# Patient Record
Sex: FEMALE | Race: WHITE | NOT HISPANIC OR LATINO | Employment: OTHER | URBAN - METROPOLITAN AREA
[De-identification: names, ages, dates, MRNs, and addresses within clinical notes are randomized per-mention and may not be internally consistent; named-entity substitution may affect disease eponyms.]

---

## 2017-01-17 RX ORDER — HYDROCHLOROTHIAZIDE 25 MG/1
TABLET ORAL
Qty: 90 TABLET | Refills: 1 | Status: SHIPPED | OUTPATIENT
Start: 2017-01-17 | End: 2017-07-12 | Stop reason: SDUPTHER

## 2017-02-01 RX ORDER — TEMAZEPAM 15 MG/1
CAPSULE ORAL
Qty: 60 CAPSULE | Refills: 1 | OUTPATIENT
Start: 2017-02-01 | End: 2017-05-08 | Stop reason: SDUPTHER

## 2017-02-06 ENCOUNTER — TELEPHONE (OUTPATIENT)
Dept: INTERNAL MEDICINE | Facility: CLINIC | Age: 62
End: 2017-02-06

## 2017-02-20 RX ORDER — FLUTICASONE PROPIONATE 50 MCG
SPRAY, SUSPENSION (ML) NASAL
Qty: 16 G | Refills: 1 | Status: SHIPPED | OUTPATIENT
Start: 2017-02-20 | End: 2017-05-08 | Stop reason: SDUPTHER

## 2017-02-20 RX ORDER — ATORVASTATIN CALCIUM 20 MG/1
TABLET, FILM COATED ORAL
Qty: 30 TABLET | Refills: 3 | Status: SHIPPED | OUTPATIENT
Start: 2017-02-20 | End: 2017-06-14 | Stop reason: SDUPTHER

## 2017-03-14 RX ORDER — ATORVASTATIN CALCIUM 20 MG/1
TABLET, FILM COATED ORAL
Qty: 30 TABLET | Refills: 3 | OUTPATIENT
Start: 2017-03-14

## 2017-03-14 RX ORDER — LISINOPRIL 20 MG/1
TABLET ORAL
Qty: 90 TABLET | Refills: 1 | Status: SHIPPED | OUTPATIENT
Start: 2017-03-14 | End: 2017-09-12 | Stop reason: SDUPTHER

## 2017-03-14 RX ORDER — POTASSIUM CHLORIDE 750 MG/1
TABLET, FILM COATED, EXTENDED RELEASE ORAL
Qty: 90 TABLET | Refills: 1 | Status: SHIPPED | OUTPATIENT
Start: 2017-03-14 | End: 2017-06-10

## 2017-03-21 RX ORDER — OMEPRAZOLE 20 MG/1
CAPSULE, DELAYED RELEASE ORAL
Qty: 180 CAPSULE | Refills: 0 | Status: SHIPPED | OUTPATIENT
Start: 2017-03-21 | End: 2017-04-04

## 2017-04-04 ENCOUNTER — APPOINTMENT (OUTPATIENT)
Dept: LAB | Facility: HOSPITAL | Age: 62
End: 2017-04-04

## 2017-04-04 ENCOUNTER — OFFICE VISIT (OUTPATIENT)
Dept: INTERNAL MEDICINE | Facility: CLINIC | Age: 62
End: 2017-04-04

## 2017-04-04 VITALS
WEIGHT: 156 LBS | SYSTOLIC BLOOD PRESSURE: 146 MMHG | HEART RATE: 64 BPM | TEMPERATURE: 98.2 F | OXYGEN SATURATION: 97 % | BODY MASS INDEX: 29 KG/M2 | DIASTOLIC BLOOD PRESSURE: 80 MMHG | RESPIRATION RATE: 16 BRPM

## 2017-04-04 DIAGNOSIS — E78.00 PURE HYPERCHOLESTEROLEMIA: Primary | ICD-10-CM

## 2017-04-04 DIAGNOSIS — M54.50 CHRONIC BILATERAL LOW BACK PAIN WITHOUT SCIATICA: ICD-10-CM

## 2017-04-04 DIAGNOSIS — F41.9 ANXIETY: ICD-10-CM

## 2017-04-04 DIAGNOSIS — I10 ESSENTIAL HYPERTENSION: ICD-10-CM

## 2017-04-04 DIAGNOSIS — M81.0 POST-MENOPAUSAL OSTEOPOROSIS: ICD-10-CM

## 2017-04-04 DIAGNOSIS — G47.9 SLEEP DISORDER: ICD-10-CM

## 2017-04-04 DIAGNOSIS — Z12.11 SCREEN FOR COLON CANCER: ICD-10-CM

## 2017-04-04 DIAGNOSIS — G89.4 CHRONIC PAIN SYNDROME: ICD-10-CM

## 2017-04-04 DIAGNOSIS — G89.29 CHRONIC BILATERAL LOW BACK PAIN WITHOUT SCIATICA: ICD-10-CM

## 2017-04-04 LAB
ALBUMIN SERPL-MCNC: 5 G/DL (ref 3.2–4.8)
ALBUMIN/GLOB SERPL: 1.6 G/DL (ref 1.5–2.5)
ALP SERPL-CCNC: 64 U/L (ref 25–100)
ALT SERPL W P-5'-P-CCNC: 21 U/L (ref 7–40)
ANION GAP SERPL CALCULATED.3IONS-SCNC: 11 MMOL/L (ref 3–11)
ARTICHOKE IGE QN: 125 MG/DL (ref 0–130)
AST SERPL-CCNC: 23 U/L (ref 0–33)
BILIRUB SERPL-MCNC: 0.8 MG/DL (ref 0.3–1.2)
BUN BLD-MCNC: 18 MG/DL (ref 9–23)
BUN/CREAT SERPL: 25.7 (ref 7–25)
CALCIUM SPEC-SCNC: 10.1 MG/DL (ref 8.7–10.4)
CHLORIDE SERPL-SCNC: 97 MMOL/L (ref 99–109)
CHOLEST SERPL-MCNC: 234 MG/DL (ref 0–200)
CO2 SERPL-SCNC: 28 MMOL/L (ref 20–31)
CREAT BLD-MCNC: 0.7 MG/DL (ref 0.6–1.3)
CRP SERPL-MCNC: 0.05 MG/DL (ref 0–1)
GFR SERPL CREATININE-BSD FRML MDRD: 85 ML/MIN/1.73
GLOBULIN UR ELPH-MCNC: 3.1 GM/DL
GLUCOSE BLD-MCNC: 96 MG/DL (ref 70–100)
HDLC SERPL-MCNC: 85 MG/DL (ref 40–60)
POTASSIUM BLD-SCNC: 3.8 MMOL/L (ref 3.5–5.5)
PROT SERPL-MCNC: 8.1 G/DL (ref 5.7–8.2)
SODIUM BLD-SCNC: 136 MMOL/L (ref 132–146)
TRIGL SERPL-MCNC: 146 MG/DL (ref 0–150)

## 2017-04-04 PROCEDURE — 36415 COLL VENOUS BLD VENIPUNCTURE: CPT | Performed by: INTERNAL MEDICINE

## 2017-04-04 PROCEDURE — 99213 OFFICE O/P EST LOW 20 MIN: CPT | Performed by: INTERNAL MEDICINE

## 2017-04-04 PROCEDURE — 80061 LIPID PANEL: CPT | Performed by: INTERNAL MEDICINE

## 2017-04-04 PROCEDURE — 86140 C-REACTIVE PROTEIN: CPT | Performed by: INTERNAL MEDICINE

## 2017-04-04 PROCEDURE — 80053 COMPREHEN METABOLIC PANEL: CPT | Performed by: INTERNAL MEDICINE

## 2017-04-04 PROCEDURE — 99396 PREV VISIT EST AGE 40-64: CPT | Performed by: INTERNAL MEDICINE

## 2017-04-04 RX ORDER — NYSTATIN 100000 U/G
CREAM TOPICAL AS NEEDED
Qty: 30 G | Refills: 1 | Status: SHIPPED | OUTPATIENT
Start: 2017-04-04 | End: 2023-01-17

## 2017-04-04 RX ORDER — RANITIDINE 150 MG/1
150 TABLET ORAL NIGHTLY
Qty: 90 TABLET | Refills: 3 | Status: SHIPPED | OUTPATIENT
Start: 2017-04-04 | End: 2017-08-17

## 2017-04-04 RX ORDER — CHOLECALCIFEROL (VITAMIN D3) 125 MCG
5 CAPSULE ORAL NIGHTLY
Start: 2017-04-04

## 2017-04-04 RX ORDER — HYDROCODONE BITARTRATE AND ACETAMINOPHEN 5; 325 MG/1; MG/1
1 TABLET ORAL DAILY PRN
Qty: 30 TABLET | Refills: 0 | Status: SHIPPED | OUTPATIENT
Start: 2017-04-04 | End: 2017-06-08

## 2017-04-04 RX ORDER — TRIAMCINOLONE ACETONIDE 1 MG/G
CREAM TOPICAL DAILY PRN
Qty: 45 G | Refills: 1 | Status: SHIPPED | OUTPATIENT
Start: 2017-04-04 | End: 2019-01-28

## 2017-04-04 NOTE — PROGRESS NOTES
Subjective   Luh Horner is a 61 y.o. female.     History of Present Illness     The patient has many years of lumbar spondylosis with chronic back pain and hip pain.  She frequently has severe flares of pain and response to cortisone injections.  Her pain often flares with regular activities and she takes hydrocodone breast 4 times weekly to regulate her pain and to stay highly functional.  She feels her low back and right hip had become increasingly painful in recent months.  She did have a follow-up with a [place] holidays and is been particularly tender than the right hip since that time.  She does find herself limping.    The following portions of the patient's history were reviewed and updated as appropriate: allergies, current medications, past family history, past medical history, past social history, past surgical history and problem list.    Review of Systems   Constitutional: Negative for appetite change and fatigue.   Respiratory: Negative for cough and shortness of breath.    Cardiovascular: Negative for chest pain and palpitations.   Gastrointestinal: Negative for abdominal distention and nausea.   Musculoskeletal: Positive for back pain and gait problem.   Neurological: Negative for dizziness and light-headedness.   Psychiatric/Behavioral: Positive for sleep disturbance. The patient is nervous/anxious.         Sleep impairment and anxiety are stable.       Objective   Blood pressure 146/80, pulse 64, temperature 98.2 °F (36.8 °C), temperature source Oral, resp. rate 16, weight 156 lb (70.8 kg), SpO2 97 %.    Physical Exam   Constitutional: She is oriented to person, place, and time. She appears well-developed and well-nourished. No distress.   Cardiovascular: Normal rate and regular rhythm.    Systolic murmur at apex   Pulmonary/Chest: Effort normal and breath sounds normal. She has no wheezes. She has no rales.   Abdominal: Soft. Bowel sounds are normal. She exhibits no distension. There is  no tenderness.   Musculoskeletal:   Moderate tenderness in paralumbar muscles.  Moderate right sacroiliitis and hip stiffness.   Neurological: She is alert and oriented to person, place, and time. She exhibits normal muscle tone. Coordination normal.   Psychiatric: She has a normal mood and affect. Her behavior is normal. Judgment and thought content normal.   Nursing note and vitals reviewed.    Procedures  Assessment/Plan   Luh was seen today for hip pain.    Diagnoses and all orders for this visit:    Pure hypercholesterolemia  -     Comprehensive Metabolic Panel  -     Lipid Panel    Essential hypertension    Chronic bilateral low back pain without sciatica  -     Ambulatory Referral to Physical Therapy Evaluate and treat    Anxiety    Sleep disorder    Chronic pain syndrome  -     C-reactive Protein  -     Ambulatory Referral to Physical Therapy Evaluate and treat    Screen for colon cancer  -     Ambulatory Referral For Screening Colonoscopy    Post-menopausal osteoporosis  -     DEXA Bone Density Axial; Future    Other orders  -     triamcinolone (KENALOG) 0.1 % cream; Apply  topically Daily As Needed for Irritation.  -     nystatin (MYCOSTATIN) 024855 UNIT/GM cream; Apply  topically As Needed (itch).  -     raNITIdine (ZANTAC) 150 MG tablet; Take 1 tablet by mouth Every Night.  -     melatonin 5 MG tablet tablet; Take 1 tablet by mouth Every Night.  -     HYDROcodone-acetaminophen (NORCO) 5-325 MG per tablet; Take 1 tablet by mouth Daily As Needed for Severe Pain (7-10).      X-rays 2 years ago showed extensive spondylosis of the lumbar spine.  Her hip x-ray itself appeared normal.  Likely the pain is related to her lumbar disease with referral into the hip and support muscles.  She has enough progressive pain and dysfunction she will need to return to her physical therapist at this time.     The patient has moderately severe hyperlipidemia and family history for heart disease.  She has failed Crestor and  Zocor but is doing well on low-dose Lipitor.  Her LDL cholesterol 125 although night ideal is certainly a favorable reading.  Increase her Lipitor dose with her at increased risk of drug intolerance.    The preventive exam has been reviewed in detail.  The patient has been fully counseled on preventative guidelines for vaccines, cancer screenings, and other health maintenance needs.   The patient has been counseled on guidelines for maintaining a lifestyle to promote good health and to minimize chronic diseases.  The patient has been assisted with scheduling these healthcare procedures for the coming year and given a written document outlining these recommendations.    Patient Instructions   1.  Physical therapy - low back and hip rehabilitation.    2.  Continue ranitidine to 150 mg at bedtime - acid suppression.    3.  Use hydrocodone - 1/2 tab to 1 tab - as needed for severe pain.    4.  Limit alcohol intake - improve stomach healing.    5.  Return in 3 months - fasting checkup.    6.  LDL cholesterol is acceptable 125.  In view of prior drug reactions no change in treatment.    7.  Chemistry panel is acceptable requires no change in treatment.    Electronically signed Jabari Tenorio M.D.4/5/2017 5:19 PM

## 2017-04-04 NOTE — PATIENT INSTRUCTIONS
1.  Physical therapy - low back and hip rehabilitation.    2.  Continue ranitidine to 150 mg at bedtime - acid suppression.    3.  Use hydrocodone - 1/2 tab to 1 tab - as needed for severe pain.    4.  Limit alcohol intake - improve stomach healing.    5.  Return in 3 months - fasting checkup.

## 2017-04-10 ENCOUNTER — HOSPITAL ENCOUNTER (OUTPATIENT)
Dept: BONE DENSITY | Facility: HOSPITAL | Age: 62
Discharge: HOME | End: 2017-04-10
Attending: INTERNAL MEDICINE | Admitting: INTERNAL MEDICINE

## 2017-04-10 ENCOUNTER — TELEPHONE (OUTPATIENT)
Dept: INTERNAL MEDICINE | Facility: CLINIC | Age: 62
End: 2017-04-10

## 2017-04-10 DIAGNOSIS — M81.0 POST-MENOPAUSAL OSTEOPOROSIS: ICD-10-CM

## 2017-04-10 PROCEDURE — 77080 DXA BONE DENSITY AXIAL: CPT

## 2017-04-10 PROCEDURE — 77080 DXA BONE DENSITY AXIAL: CPT | Performed by: RADIOLOGY

## 2017-04-12 ENCOUNTER — TELEPHONE (OUTPATIENT)
Dept: INTERNAL MEDICINE | Facility: CLINIC | Age: 62
End: 2017-04-12

## 2017-05-09 RX ORDER — CITALOPRAM 20 MG/1
TABLET ORAL
Qty: 30 TABLET | Refills: 1 | Status: SHIPPED | OUTPATIENT
Start: 2017-05-09 | End: 2017-06-30 | Stop reason: SDUPTHER

## 2017-05-09 RX ORDER — TEMAZEPAM 15 MG/1
CAPSULE ORAL
Qty: 60 CAPSULE | Refills: 0 | OUTPATIENT
Start: 2017-05-09 | End: 2017-08-02 | Stop reason: SDUPTHER

## 2017-05-09 RX ORDER — FLUTICASONE PROPIONATE 50 MCG
SPRAY, SUSPENSION (ML) NASAL
Qty: 16 G | Refills: 1 | Status: SHIPPED | OUTPATIENT
Start: 2017-05-09 | End: 2017-06-30 | Stop reason: SDUPTHER

## 2017-05-16 ENCOUNTER — TELEPHONE (OUTPATIENT)
Dept: INTERNAL MEDICINE | Facility: CLINIC | Age: 62
End: 2017-05-16

## 2017-06-08 ENCOUNTER — OFFICE VISIT (OUTPATIENT)
Dept: INTERNAL MEDICINE | Facility: CLINIC | Age: 62
End: 2017-06-08

## 2017-06-08 ENCOUNTER — HOSPITAL ENCOUNTER (OUTPATIENT)
Dept: GENERAL RADIOLOGY | Facility: HOSPITAL | Age: 62
Discharge: HOME | End: 2017-06-08
Attending: INTERNAL MEDICINE | Admitting: INTERNAL MEDICINE

## 2017-06-08 VITALS
SYSTOLIC BLOOD PRESSURE: 110 MMHG | DIASTOLIC BLOOD PRESSURE: 70 MMHG | BODY MASS INDEX: 29.37 KG/M2 | HEART RATE: 60 BPM | OXYGEN SATURATION: 95 % | WEIGHT: 158 LBS | TEMPERATURE: 97 F | RESPIRATION RATE: 16 BRPM

## 2017-06-08 DIAGNOSIS — M25.552 PAIN OF BOTH HIP JOINTS: ICD-10-CM

## 2017-06-08 DIAGNOSIS — G89.29 CHRONIC BILATERAL LOW BACK PAIN WITHOUT SCIATICA: Primary | ICD-10-CM

## 2017-06-08 DIAGNOSIS — M25.551 PAIN OF BOTH HIP JOINTS: ICD-10-CM

## 2017-06-08 DIAGNOSIS — M47.816 SPONDYLOSIS OF LUMBAR REGION WITHOUT MYELOPATHY OR RADICULOPATHY: ICD-10-CM

## 2017-06-08 DIAGNOSIS — G89.4 CHRONIC PAIN SYNDROME: ICD-10-CM

## 2017-06-08 DIAGNOSIS — M54.50 CHRONIC BILATERAL LOW BACK PAIN WITHOUT SCIATICA: Primary | ICD-10-CM

## 2017-06-08 DIAGNOSIS — M41.26 OTHER IDIOPATHIC SCOLIOSIS, LUMBAR REGION: ICD-10-CM

## 2017-06-08 PROCEDURE — 72100 X-RAY EXAM L-S SPINE 2/3 VWS: CPT

## 2017-06-08 PROCEDURE — 73502 X-RAY EXAM HIP UNI 2-3 VIEWS: CPT

## 2017-06-08 PROCEDURE — 99214 OFFICE O/P EST MOD 30 MIN: CPT | Performed by: INTERNAL MEDICINE

## 2017-06-08 RX ORDER — HYDROCODONE BITARTRATE AND ACETAMINOPHEN 5; 325 MG/1; MG/1
1 TABLET ORAL DAILY PRN
Qty: 30 TABLET | Refills: 0 | Status: SHIPPED | OUTPATIENT
Start: 2017-06-08 | End: 2017-12-20

## 2017-06-08 NOTE — PATIENT INSTRUCTIONS
1.  X-rays of right hip and low back - now.    2.  Consider MRI of lumbar spine.    3.  Consider neurosurgical consultation.    4.  Continue water aerobics - 3 days weekly - for low impact exercise.    5.  Consider new nerve modulating medication - for pain control.    6.  Avoid excessive standing and walking - protect back.    7.  Return visit August 17 - fasting checkup.

## 2017-06-08 NOTE — PROGRESS NOTES
Subjective   Luh Horner is a 61 y.o. female.     History of Present Illness     The patient has a greater than 15 year history of chronic low back pain.  3 years ago she presented with left hip pain and x-ray was normal.  In recent months she has had increasing right hip pain which is much worse after sitting and then mobilizing.  She has had no sciatica.  She has been on iron: As needed for severe cases of back pain.  She has not responded to gabapentin.  She has had significant physical therapy but generally does not improve.  He has had no recent injury.    The following portions of the patient's history were reviewed and updated as appropriate: allergies, current medications, past family history, past medical history, past social history, past surgical history and problem list.    Review of Systems   Constitutional: Negative for appetite change and fatigue.   HENT: Negative for ear pain and sore throat.    Respiratory: Negative for cough and shortness of breath.    Cardiovascular: Negative for chest pain and palpitations.   Gastrointestinal: Negative for abdominal pain and nausea.   Musculoskeletal: Positive for arthralgias, back pain and gait problem.   Neurological: Negative for dizziness and headaches.   Psychiatric/Behavioral: Positive for sleep disturbance. Negative for dysphoric mood. The patient is nervous/anxious.        Objective   Blood pressure 110/70, pulse 60, temperature 97 °F (36.1 °C), temperature source Oral, resp. rate 16, weight 158 lb (71.7 kg), SpO2 95 %.    Physical Exam   Constitutional: She is oriented to person, place, and time. She appears well-developed and well-nourished.   Cardiovascular: Normal rate, regular rhythm and normal heart sounds.    Pulmonary/Chest: Effort normal and breath sounds normal. She has no wheezes. She has no rales.   Musculoskeletal:   Moderate paralumbar tightness tenderness with right sacroiliitis.  Hips and knees good range of motion with moderate  tenderness in right hip with full range of motion.    Neurological: She is alert and oriented to person, place, and time. She exhibits normal muscle tone. Coordination normal.   Psychiatric: She has a normal mood and affect. Her behavior is normal. Judgment and thought content normal.   Nursing note and vitals reviewed.    Procedures  Assessment/Plan   Luh was seen today for hip pain.    Diagnoses and all orders for this visit:    Chronic bilateral low back pain without sciatica  -     XR Spine Lumbar 2 or 3 View    Chronic pain syndrome    Pain of both hip joints  -     XR Hip With or Without Pelvis 2 - 3 View Right    Spondylosis of lumbar region without myelopathy or radiculopathy    Other idiopathic scoliosis, lumbar region    Other orders  -     HYDROcodone-acetaminophen (NORCO) 5-325 MG per tablet; Take 1 tablet by mouth Daily As Needed for Severe Pain (7-10).    The patient's normal right hip x-ray again suggests that her lumbar spine is a source of her increasing hip pain.  The lumbar spine does confirm advancing disease over the last 3 years.  I've counseled her that increasing pain may warrant an MRI and neurosurgical consultation.  I've again counseled her on using a new medication such as sonogram and which may help her lose weight.    The patient's condition is complicated by chronic insomnia and dysphoria.  Her current medications have greatly helped stabilize the distress created by her chronic pain.  Her current doses seem to be adequate and safe.    The patient does need to stay physically active to maintain strength and function capacity.  I've encouraged her to make water aerobics her major form of exercise.    Patient Instructions   1.  X-rays of right hip and low back - now.    2.  Consider MRI of lumbar spine.    3.  Consider neurosurgical consultation.    4.  Continue water aerobics - 3 days weekly - for low impact exercise.    5.  Consider new nerve modulating medication - for pain  control.    6.  Avoid excessive standing and walking - protect back.    7.  Return visit August 17 - fasting checkup.    8.  Right hip x-ray is normal.    9.  Lumbar spine x-ray shows advanced degenerative change L1-4.    Electronically signed Jabari Tenorio M.D.6/10/2017 2:44 PM

## 2017-06-10 PROBLEM — M41.9 LUMBAR SCOLIOSIS: Status: ACTIVE | Noted: 2017-06-10

## 2017-06-12 ENCOUNTER — TELEPHONE (OUTPATIENT)
Dept: INTERNAL MEDICINE | Facility: CLINIC | Age: 62
End: 2017-06-12

## 2017-06-12 NOTE — TELEPHONE ENCOUNTER
Left message with pt re: recent xrays.  Per TGF - Right hip x-ray is normal.  Lumbar spine x-ray shows advanced degenerative change L1-4.  Enc to call if questions or concerns.

## 2017-06-14 RX ORDER — ATORVASTATIN CALCIUM 20 MG/1
TABLET, FILM COATED ORAL
Qty: 90 TABLET | Refills: 0 | Status: SHIPPED | OUTPATIENT
Start: 2017-06-14 | End: 2017-09-26 | Stop reason: SDUPTHER

## 2017-07-03 RX ORDER — FLUTICASONE PROPIONATE 50 MCG
SPRAY, SUSPENSION (ML) NASAL
Qty: 16 G | Refills: 1 | Status: SHIPPED | OUTPATIENT
Start: 2017-07-03 | End: 2017-09-27 | Stop reason: SDUPTHER

## 2017-07-03 RX ORDER — AMLODIPINE BESYLATE 5 MG/1
TABLET ORAL
Qty: 90 TABLET | Refills: 1 | Status: SHIPPED | OUTPATIENT
Start: 2017-07-03 | End: 2018-01-02 | Stop reason: SDUPTHER

## 2017-07-03 RX ORDER — CITALOPRAM 20 MG/1
TABLET ORAL
Qty: 90 TABLET | Refills: 1 | Status: SHIPPED | OUTPATIENT
Start: 2017-07-03 | End: 2018-01-02 | Stop reason: SDUPTHER

## 2017-07-13 RX ORDER — HYDROCHLOROTHIAZIDE 25 MG/1
TABLET ORAL
Qty: 90 TABLET | Refills: 1 | Status: SHIPPED | OUTPATIENT
Start: 2017-07-13 | End: 2018-01-08 | Stop reason: SDUPTHER

## 2017-08-02 RX ORDER — TEMAZEPAM 15 MG/1
15 CAPSULE ORAL NIGHTLY PRN
Qty: 60 CAPSULE | Refills: 0 | OUTPATIENT
Start: 2017-08-02 | End: 2017-09-26 | Stop reason: SDUPTHER

## 2017-08-03 ENCOUNTER — OUTSIDE FACILITY SERVICE (OUTPATIENT)
Dept: GASTROENTEROLOGY | Facility: CLINIC | Age: 62
End: 2017-08-03

## 2017-08-03 PROCEDURE — G0105 COLORECTAL SCRN; HI RISK IND: HCPCS | Performed by: INTERNAL MEDICINE

## 2017-08-08 RX ORDER — METOPROLOL SUCCINATE 50 MG/1
TABLET, EXTENDED RELEASE ORAL
Qty: 30 TABLET | Refills: 5 | OUTPATIENT
Start: 2017-08-08

## 2017-08-08 RX ORDER — METOPROLOL SUCCINATE 50 MG/1
TABLET, EXTENDED RELEASE ORAL
Qty: 30 TABLET | Refills: 5 | Status: SHIPPED | OUTPATIENT
Start: 2017-08-08 | End: 2017-08-17

## 2017-08-17 ENCOUNTER — APPOINTMENT (OUTPATIENT)
Dept: LAB | Facility: HOSPITAL | Age: 62
End: 2017-08-17

## 2017-08-17 ENCOUNTER — OFFICE VISIT (OUTPATIENT)
Dept: INTERNAL MEDICINE | Facility: CLINIC | Age: 62
End: 2017-08-17

## 2017-08-17 VITALS
BODY MASS INDEX: 29.44 KG/M2 | TEMPERATURE: 97.7 F | SYSTOLIC BLOOD PRESSURE: 150 MMHG | RESPIRATION RATE: 16 BRPM | WEIGHT: 160 LBS | HEIGHT: 62 IN | DIASTOLIC BLOOD PRESSURE: 80 MMHG | HEART RATE: 60 BPM | OXYGEN SATURATION: 98 %

## 2017-08-17 DIAGNOSIS — K58.9 IRRITABLE BOWEL SYNDROME WITHOUT DIARRHEA: ICD-10-CM

## 2017-08-17 DIAGNOSIS — M47.816 SPONDYLOSIS OF LUMBAR REGION WITHOUT MYELOPATHY OR RADICULOPATHY: ICD-10-CM

## 2017-08-17 DIAGNOSIS — M25.552 PAIN OF BOTH HIP JOINTS: Primary | ICD-10-CM

## 2017-08-17 DIAGNOSIS — M25.551 PAIN OF BOTH HIP JOINTS: Primary | ICD-10-CM

## 2017-08-17 DIAGNOSIS — F41.9 ANXIETY: ICD-10-CM

## 2017-08-17 DIAGNOSIS — E78.00 PURE HYPERCHOLESTEROLEMIA: ICD-10-CM

## 2017-08-17 DIAGNOSIS — I10 ESSENTIAL HYPERTENSION: ICD-10-CM

## 2017-08-17 LAB
ALBUMIN SERPL-MCNC: 4.6 G/DL (ref 3.2–4.8)
ALBUMIN/GLOB SERPL: 1.7 G/DL (ref 1.5–2.5)
ALP SERPL-CCNC: 64 U/L (ref 25–100)
ALT SERPL W P-5'-P-CCNC: 20 U/L (ref 7–40)
ANION GAP SERPL CALCULATED.3IONS-SCNC: 7 MMOL/L (ref 3–11)
ARTICHOKE IGE QN: 127 MG/DL (ref 0–130)
AST SERPL-CCNC: 19 U/L (ref 0–33)
BILIRUB SERPL-MCNC: 0.7 MG/DL (ref 0.3–1.2)
BUN BLD-MCNC: 14 MG/DL (ref 9–23)
BUN/CREAT SERPL: 20 (ref 7–25)
CALCIUM SPEC-SCNC: 9.4 MG/DL (ref 8.7–10.4)
CHLORIDE SERPL-SCNC: 101 MMOL/L (ref 99–109)
CHOLEST SERPL-MCNC: 213 MG/DL (ref 0–200)
CO2 SERPL-SCNC: 31 MMOL/L (ref 20–31)
CREAT BLD-MCNC: 0.7 MG/DL (ref 0.6–1.3)
GFR SERPL CREATININE-BSD FRML MDRD: 85 ML/MIN/1.73
GLOBULIN UR ELPH-MCNC: 2.7 GM/DL
GLUCOSE BLD-MCNC: 86 MG/DL (ref 70–100)
HDLC SERPL-MCNC: 77 MG/DL (ref 40–60)
POTASSIUM BLD-SCNC: 4 MMOL/L (ref 3.5–5.5)
PROT SERPL-MCNC: 7.3 G/DL (ref 5.7–8.2)
SODIUM BLD-SCNC: 139 MMOL/L (ref 132–146)
TRIGL SERPL-MCNC: 141 MG/DL (ref 0–150)

## 2017-08-17 PROCEDURE — 99214 OFFICE O/P EST MOD 30 MIN: CPT | Performed by: INTERNAL MEDICINE

## 2017-08-17 PROCEDURE — 36415 COLL VENOUS BLD VENIPUNCTURE: CPT | Performed by: INTERNAL MEDICINE

## 2017-08-17 PROCEDURE — 80061 LIPID PANEL: CPT | Performed by: INTERNAL MEDICINE

## 2017-08-17 PROCEDURE — 80053 COMPREHEN METABOLIC PANEL: CPT | Performed by: INTERNAL MEDICINE

## 2017-08-17 RX ORDER — DICYCLOMINE HYDROCHLORIDE 10 MG/1
10 CAPSULE ORAL 3 TIMES DAILY PRN
Qty: 30 CAPSULE | Refills: 3 | Status: SHIPPED | OUTPATIENT
Start: 2017-08-17 | End: 2017-12-20

## 2017-08-17 RX ORDER — FAMOTIDINE 20 MG/1
20 TABLET, FILM COATED ORAL 2 TIMES DAILY
Qty: 60 TABLET | Refills: 5 | Status: SHIPPED | OUTPATIENT
Start: 2017-08-17 | End: 2017-12-20

## 2017-08-17 NOTE — PATIENT INSTRUCTIONS
"1.  Use \"Lifeway Kefir\" - 4 ounces daily for 8 days - to treat irritable bowel as needed.    2.  Use dicyclomine 10 mg 3 times daily - to treat irritable bowel as needed.    3.  Continue usual medicines and supplements - as listed.    4.  Follow a low-calorie diet - low in salt and low in sugar.    5.  Return visit December 20 - annual checkup fasting.  "

## 2017-08-17 NOTE — PROGRESS NOTES
"Subjective   Luhcarey Horner is a 61 y.o. female.     History of Present Illness     The patient's had many years of irritable bowel syndrome associated with GERD.  Her GERD symptoms continue to flare at times despite Carafate and ranitidine.  She has had more lower abdominal crampiness and intermittent loose bowel movements.  She does follow well-balanced [employer] diet.    The following portions of the patient's history were reviewed and updated as appropriate: allergies, current medications, past family history, past medical history, past social history, past surgical history and problem list.    Review of Systems   Constitutional: Negative for appetite change and fatigue.   HENT: Negative for ear pain and sore throat.    Respiratory: Negative for cough and shortness of breath.    Cardiovascular: Negative for chest pain and palpitations.   Gastrointestinal: Positive for abdominal distention and diarrhea. Negative for abdominal pain and nausea.        Mild intermittent lower abdominal cramping   Musculoskeletal: Negative for arthralgias and back pain.   Neurological: Negative for dizziness and headaches.   Psychiatric/Behavioral: Positive for dysphoric mood and sleep disturbance. The patient is nervous/anxious.         Anxiety and sleep disturbance well compensated       Objective   Blood pressure 150/80, pulse 60, temperature 97.7 °F (36.5 °C), temperature source Oral, resp. rate 16, height 61.5\" (156.2 cm), weight 160 lb (72.6 kg), SpO2 98 %.    Physical Exam   Constitutional: She is oriented to person, place, and time. She appears well-developed and well-nourished. No distress.   Cardiovascular: Normal rate and regular rhythm.    Start murmur at apex   Pulmonary/Chest: Effort normal and breath sounds normal. She has no wheezes. She has no rales.   Abdominal: Soft. Bowel sounds are normal. She exhibits no distension and no mass. There is no tenderness.   Neurological: She is alert and " "oriented to person, place, and time. She exhibits normal muscle tone. Coordination normal.   Psychiatric: She has a normal mood and affect. Her behavior is normal. Judgment and thought content normal.   Nursing note and vitals reviewed.    Procedures  Assessment/Plan   Luh was seen today for pain.    Diagnoses and all orders for this visit:    Pain of both hip joints    Spondylosis of lumbar region without myelopathy or radiculopathy    Anxiety    Irritable bowel syndrome without diarrhea    Essential hypertension    Pure hypercholesterolemia  -     Comprehensive Metabolic Panel  -     Lipid Panel    Other orders  -     famotidine (PEPCID) 20 MG tablet; Take 1 tablet by mouth 2 (Two) Times a Day.  -     dicyclomine (BENTYL) 10 MG capsule; Take 1 capsule by mouth 3 (Three) Times a Day As Needed (Irritable bowel).    I've asked her to change to famotidine hoping for improved suppression of acid.  I've given her Bentyl to use as needed for more severe cramps.  She may benefit from new medical nutritional therapy.    The patient's LDL cholesterol well-balanced remains elevated.  Atorvastatin 20 mg has offered her partial control.  In view of her brother's history of heart disease, she may benefit from a new trial on Zetia.    Patient's had many years of essential hypertension.  Blood pressures are responding adequately to medication and lifestyle treatment.    The patient has a long history of anxiety, clinical depression, and sleep disturbance.  She feels remarkably compensated on current medication.    Patient Instructions   1.  Use \"Lifeway Kefir\" - 4 ounces daily for 8 days - to treat irritable bowel as needed.    2.  Use dicyclomine 10 mg 3 times daily - to treat irritable bowel as needed.    3.  Continue usual medicines and supplements - as listed.    4.  Follow a low-calorie diet - low in salt and low in sugar.    5.  Return visit December 20 - annual checkup fasting.    6.  Elevated cholesterol mildly " elevated 127.  Consider Zetia in addition to Lipitor.    7.  Chemistry panel is acceptable requires no change in treatment.    8.  Consider new consultation with registered dietitian.    Electronically signed Jabari Tenorio M.D.8/19/2017 2:10 PM

## 2017-08-23 ENCOUNTER — TELEPHONE (OUTPATIENT)
Dept: INTERNAL MEDICINE | Facility: CLINIC | Age: 62
End: 2017-08-23

## 2017-08-23 NOTE — TELEPHONE ENCOUNTER
Called pt re: recent labs.  Per TGF -  Elevated cholesterol mildly elevated 127.  Consider Zetia in addition to Lipitor.  Chemistry panel is acceptable requires no change in treatment.  Pt verb understanding.

## 2017-09-12 RX ORDER — LISINOPRIL 20 MG/1
TABLET ORAL
Qty: 90 TABLET | Refills: 1 | Status: SHIPPED | OUTPATIENT
Start: 2017-09-12 | End: 2018-02-28 | Stop reason: SDUPTHER

## 2017-09-12 RX ORDER — POTASSIUM CHLORIDE 750 MG/1
TABLET, FILM COATED, EXTENDED RELEASE ORAL
Qty: 90 TABLET | Refills: 1 | Status: SHIPPED | OUTPATIENT
Start: 2017-09-12 | End: 2018-02-06 | Stop reason: SDUPTHER

## 2017-09-18 RX ORDER — OMEPRAZOLE 20 MG/1
CAPSULE, DELAYED RELEASE ORAL
Qty: 60 CAPSULE | Refills: 0 | OUTPATIENT
Start: 2017-09-18

## 2017-09-26 RX ORDER — TEMAZEPAM 15 MG/1
CAPSULE ORAL
Qty: 60 CAPSULE | Refills: 0 | OUTPATIENT
Start: 2017-09-26 | End: 2017-11-29 | Stop reason: SDUPTHER

## 2017-09-26 RX ORDER — ATORVASTATIN CALCIUM 20 MG/1
TABLET, FILM COATED ORAL
Qty: 30 TABLET | Refills: 5 | Status: SHIPPED | OUTPATIENT
Start: 2017-09-26 | End: 2018-03-27 | Stop reason: SDUPTHER

## 2017-09-28 RX ORDER — FLUTICASONE PROPIONATE 50 MCG
SPRAY, SUSPENSION (ML) NASAL
Qty: 1 BOTTLE | Refills: 2 | Status: SHIPPED | OUTPATIENT
Start: 2017-09-28 | End: 2018-01-08 | Stop reason: SDUPTHER

## 2017-11-29 RX ORDER — TEMAZEPAM 15 MG/1
CAPSULE ORAL
Qty: 60 CAPSULE | Refills: 0 | OUTPATIENT
Start: 2017-11-29 | End: 2018-02-06 | Stop reason: SDUPTHER

## 2017-12-20 ENCOUNTER — APPOINTMENT (OUTPATIENT)
Dept: LAB | Facility: HOSPITAL | Age: 62
End: 2017-12-20

## 2017-12-20 ENCOUNTER — OFFICE VISIT (OUTPATIENT)
Dept: INTERNAL MEDICINE | Facility: CLINIC | Age: 62
End: 2017-12-20

## 2017-12-20 DIAGNOSIS — G47.9 SLEEP DISORDER: ICD-10-CM

## 2017-12-20 DIAGNOSIS — E78.00 PURE HYPERCHOLESTEROLEMIA: Primary | ICD-10-CM

## 2017-12-20 DIAGNOSIS — M25.552 PAIN OF BOTH HIP JOINTS: ICD-10-CM

## 2017-12-20 DIAGNOSIS — F32.89 OTHER DEPRESSION: ICD-10-CM

## 2017-12-20 DIAGNOSIS — I10 ESSENTIAL HYPERTENSION: ICD-10-CM

## 2017-12-20 DIAGNOSIS — M47.816 LUMBAR SPONDYLOSIS: ICD-10-CM

## 2017-12-20 DIAGNOSIS — G89.4 CHRONIC PAIN SYNDROME: ICD-10-CM

## 2017-12-20 DIAGNOSIS — K21.9 GASTROESOPHAGEAL REFLUX DISEASE WITHOUT ESOPHAGITIS: ICD-10-CM

## 2017-12-20 DIAGNOSIS — I34.0 NON-RHEUMATIC MITRAL REGURGITATION: ICD-10-CM

## 2017-12-20 DIAGNOSIS — F41.9 ANXIETY: ICD-10-CM

## 2017-12-20 DIAGNOSIS — N95.1 POSTMENOPAUSAL DISORDER: ICD-10-CM

## 2017-12-20 DIAGNOSIS — E66.09 CLASS 1 OBESITY DUE TO EXCESS CALORIES WITHOUT SERIOUS COMORBIDITY WITH BODY MASS INDEX (BMI) OF 30.0 TO 30.9 IN ADULT: ICD-10-CM

## 2017-12-20 DIAGNOSIS — M25.551 PAIN OF BOTH HIP JOINTS: ICD-10-CM

## 2017-12-20 DIAGNOSIS — M41.26 OTHER IDIOPATHIC SCOLIOSIS, LUMBAR REGION: ICD-10-CM

## 2017-12-20 LAB
ALBUMIN SERPL-MCNC: 5 G/DL (ref 3.2–4.8)
ALBUMIN/GLOB SERPL: 1.8 G/DL (ref 1.5–2.5)
ALP SERPL-CCNC: 70 U/L (ref 25–100)
ALT SERPL W P-5'-P-CCNC: 46 U/L (ref 7–40)
ANION GAP SERPL CALCULATED.3IONS-SCNC: 11 MMOL/L (ref 3–11)
ARTICHOKE IGE QN: 105 MG/DL (ref 0–130)
AST SERPL-CCNC: 36 U/L (ref 0–33)
BACTERIA UR QL AUTO: ABNORMAL /HPF
BASOPHILS # BLD AUTO: 0.08 10*3/MM3 (ref 0–0.2)
BASOPHILS NFR BLD AUTO: 1 % (ref 0–1)
BILIRUB SERPL-MCNC: 0.9 MG/DL (ref 0.3–1.2)
BILIRUB UR QL STRIP: NEGATIVE
BUN BLD-MCNC: 15 MG/DL (ref 9–23)
BUN/CREAT SERPL: 21.4 (ref 7–25)
CALCIUM SPEC-SCNC: 9.6 MG/DL (ref 8.7–10.4)
CHLORIDE SERPL-SCNC: 95 MMOL/L (ref 99–109)
CHOLEST SERPL-MCNC: 190 MG/DL (ref 0–200)
CLARITY UR: ABNORMAL
CO2 SERPL-SCNC: 31 MMOL/L (ref 20–31)
COLOR UR: YELLOW
CREAT BLD-MCNC: 0.7 MG/DL (ref 0.6–1.3)
CRP SERPL-MCNC: 0.07 MG/DL (ref 0–1)
DEPRECATED RDW RBC AUTO: 45 FL (ref 37–54)
EOSINOPHIL # BLD AUTO: 0.09 10*3/MM3 (ref 0–0.3)
EOSINOPHIL NFR BLD AUTO: 1.1 % (ref 0–3)
ERYTHROCYTE [DISTWIDTH] IN BLOOD BY AUTOMATED COUNT: 12.3 % (ref 11.3–14.5)
GFR SERPL CREATININE-BSD FRML MDRD: 84 ML/MIN/1.73
GLOBULIN UR ELPH-MCNC: 2.8 GM/DL
GLUCOSE BLD-MCNC: 88 MG/DL (ref 70–100)
GLUCOSE UR STRIP-MCNC: NEGATIVE MG/DL
HCT VFR BLD AUTO: 47.4 % (ref 34.5–44)
HDLC SERPL-MCNC: 75 MG/DL (ref 40–60)
HGB BLD-MCNC: 15.6 G/DL (ref 11.5–15.5)
HGB UR QL STRIP.AUTO: NEGATIVE
HYALINE CASTS UR QL AUTO: ABNORMAL /LPF
IMM GRANULOCYTES # BLD: 0.02 10*3/MM3 (ref 0–0.03)
IMM GRANULOCYTES NFR BLD: 0.3 % (ref 0–0.6)
KETONES UR QL STRIP: NEGATIVE
LEUKOCYTE ESTERASE UR QL STRIP.AUTO: NEGATIVE
LYMPHOCYTES # BLD AUTO: 1.34 10*3/MM3 (ref 0.6–4.8)
LYMPHOCYTES NFR BLD AUTO: 17.1 % (ref 24–44)
MCH RBC QN AUTO: 33.3 PG (ref 27–31)
MCHC RBC AUTO-ENTMCNC: 32.9 G/DL (ref 32–36)
MCV RBC AUTO: 101.3 FL (ref 80–99)
MONOCYTES # BLD AUTO: 0.69 10*3/MM3 (ref 0–1)
MONOCYTES NFR BLD AUTO: 8.8 % (ref 0–12)
NEUTROPHILS # BLD AUTO: 5.63 10*3/MM3 (ref 1.5–8.3)
NEUTROPHILS NFR BLD AUTO: 71.7 % (ref 41–71)
NITRITE UR QL STRIP: NEGATIVE
PH UR STRIP.AUTO: 7 [PH] (ref 5–8)
PLATELET # BLD AUTO: 255 10*3/MM3 (ref 150–450)
PMV BLD AUTO: 10.7 FL (ref 6–12)
POTASSIUM BLD-SCNC: 3.5 MMOL/L (ref 3.5–5.5)
PROT SERPL-MCNC: 7.8 G/DL (ref 5.7–8.2)
PROT UR QL STRIP: NEGATIVE
RBC # BLD AUTO: 4.68 10*6/MM3 (ref 3.89–5.14)
RBC # UR: ABNORMAL /HPF
REF LAB TEST METHOD: ABNORMAL
SODIUM BLD-SCNC: 137 MMOL/L (ref 132–146)
SP GR UR STRIP: 1.01 (ref 1–1.03)
SQUAMOUS #/AREA URNS HPF: ABNORMAL /HPF
TRIGL SERPL-MCNC: 124 MG/DL (ref 0–150)
TSH SERPL DL<=0.05 MIU/L-ACNC: 1.5 MIU/ML (ref 0.35–5.35)
UROBILINOGEN UR QL STRIP: ABNORMAL
WBC NRBC COR # BLD: 7.85 10*3/MM3 (ref 3.5–10.8)
WBC UR QL AUTO: ABNORMAL /HPF

## 2017-12-20 PROCEDURE — 81001 URINALYSIS AUTO W/SCOPE: CPT | Performed by: INTERNAL MEDICINE

## 2017-12-20 PROCEDURE — 84443 ASSAY THYROID STIM HORMONE: CPT | Performed by: INTERNAL MEDICINE

## 2017-12-20 PROCEDURE — 36415 COLL VENOUS BLD VENIPUNCTURE: CPT | Performed by: INTERNAL MEDICINE

## 2017-12-20 PROCEDURE — 80061 LIPID PANEL: CPT | Performed by: INTERNAL MEDICINE

## 2017-12-20 PROCEDURE — 80053 COMPREHEN METABOLIC PANEL: CPT | Performed by: INTERNAL MEDICINE

## 2017-12-20 PROCEDURE — 85025 COMPLETE CBC W/AUTO DIFF WBC: CPT | Performed by: INTERNAL MEDICINE

## 2017-12-20 PROCEDURE — 99215 OFFICE O/P EST HI 40 MIN: CPT | Performed by: INTERNAL MEDICINE

## 2017-12-20 PROCEDURE — 93000 ELECTROCARDIOGRAM COMPLETE: CPT | Performed by: INTERNAL MEDICINE

## 2017-12-20 PROCEDURE — 86140 C-REACTIVE PROTEIN: CPT | Performed by: INTERNAL MEDICINE

## 2017-12-20 RX ORDER — HYDROCODONE BITARTRATE AND ACETAMINOPHEN 5; 325 MG/1; MG/1
.5-1 TABLET ORAL DAILY PRN
Qty: 30 TABLET | Refills: 0 | Status: SHIPPED | OUTPATIENT
Start: 2017-12-20 | End: 2018-03-27

## 2017-12-20 RX ORDER — FAMOTIDINE 20 MG/1
20 TABLET, FILM COATED ORAL NIGHTLY
Qty: 60 TABLET | Refills: 5
Start: 2017-12-20 | End: 2018-07-30 | Stop reason: SDUPTHER

## 2017-12-20 NOTE — PROGRESS NOTES
Subjective   Luh Horner is a 62 y.o. female.     Chief Complaint   Patient presents with   • Back Pain       History of Present Illness     The patient's had several years of progressive low back pain with stiffness and achiness and hip discomfort.  Last year she had moderate left hip discomfort and this year it is mostly on the right.  Her right hip has been less uncomfortable in the last month.  She often has to concentrate to avoid limping.  She has had a greatly curtailed physical activities although she visits the gymnasium regularly.  She has been cautioned to avoid nonsteroidals for long-term safety.  She has episodes of peak pain and takes hydrocodone with some success about once weekly.  She does not have significant pain at night.  Plain x-rays in June of this year showed advanced lumbar spondylosis with mild scoliosis.  Hip x-rays are normal.    The following portions of the patient's history were reviewed and updated as appropriate: allergies, current medications, past family history, past medical history, past social history, past surgical history and problem list.    Active Ambulatory Problems     Diagnosis Date Noted   • Atopic rhinitis 06/14/2016   • Anxiety 06/14/2016   • Carpal tunnel syndrome 06/14/2016   • Clenching of teeth 06/14/2016   • Depression 06/14/2016   • Sleep disorder 06/14/2016   • Gastroesophageal reflux disease 06/14/2016   • Hyperlipidemia 06/14/2016   • Hypertension 06/14/2016   • Low back pain 06/14/2016   • Mitral valve insufficiency 06/14/2016   • Adiposity 06/14/2016   • Arthralgia of hip 06/14/2016   • Postmenopausal disorder 06/14/2016   • Preventative health care 09/29/2016   • Chronic pain 09/29/2016   • Lumbar spondylosis 06/08/2017   • Lumbar scoliosis 06/10/2017   • Irritable bowel syndrome 08/17/2017     Resolved Ambulatory Problems     Diagnosis Date Noted   • Dizziness 06/14/2016   • Insect bites 06/14/2016     Past Medical History:   Diagnosis Date   •  Allergic rhinitis    • Bone pain    • Depression    • Fibrocystic breast disease    • GERD (gastroesophageal reflux disease)    • Hiatal hernia    • Hyperlipidemia    • Hypertension    • Insomnia    • Irritable bowel syndrome (IBS)    • Lumbar scoliosis    • Lumbar spondylosis    • Mitral valve insufficiency    • Osteoarthritis    • Osteopenia    • Post menopausal syndrome      Past Surgical History:   Procedure Laterality Date   • BREAST BIOPSY Right remote    benign disease   • CHOLECYSTECTOMY     • HYSTERECTOMY     • REFRACTIVE SURGERY      RK   • URETHRAL SUSPENSION      laparoscopic for stress incontinence     Family History   Problem Relation Age of Onset   • Pulmonary fibrosis Mother       age 82   • Hypertension Mother    • Lumbar disc disease Mother    • Prostate cancer Father       age 76   • Hypertension Sister    • Goiter Sister    • Hypothyroidism Sister    • Heart disease Brother       Age 48 heart attack was cigarette smoker   • Hiatal hernia Brother    • Breast cancer Other      Maternal and paternal aunts   • Hypertension Brother    • Obesity Maternal Uncle    • Diabetes Maternal Uncle    • Arthritis Paternal Aunt      Social History     Social History   • Marital status:      Spouse name: N/A   • Number of children: N/A   • Years of education: N/A     Occupational History   • Not on file.     Social History Main Topics   • Smoking status: Former Smoker     Packs/day: 2.00     Years: 10.00     Types: Cigarettes     Start date:      Quit date:    • Smokeless tobacco: Never Used   • Alcohol use 1.8 oz/week     3 Glasses of wine per week   • Drug use: No   • Sexual activity: Not on file     Other Topics Concern   • Not on file     Social History Narrative    Domestic life   lives in private home with         Orthodoxy   none listed        Sleep hygiene    in bed 10 PM to 7 AM for 8 hours broken sleep        Caffeine  "use    2 cup of coffee daily        Exercise habits   water aerobics twice weekly.  walks daily.  upper body strengthening twice daily.        Diet     AHA diet low in red meats.  One dairy serving daily.  Low sodium.        Occupation   retired college         Hearing   no impairment        Vision   corrects with bifocal glasses        Driving   no limitations             Review of Systems   Constitutional: Negative for appetite change and fatigue.   HENT: Negative for ear pain and sore throat.    Eyes: Negative for itching and visual disturbance.   Respiratory: Negative for cough and shortness of breath.    Cardiovascular: Negative for chest pain and palpitations.   Gastrointestinal: Negative for abdominal pain and nausea.        Allan cramping improved this year.   Endocrine: Negative for cold intolerance and heat intolerance.   Genitourinary: Negative for dysuria and hematuria.   Musculoskeletal: Positive for arthralgias, back pain and gait problem.   Skin: Negative for rash and wound.   Allergic/Immunologic: Negative for environmental allergies and food allergies.   Neurological: Negative for dizziness and headaches.   Hematological: Negative for adenopathy. Does not bruise/bleed easily.   Psychiatric/Behavioral: Positive for dysphoric mood and sleep disturbance. The patient is nervous/anxious.         Sleep disturbance and emotional distress well compensated on medication       Objective   Blood pressure 130/80, pulse 60, temperature 97.4 °F (36.3 °C), temperature source Oral, resp. rate 14, height 154.9 cm (61\"), weight 71.7 kg (158 lb), SpO2 98 %.    Physical Exam   Constitutional: She is oriented to person, place, and time. She appears well-developed and well-nourished. No distress.   HENT:   Right Ear: External ear normal.   Left Ear: External ear normal.   Nose: Nose normal.   Mouth/Throat: Oropharynx is clear and moist.   Eyes: EOM are normal. Pupils are equal, round, and reactive " to light. No scleral icterus.   Neck: Normal range of motion. Neck supple. No JVD present. No thyromegaly present.   Cardiovascular: Normal rate, regular rhythm and intact distal pulses.    Systolic murmur at apex   Pulmonary/Chest: Effort normal and breath sounds normal. She has no wheezes. She has no rales.   Abdominal: Soft. Bowel sounds are normal. She exhibits no distension and no mass. There is no tenderness.   Genitourinary:   Genitourinary Comments: Per GYN   Musculoskeletal: Normal range of motion. She exhibits no edema.   Moderate paralumbar tightness tenderness.  Moderate stiffness mild tenderness through hips and knees.   Lymphadenopathy:     She has no cervical adenopathy.   Neurological: She is alert and oriented to person, place, and time. She displays normal reflexes. No cranial nerve deficit. She exhibits normal muscle tone. Coordination normal.   Vibratory normal  Romberg negative  Gait normal  Plantars downgoing     Skin: Skin is warm and dry. No rash noted.   Breast exam normal   Psychiatric: She has a normal mood and affect. Her behavior is normal. Judgment and thought content normal.   Nursing note and vitals reviewed.      ECG 12 Lead  Date/Time: 12/20/2017 9:50 AM  Performed by: JABARI TENORIO  Authorized by: JABARI TENORIO   Interpreted by ED physician: Jabari Tenorio M.D.  Comparison: compared with previous ECG from 12/16/2016  Similar to previous ECG  Rhythm: sinus rhythm  Rate: normal  BPM: 58  Conduction: conduction normal  ST Segments: ST segments normal  T Waves: T waves normal  QRS axis: normal  Other: no other findings  Clinical impression: normal ECG  Comments: Indication - mitral regurgitation  Baseline  EKG          Assessment/Plan   Luh was seen today for back pain.    Diagnoses and all orders for this visit:    Pure hypercholesterolemia  -     Comprehensive Metabolic Panel  -     Lipid Panel    Essential hypertension  -     Urinalysis With / Microscopic If  Indicated - Urine, Clean Catch  -     Urinalysis, Microscopic Only - Urine, Clean Catch; Future  -     Urinalysis, Microscopic Only - Urine, Clean Catch    Non-rheumatic mitral regurgitation  -     ECG 12 Lead    Class 1 obesity due to excess calories without serious comorbidity with body mass index (BMI) of 30.0 to 30.9 in adult  -     TSH    Gastroesophageal reflux disease without esophagitis  -     CBC & Differential  -     C-reactive Protein  -     CBC Auto Differential    Pain of both hip joints    Other idiopathic scoliosis, lumbar region    Lumbar spondylosis    Anxiety  -     TSH    Chronic pain syndrome    Other depression    Postmenopausal disorder    Sleep disorder    Other orders  -     famotidine (PEPCID) 20 MG tablet; Take 1 tablet by mouth Every Night.  -     HYDROcodone-acetaminophen (NORCO) 5-325 MG per tablet; Take 0.5-1 tablets by mouth Daily As Needed for Moderate Pain .    The patient has severe advanced lumbar spondylosis with chronic recurring pain.  She has had a greatly curtail intensity her physical activities around the home and the gymnasium.  She has worked effectively with physical therapy and feels she will require this off and on.    The patient has moderately severe hyperlipidemia and a family history of heart disease.  She has failed multiple statins but is currently tolerating atorvastatin.  Her LDL cholesterol at 105 should be acceptable to prevent adverse reactions higher doses.    The patient has many years of chronic GERD.  She has made excellent progress on famotidine and sucralfate.  I've asked her to stop omeprazole for long-term safety.    The patient's had many years of irritable bowel syndrome with abdominal and call cramps and colon spasms.  She is doing well now keeping her bowels regular with kefir and a bland diet.    The patient has a greater than 30 year history of severe lapsing  depression.  In recent years she has had significant impairment with menopause and  feels the estrogen replacement is still helpful.  I've asked her to wean off of estrogen gradually emboli on citalopram.  She may wish to try a new agent such as bupropion.      Patient Instructions   1.  Continue usual medicines and supplements - as listed.    2.  Stop omeprazole - use sucralfate and famotidine daily - for acid suppression.    3.  Use Pepto-Bismol as needed - for indigestion.    4.  Maintain routine exercise program - every week.    5.  Follow a low-calorie diet - low in salt and low in sugar.    6.  The nurse will call with test results.    7.  Return visit 3 months - fasting checkup.    8.   indicates adequate cholesterol control.    9.  Other laboratory tests are acceptable and require no change in treatment.    Current Outpatient Prescriptions:   •  bismuth subsalicylate (PEPTO BISMOL) 262 MG/15ML suspension, Take 15 mL by mouth Daily As Needed., Disp: , Rfl:   •  amLODIPine (NORVASC) 5 MG tablet, TAKE ONE TABLET BY MOUTH EVERY NIGHT AT BEDTIME, Disp: 90 tablet, Rfl: 1  •  atorvastatin (LIPITOR) 20 MG tablet, TAKE ONE TABLET BY MOUTH EVERY NIGHT AT BEDTIME, Disp: 30 tablet, Rfl: 5  •  b complex vitamins tablet, Take  by mouth daily., Disp: , Rfl:   •  citalopram (CeleXA) 20 MG tablet, TAKE ONE TABLET BY MOUTH DAILY, Disp: 90 tablet, Rfl: 1  •  Coenzyme Q10 (CO Q-10) 200 MG capsule, Take 1 capsule by mouth., Disp: , Rfl:   •  estradiol (VIVELLE-DOT) 0.075 MG/24HR patch, Place  on the skin., Disp: , Rfl:   •  famotidine (PEPCID) 20 MG tablet, Take 1 tablet by mouth Every Night., Disp: 60 tablet, Rfl: 5  •  fluticasone (FLONASE) 50 MCG/ACT nasal spray, USE 1 SPRAY IN EACH NOSTRIL ONCE DAILY, Disp: 1 bottle, Rfl: 2  •  hydrochlorothiazide (HYDRODIURIL) 25 MG tablet, TAKE ONE TABLET BY MOUTH EVERY MORNING, Disp: 90 tablet, Rfl: 1  •  HYDROcodone-acetaminophen (NORCO) 5-325 MG per tablet, Take 0.5-1 tablets by mouth Daily As Needed for Moderate Pain ., Disp: 30 tablet, Rfl: 0  •  lisinopril  (PRINIVIL,ZESTRIL) 20 MG tablet, TAKE ONE TABLET BY MOUTH EVERY MORNING, Disp: 90 tablet, Rfl: 1  •  magnesium oxide (MAGOX 400) 400 (241.3 MG) MG tablet tablet, Take 1 tablet by mouth., Disp: , Rfl:   •  melatonin 5 MG tablet tablet, Take 1 tablet by mouth Every Night., Disp: , Rfl:   •  metoprolol succinate XL (TOPROL-XL) 50 MG 24 hr tablet, TAKE ONE TABLET BY MOUTH DAILY, Disp: 90 tablet, Rfl: 1  •  nystatin (MYCOSTATIN) 740482 UNIT/GM cream, Apply  topically As Needed (itch)., Disp: 30 g, Rfl: 1  •  Omega-3 Fatty Acids (FISH OIL) 1000 MG capsule capsule, Take  by mouth., Disp: , Rfl:   •  potassium chloride (K-DUR) 10 MEQ CR tablet, TAKE ONE TABLET BY MOUTH DAILY, Disp: 90 tablet, Rfl: 1  •  sucralfate (CARAFATE) 1 G tablet, Take 1 tablet by mouth Every 12 (Twelve) Hours., Disp: 180 tablet, Rfl: 3  •  temazepam (RESTORIL) 15 MG capsule, TAKE ONE CAPSULE BY MOUTH EVERY NIGHT AT BEDTIME AND ONE EXTRA AS NEEDED, Disp: 60 capsule, Rfl: 0  •  triamcinolone (KENALOG) 0.1 % cream, Apply  topically Daily As Needed for Irritation., Disp: 45 g, Rfl: 1  •  vitamin C (ASCORBIC ACID) 500 MG tablet, Take  by mouth daily., Disp: , Rfl:     Allergies   Allergen Reactions   • Shellfish Allergy      Head congestion   • Atenolol      Fatigue   • Buspirone       Headache   • Codeine      Depression   • Pseudoephedrine Anxiety   • Rosuvastatin      Fatigue   • Simvastatin      Facial numbness   • Westley-E.P.A. [Dha-Epa-Vitamin E] Anxiety   • Trazodone      Nightmares       Immunization History   Administered Date(s) Administered   • Influenza, Quadrivalent 10/30/2015, 10/19/2017   • Td 01/01/1998   • Tdap 02/25/2009   • influenza Split 09/29/2016     Electronically signed Jabari Tenorio M.D.12/21/2017 6:59 PM

## 2017-12-20 NOTE — PATIENT INSTRUCTIONS
1.  Continue usual medicines and supplements - as listed.    2.  Stop omeprazole - use sucralfate and famotidine daily - for acid suppression.    3.  Use Pepto-Bismol as needed - for indigestion.    4.  Maintain routine exercise program - every week.    5.  Follow a low-calorie diet - low in salt and low in sugar.    6.  The nurse will call with test results.    7.  Return visit 3 months - fasting checkup.

## 2017-12-21 VITALS
DIASTOLIC BLOOD PRESSURE: 80 MMHG | HEIGHT: 61 IN | HEART RATE: 60 BPM | TEMPERATURE: 97.4 F | BODY MASS INDEX: 29.83 KG/M2 | RESPIRATION RATE: 14 BRPM | SYSTOLIC BLOOD PRESSURE: 130 MMHG | WEIGHT: 158 LBS | OXYGEN SATURATION: 98 %

## 2017-12-22 ENCOUNTER — TELEPHONE (OUTPATIENT)
Dept: INTERNAL MEDICINE | Facility: CLINIC | Age: 62
End: 2017-12-22

## 2017-12-22 NOTE — TELEPHONE ENCOUNTER
Called labs to pt. Per TGF,  indicates adequate cholesterol control. Other laboratory tests are acceptable and require no change in treatment.  Pt verb understanding.

## 2018-01-02 RX ORDER — AMLODIPINE BESYLATE 5 MG/1
TABLET ORAL
Qty: 30 TABLET | Refills: 5 | Status: SHIPPED | OUTPATIENT
Start: 2018-01-02 | End: 2018-06-20 | Stop reason: SDUPTHER

## 2018-01-02 RX ORDER — CITALOPRAM 20 MG/1
TABLET ORAL
Qty: 30 TABLET | Refills: 5 | Status: SHIPPED | OUTPATIENT
Start: 2018-01-02 | End: 2018-06-26 | Stop reason: SDUPTHER

## 2018-01-08 RX ORDER — SUCRALFATE 1 G/1
TABLET ORAL
Qty: 60 TABLET | Refills: 5 | Status: SHIPPED | OUTPATIENT
Start: 2018-01-08 | End: 2019-02-05 | Stop reason: SDUPTHER

## 2018-01-08 RX ORDER — HYDROCHLOROTHIAZIDE 25 MG/1
TABLET ORAL
Qty: 30 TABLET | Refills: 5 | Status: SHIPPED | OUTPATIENT
Start: 2018-01-08 | End: 2018-07-10 | Stop reason: SDUPTHER

## 2018-01-08 RX ORDER — FLUTICASONE PROPIONATE 50 MCG
SPRAY, SUSPENSION (ML) NASAL
Qty: 1 BOTTLE | Refills: 3 | Status: SHIPPED | OUTPATIENT
Start: 2018-01-08 | End: 2018-07-10 | Stop reason: SDUPTHER

## 2018-02-06 RX ORDER — TEMAZEPAM 15 MG/1
CAPSULE ORAL
Qty: 60 CAPSULE | Refills: 0 | OUTPATIENT
Start: 2018-02-06 | End: 2018-03-14 | Stop reason: SDUPTHER

## 2018-02-06 RX ORDER — METOPROLOL SUCCINATE 50 MG/1
TABLET, EXTENDED RELEASE ORAL
Qty: 90 TABLET | Refills: 1 | Status: SHIPPED | OUTPATIENT
Start: 2018-02-06 | End: 2018-07-17

## 2018-02-06 RX ORDER — POTASSIUM CHLORIDE 750 MG/1
TABLET, EXTENDED RELEASE ORAL
Qty: 90 TABLET | Refills: 1 | Status: SHIPPED | OUTPATIENT
Start: 2018-02-06 | End: 2018-07-17

## 2018-02-07 RX ORDER — TEMAZEPAM 15 MG/1
CAPSULE ORAL
Qty: 60 CAPSULE | Refills: 0 | OUTPATIENT
Start: 2018-02-07

## 2018-02-28 RX ORDER — LISINOPRIL 20 MG/1
TABLET ORAL
Qty: 90 TABLET | Refills: 1 | Status: SHIPPED | OUTPATIENT
Start: 2018-02-28 | End: 2018-09-04 | Stop reason: SDUPTHER

## 2018-03-14 RX ORDER — TEMAZEPAM 15 MG/1
CAPSULE ORAL
Qty: 60 CAPSULE | Refills: 0 | OUTPATIENT
Start: 2018-03-14

## 2018-03-15 RX ORDER — TEMAZEPAM 15 MG/1
CAPSULE ORAL
Qty: 60 CAPSULE | Refills: 0 | OUTPATIENT
Start: 2018-03-15 | End: 2018-05-21 | Stop reason: SDUPTHER

## 2018-03-27 ENCOUNTER — APPOINTMENT (OUTPATIENT)
Dept: LAB | Facility: HOSPITAL | Age: 63
End: 2018-03-27

## 2018-03-27 ENCOUNTER — OFFICE VISIT (OUTPATIENT)
Dept: INTERNAL MEDICINE | Facility: CLINIC | Age: 63
End: 2018-03-27

## 2018-03-27 VITALS
DIASTOLIC BLOOD PRESSURE: 70 MMHG | HEART RATE: 60 BPM | RESPIRATION RATE: 16 BRPM | TEMPERATURE: 97.8 F | SYSTOLIC BLOOD PRESSURE: 126 MMHG | BODY MASS INDEX: 30.61 KG/M2 | WEIGHT: 162 LBS | OXYGEN SATURATION: 97 %

## 2018-03-27 DIAGNOSIS — K21.9 GASTROESOPHAGEAL REFLUX DISEASE WITHOUT ESOPHAGITIS: ICD-10-CM

## 2018-03-27 DIAGNOSIS — E78.00 PURE HYPERCHOLESTEROLEMIA: Primary | ICD-10-CM

## 2018-03-27 DIAGNOSIS — G47.9 SLEEP DISORDER: ICD-10-CM

## 2018-03-27 DIAGNOSIS — I10 ESSENTIAL HYPERTENSION: ICD-10-CM

## 2018-03-27 DIAGNOSIS — G89.29 OTHER CHRONIC PAIN: ICD-10-CM

## 2018-03-27 DIAGNOSIS — M41.26 OTHER IDIOPATHIC SCOLIOSIS, LUMBAR REGION: ICD-10-CM

## 2018-03-27 DIAGNOSIS — M47.816 LUMBAR SPONDYLOSIS: ICD-10-CM

## 2018-03-27 LAB
ALBUMIN SERPL-MCNC: 4.7 G/DL (ref 3.2–4.8)
ALBUMIN/GLOB SERPL: 1.9 G/DL (ref 1.5–2.5)
ALP SERPL-CCNC: 67 U/L (ref 25–100)
ALT SERPL W P-5'-P-CCNC: 31 U/L (ref 7–40)
ANION GAP SERPL CALCULATED.3IONS-SCNC: 6 MMOL/L (ref 3–11)
ARTICHOKE IGE QN: 144 MG/DL (ref 0–130)
AST SERPL-CCNC: 27 U/L (ref 0–33)
BILIRUB SERPL-MCNC: 0.8 MG/DL (ref 0.3–1.2)
BUN BLD-MCNC: 15 MG/DL (ref 9–23)
BUN/CREAT SERPL: 18.8 (ref 7–25)
CALCIUM SPEC-SCNC: 9.3 MG/DL (ref 8.7–10.4)
CHLORIDE SERPL-SCNC: 99 MMOL/L (ref 99–109)
CHOLEST SERPL-MCNC: 242 MG/DL (ref 0–200)
CO2 SERPL-SCNC: 31 MMOL/L (ref 20–31)
CREAT BLD-MCNC: 0.8 MG/DL (ref 0.6–1.3)
GFR SERPL CREATININE-BSD FRML MDRD: 72 ML/MIN/1.73
GLOBULIN UR ELPH-MCNC: 2.5 GM/DL
GLUCOSE BLD-MCNC: 91 MG/DL (ref 70–100)
HDLC SERPL-MCNC: 84 MG/DL (ref 40–60)
POTASSIUM BLD-SCNC: 4.1 MMOL/L (ref 3.5–5.5)
PROT SERPL-MCNC: 7.2 G/DL (ref 5.7–8.2)
SODIUM BLD-SCNC: 136 MMOL/L (ref 132–146)
TRIGL SERPL-MCNC: 184 MG/DL (ref 0–150)

## 2018-03-27 PROCEDURE — 36415 COLL VENOUS BLD VENIPUNCTURE: CPT | Performed by: INTERNAL MEDICINE

## 2018-03-27 PROCEDURE — 80053 COMPREHEN METABOLIC PANEL: CPT | Performed by: INTERNAL MEDICINE

## 2018-03-27 PROCEDURE — 80061 LIPID PANEL: CPT | Performed by: INTERNAL MEDICINE

## 2018-03-27 PROCEDURE — 99213 OFFICE O/P EST LOW 20 MIN: CPT | Performed by: INTERNAL MEDICINE

## 2018-03-27 RX ORDER — HYDROCODONE BITARTRATE AND ACETAMINOPHEN 5; 325 MG/1; MG/1
.5-1 TABLET ORAL DAILY PRN
Qty: 30 TABLET | Refills: 0 | Status: SHIPPED | OUTPATIENT
Start: 2018-03-27 | End: 2018-07-17 | Stop reason: SDUPTHER

## 2018-03-27 RX ORDER — TIZANIDINE 4 MG/1
2-4 TABLET ORAL EVERY 12 HOURS PRN
Qty: 30 TABLET | Refills: 0 | Status: SHIPPED | OUTPATIENT
Start: 2018-03-27 | End: 2019-01-28

## 2018-03-27 NOTE — PROGRESS NOTES
Subjective   Luh Horner is a 62 y.o. female.     History of Present Illness     The patient's had many years of symptomatic lumbar spondylosis and osteoarthritis.  She has chronic back stiffness and stretches every morning.  She has occasional flares of pain based on activities which cause her acute immobility.  She takes hydrocodone about once weekly to maintain pain control.    The following portions of the patient's history were reviewed and updated as appropriate: allergies, current medications, past family history, past medical history, past social history, past surgical history and problem list.    Review of Systems   Constitutional: Negative for appetite change and fatigue.   Gastrointestinal: Negative for abdominal distention and nausea.        Minimal heartburn and indigestion   Musculoskeletal: Positive for arthralgias and back pain. Negative for gait problem.        Intermittent joint pain flares respond to occasional hydrocodone.   Neurological: Negative for dizziness and light-headedness.   Psychiatric/Behavioral:        Anxiety and sleep disturbance responding to medication.       Objective   Blood pressure 126/70, pulse 60, temperature 97.8 °F (36.6 °C), temperature source Oral, resp. rate 16, weight 73.5 kg (162 lb), SpO2 97 %.    Physical Exam   Constitutional: She is oriented to person, place, and time. She appears well-developed and well-nourished. No distress.   Cardiovascular: Normal rate, regular rhythm and normal heart sounds.    Pulmonary/Chest: Effort normal and breath sounds normal. She has no wheezes. She has no rales.   Musculoskeletal:   Moderate paralumbar tightness tenderness   Neurological: She is alert and oriented to person, place, and time. She exhibits normal muscle tone. Coordination normal.   Psychiatric: She has a normal mood and affect. Her behavior is normal. Judgment and thought content normal.   Nursing note and vitals reviewed.    Procedures  Assessment/Plan    Luh was seen today for back pain.    Diagnoses and all orders for this visit:    Pure hypercholesterolemia  -     Comprehensive Metabolic Panel  -     Lipid Panel    Essential hypertension    Gastroesophageal reflux disease without esophagitis    Other idiopathic scoliosis, lumbar region    Lumbar spondylosis    Other chronic pain    Sleep disorder    Other orders  -     HYDROcodone-acetaminophen (NORCO) 5-325 MG per tablet; Take 0.5-1 tablets by mouth Daily As Needed for Moderate Pain .  -     tiZANidine (ZANAFLEX) 4 MG tablet; Take 0.5-1 tablets by mouth Every 12 (Twelve) Hours As Needed for Muscle Spasms.      The patient has remained highly functional.  Because of morning stiffness we will target tizanidine to minimize back spasm at the beginning of the day.     The patient has moderate hyperlipidemia and has failed statin therapy.  She has no heart disease in her parents but does have a brother with heart disease.  Her lipid level is mildly elevated on medical junctional therapy.    The patient has chronic anxiety with depressive overtones.  She has marked sleep disturbance.  The combination of low-dose temazepam and melatonin around her to rest well at night.  Citalopram has stabilized her daytime anxiety and her pain severity.    Patient Instructions   1.  Use tizanidine 2 mg to 4 mg - morning and night - as needed for back spasms.    2.  Back stretching exercises every morning - promote flexibility and good posture.    3.  Continue usual medicines and supplements - as listed.    4.  Follow a low-calorie American Heart Association diet - low in salt low in sugar    5.  Return visit July - fasting checkup - PME.    6.  LDL cholesterol remains mildly elevated 144.    7.  Chemistry panel is acceptable.    Electronically signed Jabari Tenorio M.D.3/29/2018 7:29 AM

## 2018-03-27 NOTE — PATIENT INSTRUCTIONS
1.  Use tizanidine 2 mg to 4 mg - morning and night - as needed for back spasms.    2.  Back stretching exercises every morning - promote flexibility and good posture.    3.  Continue usual medicines and supplements - as listed.    4.  Follow a low-calorie American Heart Association diet - low in salt low in sugar    5.  Return visit July - fasting checkup - PME.

## 2018-03-28 RX ORDER — ATORVASTATIN CALCIUM 20 MG/1
TABLET, FILM COATED ORAL
Qty: 30 TABLET | Refills: 4 | Status: SHIPPED | OUTPATIENT
Start: 2018-03-28 | End: 2018-08-21 | Stop reason: SDUPTHER

## 2018-03-30 ENCOUNTER — TELEPHONE (OUTPATIENT)
Dept: INTERNAL MEDICINE | Facility: CLINIC | Age: 63
End: 2018-03-30

## 2018-05-21 RX ORDER — TEMAZEPAM 15 MG/1
CAPSULE ORAL
Qty: 60 CAPSULE | Refills: 0 | OUTPATIENT
Start: 2018-05-21 | End: 2018-07-10 | Stop reason: SDUPTHER

## 2018-06-20 RX ORDER — AMLODIPINE BESYLATE 5 MG/1
TABLET ORAL
Qty: 90 TABLET | Refills: 1 | Status: SHIPPED | OUTPATIENT
Start: 2018-06-20 | End: 2018-12-26 | Stop reason: SDUPTHER

## 2018-06-26 RX ORDER — CITALOPRAM 20 MG/1
TABLET ORAL
Qty: 30 TABLET | Refills: 5 | Status: SHIPPED | OUTPATIENT
Start: 2018-06-26 | End: 2018-12-26 | Stop reason: SDUPTHER

## 2018-07-10 RX ORDER — OMEPRAZOLE 20 MG/1
CAPSULE, DELAYED RELEASE ORAL
Qty: 60 CAPSULE | Refills: 0 | OUTPATIENT
Start: 2018-07-10

## 2018-07-11 RX ORDER — FLUTICASONE PROPIONATE 50 MCG
SPRAY, SUSPENSION (ML) NASAL
Qty: 16 G | Refills: 2 | Status: SHIPPED | OUTPATIENT
Start: 2018-07-11 | End: 2018-10-15 | Stop reason: SDUPTHER

## 2018-07-11 RX ORDER — HYDROCHLOROTHIAZIDE 25 MG/1
TABLET ORAL
Qty: 30 TABLET | Refills: 5 | Status: SHIPPED | OUTPATIENT
Start: 2018-07-11 | End: 2019-01-08 | Stop reason: SDUPTHER

## 2018-07-11 RX ORDER — TEMAZEPAM 15 MG/1
CAPSULE ORAL
Qty: 60 CAPSULE | Refills: 0 | OUTPATIENT
Start: 2018-07-11 | End: 2018-09-18 | Stop reason: SDUPTHER

## 2018-07-17 ENCOUNTER — OFFICE VISIT (OUTPATIENT)
Dept: INTERNAL MEDICINE | Facility: CLINIC | Age: 63
End: 2018-07-17

## 2018-07-17 VITALS
HEART RATE: 64 BPM | HEIGHT: 61 IN | SYSTOLIC BLOOD PRESSURE: 142 MMHG | OXYGEN SATURATION: 98 % | DIASTOLIC BLOOD PRESSURE: 84 MMHG | BODY MASS INDEX: 31.3 KG/M2 | TEMPERATURE: 97.8 F | WEIGHT: 165.8 LBS

## 2018-07-17 DIAGNOSIS — M76.01 GLUTEAL TENDINITIS OF RIGHT BUTTOCK: ICD-10-CM

## 2018-07-17 DIAGNOSIS — K21.9 GASTROESOPHAGEAL REFLUX DISEASE WITHOUT ESOPHAGITIS: ICD-10-CM

## 2018-07-17 DIAGNOSIS — E66.09 CLASS 1 OBESITY DUE TO EXCESS CALORIES WITH SERIOUS COMORBIDITY AND BODY MASS INDEX (BMI) OF 31.0 TO 31.9 IN ADULT: ICD-10-CM

## 2018-07-17 DIAGNOSIS — E78.00 PURE HYPERCHOLESTEROLEMIA: Primary | ICD-10-CM

## 2018-07-17 DIAGNOSIS — I10 ESSENTIAL HYPERTENSION: ICD-10-CM

## 2018-07-17 PROCEDURE — 99214 OFFICE O/P EST MOD 30 MIN: CPT | Performed by: FAMILY MEDICINE

## 2018-07-17 RX ORDER — OMEPRAZOLE 20 MG/1
20 CAPSULE, DELAYED RELEASE ORAL DAILY
Qty: 30 CAPSULE | Refills: 2 | Status: SHIPPED | OUTPATIENT
Start: 2018-07-17 | End: 2019-02-12 | Stop reason: SDUPTHER

## 2018-07-17 RX ORDER — OMEPRAZOLE 20 MG/1
20 CAPSULE, DELAYED RELEASE ORAL DAILY
Qty: 30 CAPSULE | Refills: 2 | Status: SHIPPED | OUTPATIENT
Start: 2018-07-17 | End: 2018-07-17 | Stop reason: SDUPTHER

## 2018-07-17 RX ORDER — OMEPRAZOLE 20 MG/1
20 CAPSULE, DELAYED RELEASE ORAL DAILY
COMMUNITY
End: 2018-07-17 | Stop reason: SDUPTHER

## 2018-07-17 RX ORDER — HYDROCODONE BITARTRATE AND ACETAMINOPHEN 5; 325 MG/1; MG/1
.5-1 TABLET ORAL DAILY PRN
Qty: 30 TABLET | Refills: 0 | Status: SHIPPED | OUTPATIENT
Start: 2018-07-17 | End: 2019-01-29 | Stop reason: SDUPTHER

## 2018-07-17 NOTE — PROGRESS NOTES
"Subjective   Luh Horner is a 62 y.o. female.     Heartburn   She reports no nausea. Pertinent negatives include no fatigue.        The patient's had many years of symptomatic lumbar spondylosis and osteoarthritis.  She has chronic back stiffness and stretches every morning.  She has occasional flares of pain based on activities which cause her acute immobility.  She takes hydrocodone about once weekly to maintain pain control. She also reports right-sided lateral hip pain.  It appears to be worse when going up and down stairs, getting out of car.it is about a 3-4 out of 10 in severity.  Mostly is dull.  She has done physical therapy for her low back before, but has previously been diagnosed with \"hip arthritis\" and physical therapy did not seem to help much.    The following portions of the patient's history were reviewed and updated as appropriate: allergies, current medications, past family history, past medical history, past social history, past surgical history and problem list.    Review of Systems   Constitutional: Negative for appetite change and fatigue.   Gastrointestinal: Negative for abdominal distention and nausea.        Minimal heartburn and indigestion   Musculoskeletal: Positive for arthralgias and back pain. Negative for gait problem.        Intermittent joint pain flares respond to occasional hydrocodone.   Neurological: Negative for dizziness and light-headedness.   Psychiatric/Behavioral:        Anxiety and sleep disturbance responding to medication.       Objective   Blood pressure 142/84, pulse 64, temperature 97.8 °F (36.6 °C), temperature source Temporal Artery , height 154.9 cm (61\"), weight 75.2 kg (165 lb 12.8 oz), SpO2 98 %, not currently breastfeeding.    Physical Exam   Constitutional: She is oriented to person, place, and time. She appears well-developed and well-nourished. No distress.   Cardiovascular: Normal rate, regular rhythm and normal heart sounds.    Pulmonary/Chest: " Effort normal and breath sounds normal. She has no wheezes. She has no rales.   Musculoskeletal:   Moderate paralumbar tightness tenderness   Neurological: She is alert and oriented to person, place, and time. She exhibits normal muscle tone. Coordination normal.   Psychiatric: She has a normal mood and affect. Her behavior is normal. Judgment and thought content normal.   Nursing note and vitals reviewed.    Procedures  Assessment/Plan   Luh was seen today for heartburn.    Diagnoses and all orders for this visit:    Pure hypercholesterolemia  -     Lipid panel; Future  -     Comprehensive metabolic panel; Future  -     TSH; Future    Essential hypertension  -     Hemoglobin A1c; Future  -     TSH; Future    Gluteal tendinitis of right buttock  -     Ambulatory Referral to Physical Therapy Ortho    Gastroesophageal reflux disease without esophagitis  -     CBC No Differential; Future  -     omeprazole (priLOSEC) 20 MG capsule; Take 1 capsule by mouth Daily.    Class 1 obesity due to excess calories with serious comorbidity and body mass index (BMI) of 31.0 to 31.9 in adult  -     TSH; Future    Other orders  -     HYDROcodone-acetaminophen (NORCO) 5-325 MG per tablet; Take 0.5-1 tablets by mouth Daily As Needed for Moderate Pain .  -     Discontinue: omeprazole (priLOSEC) 20 MG capsule; Take 1 capsule by mouth Daily.      We will check a lipid panel as well as CMP today for her cholesterol.  In the meantime she should continue atorvastatin    For her blood pressure, she endorses continued difficulty in losing weight and is on metoprolol XL.  This may be also contributing to continue depression issues.  Her heart rate is fairly low at 64, I recommend coming off the beta blocker and continuing her other medications.    For her right hip pain, this appears to be becoming less from her hip and more from her greater trochanteric bursitis and even more than that some right gluteal tendinitis.  I have recommended that  she go back to physical therapy to isolate this area in particular.  If desired she may return for a cortisone injection.  She has previously had an injection into the bursa years ago that seemed to help.    For her chronic pain, we will continue the hydrocodone.  Joshua has been reviewed and was appropriate.  Medication agreement has been signed and in the chart.

## 2018-07-30 RX ORDER — FAMOTIDINE 20 MG/1
TABLET, FILM COATED ORAL
Qty: 60 TABLET | Refills: 5 | Status: SHIPPED | OUTPATIENT
Start: 2018-07-30 | End: 2020-07-29

## 2018-08-22 RX ORDER — ATORVASTATIN CALCIUM 20 MG/1
TABLET, FILM COATED ORAL
Qty: 30 TABLET | Refills: 3 | Status: SHIPPED | OUTPATIENT
Start: 2018-08-22 | End: 2018-12-26 | Stop reason: SDUPTHER

## 2018-09-05 RX ORDER — LISINOPRIL 20 MG/1
TABLET ORAL
Qty: 30 TABLET | Refills: 2 | Status: SHIPPED | OUTPATIENT
Start: 2018-09-05 | End: 2018-11-27 | Stop reason: SDUPTHER

## 2018-09-18 RX ORDER — TEMAZEPAM 15 MG/1
CAPSULE ORAL
Qty: 60 CAPSULE | Refills: 0 | Status: SHIPPED | OUTPATIENT
Start: 2018-09-18 | End: 2019-01-29 | Stop reason: SDUPTHER

## 2018-09-18 NOTE — TELEPHONE ENCOUNTER
Last fill: 7/11/18  Last office visit: 7/17/18  Next office visit: 12/21/18  Last Joshua: 7/15/18  Controlled substance contract on file? Yes  Last UDS: None to date

## 2018-10-15 RX ORDER — FLUTICASONE PROPIONATE 50 MCG
SPRAY, SUSPENSION (ML) NASAL
Qty: 9.9 ML | Refills: 1 | Status: SHIPPED | OUTPATIENT
Start: 2018-10-15 | End: 2019-01-08 | Stop reason: SDUPTHER

## 2018-11-27 RX ORDER — TEMAZEPAM 15 MG/1
CAPSULE ORAL
Qty: 60 CAPSULE | Refills: 0 | OUTPATIENT
Start: 2018-11-27

## 2018-11-27 RX ORDER — LISINOPRIL 20 MG/1
TABLET ORAL
Qty: 30 TABLET | Refills: 1 | Status: SHIPPED | OUTPATIENT
Start: 2018-11-27 | End: 2019-02-05 | Stop reason: SDUPTHER

## 2018-12-26 RX ORDER — ATORVASTATIN CALCIUM 20 MG/1
TABLET, FILM COATED ORAL
Qty: 30 TABLET | Refills: 0 | Status: SHIPPED | OUTPATIENT
Start: 2018-12-26 | End: 2019-01-22 | Stop reason: SDUPTHER

## 2018-12-27 RX ORDER — CITALOPRAM 20 MG/1
TABLET ORAL
Qty: 30 TABLET | Refills: 1 | Status: SHIPPED | OUTPATIENT
Start: 2018-12-27 | End: 2019-02-19 | Stop reason: SDUPTHER

## 2018-12-27 RX ORDER — AMLODIPINE BESYLATE 5 MG/1
TABLET ORAL
Qty: 30 TABLET | Refills: 1 | Status: SHIPPED | OUTPATIENT
Start: 2018-12-27 | End: 2019-02-19 | Stop reason: SDUPTHER

## 2019-01-08 RX ORDER — FLUTICASONE PROPIONATE 50 MCG
SPRAY, SUSPENSION (ML) NASAL
Qty: 1 BOTTLE | Refills: 0 | Status: SHIPPED | OUTPATIENT
Start: 2019-01-08 | End: 2019-02-19 | Stop reason: SDUPTHER

## 2019-01-08 RX ORDER — HYDROCHLOROTHIAZIDE 25 MG/1
TABLET ORAL
Qty: 30 TABLET | Refills: 4 | Status: SHIPPED | OUTPATIENT
Start: 2019-01-08 | End: 2019-05-23 | Stop reason: SDUPTHER

## 2019-01-22 RX ORDER — ATORVASTATIN CALCIUM 20 MG/1
TABLET, FILM COATED ORAL
Qty: 30 TABLET | Refills: 0 | Status: SHIPPED | OUTPATIENT
Start: 2019-01-22 | End: 2019-02-19 | Stop reason: SDUPTHER

## 2019-01-23 ENCOUNTER — LAB (OUTPATIENT)
Dept: LAB | Facility: HOSPITAL | Age: 64
End: 2019-01-23

## 2019-01-23 DIAGNOSIS — I10 ESSENTIAL HYPERTENSION: ICD-10-CM

## 2019-01-23 DIAGNOSIS — E78.00 PURE HYPERCHOLESTEROLEMIA: ICD-10-CM

## 2019-01-23 DIAGNOSIS — E66.09 CLASS 1 OBESITY DUE TO EXCESS CALORIES WITH SERIOUS COMORBIDITY AND BODY MASS INDEX (BMI) OF 31.0 TO 31.9 IN ADULT: ICD-10-CM

## 2019-01-23 DIAGNOSIS — K21.9 GASTROESOPHAGEAL REFLUX DISEASE WITHOUT ESOPHAGITIS: ICD-10-CM

## 2019-01-23 LAB
ALBUMIN SERPL-MCNC: 4.53 G/DL (ref 3.2–4.8)
ALBUMIN/GLOB SERPL: 2.1 G/DL (ref 1.5–2.5)
ALP SERPL-CCNC: 74 U/L (ref 25–100)
ALT SERPL W P-5'-P-CCNC: 29 U/L (ref 7–40)
ANION GAP SERPL CALCULATED.3IONS-SCNC: 4 MMOL/L (ref 3–11)
ARTICHOKE IGE QN: 115 MG/DL (ref 0–130)
AST SERPL-CCNC: 24 U/L (ref 0–33)
BILIRUB SERPL-MCNC: 0.8 MG/DL (ref 0.3–1.2)
BUN BLD-MCNC: 16 MG/DL (ref 9–23)
BUN/CREAT SERPL: 23.2 (ref 7–25)
CALCIUM SPEC-SCNC: 9 MG/DL (ref 8.7–10.4)
CHLORIDE SERPL-SCNC: 101 MMOL/L (ref 99–109)
CHOLEST SERPL-MCNC: 198 MG/DL (ref 0–200)
CO2 SERPL-SCNC: 31 MMOL/L (ref 20–31)
CREAT BLD-MCNC: 0.69 MG/DL (ref 0.6–1.3)
DEPRECATED RDW RBC AUTO: 45.6 FL (ref 37–54)
ERYTHROCYTE [DISTWIDTH] IN BLOOD BY AUTOMATED COUNT: 12.4 % (ref 11.3–14.5)
GFR SERPL CREATININE-BSD FRML MDRD: 85 ML/MIN/1.73
GLOBULIN UR ELPH-MCNC: 2.2 GM/DL
GLUCOSE BLD-MCNC: 90 MG/DL (ref 70–100)
HBA1C MFR BLD: 5 % (ref 4.8–5.6)
HCT VFR BLD AUTO: 45.6 % (ref 34.5–44)
HDLC SERPL-MCNC: 74 MG/DL (ref 40–60)
HGB BLD-MCNC: 15.5 G/DL (ref 11.5–15.5)
MCH RBC QN AUTO: 34.2 PG (ref 27–31)
MCHC RBC AUTO-ENTMCNC: 34 G/DL (ref 32–36)
MCV RBC AUTO: 100.7 FL (ref 80–99)
PLATELET # BLD AUTO: 271 10*3/MM3 (ref 150–450)
PMV BLD AUTO: 9.5 FL (ref 6–12)
POTASSIUM BLD-SCNC: 3.8 MMOL/L (ref 3.5–5.5)
PROT SERPL-MCNC: 6.7 G/DL (ref 5.7–8.2)
RBC # BLD AUTO: 4.53 10*6/MM3 (ref 3.89–5.14)
SODIUM BLD-SCNC: 136 MMOL/L (ref 132–146)
TRIGL SERPL-MCNC: 129 MG/DL (ref 0–150)
TSH SERPL DL<=0.05 MIU/L-ACNC: 1.92 MIU/ML (ref 0.35–5.35)
WBC NRBC COR # BLD: 5.7 10*3/MM3 (ref 3.5–10.8)

## 2019-01-23 PROCEDURE — 36415 COLL VENOUS BLD VENIPUNCTURE: CPT

## 2019-01-23 PROCEDURE — 80061 LIPID PANEL: CPT | Performed by: FAMILY MEDICINE

## 2019-01-23 PROCEDURE — 80053 COMPREHEN METABOLIC PANEL: CPT | Performed by: FAMILY MEDICINE

## 2019-01-23 PROCEDURE — 84443 ASSAY THYROID STIM HORMONE: CPT | Performed by: FAMILY MEDICINE

## 2019-01-23 PROCEDURE — 83036 HEMOGLOBIN GLYCOSYLATED A1C: CPT | Performed by: FAMILY MEDICINE

## 2019-01-23 PROCEDURE — 85027 COMPLETE CBC AUTOMATED: CPT | Performed by: FAMILY MEDICINE

## 2019-01-28 ENCOUNTER — OFFICE VISIT (OUTPATIENT)
Dept: FAMILY MEDICINE CLINIC | Facility: CLINIC | Age: 64
End: 2019-01-28

## 2019-01-28 VITALS
DIASTOLIC BLOOD PRESSURE: 90 MMHG | HEIGHT: 61 IN | RESPIRATION RATE: 16 BRPM | SYSTOLIC BLOOD PRESSURE: 136 MMHG | WEIGHT: 164.2 LBS | HEART RATE: 90 BPM | TEMPERATURE: 97.7 F | BODY MASS INDEX: 31 KG/M2 | OXYGEN SATURATION: 96 %

## 2019-01-28 DIAGNOSIS — I10 ESSENTIAL HYPERTENSION: ICD-10-CM

## 2019-01-28 DIAGNOSIS — G47.9 SLEEP DISORDER: ICD-10-CM

## 2019-01-28 DIAGNOSIS — Z00.00 WELL ADULT EXAM: Primary | ICD-10-CM

## 2019-01-28 DIAGNOSIS — M47.816 LUMBAR SPONDYLOSIS: ICD-10-CM

## 2019-01-28 DIAGNOSIS — E78.00 PURE HYPERCHOLESTEROLEMIA: ICD-10-CM

## 2019-01-28 DIAGNOSIS — F41.9 ANXIETY: ICD-10-CM

## 2019-01-28 PROCEDURE — 99397 PER PM REEVAL EST PAT 65+ YR: CPT | Performed by: FAMILY MEDICINE

## 2019-01-28 RX ORDER — TEMAZEPAM 15 MG/1
CAPSULE ORAL
Qty: 60 CAPSULE | Refills: 2 | Status: CANCELLED | OUTPATIENT
Start: 2019-01-28

## 2019-01-28 RX ORDER — HYDROCODONE BITARTRATE AND ACETAMINOPHEN 5; 325 MG/1; MG/1
.5-1 TABLET ORAL DAILY PRN
Qty: 30 TABLET | Refills: 0 | Status: CANCELLED | OUTPATIENT
Start: 2019-01-28

## 2019-01-29 RX ORDER — TEMAZEPAM 15 MG/1
CAPSULE ORAL
Qty: 60 CAPSULE | Refills: 2 | Status: SHIPPED | OUTPATIENT
Start: 2019-01-29 | End: 2019-05-14 | Stop reason: SDUPTHER

## 2019-01-29 RX ORDER — TEMAZEPAM 15 MG/1
CAPSULE ORAL
Qty: 60 CAPSULE | Refills: 0 | Status: CANCELLED | OUTPATIENT
Start: 2019-01-29

## 2019-01-29 RX ORDER — HYDROCODONE BITARTRATE AND ACETAMINOPHEN 5; 325 MG/1; MG/1
.5-1 TABLET ORAL DAILY PRN
Qty: 30 TABLET | Refills: 0 | Status: SHIPPED | OUTPATIENT
Start: 2019-01-29 | End: 2019-07-29 | Stop reason: SDUPTHER

## 2019-01-29 NOTE — TELEPHONE ENCOUNTER
Last fill:7/17/18; 9/18/18  Last office visit: 1/28/19  Next office visit: 7/29/19  Last Joshua: 7/15/18  Controlled substance contract on file? yes  Last UDS: n/a

## 2019-02-05 RX ORDER — LISINOPRIL 20 MG/1
TABLET ORAL
Qty: 30 TABLET | Refills: 0 | Status: SHIPPED | OUTPATIENT
Start: 2019-02-05 | End: 2019-03-03 | Stop reason: SDUPTHER

## 2019-02-05 RX ORDER — SUCRALFATE 1 G/1
TABLET ORAL
Qty: 60 TABLET | Refills: 4 | Status: SHIPPED | OUTPATIENT
Start: 2019-02-05 | End: 2020-02-06

## 2019-02-12 DIAGNOSIS — K21.9 GASTROESOPHAGEAL REFLUX DISEASE WITHOUT ESOPHAGITIS: ICD-10-CM

## 2019-02-13 RX ORDER — OMEPRAZOLE 20 MG/1
CAPSULE, DELAYED RELEASE ORAL
Qty: 30 CAPSULE | Refills: 1 | Status: SHIPPED | OUTPATIENT
Start: 2019-02-13 | End: 2019-04-02 | Stop reason: SDUPTHER

## 2019-02-19 RX ORDER — CITALOPRAM 20 MG/1
TABLET ORAL
Qty: 30 TABLET | Refills: 0 | Status: SHIPPED | OUTPATIENT
Start: 2019-02-19 | End: 2019-03-19 | Stop reason: SDUPTHER

## 2019-02-19 RX ORDER — AMLODIPINE BESYLATE 5 MG/1
TABLET ORAL
Qty: 30 TABLET | Refills: 0 | Status: SHIPPED | OUTPATIENT
Start: 2019-02-19 | End: 2019-03-19 | Stop reason: SDUPTHER

## 2019-02-19 RX ORDER — FLUTICASONE PROPIONATE 50 MCG
SPRAY, SUSPENSION (ML) NASAL
Qty: 1 BOTTLE | Refills: 0 | Status: SHIPPED | OUTPATIENT
Start: 2019-02-19 | End: 2019-03-29 | Stop reason: SDUPTHER

## 2019-02-19 RX ORDER — ATORVASTATIN CALCIUM 20 MG/1
TABLET, FILM COATED ORAL
Qty: 30 TABLET | Refills: 0 | Status: SHIPPED | OUTPATIENT
Start: 2019-02-19 | End: 2019-03-19 | Stop reason: SDUPTHER

## 2019-03-04 RX ORDER — LISINOPRIL 20 MG/1
TABLET ORAL
Qty: 30 TABLET | Refills: 0 | Status: SHIPPED | OUTPATIENT
Start: 2019-03-04 | End: 2019-04-02 | Stop reason: SDUPTHER

## 2019-03-19 RX ORDER — ATORVASTATIN CALCIUM 20 MG/1
TABLET, FILM COATED ORAL
Qty: 30 TABLET | Refills: 2 | Status: SHIPPED | OUTPATIENT
Start: 2019-03-19 | End: 2019-06-18 | Stop reason: SDUPTHER

## 2019-03-19 RX ORDER — CITALOPRAM 20 MG/1
TABLET ORAL
Qty: 30 TABLET | Refills: 2 | Status: SHIPPED | OUTPATIENT
Start: 2019-03-19 | End: 2019-06-18 | Stop reason: SDUPTHER

## 2019-03-19 RX ORDER — AMLODIPINE BESYLATE 5 MG/1
TABLET ORAL
Qty: 30 TABLET | Refills: 2 | Status: SHIPPED | OUTPATIENT
Start: 2019-03-19 | End: 2019-06-18 | Stop reason: SDUPTHER

## 2019-04-01 RX ORDER — FLUTICASONE PROPIONATE 50 MCG
SPRAY, SUSPENSION (ML) NASAL
Qty: 16 G | Refills: 0 | Status: SHIPPED | OUTPATIENT
Start: 2019-04-01 | End: 2019-05-03 | Stop reason: SDUPTHER

## 2019-04-02 DIAGNOSIS — K21.9 GASTROESOPHAGEAL REFLUX DISEASE WITHOUT ESOPHAGITIS: ICD-10-CM

## 2019-04-02 RX ORDER — OMEPRAZOLE 20 MG/1
CAPSULE, DELAYED RELEASE ORAL
Qty: 90 CAPSULE | Refills: 1 | Status: SHIPPED | OUTPATIENT
Start: 2019-04-02 | End: 2019-08-31 | Stop reason: SDUPTHER

## 2019-04-02 RX ORDER — LISINOPRIL 20 MG/1
TABLET ORAL
Qty: 90 TABLET | Refills: 1 | Status: SHIPPED | OUTPATIENT
Start: 2019-04-02 | End: 2019-08-12 | Stop reason: SDUPTHER

## 2019-05-03 RX ORDER — FLUTICASONE PROPIONATE 50 MCG
SPRAY, SUSPENSION (ML) NASAL
Qty: 16 ML | Refills: 0 | Status: SHIPPED | OUTPATIENT
Start: 2019-05-03 | End: 2019-06-13 | Stop reason: SDUPTHER

## 2019-05-14 DIAGNOSIS — G47.9 SLEEP DISORDER: ICD-10-CM

## 2019-05-14 DIAGNOSIS — F41.9 ANXIETY: ICD-10-CM

## 2019-05-14 RX ORDER — TEMAZEPAM 15 MG/1
CAPSULE ORAL
Qty: 60 CAPSULE | Refills: 1 | Status: SHIPPED | OUTPATIENT
Start: 2019-05-14 | End: 2019-07-29 | Stop reason: SDUPTHER

## 2019-05-14 NOTE — TELEPHONE ENCOUNTER
Last fill: 1/29/19  Last office visit: 1/28/19  Next office visit: 7/29/19  Last Joshua: TODAY  Controlled substance contract on file? YES  Last UDS: NONE

## 2019-05-23 RX ORDER — HYDROCHLOROTHIAZIDE 25 MG/1
TABLET ORAL
Qty: 30 TABLET | Refills: 3 | Status: SHIPPED | OUTPATIENT
Start: 2019-05-23 | End: 2019-10-01 | Stop reason: SDUPTHER

## 2019-06-13 RX ORDER — FLUTICASONE PROPIONATE 50 MCG
SPRAY, SUSPENSION (ML) NASAL
Qty: 1 BOTTLE | Refills: 5 | Status: SHIPPED | OUTPATIENT
Start: 2019-06-13 | End: 2020-04-13

## 2019-06-19 RX ORDER — ATORVASTATIN CALCIUM 20 MG/1
TABLET, FILM COATED ORAL
Qty: 30 TABLET | Refills: 1 | Status: SHIPPED | OUTPATIENT
Start: 2019-06-19 | End: 2019-08-21 | Stop reason: SDUPTHER

## 2019-06-19 RX ORDER — AMLODIPINE BESYLATE 5 MG/1
TABLET ORAL
Qty: 30 TABLET | Refills: 1 | Status: SHIPPED | OUTPATIENT
Start: 2019-06-19 | End: 2019-08-21 | Stop reason: SDUPTHER

## 2019-06-19 RX ORDER — CITALOPRAM 20 MG/1
TABLET ORAL
Qty: 30 TABLET | Refills: 1 | Status: SHIPPED | OUTPATIENT
Start: 2019-06-19 | End: 2019-08-21 | Stop reason: SDUPTHER

## 2019-07-25 ENCOUNTER — LAB (OUTPATIENT)
Dept: LAB | Facility: HOSPITAL | Age: 64
End: 2019-07-25

## 2019-07-25 DIAGNOSIS — I10 ESSENTIAL HYPERTENSION: ICD-10-CM

## 2019-07-25 DIAGNOSIS — E78.00 PURE HYPERCHOLESTEROLEMIA: ICD-10-CM

## 2019-07-25 LAB
ALBUMIN SERPL-MCNC: 4.7 G/DL (ref 3.5–5.2)
ALBUMIN/GLOB SERPL: 1.7 G/DL
ALP SERPL-CCNC: 73 U/L (ref 39–117)
ALT SERPL W P-5'-P-CCNC: 19 U/L (ref 1–33)
ANION GAP SERPL CALCULATED.3IONS-SCNC: 15.1 MMOL/L (ref 5–15)
AST SERPL-CCNC: 18 U/L (ref 1–32)
BILIRUB SERPL-MCNC: 0.9 MG/DL (ref 0.2–1.2)
BUN BLD-MCNC: 11 MG/DL (ref 8–23)
BUN/CREAT SERPL: 14.9 (ref 7–25)
CALCIUM SPEC-SCNC: 9.6 MG/DL (ref 8.6–10.5)
CHLORIDE SERPL-SCNC: 93 MMOL/L (ref 98–107)
CHOLEST SERPL-MCNC: 210 MG/DL (ref 0–200)
CO2 SERPL-SCNC: 26.9 MMOL/L (ref 22–29)
CREAT BLD-MCNC: 0.74 MG/DL (ref 0.57–1)
GFR SERPL CREATININE-BSD FRML MDRD: 79 ML/MIN/1.73
GLOBULIN UR ELPH-MCNC: 2.8 GM/DL
GLUCOSE BLD-MCNC: 97 MG/DL (ref 65–99)
HDLC SERPL-MCNC: 63 MG/DL (ref 40–60)
LDLC SERPL CALC-MCNC: 105 MG/DL (ref 0–100)
LDLC/HDLC SERPL: 1.67 {RATIO}
POTASSIUM BLD-SCNC: 3.6 MMOL/L (ref 3.5–5.2)
PROT SERPL-MCNC: 7.5 G/DL (ref 6–8.5)
SODIUM BLD-SCNC: 135 MMOL/L (ref 136–145)
TRIGL SERPL-MCNC: 210 MG/DL (ref 0–150)
VLDLC SERPL-MCNC: 42 MG/DL (ref 5–40)

## 2019-07-25 PROCEDURE — 80061 LIPID PANEL: CPT | Performed by: FAMILY MEDICINE

## 2019-07-25 PROCEDURE — 80053 COMPREHEN METABOLIC PANEL: CPT | Performed by: FAMILY MEDICINE

## 2019-07-29 ENCOUNTER — OFFICE VISIT (OUTPATIENT)
Dept: FAMILY MEDICINE CLINIC | Facility: CLINIC | Age: 64
End: 2019-07-29

## 2019-07-29 VITALS
OXYGEN SATURATION: 97 % | BODY MASS INDEX: 30.21 KG/M2 | SYSTOLIC BLOOD PRESSURE: 140 MMHG | HEIGHT: 61 IN | DIASTOLIC BLOOD PRESSURE: 90 MMHG | WEIGHT: 160 LBS | HEART RATE: 77 BPM

## 2019-07-29 DIAGNOSIS — F41.9 ANXIETY: Primary | ICD-10-CM

## 2019-07-29 DIAGNOSIS — E78.00 PURE HYPERCHOLESTEROLEMIA: ICD-10-CM

## 2019-07-29 DIAGNOSIS — I10 ESSENTIAL HYPERTENSION: ICD-10-CM

## 2019-07-29 DIAGNOSIS — M47.816 LUMBAR SPONDYLOSIS: ICD-10-CM

## 2019-07-29 DIAGNOSIS — G47.9 SLEEP DISORDER: ICD-10-CM

## 2019-07-29 PROCEDURE — 99214 OFFICE O/P EST MOD 30 MIN: CPT | Performed by: FAMILY MEDICINE

## 2019-07-29 RX ORDER — TEMAZEPAM 15 MG/1
CAPSULE ORAL
Qty: 60 CAPSULE | Refills: 5 | Status: SHIPPED | OUTPATIENT
Start: 2019-07-29 | End: 2020-04-13

## 2019-07-29 RX ORDER — HYDROCODONE BITARTRATE AND ACETAMINOPHEN 5; 325 MG/1; MG/1
.5-1 TABLET ORAL DAILY PRN
Qty: 30 TABLET | Refills: 0 | Status: SHIPPED | OUTPATIENT
Start: 2019-07-29 | End: 2019-10-02 | Stop reason: SDUPTHER

## 2019-07-29 RX ORDER — VALACYCLOVIR HYDROCHLORIDE 500 MG/1
1 TABLET, FILM COATED ORAL DAILY
COMMUNITY
Start: 2019-07-18

## 2019-07-29 RX ORDER — MECLIZINE HCL 12.5 MG/1
12.5 TABLET ORAL 3 TIMES DAILY PRN
Qty: 90 TABLET | Refills: 1 | Status: SHIPPED | OUTPATIENT
Start: 2019-07-29 | End: 2021-02-04

## 2019-07-30 ENCOUNTER — PRIOR AUTHORIZATION (OUTPATIENT)
Dept: FAMILY MEDICINE CLINIC | Facility: CLINIC | Age: 64
End: 2019-07-30

## 2019-07-30 NOTE — TELEPHONE ENCOUNTER
PRIOR AUTHORIZATION STARTED HYDROcodone-acetaminophen (NORCO) 5-325 MG per tablet    Key: [id number]  PENDING

## 2019-08-12 ENCOUNTER — TELEPHONE (OUTPATIENT)
Dept: FAMILY MEDICINE CLINIC | Facility: CLINIC | Age: 64
End: 2019-08-12

## 2019-08-12 RX ORDER — LISINOPRIL 40 MG/1
40 TABLET ORAL EVERY MORNING
Qty: 90 TABLET | Refills: 1 | Status: SHIPPED | OUTPATIENT
Start: 2019-08-12 | End: 2020-02-06

## 2019-08-12 NOTE — TELEPHONE ENCOUNTER
Pt has been doubling up on lisinopril dosage, requesting new script sent in to pharmacy. Is this okay?

## 2019-08-12 NOTE — TELEPHONE ENCOUNTER
Patient called to let Dr Shafer know that doubling up on her lisinopril has helped control her BP so she is going to need a new rx tor reflect this. Please call back with any questions [phone]

## 2019-08-21 RX ORDER — CITALOPRAM 20 MG/1
TABLET ORAL
Qty: 30 TABLET | Refills: 0 | Status: SHIPPED | OUTPATIENT
Start: 2019-08-21 | End: 2019-09-17 | Stop reason: SDUPTHER

## 2019-08-21 RX ORDER — ATORVASTATIN CALCIUM 20 MG/1
TABLET, FILM COATED ORAL
Qty: 30 TABLET | Refills: 0 | Status: SHIPPED | OUTPATIENT
Start: 2019-08-21 | End: 2019-09-17 | Stop reason: SDUPTHER

## 2019-08-21 RX ORDER — AMLODIPINE BESYLATE 5 MG/1
TABLET ORAL
Qty: 30 TABLET | Refills: 0 | Status: SHIPPED | OUTPATIENT
Start: 2019-08-21 | End: 2019-09-17 | Stop reason: SDUPTHER

## 2019-08-31 DIAGNOSIS — K21.9 GASTROESOPHAGEAL REFLUX DISEASE WITHOUT ESOPHAGITIS: ICD-10-CM

## 2019-09-03 RX ORDER — OMEPRAZOLE 20 MG/1
CAPSULE, DELAYED RELEASE ORAL
Qty: 30 CAPSULE | Refills: 5 | Status: SHIPPED | OUTPATIENT
Start: 2019-09-03 | End: 2020-02-06

## 2019-09-17 RX ORDER — ATORVASTATIN CALCIUM 20 MG/1
TABLET, FILM COATED ORAL
Qty: 30 TABLET | Refills: 5 | Status: SHIPPED | OUTPATIENT
Start: 2019-09-17 | End: 2020-03-10

## 2019-09-17 RX ORDER — CITALOPRAM 20 MG/1
TABLET ORAL
Qty: 30 TABLET | Refills: 5 | Status: SHIPPED | OUTPATIENT
Start: 2019-09-17 | End: 2020-03-10

## 2019-09-17 RX ORDER — AMLODIPINE BESYLATE 5 MG/1
TABLET ORAL
Qty: 30 TABLET | Refills: 5 | Status: SHIPPED | OUTPATIENT
Start: 2019-09-17 | End: 2020-03-10

## 2019-10-01 RX ORDER — HYDROCHLOROTHIAZIDE 25 MG/1
TABLET ORAL
Qty: 30 TABLET | Refills: 5 | Status: SHIPPED | OUTPATIENT
Start: 2019-10-01 | End: 2020-04-13

## 2019-10-02 ENCOUNTER — OFFICE VISIT (OUTPATIENT)
Dept: FAMILY MEDICINE CLINIC | Facility: CLINIC | Age: 64
End: 2019-10-02

## 2019-10-02 ENCOUNTER — HOSPITAL ENCOUNTER (OUTPATIENT)
Dept: GENERAL RADIOLOGY | Facility: HOSPITAL | Age: 64
Discharge: HOME | End: 2019-10-02
Admitting: FAMILY MEDICINE

## 2019-10-02 VITALS
BODY MASS INDEX: 31.08 KG/M2 | HEIGHT: 61 IN | DIASTOLIC BLOOD PRESSURE: 90 MMHG | WEIGHT: 164.6 LBS | HEART RATE: 87 BPM | SYSTOLIC BLOOD PRESSURE: 148 MMHG | OXYGEN SATURATION: 98 %

## 2019-10-02 DIAGNOSIS — M47.816 LUMBAR SPONDYLOSIS: ICD-10-CM

## 2019-10-02 DIAGNOSIS — M53.3 SACROILIAC JOINT PAIN: Primary | ICD-10-CM

## 2019-10-02 DIAGNOSIS — M53.3 SACROILIAC JOINT PAIN: ICD-10-CM

## 2019-10-02 PROCEDURE — 99214 OFFICE O/P EST MOD 30 MIN: CPT | Performed by: FAMILY MEDICINE

## 2019-10-02 PROCEDURE — 72100 X-RAY EXAM L-S SPINE 2/3 VWS: CPT

## 2019-10-02 RX ORDER — HYDROCODONE BITARTRATE AND ACETAMINOPHEN 5; 325 MG/1; MG/1
.5-1 TABLET ORAL DAILY PRN
Qty: 30 TABLET | Refills: 0 | Status: SHIPPED | OUTPATIENT
Start: 2019-10-02 | End: 2020-01-29 | Stop reason: SDUPTHER

## 2019-10-02 RX ORDER — CLOBETASOL PROPIONATE 0.5 MG/G
CREAM TOPICAL AS NEEDED
COMMUNITY
Start: 2019-09-10 | End: 2023-01-14

## 2019-10-02 NOTE — PROGRESS NOTES
Subjective   Luh Horner is a 63 y.o. female.     Chief Complaint   Patient presents with   • Back Pain     new issue, ongoing for 2 weeks        History of Present Illness     Luh Horner presents today for   Chief Complaint   Patient presents with   • Back Pain     new issue, ongoing for 2 weeks      She has long-standing lumbar spondylosis, but this pain is different.  She reports that it is located on the left lower spine.  It worsens with her aerobics and [place] activity.  The pain is moderate about a 3-4/10, she wants to make sure she is not doing any further damage.  She denies any numbness or tingling down her leg.  She does have some slight pain that radiates into the groin.  She has tried Aleve, but this tends to tear up her stomach.    This patient is accompanied by their self who contributes to the history of their care.    The following portions of the patient's history were reviewed and updated as appropriate: allergies, current medications, past family history, past medical history, past social history, past surgical history and problem list.    Active Ambulatory Problems     Diagnosis Date Noted   • Atopic rhinitis 06/14/2016   • Anxiety 06/14/2016   • Carpal tunnel syndrome 06/14/2016   • Clenching of teeth 06/14/2016   • Depression 06/14/2016   • Sleep disorder 06/14/2016   • Gastroesophageal reflux disease 06/14/2016   • Hyperlipidemia 06/14/2016   • Hypertension 06/14/2016   • Low back pain 06/14/2016   • Mitral valve insufficiency 06/14/2016   • Adiposity 06/14/2016   • Gluteal tendinitis of right buttock 06/14/2016   • Postmenopausal disorder 06/14/2016   • Preventative health care 09/29/2016   • Other chronic pain 09/29/2016   • Lumbar spondylosis 06/08/2017   • Lumbar scoliosis 06/10/2017   • Irritable bowel syndrome 08/17/2017     Resolved Ambulatory Problems     Diagnosis Date Noted   • Dizziness 06/14/2016   • Insect bites 06/14/2016     Past Medical History:   Diagnosis Date    • Allergic rhinitis    • Bone pain    • Depression    • Fibrocystic breast disease    • GERD (gastroesophageal reflux disease)    • Hiatal hernia    • Hyperlipidemia    • Hypertension    • Insomnia    • Irritable bowel syndrome (IBS)    • Lumbar scoliosis    • Lumbar spondylosis    • Mitral valve insufficiency    • Osteoarthritis    • Osteopenia    • Post menopausal syndrome      Past Surgical History:   Procedure Laterality Date   • BREAST BIOPSY Right remote    benign disease   • CHOLECYSTECTOMY     • HYSTERECTOMY     • REFRACTIVE SURGERY      RK   • URETHRAL SUSPENSION      laparoscopic for stress incontinence     Family History   Problem Relation Age of Onset   • Pulmonary fibrosis Mother          age 82   • Hypertension Mother    • Lumbar disc disease Mother    • Prostate cancer Father          age 76   • Hypertension Sister    • Goiter Sister    • Hypothyroidism Sister    • Heart disease Brother          Age 48 heart attack was cigarette smoker   • Hiatal hernia Brother    • Breast cancer Other         Maternal and paternal aunts   • Hypertension Brother    • Obesity Maternal Uncle    • Diabetes Maternal Uncle    • Arthritis Paternal Aunt      Social History     Socioeconomic History   • Marital status:      Spouse name: Not on file   • Number of children: Not on file   • Years of education: Not on file   • Highest education level: Not on file   Tobacco Use   • Smoking status: Former Smoker     Packs/day: 2.00     Years: 10.00     Pack years: 20.00     Types: Cigarettes     Start date:      Last attempt to quit:      Years since quittin.7   • Smokeless tobacco: Never Used   Substance and Sexual Activity   • Alcohol use: Yes     Alcohol/week: 1.8 oz     Types: 3 Glasses of wine per week   • Drug use: No   Social History Narrative    Domestic life   lives in private home with         Roman Catholic   none listed        Sleep  "hygiene    in bed 10 PM to 7 AM for 8 hours broken sleep        Caffeine use    2 cup of coffee daily        Exercise habits   water aerobics twice weekly.  walks daily.  upper body strengthening twice daily.        Diet     AHA diet low in red meats.  One dairy serving daily.  Low sodium.        Occupation   retired college         Hearing   no impairment        Vision   corrects with bifocal glasses        Driving   no limitations         Review of Systems   Constitutional: Negative.    Musculoskeletal: Positive for back pain.       Objective   Blood pressure 148/90, pulse 87, height 154.9 cm (60.98\"), weight 74.7 kg (164 lb 9.6 oz), SpO2 98 %, not currently breastfeeding.  Nursing note reviewed  Physical Exam  Const: NAD, A&Ox4, Pleasant, Cooperative  Eyes: EOMI, no conjunctivitis  ENT: No nasal discharge present, neck supple  Cardiac: Regular rate and rhythm, no cyanosis  Resp: Respiratory rate within normal limits, no increased work of breathing, no audible wheezing or retractions noted  GI: No distention or ascites  MSK: She has some slight tenderness to palpation of the left SI joint.  There is no overlying erythema, rash, or edema.  Figure-of-four testing and loading elicits pain  Procedures  Assessment/Plan     Problem List Items Addressed This Visit        Musculoskeletal and Integument    Lumbar spondylosis    Relevant Medications    HYDROcodone-acetaminophen (NORCO) 5-325 MG per tablet      Other Visit Diagnoses     Sacroiliac joint pain    -  Primary    Relevant Medications    diclofenac (VOLTAREN) 1 % gel gel    Other Relevant Orders    XR Spine Lumbar 2 or 3 View        #1 sacroiliac joint pain  She was given exercises and stretching program from the sports medicine patient advisor.  Encouraged to use ice, and relative rest.  Side to side [place] activities may flareup her pain more.  -NSAIDs tend to bother her stomach, she may use Voltaren gel    #2 lumbar spondylosis  She has " baseline pain, well-controlled with intermittent Norco use.  She is stable.    There are no Patient Instructions on file for this visit.    Return in about 6 weeks (around 11/13/2019).    Ambulatory progress note signed and attested to by Daniel Shafer D.O.

## 2019-11-07 ENCOUNTER — OFFICE VISIT (OUTPATIENT)
Dept: FAMILY MEDICINE CLINIC | Facility: CLINIC | Age: 64
End: 2019-11-07

## 2019-11-07 VITALS
SYSTOLIC BLOOD PRESSURE: 142 MMHG | OXYGEN SATURATION: 98 % | DIASTOLIC BLOOD PRESSURE: 84 MMHG | BODY MASS INDEX: 30.78 KG/M2 | HEIGHT: 61 IN | WEIGHT: 163 LBS | HEART RATE: 94 BPM

## 2019-11-07 DIAGNOSIS — M53.3 SACROILIAC JOINT PAIN: Primary | ICD-10-CM

## 2019-11-07 DIAGNOSIS — M47.816 LUMBAR SPONDYLOSIS: ICD-10-CM

## 2019-11-07 PROCEDURE — 99213 OFFICE O/P EST LOW 20 MIN: CPT | Performed by: FAMILY MEDICINE

## 2019-11-07 NOTE — PROGRESS NOTES
Subjective   Luh Horner is a 64 y.o. female.     Chief Complaint   Patient presents with   • Follow-up     sacroiliac joint pain        History of Present Illness     Luh Horner presents today for   Chief Complaint   Patient presents with   • Follow-up     sacroiliac joint pain      She was seen about 6 weeks ago for lower back pain.  She has been doing the exercises fairly regularly and feels significantly improved.  She reports she is about 50 to 60% better.  She has not been using the diclofenac gel because the pharmacist told her that it would have identical side effects to oral medication.    This patient is accompanied by their self who contributes to the history of their care.    The following portions of the patient's history were reviewed and updated as appropriate: allergies, current medications, past family history, past medical history, past social history, past surgical history and problem list.    Active Ambulatory Problems     Diagnosis Date Noted   • Atopic rhinitis 06/14/2016   • Anxiety 06/14/2016   • Carpal tunnel syndrome 06/14/2016   • Clenching of teeth 06/14/2016   • Depression 06/14/2016   • Sleep disorder 06/14/2016   • Gastroesophageal reflux disease 06/14/2016   • Hyperlipidemia 06/14/2016   • Hypertension 06/14/2016   • Low back pain 06/14/2016   • Mitral valve insufficiency 06/14/2016   • Adiposity 06/14/2016   • Gluteal tendinitis of right buttock 06/14/2016   • Postmenopausal disorder 06/14/2016   • Preventative health care 09/29/2016   • Other chronic pain 09/29/2016   • Lumbar spondylosis 06/08/2017   • Lumbar scoliosis 06/10/2017   • Irritable bowel syndrome 08/17/2017     Resolved Ambulatory Problems     Diagnosis Date Noted   • Dizziness 06/14/2016   • Insect bites 06/14/2016     Past Medical History:   Diagnosis Date   • Allergic rhinitis    • Bone pain    • Depression 1981   • Fibrocystic breast disease 2000   • GERD (gastroesophageal reflux disease) 2001   •  Hiatal hernia    • Hyperlipidemia    • Hypertension    • Insomnia    • Irritable bowel syndrome (IBS)    • Lumbar scoliosis 2017   • Lumbar spondylosis    • Mitral valve insufficiency    • Osteoarthritis    • Osteopenia    • Post menopausal syndrome      Past Surgical History:   Procedure Laterality Date   • BREAST BIOPSY Right remote    benign disease   • CHOLECYSTECTOMY     • HYSTERECTOMY     • REFRACTIVE SURGERY      RK   • URETHRAL SUSPENSION      laparoscopic for stress incontinence     Family History   Problem Relation Age of Onset   • Pulmonary fibrosis Mother          age 82   • Hypertension Mother    • Lumbar disc disease Mother    • Prostate cancer Father          age 76   • Hypertension Sister    • Goiter Sister    • Hypothyroidism Sister    • Heart disease Brother          Age 48 heart attack was cigarette smoker   • Hiatal hernia Brother    • Breast cancer Other         Maternal and paternal aunts   • Hypertension Brother    • Obesity Maternal Uncle    • Diabetes Maternal Uncle    • Arthritis Paternal Aunt      Social History     Socioeconomic History   • Marital status:      Spouse name: Not on file   • Number of children: Not on file   • Years of education: Not on file   • Highest education level: Not on file   Tobacco Use   • Smoking status: Former Smoker     Packs/day: 2.00     Years: 10.00     Pack years: 20.00     Types: Cigarettes     Start date:      Last attempt to quit:      Years since quittin.8   • Smokeless tobacco: Never Used   Substance and Sexual Activity   • Alcohol use: Yes     Alcohol/week: 1.8 oz     Types: 3 Glasses of wine per week   • Drug use: No   Social History Narrative    Domestic life   lives in private home with         Episcopalian   none listed        Sleep hygiene    in bed 10 PM to 7 AM for 8 hours broken sleep        Caffeine use    2 cup of coffee daily        Exercise habits   water aerobics twice  "weekly.  walks daily.  upper body strengthening twice daily.        Diet     AHA diet low in red meats.  One dairy serving daily.  Low sodium.        Occupation   retired college         Hearing   no impairment        Vision   corrects with bifocal glasses        Driving   no limitations         Review of Systems   Constitutional: Negative.    Musculoskeletal: Positive for back pain.       Objective   Blood pressure 142/84, pulse 94, height 154.9 cm (60.98\"), weight 73.9 kg (163 lb), SpO2 98 %, not currently breastfeeding.  Nursing note reviewed  Physical Exam  Const: NAD, A&Ox4, Pleasant, Cooperative  Eyes: EOMI, no conjunctivitis  ENT: No nasal discharge present, neck supple  Cardiac: Regular rate and rhythm, no cyanosis  Resp: Respiratory rate within normal limits, no increased work of breathing, no audible wheezing or retractions noted  GI: No distention or ascites  MSK: She still has some slight tenderness over the right paralumbar  Procedures   Radiology results lumbar x-ray from October reviewed including images with the patient.  She has some significant degenerative disc disease at L1-L2, with significant disc height loss and mild osteophyte formation.  She has some slight sclerosis as well  Assessment/Plan     Problem List Items Addressed This Visit        Musculoskeletal and Integument    Lumbar spondylosis      Other Visit Diagnoses     Sacroiliac joint pain    -  Primary        Continue conservative management with stretches at home.  She can use anti-inflammatories as needed.  If she continues to have pain or if it worsens, given the severe disc height loss at L1-L2 I would recommend further imaging and possible referral    There are no Patient Instructions on file for this visit.    No Follow-up on file.    Ambulatory progress note signed and attested to by Daniel Shafer D.O.             "

## 2020-01-29 ENCOUNTER — OFFICE VISIT (OUTPATIENT)
Dept: FAMILY MEDICINE CLINIC | Facility: CLINIC | Age: 65
End: 2020-01-29

## 2020-01-29 VITALS
OXYGEN SATURATION: 97 % | SYSTOLIC BLOOD PRESSURE: 140 MMHG | BODY MASS INDEX: 30.4 KG/M2 | WEIGHT: 161 LBS | HEART RATE: 86 BPM | HEIGHT: 61 IN | DIASTOLIC BLOOD PRESSURE: 80 MMHG

## 2020-01-29 DIAGNOSIS — R73.9 HYPERGLYCEMIA: ICD-10-CM

## 2020-01-29 DIAGNOSIS — E78.00 PURE HYPERCHOLESTEROLEMIA: ICD-10-CM

## 2020-01-29 DIAGNOSIS — I10 ESSENTIAL HYPERTENSION: ICD-10-CM

## 2020-01-29 DIAGNOSIS — E55.9 VITAMIN D DEFICIENCY: ICD-10-CM

## 2020-01-29 DIAGNOSIS — E53.8 B12 DEFICIENCY: ICD-10-CM

## 2020-01-29 DIAGNOSIS — M47.816 LUMBAR SPONDYLOSIS: ICD-10-CM

## 2020-01-29 DIAGNOSIS — F41.9 ANXIETY: ICD-10-CM

## 2020-01-29 DIAGNOSIS — Z00.00 PREVENTATIVE HEALTH CARE: Primary | ICD-10-CM

## 2020-01-29 PROCEDURE — G0438 PPPS, INITIAL VISIT: HCPCS | Performed by: FAMILY MEDICINE

## 2020-01-29 PROCEDURE — 99397 PER PM REEVAL EST PAT 65+ YR: CPT | Performed by: FAMILY MEDICINE

## 2020-01-29 RX ORDER — HYDROCODONE BITARTRATE AND ACETAMINOPHEN 5; 325 MG/1; MG/1
.5-1 TABLET ORAL DAILY PRN
Qty: 30 TABLET | Refills: 0 | Status: SHIPPED | OUTPATIENT
Start: 2020-01-29 | End: 2020-05-28 | Stop reason: SDUPTHER

## 2020-02-03 DIAGNOSIS — R73.9 HYPERGLYCEMIA: ICD-10-CM

## 2020-02-03 DIAGNOSIS — I10 ESSENTIAL HYPERTENSION: ICD-10-CM

## 2020-02-05 ENCOUNTER — LAB (OUTPATIENT)
Dept: LAB | Facility: HOSPITAL | Age: 65
End: 2020-02-05

## 2020-02-05 DIAGNOSIS — E53.8 B12 DEFICIENCY: ICD-10-CM

## 2020-02-05 DIAGNOSIS — E55.9 VITAMIN D DEFICIENCY: ICD-10-CM

## 2020-02-05 DIAGNOSIS — Z00.00 PREVENTATIVE HEALTH CARE: ICD-10-CM

## 2020-02-05 DIAGNOSIS — R73.9 HYPERGLYCEMIA: ICD-10-CM

## 2020-02-05 DIAGNOSIS — E78.00 PURE HYPERCHOLESTEROLEMIA: ICD-10-CM

## 2020-02-05 DIAGNOSIS — I10 ESSENTIAL HYPERTENSION: ICD-10-CM

## 2020-02-05 LAB
25(OH)D3 SERPL-MCNC: 34.1 NG/ML (ref 30–100)
ALBUMIN SERPL-MCNC: 4.6 G/DL (ref 3.5–5.2)
ALBUMIN/GLOB SERPL: 1.8 G/DL
ALP SERPL-CCNC: 63 U/L (ref 39–117)
ALT SERPL W P-5'-P-CCNC: 25 U/L (ref 1–33)
ANION GAP SERPL CALCULATED.3IONS-SCNC: 13.2 MMOL/L (ref 5–15)
AST SERPL-CCNC: 24 U/L (ref 1–32)
BASOPHILS # BLD AUTO: 0.08 10*3/MM3 (ref 0–0.2)
BASOPHILS NFR BLD AUTO: 1.5 % (ref 0–1.5)
BILIRUB SERPL-MCNC: 0.5 MG/DL (ref 0.2–1.2)
BUN BLD-MCNC: 17 MG/DL (ref 8–23)
BUN/CREAT SERPL: 24.6 (ref 7–25)
CALCIUM SPEC-SCNC: 9 MG/DL (ref 8.6–10.5)
CHLORIDE SERPL-SCNC: 97 MMOL/L (ref 98–107)
CHOLEST SERPL-MCNC: 207 MG/DL (ref 0–200)
CO2 SERPL-SCNC: 27.8 MMOL/L (ref 22–29)
CREAT BLD-MCNC: 0.69 MG/DL (ref 0.57–1)
CRP SERPL-MCNC: 0.11 MG/DL (ref 0.01–0.5)
DEPRECATED RDW RBC AUTO: 45.8 FL (ref 37–54)
EOSINOPHIL # BLD AUTO: 0.09 10*3/MM3 (ref 0–0.4)
EOSINOPHIL NFR BLD AUTO: 1.6 % (ref 0.3–6.2)
ERYTHROCYTE [DISTWIDTH] IN BLOOD BY AUTOMATED COUNT: 12.1 % (ref 12.3–15.4)
GFR SERPL CREATININE-BSD FRML MDRD: 85 ML/MIN/1.73
GLOBULIN UR ELPH-MCNC: 2.6 GM/DL
GLUCOSE BLD-MCNC: 85 MG/DL (ref 65–99)
HBA1C MFR BLD: 5 % (ref 4.8–5.6)
HCT VFR BLD AUTO: 42.8 % (ref 34–46.6)
HDLC SERPL-MCNC: 80 MG/DL (ref 40–60)
HGB BLD-MCNC: 15 G/DL (ref 12–15.9)
IMM GRANULOCYTES # BLD AUTO: 0.02 10*3/MM3 (ref 0–0.05)
IMM GRANULOCYTES NFR BLD AUTO: 0.4 % (ref 0–0.5)
LDLC SERPL CALC-MCNC: 98 MG/DL (ref 0–100)
LDLC/HDLC SERPL: 1.23 {RATIO}
LYMPHOCYTES # BLD AUTO: 1.05 10*3/MM3 (ref 0.7–3.1)
LYMPHOCYTES NFR BLD AUTO: 19.2 % (ref 19.6–45.3)
MCH RBC QN AUTO: 35.8 PG (ref 26.6–33)
MCHC RBC AUTO-ENTMCNC: 35 G/DL (ref 31.5–35.7)
MCV RBC AUTO: 102.1 FL (ref 79–97)
MONOCYTES # BLD AUTO: 0.62 10*3/MM3 (ref 0.1–0.9)
MONOCYTES NFR BLD AUTO: 11.4 % (ref 5–12)
NEUTROPHILS # BLD AUTO: 3.6 10*3/MM3 (ref 1.7–7)
NEUTROPHILS NFR BLD AUTO: 65.9 % (ref 42.7–76)
NRBC BLD AUTO-RTO: 0 /100 WBC (ref 0–0.2)
PLATELET # BLD AUTO: 277 10*3/MM3 (ref 140–450)
PMV BLD AUTO: 9.9 FL (ref 6–12)
POTASSIUM BLD-SCNC: 4.2 MMOL/L (ref 3.5–5.2)
PROT SERPL-MCNC: 7.2 G/DL (ref 6–8.5)
RBC # BLD AUTO: 4.19 10*6/MM3 (ref 3.77–5.28)
SODIUM BLD-SCNC: 138 MMOL/L (ref 136–145)
TRIGL SERPL-MCNC: 143 MG/DL (ref 0–150)
TSH SERPL DL<=0.05 MIU/L-ACNC: 2.66 UIU/ML (ref 0.27–4.2)
VIT B12 BLD-MCNC: 1126 PG/ML (ref 211–946)
VLDLC SERPL-MCNC: 28.6 MG/DL (ref 5–40)
WBC NRBC COR # BLD: 5.46 10*3/MM3 (ref 3.4–10.8)

## 2020-02-05 PROCEDURE — 82306 VITAMIN D 25 HYDROXY: CPT

## 2020-02-05 PROCEDURE — 82607 VITAMIN B-12: CPT

## 2020-02-05 PROCEDURE — 80053 COMPREHEN METABOLIC PANEL: CPT

## 2020-02-05 PROCEDURE — 85025 COMPLETE CBC W/AUTO DIFF WBC: CPT

## 2020-02-05 PROCEDURE — 86141 C-REACTIVE PROTEIN HS: CPT

## 2020-02-05 PROCEDURE — 84443 ASSAY THYROID STIM HORMONE: CPT

## 2020-02-05 PROCEDURE — 80061 LIPID PANEL: CPT

## 2020-02-05 PROCEDURE — 83036 HEMOGLOBIN GLYCOSYLATED A1C: CPT

## 2020-02-06 DIAGNOSIS — I10 ESSENTIAL HYPERTENSION: ICD-10-CM

## 2020-02-06 DIAGNOSIS — K21.9 GASTROESOPHAGEAL REFLUX DISEASE WITHOUT ESOPHAGITIS: ICD-10-CM

## 2020-02-06 RX ORDER — SUCRALFATE 1 G/1
TABLET ORAL
Qty: 60 TABLET | Refills: 3 | Status: SHIPPED | OUTPATIENT
Start: 2020-02-06 | End: 2020-11-02

## 2020-02-06 RX ORDER — OMEPRAZOLE 20 MG/1
CAPSULE, DELAYED RELEASE ORAL
Qty: 30 CAPSULE | Refills: 4 | Status: SHIPPED | OUTPATIENT
Start: 2020-02-06 | End: 2020-07-02

## 2020-02-06 RX ORDER — LISINOPRIL 40 MG/1
TABLET ORAL
Qty: 30 TABLET | Refills: 0 | Status: SHIPPED | OUTPATIENT
Start: 2020-02-06 | End: 2020-03-02

## 2020-03-02 DIAGNOSIS — I10 ESSENTIAL HYPERTENSION: ICD-10-CM

## 2020-03-02 RX ORDER — LISINOPRIL 40 MG/1
TABLET ORAL
Qty: 30 TABLET | Refills: 0 | Status: SHIPPED | OUTPATIENT
Start: 2020-03-02 | End: 2020-04-13

## 2020-03-10 RX ORDER — AMLODIPINE BESYLATE 5 MG/1
TABLET ORAL
Qty: 30 TABLET | Refills: 4 | Status: SHIPPED | OUTPATIENT
Start: 2020-03-10 | End: 2020-08-05

## 2020-03-10 RX ORDER — ATORVASTATIN CALCIUM 20 MG/1
TABLET, FILM COATED ORAL
Qty: 30 TABLET | Refills: 4 | Status: SHIPPED | OUTPATIENT
Start: 2020-03-10 | End: 2020-08-05

## 2020-03-10 RX ORDER — CITALOPRAM 20 MG/1
TABLET ORAL
Qty: 30 TABLET | Refills: 4 | Status: SHIPPED | OUTPATIENT
Start: 2020-03-10 | End: 2020-08-05

## 2020-04-13 DIAGNOSIS — F41.9 ANXIETY: ICD-10-CM

## 2020-04-13 DIAGNOSIS — I10 ESSENTIAL HYPERTENSION: ICD-10-CM

## 2020-04-13 DIAGNOSIS — G47.9 SLEEP DISORDER: ICD-10-CM

## 2020-04-13 RX ORDER — HYDROCHLOROTHIAZIDE 25 MG/1
TABLET ORAL
Qty: 30 TABLET | Refills: 4 | Status: SHIPPED | OUTPATIENT
Start: 2020-04-13 | End: 2020-09-01

## 2020-04-13 RX ORDER — FLUTICASONE PROPIONATE 50 MCG
SPRAY, SUSPENSION (ML) NASAL
Qty: 16 G | Refills: 0 | Status: SHIPPED | OUTPATIENT
Start: 2020-04-13 | End: 2020-05-28 | Stop reason: SDUPTHER

## 2020-04-13 RX ORDER — LISINOPRIL 40 MG/1
TABLET ORAL
Qty: 30 TABLET | Refills: 0 | Status: SHIPPED | OUTPATIENT
Start: 2020-04-13 | End: 2020-05-06

## 2020-04-13 RX ORDER — TEMAZEPAM 15 MG/1
CAPSULE ORAL
Qty: 60 CAPSULE | Refills: 4 | Status: SHIPPED | OUTPATIENT
Start: 2020-04-13 | End: 2020-11-10

## 2020-04-13 NOTE — TELEPHONE ENCOUNTER
Last fill: 7/29/19  Last office visit: 1/29/20  Next office visit: not scheduled  Date of last Joshua: 5/14/19  CSA up-to-date? 1/29/20  Date of last UDS: 1/29/20   UDS consistent: inconsistent

## 2020-04-20 ENCOUNTER — PRIOR AUTHORIZATION (OUTPATIENT)
Dept: FAMILY MEDICINE CLINIC | Facility: CLINIC | Age: 65
End: 2020-04-20

## 2020-04-20 NOTE — TELEPHONE ENCOUNTER
Completed Prior Authorization for Temazepam 15 mg capsules    KEY:[id number]  Awaiting determination from insurance plan

## 2020-05-06 DIAGNOSIS — I10 ESSENTIAL HYPERTENSION: ICD-10-CM

## 2020-05-06 RX ORDER — LISINOPRIL 40 MG/1
TABLET ORAL
Qty: 30 TABLET | Refills: 0 | Status: SHIPPED | OUTPATIENT
Start: 2020-05-06 | End: 2020-06-03

## 2020-05-28 DIAGNOSIS — M47.816 LUMBAR SPONDYLOSIS: ICD-10-CM

## 2020-05-28 RX ORDER — HYDROCODONE BITARTRATE AND ACETAMINOPHEN 5; 325 MG/1; MG/1
.5-1 TABLET ORAL DAILY PRN
Qty: 30 TABLET | Refills: 0 | Status: SHIPPED | OUTPATIENT
Start: 2020-05-28 | End: 2020-07-29 | Stop reason: SDUPTHER

## 2020-05-28 RX ORDER — FLUTICASONE PROPIONATE 50 MCG
1 SPRAY, SUSPENSION (ML) NASAL DAILY
Qty: 16 G | Refills: 0 | Status: SHIPPED | OUTPATIENT
Start: 2020-05-28 | End: 2020-07-08

## 2020-05-28 NOTE — TELEPHONE ENCOUNTER
PT CALLED STATES SHE IS NEEDING A REFILL ON THE FOLLOWING MEDICATION(S):     HYDROcodone-acetaminophen (NORCO) 5-325 MG per tablet    fluticasone (FLONASE) 50 MCG/ACT nasal spray    [facility]  [place] [address] [facility] [phone] [facility] [phone]       CONTACT: [phone]

## 2020-05-28 NOTE — TELEPHONE ENCOUNTER
Last fill:1/29/2020  Last office visit:1/29/2020  Next office visit: none  Date of last Joshua: 5/14/2019  CSA up-to-date? 1/29/2020  Date of last UDS: 1/29/2020  UDS consistent: inconsistent

## 2020-06-03 DIAGNOSIS — I10 ESSENTIAL HYPERTENSION: ICD-10-CM

## 2020-06-03 RX ORDER — LISINOPRIL 40 MG/1
TABLET ORAL
Qty: 30 TABLET | Refills: 0 | Status: SHIPPED | OUTPATIENT
Start: 2020-06-03 | End: 2020-07-02

## 2020-07-02 DIAGNOSIS — I10 ESSENTIAL HYPERTENSION: ICD-10-CM

## 2020-07-02 DIAGNOSIS — K21.9 GASTROESOPHAGEAL REFLUX DISEASE WITHOUT ESOPHAGITIS: ICD-10-CM

## 2020-07-02 RX ORDER — LISINOPRIL 40 MG/1
TABLET ORAL
Qty: 30 TABLET | Refills: 0 | Status: SHIPPED | OUTPATIENT
Start: 2020-07-02 | End: 2020-08-05

## 2020-07-02 RX ORDER — OMEPRAZOLE 20 MG/1
CAPSULE, DELAYED RELEASE ORAL
Qty: 30 CAPSULE | Refills: 3 | Status: SHIPPED | OUTPATIENT
Start: 2020-07-02 | End: 2020-11-02

## 2020-07-08 RX ORDER — FLUTICASONE PROPIONATE 50 MCG
SPRAY, SUSPENSION (ML) NASAL
Qty: 16 G | Refills: 0 | Status: SHIPPED | OUTPATIENT
Start: 2020-07-08 | End: 2020-08-24

## 2020-07-29 ENCOUNTER — OFFICE VISIT (OUTPATIENT)
Dept: FAMILY MEDICINE CLINIC | Facility: CLINIC | Age: 65
End: 2020-07-29

## 2020-07-29 ENCOUNTER — LAB (OUTPATIENT)
Dept: LAB | Facility: HOSPITAL | Age: 65
End: 2020-07-29

## 2020-07-29 VITALS
HEIGHT: 61 IN | SYSTOLIC BLOOD PRESSURE: 150 MMHG | OXYGEN SATURATION: 98 % | WEIGHT: 169 LBS | HEART RATE: 80 BPM | BODY MASS INDEX: 31.91 KG/M2 | DIASTOLIC BLOOD PRESSURE: 84 MMHG

## 2020-07-29 DIAGNOSIS — K58.9 IRRITABLE BOWEL SYNDROME WITHOUT DIARRHEA: Primary | ICD-10-CM

## 2020-07-29 DIAGNOSIS — M70.50 PES ANSERINE BURSITIS: ICD-10-CM

## 2020-07-29 DIAGNOSIS — R10.32 ACUTE LEFT LOWER QUADRANT PAIN: ICD-10-CM

## 2020-07-29 DIAGNOSIS — M47.816 LUMBAR SPONDYLOSIS: ICD-10-CM

## 2020-07-29 LAB
ALBUMIN SERPL-MCNC: 4.6 G/DL (ref 3.5–5.2)
ALBUMIN/GLOB SERPL: 1.5 G/DL
ALP SERPL-CCNC: 61 U/L (ref 39–117)
ALT SERPL W P-5'-P-CCNC: 29 U/L (ref 1–33)
ANION GAP SERPL CALCULATED.3IONS-SCNC: 11.5 MMOL/L (ref 5–15)
AST SERPL-CCNC: 26 U/L (ref 1–32)
BASOPHILS # BLD AUTO: 0.06 10*3/MM3 (ref 0–0.2)
BASOPHILS NFR BLD AUTO: 0.9 % (ref 0–1.5)
BILIRUB SERPL-MCNC: 0.5 MG/DL (ref 0–1.2)
BILIRUB UR QL STRIP: NEGATIVE
BUN SERPL-MCNC: 11 MG/DL (ref 8–23)
BUN/CREAT SERPL: 14.9 (ref 7–25)
CALCIUM SPEC-SCNC: 9.7 MG/DL (ref 8.6–10.5)
CHLORIDE SERPL-SCNC: 93 MMOL/L (ref 98–107)
CLARITY UR: CLEAR
CO2 SERPL-SCNC: 27.5 MMOL/L (ref 22–29)
COLOR UR: YELLOW
CREAT SERPL-MCNC: 0.74 MG/DL (ref 0.57–1)
DEPRECATED RDW RBC AUTO: 44.8 FL (ref 37–54)
EOSINOPHIL # BLD AUTO: 0.04 10*3/MM3 (ref 0–0.4)
EOSINOPHIL NFR BLD AUTO: 0.6 % (ref 0.3–6.2)
ERYTHROCYTE [DISTWIDTH] IN BLOOD BY AUTOMATED COUNT: 12.2 % (ref 12.3–15.4)
GFR SERPL CREATININE-BSD FRML MDRD: 79 ML/MIN/1.73
GLOBULIN UR ELPH-MCNC: 3 GM/DL
GLUCOSE SERPL-MCNC: 91 MG/DL (ref 65–99)
GLUCOSE UR STRIP-MCNC: NEGATIVE MG/DL
HCT VFR BLD AUTO: 43 % (ref 34–46.6)
HGB BLD-MCNC: 15.1 G/DL (ref 12–15.9)
HGB UR QL STRIP.AUTO: NEGATIVE
IMM GRANULOCYTES # BLD AUTO: 0.01 10*3/MM3 (ref 0–0.05)
IMM GRANULOCYTES NFR BLD AUTO: 0.2 % (ref 0–0.5)
KETONES UR QL STRIP: NEGATIVE
LEUKOCYTE ESTERASE UR QL STRIP.AUTO: NEGATIVE
LYMPHOCYTES # BLD AUTO: 1.12 10*3/MM3 (ref 0.7–3.1)
LYMPHOCYTES NFR BLD AUTO: 17.2 % (ref 19.6–45.3)
MCH RBC QN AUTO: 35.2 PG (ref 26.6–33)
MCHC RBC AUTO-ENTMCNC: 35.1 G/DL (ref 31.5–35.7)
MCV RBC AUTO: 100.2 FL (ref 79–97)
MONOCYTES # BLD AUTO: 0.71 10*3/MM3 (ref 0.1–0.9)
MONOCYTES NFR BLD AUTO: 10.9 % (ref 5–12)
NEUTROPHILS NFR BLD AUTO: 4.56 10*3/MM3 (ref 1.7–7)
NEUTROPHILS NFR BLD AUTO: 70.2 % (ref 42.7–76)
NITRITE UR QL STRIP: NEGATIVE
NRBC BLD AUTO-RTO: 0 /100 WBC (ref 0–0.2)
PH UR STRIP.AUTO: 7 [PH] (ref 5–8)
PLATELET # BLD AUTO: 298 10*3/MM3 (ref 140–450)
PMV BLD AUTO: 9.6 FL (ref 6–12)
POTASSIUM SERPL-SCNC: 3.6 MMOL/L (ref 3.5–5.2)
PROT SERPL-MCNC: 7.6 G/DL (ref 6–8.5)
PROT UR QL STRIP: NEGATIVE
RBC # BLD AUTO: 4.29 10*6/MM3 (ref 3.77–5.28)
SODIUM SERPL-SCNC: 132 MMOL/L (ref 136–145)
SP GR UR STRIP: 1.01 (ref 1–1.03)
UROBILINOGEN UR QL STRIP: NORMAL
WBC # BLD AUTO: 6.5 10*3/MM3 (ref 3.4–10.8)

## 2020-07-29 PROCEDURE — 81003 URINALYSIS AUTO W/O SCOPE: CPT

## 2020-07-29 PROCEDURE — 99214 OFFICE O/P EST MOD 30 MIN: CPT | Performed by: FAMILY MEDICINE

## 2020-07-29 PROCEDURE — 80053 COMPREHEN METABOLIC PANEL: CPT

## 2020-07-29 PROCEDURE — 85025 COMPLETE CBC W/AUTO DIFF WBC: CPT

## 2020-07-29 RX ORDER — HYDROCODONE BITARTRATE AND ACETAMINOPHEN 5; 325 MG/1; MG/1
.5-1 TABLET ORAL DAILY PRN
Qty: 30 TABLET | Refills: 0 | Status: SHIPPED | OUTPATIENT
Start: 2020-07-29 | End: 2021-02-04 | Stop reason: SDUPTHER

## 2020-07-29 NOTE — PROGRESS NOTES
Subjective   Luh Horner is a 64 y.o. female.     Chief Complaint   Patient presents with   • Abdominal Pain   • Left knee pain       History of Present Illness     Luh Horner presents today for   Chief Complaint   Patient presents with   • Abdominal Pain   • Left knee pain     Left knee pain x 2 weeks, Has been walking more but no injury. Medial sided pain. She walks with nordic poles typically. Has been using brace/sleeve for about a week with benefit. [employer], 9/10.    Abdominal pain LLQ x 1 week. Started last Thursday. No diarrhea/constipation.    This patient is accompanied by their self who contributes to the history of their care.    The following portions of the patient's history were reviewed and updated as appropriate: allergies, current medications, past family history, past medical history, past social history, past surgical history and problem list.    Active Ambulatory Problems     Diagnosis Date Noted   • Atopic rhinitis 06/14/2016   • Anxiety 06/14/2016   • Carpal tunnel syndrome 06/14/2016   • Clenching of teeth 06/14/2016   • Depression 06/14/2016   • Sleep disorder 06/14/2016   • Gastroesophageal reflux disease 06/14/2016   • Hyperlipidemia 06/14/2016   • Hypertension 06/14/2016   • Low back pain 06/14/2016   • Mitral valve insufficiency 06/14/2016   • Adiposity 06/14/2016   • Gluteal tendinitis of right buttock 06/14/2016   • Postmenopausal disorder 06/14/2016   • Preventative health care 09/29/2016   • Other chronic pain 09/29/2016   • Lumbar spondylosis 06/08/2017   • Lumbar scoliosis 06/10/2017   • Irritable bowel syndrome 08/17/2017     Resolved Ambulatory Problems     Diagnosis Date Noted   • Dizziness 06/14/2016   • Insect bites 06/14/2016     Past Medical History:   Diagnosis Date   • Allergic rhinitis    • Bone pain    • Fibrocystic breast disease 2000   • GERD (gastroesophageal reflux disease) 2001   • Hiatal hernia    • Insomnia 2013   • Irritable bowel syndrome (IBS) 2002    • Osteoarthritis    • Osteopenia    • Post menopausal syndrome      Past Surgical History:   Procedure Laterality Date   • BREAST BIOPSY Right remote    benign disease   • CHOLECYSTECTOMY     • HYSTERECTOMY     • REFRACTIVE SURGERY      RK   • URETHRAL SUSPENSION  2011    laparoscopic for stress incontinence     Family History   Problem Relation Age of Onset   • Pulmonary fibrosis Mother          age 82   • Hypertension Mother    • Lumbar disc disease Mother    • Prostate cancer Father          age 76   • Hypertension Sister    • Goiter Sister    • Hypothyroidism Sister    • Heart disease Brother          Age 48 heart attack was cigarette smoker   • Hiatal hernia Brother    • Breast cancer Other         Maternal and paternal aunts   • Hypertension Brother    • Obesity Maternal Uncle    • Diabetes Maternal Uncle    • Arthritis Paternal Aunt      Social History     Socioeconomic History   • Marital status:      Spouse name: Not on file   • Number of children: Not on file   • Years of education: Not on file   • Highest education level: Not on file   Tobacco Use   • Smoking status: Former Smoker     Packs/day: 2.00     Years: 10.00     Pack years: 20.00     Types: Cigarettes     Start date:      Last attempt to quit:      Years since quittin.6   • Smokeless tobacco: Never Used   Substance and Sexual Activity   • Alcohol use: Yes     Alcohol/week: 3.0 standard drinks     Types: 3 Glasses of wine per week   • Drug use: No   Social History Narrative    Domestic life   lives in private home with         Anglican   none listed        Sleep hygiene    in bed 10 PM to 7 AM for 8 hours broken sleep        Caffeine use    2 cup of coffee daily        Exercise habits   water aerobics twice weekly.  walks daily.  upper body strengthening twice daily.        Diet     AHA diet low in red meats.  One dairy serving daily.  Low sodium.        Occupation   retired college  "        Hearing   no impairment        Vision   corrects with bifocal glasses        Driving   no limitations       Review of Systems  Review of Systems -  General ROS: negative for - chills, fever or night sweats  Cardiovascular ROS: no chest pain or dyspnea on exertion  Gastrointestinal ROS: see HPI  Genito-Urinary ROS: no dysuria, trouble voiding, or hematuria    Objective   Blood pressure 150/84, pulse 80, height 154.9 cm (60.98\"), weight 76.7 kg (169 lb), SpO2 98 %, not currently breastfeeding.  Nursing note reviewed  Physical Exam  Const: NAD, A&Ox4, Pleasant, Cooperative  Eyes: EOMI, no conjunctivitis  ENT: No nasal discharge present, neck supple  Cardiac: Regular rate and rhythm, no cyanosis  Resp: Respiratory rate within normal limits, no increased work of breathing, no audible wheezing or retractions noted  GI: No distention or ascites. TTP LLQ, no ascites. BS normal  MSK: Left knee tight hamstrings TTP medial hamstring insertion  Procedures  Assessment/Plan   Problem List Items Addressed This Visit        Digestive    Irritable bowel syndrome - Primary       Musculoskeletal and Integument    Lumbar spondylosis    Relevant Medications    HYDROcodone-acetaminophen (NORCO) 5-325 MG per tablet      Other Visit Diagnoses     Pes anserine bursitis        Acute left lower quadrant pain        Relevant Orders    CT Abdomen Pelvis With & Without Contrast    Comprehensive Metabolic Panel    CBC & Differential    Urinalysis With Microscopic If Indicated (No Culture) - Urine, Clean Catch          See patient diagnoses and orders along with patient instructions for assessment, plan, and changes to care for patient.    Patient Instructions   Hamstring Strain Rehab  Ask your health care provider which exercises are safe for you. Do exercises exactly as told by your health care provider and adjust them as directed. It is normal to feel mild stretching, pulling, tightness, or discomfort as you do " these exercises. Stop right away if you feel sudden pain or your pain gets worse. Do not begin these exercises until told by your health care provider.  Stretching and range-of-motion exercises  These exercises warm up your muscles and joints and improve the movement and flexibility of your thighs. These exercises also help to relieve pain, numbness, and tingling. Talk to your health care provider about these restrictions.  Knee extension, seated    1. Sit with your left / right heel propped on a chair, a coffee table, or a footstool. Do not have anything under your knee to support it.  2. Allow your leg muscles to relax, letting gravity straighten out your knee (extension). You should feel a stretch behind your left / right knee.  3. If told by your health care provider, deepen the stretch by placing a __________ weight on your thigh, just above your kneecap.  4. Hold this position for __________ seconds.  Repeat __________ times. Complete this exercise __________ times a day.  Seated stretch  This exercise is sometimes called hamstrings and adductors stretch.  1. Sit on the floor with your legs stretched wide. Keep your knees straight during this exercise.  2. Keeping your head and back in a straight line, bend at your waist to reach for your left foot (position A). You should feel a stretch in your right inner thigh (adductors).  3. Hold this position for __________ seconds. Then slowly return to the upright position.  4. Keeping your head and back in a straight line, bend at your waist to reach forward (position B). You should feel a stretch behind both of your thighs or knees (hamstrings).  5. Hold this position for __________ seconds. Then slowly return to the upright position.  6. Keeping your head and back in a straight line, bend at your waist to reach for your right foot (position C). You should feel a stretch in your left inner thigh (adductors).  7. Hold this position for __________ seconds. Then slowly  return to the upright position.  Repeat __________ times. Complete this exercise __________ times a day.  Hamstrings stretch, supine    1. Lie on your back (supine position).  2. Loop a belt or towel over the ball of your left / right foot. The ball of your foot is on the walking surface, right under your toes.  3. Straighten your left / right knee and slowly pull on the belt or towel to raise your leg.  ? Do not let your left / right knee bend while you do this.  ? Keep your other leg flat on the floor.  ? Raise the left / right leg until you feel a gentle stretch behind your left / right knee or thigh (hamstrings).  4. Hold this position for __________ seconds.  5. Slowly return your leg to the starting position.  Repeat __________ times. Complete this exercise __________ times a day.  Strengthening exercises  These exercises build strength and endurance in your thighs. Endurance is the ability to use your muscles for a long time, even after they get tired.  Straight leg raises, prone  This exercise strengthens the muscles that move the hips (hip extensors).  1. Lie on your abdomen on a firm surface (prone position).  2. Tense the muscles in your buttocks and lift your left / right leg about 4 inches (10 cm). Keep your knee straight as you lift your leg. If you cannot lift your leg that high without arching your back, place a pillow under your hips.  3. Hold the position for __________ seconds.  4. Slowly lower your leg to the starting position.  5. Allow your muscles to relax completely before you start the next repetition.  Repeat __________ times. Complete this exercise __________ times a day.  Bridge  This exercise strengthens the muscles in your buttocks and the back of your thighs (hip extensors).  1. Lie on your back on a firm surface with your knees bent and your feet flat on the floor.  2. Tighten your buttocks muscles and lift your bottom off the floor until the trunk of your body is level with your  thighs.  ? You should feel the muscles working in your buttocks and the back of your thighs.  ? Do not arch your back.  3. Hold this position for __________ seconds.  4. Slowly lower your hips to the starting position.  5. Let your buttocks muscles relax completely between repetitions.  6. If told by your health care provider, keep your bottom lifted off the floor while you slowly walk your feet away from you as far as you can control. Hold for __________ seconds, then slowly walk your feet back toward you.  Repeat __________ times. Complete this exercise __________ times a day.  Lateral walking with band  This is an exercise in which you walk sideways (lateral), with tension provided by an exercise band. The exercise strengthens the muscles in your hip (hip abductors).  1.  a long hallway.  2. Wrap a loop of exercise band around your legs, just above your knees.  3. Bend your knees gently and drop your hips down and back so your weight is over your heels.  4. Step to the side to move down the length of the hallway, keeping your toes pointed ahead of you and keeping tension in the band.  5. Repeat, leading with your other leg.  Repeat __________ times. Complete this exercise __________ times a day.  Single leg stand with reaching  This exercise is also called eccentric hamstring stretch.  1. Stand on your left / right foot. Keep your big toe down on the floor and try to keep your arch lifted.  2. Slowly reach down toward the floor as far as you can while keeping your balance. Lowering your thigh under tension is called eccentric stretching.  3. Hold this position for __________ seconds.  Repeat __________ times. Complete this exercise __________ times a day.  Plank, prone  This exercise strengthens muscles in your abdomen and core area.  1. Lie on your abdomen on the floor (prone position),and prop yourself up on your elbows. Your hands should be straight out in front of you, and your elbows should be below  your shoulders. Position your feet similar to a push-up position so your toes are on the ground.  2. Tighten your abdominal muscles and lift your body off the floor.  ? Do not arch your back.  ? Do not hold your breath.  3. Hold this position for __________ seconds.  Repeat __________ times. Complete this exercise __________ times a day.  This information is not intended to replace advice given to you by your health care provider. Make sure you discuss any questions you have with your health care provider.  Document Released: 12/18/2006 Document Revised: 04/09/2020 Document Reviewed: 12/16/2019  Elsevier Patient Education © 2020 [employer]      Adductor Muscle Strain    An adductor muscle strain, also called a groin strain or pull, is an injury to the muscles or tendons on the upper, inner part of the thigh. These muscles are called the adductor muscles or groin muscles. They are responsible for moving the legs across the body or pulling the legs together.  A muscle strain occurs when a muscle is overstretched and some muscle fibers are torn. An adductor muscle strain can range from mild to severe, depending on how many muscle fibers are affected and whether the muscle fibers are partially or completely torn.  What are the causes?  Adductor muscle strains usually occur during exercise or while participating in sports. The injury often happens when a sudden, violent force is placed on a muscle, stretching the muscle too far. A strain is more likely to happen when your muscles are not warmed up or if you are not properly conditioned.  This injury may be caused by:  · Stretching the adductor muscles too far or too suddenly, often during side-to-side motion with a sudden change in direction.  · Putting repeated stress on the adductor muscles over a long period of time.  · Performing vigorous activity without properly stretching the adductor muscles beforehand.  What are the signs or symptoms?  Symptoms of this  condition include:  · Pain and tenderness in the groin area. This begins as sharp pain and persists as a dull ache.  · A popping or snapping feeling when the injury occurs (for severe strains).  · Swelling or bruising.  · Muscle spasms.  · Weakness in the leg.  · Stiffness in the groin area with decreased ability to move the affected muscles.  How is this diagnosed?  This condition may be diagnosed based on:  · Your symptoms and a description of how the injury occurred.  · A physical exam.  · Imaging tests, such as:  ? X-rays. These are sometimes needed to rule out a broken bone or cartilage problems.  ? An ultrasound, CT scan, or MRI. These may be done if your health care provider suspects a complete muscle tear or needs to check for other injuries.  How is this treated?  An adductor strain will often heal on its own. If needed, this condition may be treated with:  · PRICE therapy. PRICE stands for protection of the injured area, rest, ice, pressure (compression), and elevation.  · Medicines to help manage pain and swelling (anti-inflammatory medicines).  · Crutches. You may be directed to use these for the first few days to minimize your pain.  Depending on the severity of the muscle strain, recovery time may vary from a few weeks to several months. Severe injuries often require 4-6 weeks for recovery. In those cases, complete healing can take 4-5 months.  Follow these instructions at home:  PRICE Therapy    · Protect the muscle from being injured again.  · Rest. Do not use the strained muscle if it causes pain.  · If directed, put ice on the injured area:  ? Put ice in a plastic bag.  ? Place a towel between your skin and the bag.  ? Leave the ice on for 20 minutes, 2-3 times a day. Do this for the first 2 days after the injury.  · Apply compression by wrapping the injured area with an elastic bandage as told by your health care provider.  · Raise (elevate) the injured area above the level of your heart while you  are sitting or lying down.  General instructions  · Take over-the-counter and prescription medicines only as told by your health care provider.  · Walk, stretch, and do exercises as told by your health care provider. Only do these activities if you can do so without any pain.  · Follow your treatment plan as told by your health care provider. This may include:  ? Physical therapy.  ? Massage.  ? Local electrical stimulation (transcutaneous electrical nerve stimulation, TENS).  How is this prevented?  · Warm up and stretch before being active.  · Cool down and stretch after being active.  · Give your body time to rest between periods of activity.  · Make sure to use equipment that fits you.  · Be safe and responsible while being active to avoid slips and falls.  · Maintain physical fitness, including:  ? Proper conditioning in the adductor muscles.  ? Overall strength, flexibility, and endurance.  Contact a health care provider if:  · You have increased pain or swelling in the affected area.  · Your symptoms are not improving or they are getting worse.  Summary  · An adductor muscle strain, also called a groin strain or pull, is an injury to the muscles or tendons on the upper, inner part of the thigh.  · A muscle strain occurs when a muscle is overstretched and some muscle fibers are torn.  · Depending on the severity of the muscle strain, recovery time may vary from a few weeks to several months.  This information is not intended to replace advice given to you by your health care provider. Make sure you discuss any questions you have with your health care provider.  Document Released: 08/15/2005 Document Revised: 04/07/2020 Document Reviewed: 05/20/2019  [employer] Patient Education © 2020 [employer]          No follow-ups on file.    Ambulatory progress note signed and attested to by Daniel Shafer D.O.

## 2020-07-29 NOTE — PATIENT INSTRUCTIONS
Hamstring Strain Rehab  Ask your health care provider which exercises are safe for you. Do exercises exactly as told by your health care provider and adjust them as directed. It is normal to feel mild stretching, pulling, tightness, or discomfort as you do these exercises. Stop right away if you feel sudden pain or your pain gets worse. Do not begin these exercises until told by your health care provider.  Stretching and range-of-motion exercises  These exercises warm up your muscles and joints and improve the movement and flexibility of your thighs. These exercises also help to relieve pain, numbness, and tingling. Talk to your health care provider about these restrictions.  Knee extension, seated    1. Sit with your left / right heel propped on a chair, a coffee table, or a footstool. Do not have anything under your knee to support it.  2. Allow your leg muscles to relax, letting gravity straighten out your knee (extension). You should feel a stretch behind your left / right knee.  3. If told by your health care provider, deepen the stretch by placing a __________ weight on your thigh, just above your kneecap.  4. Hold this position for __________ seconds.  Repeat __________ times. Complete this exercise __________ times a day.  Seated stretch  This exercise is sometimes called hamstrings and adductors stretch.  1. Sit on the floor with your legs stretched wide. Keep your knees straight during this exercise.  2. Keeping your head and back in a straight line, bend at your waist to reach for your left foot (position A). You should feel a stretch in your right inner thigh (adductors).  3. Hold this position for __________ seconds. Then slowly return to the upright position.  4. Keeping your head and back in a straight line, bend at your waist to reach forward (position B). You should feel a stretch behind both of your thighs or knees (hamstrings).  5. Hold this position for __________ seconds. Then slowly return to  the upright position.  6. Keeping your head and back in a straight line, bend at your waist to reach for your right foot (position C). You should feel a stretch in your left inner thigh (adductors).  7. Hold this position for __________ seconds. Then slowly return to the upright position.  Repeat __________ times. Complete this exercise __________ times a day.  Hamstrings stretch, supine    1. Lie on your back (supine position).  2. Loop a belt or towel over the ball of your left / right foot. The ball of your foot is on the walking surface, right under your toes.  3. Straighten your left / right knee and slowly pull on the belt or towel to raise your leg.  ? Do not let your left / right knee bend while you do this.  ? Keep your other leg flat on the floor.  ? Raise the left / right leg until you feel a gentle stretch behind your left / right knee or thigh (hamstrings).  4. Hold this position for __________ seconds.  5. Slowly return your leg to the starting position.  Repeat __________ times. Complete this exercise __________ times a day.  Strengthening exercises  These exercises build strength and endurance in your thighs. Endurance is the ability to use your muscles for a long time, even after they get tired.  Straight leg raises, prone  This exercise strengthens the muscles that move the hips (hip extensors).  1. Lie on your abdomen on a firm surface (prone position).  2. Tense the muscles in your buttocks and lift your left / right leg about 4 inches (10 cm). Keep your knee straight as you lift your leg. If you cannot lift your leg that high without arching your back, place a pillow under your hips.  3. Hold the position for __________ seconds.  4. Slowly lower your leg to the starting position.  5. Allow your muscles to relax completely before you start the next repetition.  Repeat __________ times. Complete this exercise __________ times a day.  Bridge  This exercise strengthens the muscles in your buttocks  and the back of your thighs (hip extensors).  1. Lie on your back on a firm surface with your knees bent and your feet flat on the floor.  2. Tighten your buttocks muscles and lift your bottom off the floor until the trunk of your body is level with your thighs.  ? You should feel the muscles working in your buttocks and the back of your thighs.  ? Do not arch your back.  3. Hold this position for __________ seconds.  4. Slowly lower your hips to the starting position.  5. Let your buttocks muscles relax completely between repetitions.  6. If told by your health care provider, keep your bottom lifted off the floor while you slowly walk your feet away from you as far as you can control. Hold for __________ seconds, then slowly walk your feet back toward you.  Repeat __________ times. Complete this exercise __________ times a day.  Lateral walking with band  This is an exercise in which you walk sideways (lateral), with tension provided by an exercise band. The exercise strengthens the muscles in your hip (hip abductors).  1.  a long hallway.  2. Wrap a loop of exercise band around your legs, just above your knees.  3. Bend your knees gently and drop your hips down and back so your weight is over your heels.  4. Step to the side to move down the length of the hallway, keeping your toes pointed ahead of you and keeping tension in the band.  5. Repeat, leading with your other leg.  Repeat __________ times. Complete this exercise __________ times a day.  Single leg stand with reaching  This exercise is also called eccentric hamstring stretch.  1. Stand on your left / right foot. Keep your big toe down on the floor and try to keep your arch lifted.  2. Slowly reach down toward the floor as far as you can while keeping your balance. Lowering your thigh under tension is called eccentric stretching.  3. Hold this position for __________ seconds.  Repeat __________ times. Complete this exercise __________ times a  day.  Plank, prone  This exercise strengthens muscles in your abdomen and core area.  1. Lie on your abdomen on the floor (prone position),and prop yourself up on your elbows. Your hands should be straight out in front of you, and your elbows should be below your shoulders. Position your feet similar to a push-up position so your toes are on the ground.  2. Tighten your abdominal muscles and lift your body off the floor.  ? Do not arch your back.  ? Do not hold your breath.  3. Hold this position for __________ seconds.  Repeat __________ times. Complete this exercise __________ times a day.  This information is not intended to replace advice given to you by your health care provider. Make sure you discuss any questions you have with your health care provider.  Document Released: 12/18/2006 Document Revised: 04/09/2020 Document Reviewed: 12/16/2019  [employer] Patient Education © 2020 [employer]      Adductor Muscle Strain    An adductor muscle strain, also called a groin strain or pull, is an injury to the muscles or tendons on the upper, inner part of the thigh. These muscles are called the adductor muscles or groin muscles. They are responsible for moving the legs across the body or pulling the legs together.  A muscle strain occurs when a muscle is overstretched and some muscle fibers are torn. An adductor muscle strain can range from mild to severe, depending on how many muscle fibers are affected and whether the muscle fibers are partially or completely torn.  What are the causes?  Adductor muscle strains usually occur during exercise or while participating in sports. The injury often happens when a sudden, violent force is placed on a muscle, stretching the muscle too far. A strain is more likely to happen when your muscles are not warmed up or if you are not properly conditioned.  This injury may be caused by:  · Stretching the adductor muscles too far or too suddenly, often during side-to-side motion with  a sudden change in direction.  · Putting repeated stress on the adductor muscles over a long period of time.  · Performing vigorous activity without properly stretching the adductor muscles beforehand.  What are the signs or symptoms?  Symptoms of this condition include:  · Pain and tenderness in the groin area. This begins as sharp pain and persists as a dull ache.  · A popping or snapping feeling when the injury occurs (for severe strains).  · Swelling or bruising.  · Muscle spasms.  · Weakness in the leg.  · Stiffness in the groin area with decreased ability to move the affected muscles.  How is this diagnosed?  This condition may be diagnosed based on:  · Your symptoms and a description of how the injury occurred.  · A physical exam.  · Imaging tests, such as:  ? X-rays. These are sometimes needed to rule out a broken bone or cartilage problems.  ? An ultrasound, CT scan, or MRI. These may be done if your health care provider suspects a complete muscle tear or needs to check for other injuries.  How is this treated?  An adductor strain will often heal on its own. If needed, this condition may be treated with:  · PRICE therapy. PRICE stands for protection of the injured area, rest, ice, pressure (compression), and elevation.  · Medicines to help manage pain and swelling (anti-inflammatory medicines).  · Crutches. You may be directed to use these for the first few days to minimize your pain.  Depending on the severity of the muscle strain, recovery time may vary from a few weeks to several months. Severe injuries often require 4-6 weeks for recovery. In those cases, complete healing can take 4-5 months.  Follow these instructions at home:  PRICE Therapy    · Protect the muscle from being injured again.  · Rest. Do not use the strained muscle if it causes pain.  · If directed, put ice on the injured area:  ? Put ice in a plastic bag.  ? Place a towel between your skin and the bag.  ? Leave the ice on for 20  minutes, 2-3 times a day. Do this for the first 2 days after the injury.  · Apply compression by wrapping the injured area with an elastic bandage as told by your health care provider.  · Raise (elevate) the injured area above the level of your heart while you are sitting or lying down.  General instructions  · Take over-the-counter and prescription medicines only as told by your health care provider.  · Walk, stretch, and do exercises as told by your health care provider. Only do these activities if you can do so without any pain.  · Follow your treatment plan as told by your health care provider. This may include:  ? Physical therapy.  ? Massage.  ? Local electrical stimulation (transcutaneous electrical nerve stimulation, TENS).  How is this prevented?  · Warm up and stretch before being active.  · Cool down and stretch after being active.  · Give your body time to rest between periods of activity.  · Make sure to use equipment that fits you.  · Be safe and responsible while being active to avoid slips and falls.  · Maintain physical fitness, including:  ? Proper conditioning in the adductor muscles.  ? Overall strength, flexibility, and endurance.  Contact a health care provider if:  · You have increased pain or swelling in the affected area.  · Your symptoms are not improving or they are getting worse.  Summary  · An adductor muscle strain, also called a groin strain or pull, is an injury to the muscles or tendons on the upper, inner part of the thigh.  · A muscle strain occurs when a muscle is overstretched and some muscle fibers are torn.  · Depending on the severity of the muscle strain, recovery time may vary from a few weeks to several months.  This information is not intended to replace advice given to you by your health care provider. Make sure you discuss any questions you have with your health care provider.  Document Released: 08/15/2005 Document Revised: 04/07/2020 Document Reviewed:  05/20/2019  [employer] Patient Education © 2020 [employer]

## 2020-08-05 DIAGNOSIS — I10 ESSENTIAL HYPERTENSION: ICD-10-CM

## 2020-08-05 RX ORDER — CITALOPRAM 20 MG/1
TABLET ORAL
Qty: 30 TABLET | Refills: 3 | Status: SHIPPED | OUTPATIENT
Start: 2020-08-05 | End: 2020-12-01

## 2020-08-05 RX ORDER — LISINOPRIL 40 MG/1
TABLET ORAL
Qty: 30 TABLET | Refills: 0 | Status: SHIPPED | OUTPATIENT
Start: 2020-08-05 | End: 2020-09-01

## 2020-08-05 RX ORDER — ATORVASTATIN CALCIUM 20 MG/1
TABLET, FILM COATED ORAL
Qty: 30 TABLET | Refills: 3 | Status: SHIPPED | OUTPATIENT
Start: 2020-08-05 | End: 2020-12-01

## 2020-08-05 RX ORDER — AMLODIPINE BESYLATE 5 MG/1
TABLET ORAL
Qty: 30 TABLET | Refills: 3 | Status: SHIPPED | OUTPATIENT
Start: 2020-08-05 | End: 2020-12-01

## 2020-08-13 ENCOUNTER — APPOINTMENT (OUTPATIENT)
Dept: CT IMAGING | Facility: HOSPITAL | Age: 65
End: 2020-08-13

## 2020-08-24 RX ORDER — FLUTICASONE PROPIONATE 50 MCG
SPRAY, SUSPENSION (ML) NASAL
Qty: 16 G | Refills: 2 | Status: SHIPPED | OUTPATIENT
Start: 2020-08-24 | End: 2021-01-15

## 2020-08-28 ENCOUNTER — TELEPHONE (OUTPATIENT)
Dept: FAMILY MEDICINE CLINIC | Facility: CLINIC | Age: 65
End: 2020-08-28

## 2020-08-28 DIAGNOSIS — K58.9 IRRITABLE BOWEL SYNDROME WITHOUT DIARRHEA: Primary | ICD-10-CM

## 2020-09-01 DIAGNOSIS — I10 ESSENTIAL HYPERTENSION: ICD-10-CM

## 2020-09-01 RX ORDER — HYDROCHLOROTHIAZIDE 25 MG/1
TABLET ORAL
Qty: 30 TABLET | Refills: 3 | Status: SHIPPED | OUTPATIENT
Start: 2020-09-01 | End: 2020-12-29

## 2020-09-01 RX ORDER — LISINOPRIL 40 MG/1
TABLET ORAL
Qty: 30 TABLET | Refills: 0 | Status: SHIPPED | OUTPATIENT
Start: 2020-09-01 | End: 2020-09-30

## 2020-09-03 ENCOUNTER — TELEPHONE (OUTPATIENT)
Dept: FAMILY MEDICINE CLINIC | Facility: CLINIC | Age: 65
End: 2020-09-03

## 2020-09-03 NOTE — TELEPHONE ENCOUNTER
PATIENT CALLED AND STATED THAT SHE HAD TO CANCELLED HER CT SCAN AND NEEDS IT RESCHEDULED     PLEASE CALL AND ADVISE [phone]

## 2020-09-11 ENCOUNTER — HOSPITAL ENCOUNTER (OUTPATIENT)
Dept: CT IMAGING | Facility: HOSPITAL | Age: 65
Discharge: HOME | End: 2020-09-11
Admitting: FAMILY MEDICINE

## 2020-09-11 DIAGNOSIS — R10.32 ACUTE LEFT LOWER QUADRANT PAIN: ICD-10-CM

## 2020-09-11 LAB — CREAT BLDA-MCNC: 0.7 MG/DL (ref 0.6–1.3)

## 2020-09-11 PROCEDURE — 25010000002 IOPAMIDOL 61 % SOLUTION: Performed by: FAMILY MEDICINE

## 2020-09-11 PROCEDURE — 82565 ASSAY OF CREATININE: CPT

## 2020-09-11 PROCEDURE — 74178 CT ABD&PLV WO CNTR FLWD CNTR: CPT

## 2020-09-11 RX ADMIN — IOPAMIDOL 85 ML: 612 INJECTION, SOLUTION INTRAVENOUS at 14:05

## 2020-09-30 DIAGNOSIS — I10 ESSENTIAL HYPERTENSION: ICD-10-CM

## 2020-09-30 RX ORDER — LISINOPRIL 40 MG/1
TABLET ORAL
Qty: 30 TABLET | Refills: 5 | Status: SHIPPED | OUTPATIENT
Start: 2020-09-30 | End: 2021-03-26

## 2020-11-01 DIAGNOSIS — K21.9 GASTROESOPHAGEAL REFLUX DISEASE WITHOUT ESOPHAGITIS: ICD-10-CM

## 2020-11-02 RX ORDER — OMEPRAZOLE 20 MG/1
CAPSULE, DELAYED RELEASE ORAL
Qty: 30 CAPSULE | Refills: 2 | Status: SHIPPED | OUTPATIENT
Start: 2020-11-02 | End: 2021-01-27

## 2020-11-02 RX ORDER — SUCRALFATE 1 G/1
TABLET ORAL
Qty: 60 TABLET | Refills: 2 | Status: SHIPPED | OUTPATIENT
Start: 2020-11-02 | End: 2021-04-30

## 2020-11-10 DIAGNOSIS — F41.9 ANXIETY: ICD-10-CM

## 2020-11-10 DIAGNOSIS — G47.9 SLEEP DISORDER: ICD-10-CM

## 2020-11-10 RX ORDER — TEMAZEPAM 15 MG/1
CAPSULE ORAL
Qty: 30 CAPSULE | Refills: 3 | Status: SHIPPED | OUTPATIENT
Start: 2020-11-10 | End: 2021-03-02

## 2020-11-10 NOTE — TELEPHONE ENCOUNTER
Last fill: 04/13/2020  Last office visit: 07/29/2020  Next office visit:02/04/2021  CSA up-to-date? 01/29/2020  Date of last UDS: 01/29/2020  UDS consistent: no

## 2020-12-01 RX ORDER — CITALOPRAM 20 MG/1
TABLET ORAL
Qty: 30 TABLET | Refills: 2 | Status: SHIPPED | OUTPATIENT
Start: 2020-12-01 | End: 2021-03-02

## 2020-12-01 RX ORDER — ATORVASTATIN CALCIUM 20 MG/1
TABLET, FILM COATED ORAL
Qty: 30 TABLET | Refills: 2 | Status: SHIPPED | OUTPATIENT
Start: 2020-12-01 | End: 2021-03-02

## 2020-12-01 RX ORDER — AMLODIPINE BESYLATE 5 MG/1
TABLET ORAL
Qty: 30 TABLET | Refills: 2 | Status: SHIPPED | OUTPATIENT
Start: 2020-12-01 | End: 2021-03-02

## 2020-12-29 RX ORDER — HYDROCHLOROTHIAZIDE 25 MG/1
TABLET ORAL
Qty: 30 TABLET | Refills: 2 | Status: SHIPPED | OUTPATIENT
Start: 2020-12-29 | End: 2021-03-26

## 2021-01-15 RX ORDER — FLUTICASONE PROPIONATE 50 MCG
SPRAY, SUSPENSION (ML) NASAL
Qty: 16 G | Refills: 11 | Status: SHIPPED | OUTPATIENT
Start: 2021-01-15 | End: 2022-04-26

## 2021-01-26 DIAGNOSIS — K21.9 GASTROESOPHAGEAL REFLUX DISEASE WITHOUT ESOPHAGITIS: ICD-10-CM

## 2021-01-27 RX ORDER — OMEPRAZOLE 20 MG/1
CAPSULE, DELAYED RELEASE ORAL
Qty: 30 CAPSULE | Refills: 1 | Status: SHIPPED | OUTPATIENT
Start: 2021-01-27 | End: 2021-03-26

## 2021-02-04 ENCOUNTER — OFFICE VISIT (OUTPATIENT)
Dept: FAMILY MEDICINE CLINIC | Facility: CLINIC | Age: 66
End: 2021-02-04

## 2021-02-04 ENCOUNTER — LAB (OUTPATIENT)
Dept: LAB | Facility: HOSPITAL | Age: 66
End: 2021-02-04

## 2021-02-04 VITALS
HEIGHT: 61 IN | BODY MASS INDEX: 31.57 KG/M2 | DIASTOLIC BLOOD PRESSURE: 78 MMHG | SYSTOLIC BLOOD PRESSURE: 132 MMHG | OXYGEN SATURATION: 98 % | HEART RATE: 82 BPM | WEIGHT: 167.2 LBS

## 2021-02-04 DIAGNOSIS — Z00.00 PREVENTATIVE HEALTH CARE: ICD-10-CM

## 2021-02-04 DIAGNOSIS — I34.0 NONRHEUMATIC MITRAL VALVE REGURGITATION: ICD-10-CM

## 2021-02-04 DIAGNOSIS — K21.9 GASTROESOPHAGEAL REFLUX DISEASE WITHOUT ESOPHAGITIS: ICD-10-CM

## 2021-02-04 DIAGNOSIS — I10 ESSENTIAL HYPERTENSION: ICD-10-CM

## 2021-02-04 DIAGNOSIS — E53.8 B12 DEFICIENCY: ICD-10-CM

## 2021-02-04 DIAGNOSIS — K58.9 IRRITABLE BOWEL SYNDROME WITHOUT DIARRHEA: ICD-10-CM

## 2021-02-04 DIAGNOSIS — E55.9 VITAMIN D DEFICIENCY: ICD-10-CM

## 2021-02-04 DIAGNOSIS — E78.00 PURE HYPERCHOLESTEROLEMIA: ICD-10-CM

## 2021-02-04 DIAGNOSIS — Z00.00 MEDICARE ANNUAL WELLNESS VISIT, SUBSEQUENT: Primary | ICD-10-CM

## 2021-02-04 DIAGNOSIS — R73.9 HYPERGLYCEMIA: ICD-10-CM

## 2021-02-04 DIAGNOSIS — G47.9 SLEEP DISORDER: ICD-10-CM

## 2021-02-04 DIAGNOSIS — M47.816 LUMBAR SPONDYLOSIS: ICD-10-CM

## 2021-02-04 DIAGNOSIS — F41.9 ANXIETY: ICD-10-CM

## 2021-02-04 LAB
25(OH)D3 SERPL-MCNC: 36.3 NG/ML (ref 30–100)
ALBUMIN SERPL-MCNC: 4.7 G/DL (ref 3.5–5.2)
ALBUMIN UR-MCNC: <1.2 MG/DL
ALBUMIN/GLOB SERPL: 1.6 G/DL
ALP SERPL-CCNC: 74 U/L (ref 39–117)
ALT SERPL W P-5'-P-CCNC: 22 U/L (ref 1–33)
ANION GAP SERPL CALCULATED.3IONS-SCNC: 13 MMOL/L (ref 5–15)
AST SERPL-CCNC: 24 U/L (ref 1–32)
BASOPHILS # BLD AUTO: 0.04 10*3/MM3 (ref 0–0.2)
BASOPHILS NFR BLD AUTO: 0.6 % (ref 0–1.5)
BILIRUB SERPL-MCNC: 0.5 MG/DL (ref 0–1.2)
BILIRUB UR QL STRIP: NEGATIVE
BUN SERPL-MCNC: 13 MG/DL (ref 8–23)
BUN/CREAT SERPL: 18.3 (ref 7–25)
CALCIUM SPEC-SCNC: 9.2 MG/DL (ref 8.6–10.5)
CHLORIDE SERPL-SCNC: 93 MMOL/L (ref 98–107)
CHOLEST SERPL-MCNC: 235 MG/DL (ref 0–200)
CLARITY UR: CLEAR
CO2 SERPL-SCNC: 29 MMOL/L (ref 22–29)
COLOR UR: YELLOW
CREAT SERPL-MCNC: 0.71 MG/DL (ref 0.57–1)
CREAT UR-MCNC: 23.7 MG/DL
CRP SERPL-MCNC: 0.19 MG/DL (ref 0.01–0.5)
DEPRECATED RDW RBC AUTO: 44.1 FL (ref 37–54)
EOSINOPHIL # BLD AUTO: 0.08 10*3/MM3 (ref 0–0.4)
EOSINOPHIL NFR BLD AUTO: 1.3 % (ref 0.3–6.2)
ERYTHROCYTE [DISTWIDTH] IN BLOOD BY AUTOMATED COUNT: 12.1 % (ref 12.3–15.4)
GFR SERPL CREATININE-BSD FRML MDRD: 82 ML/MIN/1.73
GLOBULIN UR ELPH-MCNC: 3 GM/DL
GLUCOSE SERPL-MCNC: 90 MG/DL (ref 65–99)
GLUCOSE UR STRIP-MCNC: NEGATIVE MG/DL
HBA1C MFR BLD: 5 % (ref 4.8–5.6)
HCT VFR BLD AUTO: 44.7 % (ref 34–46.6)
HDLC SERPL-MCNC: 83 MG/DL (ref 40–60)
HGB BLD-MCNC: 15.1 G/DL (ref 12–15.9)
HGB UR QL STRIP.AUTO: NEGATIVE
IMM GRANULOCYTES # BLD AUTO: 0.04 10*3/MM3 (ref 0–0.05)
IMM GRANULOCYTES NFR BLD AUTO: 0.6 % (ref 0–0.5)
KETONES UR QL STRIP: NEGATIVE
LDLC SERPL CALC-MCNC: 133 MG/DL (ref 0–100)
LDLC/HDLC SERPL: 1.57 {RATIO}
LEUKOCYTE ESTERASE UR QL STRIP.AUTO: NEGATIVE
LYMPHOCYTES # BLD AUTO: 1.02 10*3/MM3 (ref 0.7–3.1)
LYMPHOCYTES NFR BLD AUTO: 16.2 % (ref 19.6–45.3)
MCH RBC QN AUTO: 33.6 PG (ref 26.6–33)
MCHC RBC AUTO-ENTMCNC: 33.8 G/DL (ref 31.5–35.7)
MCV RBC AUTO: 99.6 FL (ref 79–97)
MICROALBUMIN/CREAT UR: NORMAL MG/G{CREAT}
MONOCYTES # BLD AUTO: 0.61 10*3/MM3 (ref 0.1–0.9)
MONOCYTES NFR BLD AUTO: 9.7 % (ref 5–12)
NEUTROPHILS NFR BLD AUTO: 4.5 10*3/MM3 (ref 1.7–7)
NEUTROPHILS NFR BLD AUTO: 71.6 % (ref 42.7–76)
NITRITE UR QL STRIP: NEGATIVE
NRBC BLD AUTO-RTO: 0 /100 WBC (ref 0–0.2)
PH UR STRIP.AUTO: 7 [PH] (ref 5–8)
PLATELET # BLD AUTO: 320 10*3/MM3 (ref 140–450)
PMV BLD AUTO: 9.3 FL (ref 6–12)
POTASSIUM SERPL-SCNC: 3.5 MMOL/L (ref 3.5–5.2)
PROT SERPL-MCNC: 7.7 G/DL (ref 6–8.5)
PROT UR QL STRIP: NEGATIVE
RBC # BLD AUTO: 4.49 10*6/MM3 (ref 3.77–5.28)
SODIUM SERPL-SCNC: 135 MMOL/L (ref 136–145)
SP GR UR STRIP: 1.01 (ref 1–1.03)
TRIGL SERPL-MCNC: 109 MG/DL (ref 0–150)
TSH SERPL DL<=0.05 MIU/L-ACNC: 2 UIU/ML (ref 0.27–4.2)
UROBILINOGEN UR QL STRIP: NORMAL
VIT B12 BLD-MCNC: 1376 PG/ML (ref 211–946)
VLDLC SERPL-MCNC: 19 MG/DL (ref 5–40)
WBC # BLD AUTO: 6.29 10*3/MM3 (ref 3.4–10.8)

## 2021-02-04 PROCEDURE — G0444 DEPRESSION SCREEN ANNUAL: HCPCS | Performed by: FAMILY MEDICINE

## 2021-02-04 PROCEDURE — 85025 COMPLETE CBC W/AUTO DIFF WBC: CPT

## 2021-02-04 PROCEDURE — G0439 PPPS, SUBSEQ VISIT: HCPCS | Performed by: FAMILY MEDICINE

## 2021-02-04 PROCEDURE — 1159F MED LIST DOCD IN RCRD: CPT | Performed by: FAMILY MEDICINE

## 2021-02-04 PROCEDURE — 93000 ELECTROCARDIOGRAM COMPLETE: CPT | Performed by: FAMILY MEDICINE

## 2021-02-04 PROCEDURE — 82570 ASSAY OF URINE CREATININE: CPT

## 2021-02-04 PROCEDURE — 99397 PER PM REEVAL EST PAT 65+ YR: CPT | Performed by: FAMILY MEDICINE

## 2021-02-04 PROCEDURE — 80053 COMPREHEN METABOLIC PANEL: CPT

## 2021-02-04 PROCEDURE — 84443 ASSAY THYROID STIM HORMONE: CPT

## 2021-02-04 PROCEDURE — 1170F FXNL STATUS ASSESSED: CPT | Performed by: FAMILY MEDICINE

## 2021-02-04 PROCEDURE — 80061 LIPID PANEL: CPT

## 2021-02-04 PROCEDURE — 1126F AMNT PAIN NOTED NONE PRSNT: CPT | Performed by: FAMILY MEDICINE

## 2021-02-04 PROCEDURE — 82607 VITAMIN B-12: CPT

## 2021-02-04 PROCEDURE — 82306 VITAMIN D 25 HYDROXY: CPT

## 2021-02-04 PROCEDURE — 83036 HEMOGLOBIN GLYCOSYLATED A1C: CPT

## 2021-02-04 PROCEDURE — 86141 C-REACTIVE PROTEIN HS: CPT

## 2021-02-04 PROCEDURE — 82043 UR ALBUMIN QUANTITATIVE: CPT

## 2021-02-04 PROCEDURE — 81003 URINALYSIS AUTO W/O SCOPE: CPT

## 2021-02-04 NOTE — PROGRESS NOTES
QUICK REFERENCE INFORMATION:  The ABCs of the Annual Wellness Visit    Medicare Subsequent Wellness Visit    Chief Complaint   Patient presents with   • Annual Exam   • Medicare Wellness-subsequent        HPI     Luh Horner is a 65 y.o. female presenting for subsequent annual wellness visit.     This patient is accompanied by their self who contributes to the history of their care.    Past Medical History:   Diagnosis Date   • Allergic rhinitis    • Bone pain     foot   • Depression 1981 - Hospitalized   • Fibrocystic breast disease     Current benign breast biopsies   • GERD (gastroesophageal reflux disease)    • Hiatal hernia    • Hyperlipidemia    • Hypertension    • Insomnia    • Irritable bowel syndrome (IBS)     Recurrent abdominal cramps   • Lumbar scoliosis    • Lumbar spondylosis     Chronic low back pain   • Mitral valve insufficiency    • Osteoarthritis    • Osteopenia    • Post menopausal syndrome     Refractory hot flashes      Past Surgical History:   Procedure Laterality Date   • BREAST BIOPSY Right remote    benign disease   • CHOLECYSTECTOMY     • HYSTERECTOMY     • REFRACTIVE SURGERY      RK   • URETHRAL SUSPENSION      laparoscopic for stress incontinence     Family History   Problem Relation Age of Onset   • Pulmonary fibrosis Mother          age 82   • Hypertension Mother    • Lumbar disc disease Mother    • Prostate cancer Father          age 76   • Hypertension Sister    • Goiter Sister    • Hypothyroidism Sister    • Heart disease Brother          Age 48 heart attack was cigarette smoker   • Hiatal hernia Brother    • Breast cancer Other         Maternal and paternal aunts   • Hypertension Brother    • Obesity Maternal Uncle    • Diabetes Maternal Uncle    • Arthritis Paternal Aunt       Social History     Socioeconomic History   • Marital status:      Spouse name: Not on file   • Number of children: Not on  file   • Years of education: Not on file   • Highest education level: Not on file   Tobacco Use   • Smoking status: Former Smoker     Packs/day: 2.00     Years: 10.00     Pack years: 20.00     Types: Cigarettes     Start date:      Quit date:      Years since quittin.1   • Smokeless tobacco: Never Used   Substance and Sexual Activity   • Alcohol use: Yes     Alcohol/week: 3.0 standard drinks     Types: 3 Glasses of wine per week   • Drug use: No   Social History Narrative    Domestic life   lives in private home with         Rastafari   none listed        Sleep hygiene    in bed 10 PM to 7 AM for 8 hours broken sleep        Caffeine use    2 cup of coffee daily        Exercise habits   water aerobics twice weekly.  walks daily.  upper body strengthening twice daily.        Diet     AHA diet low in red meats.  One dairy serving daily.  Low sodium.        Occupation   retired college         Hearing   no impairment        Vision   corrects with bifocal glasses        Driving   no limitations      Allergies   Allergen Reactions   • Shellfish Allergy      Head congestion   • Atenolol      Fatigue   • Buspirone       Headache   • Codeine      Depression   • Pseudoephedrine Anxiety   • Rosuvastatin      Fatigue   • Westley-E.P.A. [Dha-Epa-Vitamin E] Anxiety   • Simvastatin      Facial numbness   • Trazodone      Nightmares      Outpatient Medications Prior to Visit   Medication Sig Dispense Refill   • amLODIPine (NORVASC) 5 MG tablet TAKE ONE TABLET BY MOUTH EVERY NIGHT AT BEDTIME 30 tablet 2   • atorvastatin (LIPITOR) 20 MG tablet TAKE ONE TABLET BY MOUTH EVERY NIGHT AT BEDTIME 30 tablet 2   • b complex vitamins tablet Take  by mouth daily.     • citalopram (CeleXA) 20 MG tablet TAKE ONE TABLET BY MOUTH DAILY 30 tablet 2   • clobetasol (TEMOVATE) 0.05 % cream      • Coenzyme Q10 (CO Q-10) 200 MG capsule Take 1 capsule by mouth.     • diclofenac (VOLTAREN) 1 % gel gel Apply 4 g  topically to the appropriate area as directed 4 (Four) Times a Day As Needed (back pain). 100 g 0   • estradiol (VIVELLE-DOT) 0.075 MG/24HR patch Place  on the skin.     • fluticasone (FLONASE) 50 MCG/ACT nasal spray SPRAY ONE SPRAY IN BOTH AFFECTED NOSTRILS DAILY 16 g 11   • hydroCHLOROthiazide (HYDRODIURIL) 25 MG tablet TAKE ONE TABLET BY MOUTH EVERY MORNING 30 tablet 2   • lisinopril (PRINIVIL,ZESTRIL) 40 MG tablet TAKE ONE TABLET BY MOUTH EVERY MORNING 30 tablet 5   • magnesium oxide (MAGOX 400) 400 (241.3 MG) MG tablet tablet Take 1 tablet by mouth.     • melatonin 5 MG tablet tablet Take 1 tablet by mouth Every Night.     • nystatin (MYCOSTATIN) 380258 UNIT/GM cream Apply  topically As Needed (itch). 30 g 1   • Omega-3 Fatty Acids (FISH OIL) 1000 MG capsule capsule Take  by mouth.     • omeprazole (priLOSEC) 20 MG capsule TAKE ONE CAPSULE BY MOUTH DAILY 30 capsule 1   • sucralfate (CARAFATE) 1 g tablet TAKE ONE TABLET BY MOUTH EVERY 12 HOURS 60 tablet 2   • temazepam (RESTORIL) 15 MG capsule TAKE ONE CAPSULE BY MOUTH EVERY NIGHT AT BEDTIME AND TAKE ONE CAPSULE BY MOUTH DAILY AS NEEDED FOR ANXIETY 30 capsule 3   • valACYclovir (VALTREX) 500 MG tablet Take 1 tablet by mouth Daily.     • vitamin C (ASCORBIC ACID) 500 MG tablet Take  by mouth daily.     • bismuth subsalicylate (PEPTO BISMOL) 262 MG/15ML suspension Take 15 mL by mouth Daily As Needed.     • HYDROcodone-acetaminophen (NORCO) 5-325 MG per tablet Take 0.5-1 tablets by mouth Daily As Needed for Moderate Pain . 30 tablet 0   • meclizine (ANTIVERT) 12.5 MG tablet Take 1 tablet by mouth 3 (Three) Times a Day As Needed for dizziness. 90 tablet 1     No facility-administered medications prior to visit.        Reviewed use of high risk medication in the elderly: yes  Reviewed for potential of harmful drug interactions in the elderly: yes    The following portions of the patient's history were reviewed and updated as appropriate: allergies, current  "medications, past family history, past medical history, past social history, past surgical history and problem list.    Review of Systems   Review of Systems -  General ROS: negative for - chills, fever or night sweats  Cardiovascular ROS: no chest pain or dyspnea on exertion  Gastrointestinal ROS: baseline GERD & IBS symptoms  Genito-Urinary ROS: no dysuria, trouble voiding, or hematuria.    Vitals:    21 0818   BP: 132/78   Pulse: 82   SpO2: 98%   Weight: 75.8 kg (167 lb 3.2 oz)   Height: 154.9 cm (60.98\")   PainSc: 0-No pain       Objective    Physical Exam   Const: NAD, A&Ox4, Pleasant, Cooperative  Eyes: EOMI, no conjunctivitis  ENT: No nasal discharge present, neck supple  Cardiac: Regular rate and rhythm, no cyanosis  Resp: Respiratory rate within normal limits, no increased work of breathing, no audible wheezing or retractions noted  GI: No distention or ascites  MSK: Motor and sensation grossly intact in bilateral upper extremities  Neurologic: CN II-XII grossly intact  Psych: Appropriate mood and behavior.  HEALTH RISK ASSESSMENT    1955    Recent Hospitalizations:  No hospitalization(s) within the last year..      Current Medical Providers:  Patient Care Team:  Daniel Shafer DO as PCP - General (Family Medicine)      Smoking Status:  Social History     Tobacco Use   Smoking Status Former Smoker   • Packs/day: 2.00   • Years: 10.00   • Pack years: 20.00   • Types: Cigarettes   • Start date:    • Quit date:    • Years since quittin.1   Smokeless Tobacco Never Used       Alcohol Consumption:  Social History     Substance and Sexual Activity   Alcohol Use Yes   • Alcohol/week: 3.0 standard drinks   • Types: 3 Glasses of wine per week       Depression Screen:   PHQ-2/PHQ-9 Depression Screening 2021   Little interest or pleasure in doing things 0   Feeling down, depressed, or hopeless 0   Total Score 0       Health Habits and Functional and Cognitive Screening:  Functional & " Cognitive Status 2/4/2021   Do you have difficulty preparing food and eating? No   Do you have difficulty bathing yourself, getting dressed or grooming yourself? No   Do you have difficulty using the toilet? No   Do you have difficulty moving around from place to place? No   Do you have trouble with steps or getting out of a bed or a chair? No   Current Diet Well Balanced Diet   Dental Exam Up to date   Eye Exam Up to date   Exercise (times per week) 5 times per week   Current Exercise Activities Include Aerobics   Do you need help using the phone?  No   Are you deaf or do you have serious difficulty hearing?  No   Do you need help with transportation? No   Do you need help shopping? No   Do you need help preparing meals?  No   Do you need help with housework?  No   Do you need help with laundry? No   Do you need help taking your medications? No   Do you need help managing money? No   Do you ever drive or ride in a car without wearing a seat belt? No   Have you felt unusual stress, anger or loneliness in the last month? No   Who do you live with? Spouse   If you need help, do you have trouble finding someone available to you? No   Have you been bothered in the last four weeks by sexual problems? No   Do you have difficulty concentrating, remembering or making decisions? -         Does the patient have evidence of cognitive impairment? Yes   ATTENTION  What is the year: correct  What is the month of the year: correct  What is the day of the week?: correct  What is the date?: correct  MEMORY  Repeat address three times, only score third attempt: [address], [place]: 7  HOW MANY ANIMALS DID THE PATIENT NAME  Verbal Fluency -- Animal Names (0-25): 11-13  CLOCK DRAWING  Clock Drawing: All Correct  MEMORY RECALL  Tell me what you remember about that name and address we were repeating at the beginning: 3  ACE TOTAL SCORE  Total ACE Score - <25/30 strongly suggests cognitive impairment; <21/30  almost certainly shows dementia: 23    Aspirin use counseling? Does not need ASA (and currently is not on it)      Recent Lab Results:  CMP:  Lab Results   Component Value Date    BUN 13 02/04/2021    CREATININE 0.71 02/04/2021    EGFRIFNONA 82 02/04/2021    BCR 18.3 02/04/2021     (L) 02/04/2021    K 3.5 02/04/2021    CO2 29.0 02/04/2021    CALCIUM 9.2 02/04/2021    ALBUMIN 4.70 02/04/2021    BILITOT 0.5 02/04/2021    ALKPHOS 74 02/04/2021    AST 24 02/04/2021    ALT 22 02/04/2021     Lipid Panel:  Lab Results   Component Value Date    CHOL 235 (H) 02/04/2021    TRIG 109 02/04/2021    HDL 83 (H) 02/04/2021    VLDL 19 02/04/2021    LDLHDL 1.57 02/04/2021     HbA1c:  Lab Results   Component Value Date    HGBA1C 5.00 02/04/2021       Visual Acuity:  No exam data present    Age-appropriate Screening Schedule:  Refer to the list below for future screening recommendations based on patient's age, sex and/or medical conditions. Orders for these recommended tests are listed in the plan section. The patient has been provided with a written plan.    Health Maintenance   Topic Date Due   • ZOSTER VACCINE (1 of 2) 11/03/2003   • TDAP/TD VACCINES (2 - Td) 02/25/2019   • PAP SMEAR  07/01/2019   • MAMMOGRAM  03/28/2021   • LIPID PANEL  02/04/2022   • COLONOSCOPY  07/13/2025   • DXA SCAN  04/10/2027   • INFLUENZA VACCINE  Completed          Advance Care Planning:  ACP discussion was held with the patient during this visit. Patient does not have an advance directive, information provided.    Identification of Risk Factors:  Risk factors include: Advance Directive Discussion  Chronic Pain   Diabetic Lab Screening   Obesity/Overweight .    Compared to one year ago, the patient feels his physical health is the same.  Compared to one year ago, the patient feels his mental health is the same.      Diagnoses and all orders for this visit:    1. Medicare annual wellness visit, subsequent (Primary)    2. Preventative health care  -      ECG 12 Lead    3. Nonrheumatic mitral valve regurgitation  -     ECG 12 Lead  -     Microalbumin / Creatinine Urine Ratio - Urine, Clean Catch; Future  -     Comprehensive Metabolic Panel; Future  -     CBC & Differential; Future  -     High Sensitivity CRP; Future  -     Hemoglobin A1c; Future  -     Lipid Panel; Future  -     TSH Rfx On Abnormal To Free T4; Future  -     Urinalysis With Microscopic If Indicated (No Culture) - Urine, Clean Catch; Future  -     Vitamin D 25 Hydroxy; Future  -     Vitamin B12; Future    4. Gastroesophageal reflux disease without esophagitis  -     Microalbumin / Creatinine Urine Ratio - Urine, Clean Catch; Future  -     Comprehensive Metabolic Panel; Future  -     CBC & Differential; Future  -     High Sensitivity CRP; Future  -     Hemoglobin A1c; Future  -     Lipid Panel; Future  -     TSH Rfx On Abnormal To Free T4; Future  -     Urinalysis With Microscopic If Indicated (No Culture) - Urine, Clean Catch; Future  -     Vitamin D 25 Hydroxy; Future  -     Vitamin B12; Future    5. Irritable bowel syndrome without diarrhea  -     Microalbumin / Creatinine Urine Ratio - Urine, Clean Catch; Future  -     Comprehensive Metabolic Panel; Future  -     CBC & Differential; Future  -     High Sensitivity CRP; Future  -     Hemoglobin A1c; Future  -     Lipid Panel; Future  -     TSH Rfx On Abnormal To Free T4; Future  -     Urinalysis With Microscopic If Indicated (No Culture) - Urine, Clean Catch; Future  -     Vitamin D 25 Hydroxy; Future  -     Vitamin B12; Future    6. Essential hypertension  -     Microalbumin / Creatinine Urine Ratio - Urine, Clean Catch; Future  -     Comprehensive Metabolic Panel; Future  -     CBC & Differential; Future  -     High Sensitivity CRP; Future  -     Hemoglobin A1c; Future  -     Lipid Panel; Future  -     TSH Rfx On Abnormal To Free T4; Future  -     Urinalysis With Microscopic If Indicated (No Culture) - Urine, Clean Catch; Future  -     Vitamin D 25  Hydroxy; Future  -     Vitamin B12; Future    7. Pure hypercholesterolemia  -     Microalbumin / Creatinine Urine Ratio - Urine, Clean Catch; Future  -     Comprehensive Metabolic Panel; Future  -     CBC & Differential; Future  -     High Sensitivity CRP; Future  -     Hemoglobin A1c; Future  -     Lipid Panel; Future  -     TSH Rfx On Abnormal To Free T4; Future  -     Urinalysis With Microscopic If Indicated (No Culture) - Urine, Clean Catch; Future  -     Vitamin D 25 Hydroxy; Future  -     Vitamin B12; Future    8. Vitamin D deficiency  -     Microalbumin / Creatinine Urine Ratio - Urine, Clean Catch; Future  -     Comprehensive Metabolic Panel; Future  -     CBC & Differential; Future  -     High Sensitivity CRP; Future  -     Hemoglobin A1c; Future  -     Lipid Panel; Future  -     TSH Rfx On Abnormal To Free T4; Future  -     Urinalysis With Microscopic If Indicated (No Culture) - Urine, Clean Catch; Future  -     Vitamin D 25 Hydroxy; Future  -     Vitamin B12; Future    9. Hyperglycemia  -     Microalbumin / Creatinine Urine Ratio - Urine, Clean Catch; Future  -     Comprehensive Metabolic Panel; Future  -     CBC & Differential; Future  -     High Sensitivity CRP; Future  -     Hemoglobin A1c; Future  -     Lipid Panel; Future  -     TSH Rfx On Abnormal To Free T4; Future  -     Urinalysis With Microscopic If Indicated (No Culture) - Urine, Clean Catch; Future  -     Vitamin D 25 Hydroxy; Future  -     Vitamin B12; Future    10. B12 deficiency  -     Microalbumin / Creatinine Urine Ratio - Urine, Clean Catch; Future  -     Comprehensive Metabolic Panel; Future  -     CBC & Differential; Future  -     High Sensitivity CRP; Future  -     Hemoglobin A1c; Future  -     Lipid Panel; Future  -     TSH Rfx On Abnormal To Free T4; Future  -     Urinalysis With Microscopic If Indicated (No Culture) - Urine, Clean Catch; Future  -     Vitamin D 25 Hydroxy; Future  -     Vitamin B12; Future    11. Lumbar  spondylosis  -     Urine Drug Screen - Urine, Clean Catch; Future  -     HYDROcodone-acetaminophen (NORCO) 5-325 MG per tablet; Take 0.5-1 tablets by mouth Daily As Needed for Moderate Pain .  Dispense: 30 tablet; Refill: 0    12. Anxiety    13. Sleep disorder        Procedure     ECG 12 Lead    Date/Time: 2/4/2021 8:21 AM  Performed by: Daniel Shafer DO  Authorized by: Daniel Shafer DO   Comparison: compared with previous ECG from 12/20/2017  Similar to previous ECG  Rhythm: sinus rhythm  Rate: normal  BPM: 71  Conduction: conduction normal  ST Segments: ST segments normal  T Waves: T waves normal  QRS axis: normal  Other: no other findings    Clinical impression: normal ECG               Patient Self-Management and Personalized Health Advice  The patient has been provided with information about: diet and exercise and preventive services including:   · Alcohol Misuse Screening and Counseling  (15 minutes counseling time, Code )  · Annual Wellness Visit (AWV)  · Depression Screening (15 minutes face to face, Code )  · Diabetes Screening-Lab Order for either glucose quantitative blood (except reagent strip), glucose;post glucose dose(includes glucose), or glucose tolerance test-3 specimens(includes glucose).    Diagnoses and all orders for this visit:    1. Medicare annual wellness visit, subsequent (Primary)    2. Preventative health care  -     ECG 12 Lead    3. Nonrheumatic mitral valve regurgitation  -     ECG 12 Lead  -     Microalbumin / Creatinine Urine Ratio - Urine, Clean Catch; Future  -     Comprehensive Metabolic Panel; Future  -     CBC & Differential; Future  -     High Sensitivity CRP; Future  -     Hemoglobin A1c; Future  -     Lipid Panel; Future  -     TSH Rfx On Abnormal To Free T4; Future  -     Urinalysis With Microscopic If Indicated (No Culture) - Urine, Clean Catch; Future  -     Vitamin D 25 Hydroxy; Future  -     Vitamin B12; Future    4. Gastroesophageal reflux  disease without esophagitis  -     Microalbumin / Creatinine Urine Ratio - Urine, Clean Catch; Future  -     Comprehensive Metabolic Panel; Future  -     CBC & Differential; Future  -     High Sensitivity CRP; Future  -     Hemoglobin A1c; Future  -     Lipid Panel; Future  -     TSH Rfx On Abnormal To Free T4; Future  -     Urinalysis With Microscopic If Indicated (No Culture) - Urine, Clean Catch; Future  -     Vitamin D 25 Hydroxy; Future  -     Vitamin B12; Future    5. Irritable bowel syndrome without diarrhea  -     Microalbumin / Creatinine Urine Ratio - Urine, Clean Catch; Future  -     Comprehensive Metabolic Panel; Future  -     CBC & Differential; Future  -     High Sensitivity CRP; Future  -     Hemoglobin A1c; Future  -     Lipid Panel; Future  -     TSH Rfx On Abnormal To Free T4; Future  -     Urinalysis With Microscopic If Indicated (No Culture) - Urine, Clean Catch; Future  -     Vitamin D 25 Hydroxy; Future  -     Vitamin B12; Future    6. Essential hypertension  -     Microalbumin / Creatinine Urine Ratio - Urine, Clean Catch; Future  -     Comprehensive Metabolic Panel; Future  -     CBC & Differential; Future  -     High Sensitivity CRP; Future  -     Hemoglobin A1c; Future  -     Lipid Panel; Future  -     TSH Rfx On Abnormal To Free T4; Future  -     Urinalysis With Microscopic If Indicated (No Culture) - Urine, Clean Catch; Future  -     Vitamin D 25 Hydroxy; Future  -     Vitamin B12; Future    7. Pure hypercholesterolemia  -     Microalbumin / Creatinine Urine Ratio - Urine, Clean Catch; Future  -     Comprehensive Metabolic Panel; Future  -     CBC & Differential; Future  -     High Sensitivity CRP; Future  -     Hemoglobin A1c; Future  -     Lipid Panel; Future  -     TSH Rfx On Abnormal To Free T4; Future  -     Urinalysis With Microscopic If Indicated (No Culture) - Urine, Clean Catch; Future  -     Vitamin D 25 Hydroxy; Future  -     Vitamin B12; Future    8. Vitamin D deficiency  -      Microalbumin / Creatinine Urine Ratio - Urine, Clean Catch; Future  -     Comprehensive Metabolic Panel; Future  -     CBC & Differential; Future  -     High Sensitivity CRP; Future  -     Hemoglobin A1c; Future  -     Lipid Panel; Future  -     TSH Rfx On Abnormal To Free T4; Future  -     Urinalysis With Microscopic If Indicated (No Culture) - Urine, Clean Catch; Future  -     Vitamin D 25 Hydroxy; Future  -     Vitamin B12; Future    9. Hyperglycemia  -     Microalbumin / Creatinine Urine Ratio - Urine, Clean Catch; Future  -     Comprehensive Metabolic Panel; Future  -     CBC & Differential; Future  -     High Sensitivity CRP; Future  -     Hemoglobin A1c; Future  -     Lipid Panel; Future  -     TSH Rfx On Abnormal To Free T4; Future  -     Urinalysis With Microscopic If Indicated (No Culture) - Urine, Clean Catch; Future  -     Vitamin D 25 Hydroxy; Future  -     Vitamin B12; Future    10. B12 deficiency  -     Microalbumin / Creatinine Urine Ratio - Urine, Clean Catch; Future  -     Comprehensive Metabolic Panel; Future  -     CBC & Differential; Future  -     High Sensitivity CRP; Future  -     Hemoglobin A1c; Future  -     Lipid Panel; Future  -     TSH Rfx On Abnormal To Free T4; Future  -     Urinalysis With Microscopic If Indicated (No Culture) - Urine, Clean Catch; Future  -     Vitamin D 25 Hydroxy; Future  -     Vitamin B12; Future    11. Lumbar spondylosis  -     Urine Drug Screen - Urine, Clean Catch; Future  -     HYDROcodone-acetaminophen (NORCO) 5-325 MG per tablet; Take 0.5-1 tablets by mouth Daily As Needed for Moderate Pain .  Dispense: 30 tablet; Refill: 0    12. Anxiety    13. Sleep disorder        Problem List Items Addressed This Visit        Cardiac and Vasculature    Hyperlipidemia    Relevant Orders    Microalbumin / Creatinine Urine Ratio - Urine, Clean Catch (Completed)    Comprehensive Metabolic Panel (Completed)    CBC & Differential (Completed)    High Sensitivity CRP (Completed)     Hemoglobin A1c (Completed)    Lipid Panel (Completed)    TSH Rfx On Abnormal To Free T4 (Completed)    Urinalysis With Microscopic If Indicated (No Culture) - Urine, Clean Catch (Completed)    Vitamin D 25 Hydroxy (Completed)    Vitamin B12 (Completed)    Hypertension    Relevant Orders    Microalbumin / Creatinine Urine Ratio - Urine, Clean Catch (Completed)    Comprehensive Metabolic Panel (Completed)    CBC & Differential (Completed)    High Sensitivity CRP (Completed)    Hemoglobin A1c (Completed)    Lipid Panel (Completed)    TSH Rfx On Abnormal To Free T4 (Completed)    Urinalysis With Microscopic If Indicated (No Culture) - Urine, Clean Catch (Completed)    Vitamin D 25 Hydroxy (Completed)    Vitamin B12 (Completed)    Mitral valve insufficiency    Relevant Orders    ECG 12 Lead    Microalbumin / Creatinine Urine Ratio - Urine, Clean Catch (Completed)    Comprehensive Metabolic Panel (Completed)    CBC & Differential (Completed)    High Sensitivity CRP (Completed)    Hemoglobin A1c (Completed)    Lipid Panel (Completed)    TSH Rfx On Abnormal To Free T4 (Completed)    Urinalysis With Microscopic If Indicated (No Culture) - Urine, Clean Catch (Completed)    Vitamin D 25 Hydroxy (Completed)    Vitamin B12 (Completed)       Gastrointestinal Abdominal     Gastroesophageal reflux disease    Relevant Orders    Microalbumin / Creatinine Urine Ratio - Urine, Clean Catch (Completed)    Comprehensive Metabolic Panel (Completed)    CBC & Differential (Completed)    High Sensitivity CRP (Completed)    Hemoglobin A1c (Completed)    Lipid Panel (Completed)    TSH Rfx On Abnormal To Free T4 (Completed)    Urinalysis With Microscopic If Indicated (No Culture) - Urine, Clean Catch (Completed)    Vitamin D 25 Hydroxy (Completed)    Vitamin B12 (Completed)    Irritable bowel syndrome    Relevant Orders    Microalbumin / Creatinine Urine Ratio - Urine, Clean Catch (Completed)    Comprehensive Metabolic Panel (Completed)     CBC & Differential (Completed)    High Sensitivity CRP (Completed)    Hemoglobin A1c (Completed)    Lipid Panel (Completed)    TSH Rfx On Abnormal To Free T4 (Completed)    Urinalysis With Microscopic If Indicated (No Culture) - Urine, Clean Catch (Completed)    Vitamin D 25 Hydroxy (Completed)    Vitamin B12 (Completed)       Health Encounters    Preventative health care    Relevant Orders    ECG 12 Lead       Mental Health    Anxiety       Neuro    Lumbar spondylosis    Relevant Medications    HYDROcodone-acetaminophen (NORCO) 5-325 MG per tablet    Other Relevant Orders    Urine Drug Screen - Urine, Clean Catch (Completed)       Sleep    Sleep disorder    Overview     She has been on temazepam for years, discussed risks of ongoing benzodiazepine use over age 65, patient cautioned.  UDS, controlled substance agreement updated 2/4/2021.  This patient is on a controlled substance which improves symptoms/quality of life and is aware of the risks, benefits and possible side-effects current treatment. The patient denies any medication side-effects at this time. A controlled substance agreement will be obtained or is currently on file. We reviewed required monitoring for controlled substances including but not limited to quarterly follow-up visits, annual depression screening, and urine drug screens to which the patient is agreeable. A SALLY report has been or shortly will be reviewed. There are no signs of deviation or misuse.           Other Visit Diagnoses     Medicare annual wellness visit, subsequent    -  Primary    Vitamin D deficiency        Relevant Orders    Microalbumin / Creatinine Urine Ratio - Urine, Clean Catch (Completed)    Comprehensive Metabolic Panel (Completed)    CBC & Differential (Completed)    High Sensitivity CRP (Completed)    Hemoglobin A1c (Completed)    Lipid Panel (Completed)    TSH Rfx On Abnormal To Free T4 (Completed)    Urinalysis With Microscopic If Indicated (No Culture) - Urine,  Clean Catch (Completed)    Vitamin D 25 Hydroxy (Completed)    Vitamin B12 (Completed)    Hyperglycemia        Relevant Orders    Microalbumin / Creatinine Urine Ratio - Urine, Clean Catch (Completed)    Comprehensive Metabolic Panel (Completed)    CBC & Differential (Completed)    High Sensitivity CRP (Completed)    Hemoglobin A1c (Completed)    Lipid Panel (Completed)    TSH Rfx On Abnormal To Free T4 (Completed)    Urinalysis With Microscopic If Indicated (No Culture) - Urine, Clean Catch (Completed)    Vitamin D 25 Hydroxy (Completed)    Vitamin B12 (Completed)    B12 deficiency        Relevant Orders    Microalbumin / Creatinine Urine Ratio - Urine, Clean Catch (Completed)    Comprehensive Metabolic Panel (Completed)    CBC & Differential (Completed)    High Sensitivity CRP (Completed)    Hemoglobin A1c (Completed)    Lipid Panel (Completed)    TSH Rfx On Abnormal To Free T4 (Completed)    Urinalysis With Microscopic If Indicated (No Culture) - Urine, Clean Catch (Completed)    Vitamin D 25 Hydroxy (Completed)    Vitamin B12 (Completed)          Patient Instructions     Advance Care Planning and Advance Directives     You make decisions on a daily basis - decisions about where you want to live, your career, your home, your life. Perhaps one of the most important decisions you face is your choice for future medical care. Take time to talk with your family and your healthcare team and start planning today.  Advance Care Planning is a process that can help you:  · Understand possible future healthcare decisions in light of your own experiences  · Reflect on those decision in light of your goals and values  · Discuss your decisions with those closest to you and the healthcare professionals that care for you  · Make a plan by creating a document that reflects your wishes    Surrogate Decision Maker  In the event of a medical emergency, which has left you unable to communicate or to make your own decisions, you would  need someone to make decisions for you.  It is important to discuss your preferences for medical treatment with this person while you are in good health.     Qualities of a surrogate decision maker:  • Willing to take on this role and responsibility  • Knows what you want for future medical care  • Willing to follow your wishes even if they don't agree with them  • Able to make difficult medical decisions under stressful circumstances    Advance Directives  These are legal documents you can create that will guide your healthcare team and decision maker(s) when needed. These documents can be stored in the electronic medical record.    · Living Will - a legal document to guide your care if you have a terminal condition or a serious illness and are unable to communicate. States vary by statute in document names/types, but most forms may include one or more of the following:        -  Directions regarding life-prolonging treatments        -  Directions regarding artificially provided nutrition/hydration        -  Choosing a healthcare decision maker        -  Direction regarding organ/tissue donation    · Durable Power of  for Healthcare - this document names an -in-fact to make medical decisions for you, but it may also allow this person to make personal and financial decisions for you. Please seek the advice of an  if you need this type of document.    **Advance Directives are not required and no one may discriminate against you if you do not sign one.    Medical Orders  Many states allow specific forms/orders signed by your physician to record your wishes for medical treatment in your current state of health. This form, signed in personal communication with your physician, addresses resuscitation and other medical interventions that you may or may not want.      For more information or to schedule a time with a [facility] Advance Care Planning Facilitator contact: [facility] or  call [phone] and someone will contact you directly.        The wellness exam has been reviewed in detail.  The patient has been fully counseled on preventative guidelines for vaccines, cancer screenings, and other health maintenance needs.  Functional testing has been performed to assess capacity for independent living and need for other medical interventions.  Screening for depression was completed via a validated screening model as indicated above, 15 minutes was spent in total on depression screening.  The patient was counseled on maintaining a lifestyle to promote good health and to minimize chronic diseases, including 15 minutes spent screening for alcohol misuse and counseling on safe and appropriate alcohol intake and overall risk reduction as indicated above.  The patient has been assisted with scheduling healthcare procedures for the coming year and given a written document outlining these recommendations.    Follow Up:  Return in about 6 months (around 8/4/2021) for Controlled substance 3-month.     An After Visit Summary and PPPS with all of these plans were given to the patient.

## 2021-02-04 NOTE — PATIENT INSTRUCTIONS
Advance Care Planning and Advance Directives     You make decisions on a daily basis - decisions about where you want to live, your career, your home, your life. Perhaps one of the most important decisions you face is your choice for future medical care. Take time to talk with your family and your healthcare team and start planning today.  Advance Care Planning is a process that can help you:  · Understand possible future healthcare decisions in light of your own experiences  · Reflect on those decision in light of your goals and values  · Discuss your decisions with those closest to you and the healthcare professionals that care for you  · Make a plan by creating a document that reflects your wishes    Surrogate Decision Maker  In the event of a medical emergency, which has left you unable to communicate or to make your own decisions, you would need someone to make decisions for you.  It is important to discuss your preferences for medical treatment with this person while you are in good health.     Qualities of a surrogate decision maker:  • Willing to take on this role and responsibility  • Knows what you want for future medical care  • Willing to follow your wishes even if they don't agree with them  • Able to make difficult medical decisions under stressful circumstances    Advance Directives  These are legal documents you can create that will guide your healthcare team and decision maker(s) when needed. These documents can be stored in the electronic medical record.    · Living Will - a legal document to guide your care if you have a terminal condition or a serious illness and are unable to communicate. States vary by statute in document names/types, but most forms may include one or more of the following:        -  Directions regarding life-prolonging treatments        -  Directions regarding artificially provided nutrition/hydration        -  Choosing a healthcare decision maker        -  Direction  regarding organ/tissue donation    · Durable Power of  for Healthcare - this document names an -in-fact to make medical decisions for you, but it may also allow this person to make personal and financial decisions for you. Please seek the advice of an  if you need this type of document.    **Advance Directives are not required and no one may discriminate against you if you do not sign one.    Medical Orders  Many states allow specific forms/orders signed by your physician to record your wishes for medical treatment in your current state of health. This form, signed in personal communication with your physician, addresses resuscitation and other medical interventions that you may or may not want.      For more information or to schedule a time with a [facility] Advance Care Planning Facilitator contact: [facility].[email or web address]/ACP or call [phone] and someone will contact you directly.

## 2021-02-16 DIAGNOSIS — M47.816 LUMBAR SPONDYLOSIS: ICD-10-CM

## 2021-02-19 RX ORDER — HYDROCODONE BITARTRATE AND ACETAMINOPHEN 5; 325 MG/1; MG/1
.5-1 TABLET ORAL DAILY PRN
Qty: 30 TABLET | Refills: 0 | Status: SHIPPED | OUTPATIENT
Start: 2021-02-19 | End: 2021-05-26 | Stop reason: SDUPTHER

## 2021-03-02 DIAGNOSIS — G47.9 SLEEP DISORDER: ICD-10-CM

## 2021-03-02 DIAGNOSIS — F41.9 ANXIETY: ICD-10-CM

## 2021-03-02 RX ORDER — TEMAZEPAM 15 MG/1
CAPSULE ORAL
Qty: 30 CAPSULE | Refills: 2 | Status: SHIPPED | OUTPATIENT
Start: 2021-03-02 | End: 2021-07-16

## 2021-03-02 RX ORDER — AMLODIPINE BESYLATE 5 MG/1
TABLET ORAL
Qty: 30 TABLET | Refills: 1 | Status: SHIPPED | OUTPATIENT
Start: 2021-03-02 | End: 2021-04-30

## 2021-03-02 RX ORDER — CITALOPRAM 20 MG/1
TABLET ORAL
Qty: 30 TABLET | Refills: 1 | Status: SHIPPED | OUTPATIENT
Start: 2021-03-02 | End: 2021-04-30

## 2021-03-02 RX ORDER — ATORVASTATIN CALCIUM 20 MG/1
TABLET, FILM COATED ORAL
Qty: 30 TABLET | Refills: 1 | Status: SHIPPED | OUTPATIENT
Start: 2021-03-02 | End: 2021-04-30

## 2021-03-26 DIAGNOSIS — K21.9 GASTROESOPHAGEAL REFLUX DISEASE WITHOUT ESOPHAGITIS: ICD-10-CM

## 2021-03-26 DIAGNOSIS — I10 ESSENTIAL HYPERTENSION: ICD-10-CM

## 2021-03-26 RX ORDER — LISINOPRIL 40 MG/1
TABLET ORAL
Qty: 90 TABLET | Refills: 1 | Status: SHIPPED | OUTPATIENT
Start: 2021-03-26 | End: 2021-08-31

## 2021-03-26 RX ORDER — HYDROCHLOROTHIAZIDE 25 MG/1
TABLET ORAL
Qty: 30 TABLET | Refills: 1 | Status: SHIPPED | OUTPATIENT
Start: 2021-03-26 | End: 2021-05-25

## 2021-03-26 RX ORDER — OMEPRAZOLE 20 MG/1
CAPSULE, DELAYED RELEASE ORAL
Qty: 30 CAPSULE | Refills: 2 | Status: SHIPPED | OUTPATIENT
Start: 2021-03-26 | End: 2021-06-21

## 2021-03-26 NOTE — TELEPHONE ENCOUNTER
LOV:02/04/2021  NOV:08/05/2021  Last fill:01/27/2021 (Omperazole)  Last fill: 09/30/2020 (Lisinopril)

## 2021-04-15 ENCOUNTER — PRIOR AUTHORIZATION (OUTPATIENT)
Dept: FAMILY MEDICINE CLINIC | Facility: CLINIC | Age: 66
End: 2021-04-15

## 2021-04-15 NOTE — TELEPHONE ENCOUNTER
(Key: [id number]) - [id number]  Temazepam 15MG capsules  Status: PA Response - Approved  Created: April 6th, 2021 [phone]  Sent: April 15th, 2021    Approved today  CaseId:[id number];Status:Approved;Review Type:Prior Auth;Coverage Start Date:03/16/2021;Coverage End Date:04/15/2022;    The preferred alternatives include the following: RAMELTEON

## 2021-04-30 RX ORDER — AMLODIPINE BESYLATE 5 MG/1
TABLET ORAL
Qty: 30 TABLET | Refills: 0 | Status: SHIPPED | OUTPATIENT
Start: 2021-04-30 | End: 2021-05-26

## 2021-04-30 RX ORDER — SUCRALFATE 1 G/1
TABLET ORAL
Qty: 60 TABLET | Refills: 1 | Status: SHIPPED | OUTPATIENT
Start: 2021-04-30 | End: 2021-09-30

## 2021-04-30 RX ORDER — CITALOPRAM 20 MG/1
TABLET ORAL
Qty: 30 TABLET | Refills: 0 | Status: SHIPPED | OUTPATIENT
Start: 2021-04-30 | End: 2021-05-25

## 2021-04-30 RX ORDER — ATORVASTATIN CALCIUM 20 MG/1
TABLET, FILM COATED ORAL
Qty: 30 TABLET | Refills: 0 | Status: SHIPPED | OUTPATIENT
Start: 2021-04-30 | End: 2021-05-25

## 2021-05-25 RX ORDER — ATORVASTATIN CALCIUM 20 MG/1
TABLET, FILM COATED ORAL
Qty: 30 TABLET | Refills: 0 | Status: SHIPPED | OUTPATIENT
Start: 2021-05-25 | End: 2021-06-18

## 2021-05-25 RX ORDER — HYDROCHLOROTHIAZIDE 25 MG/1
TABLET ORAL
Qty: 30 TABLET | Refills: 0 | Status: SHIPPED | OUTPATIENT
Start: 2021-05-25 | End: 2021-06-17

## 2021-05-25 RX ORDER — CITALOPRAM 20 MG/1
TABLET ORAL
Qty: 30 TABLET | Refills: 0 | Status: SHIPPED | OUTPATIENT
Start: 2021-05-25 | End: 2021-06-18

## 2021-05-26 DIAGNOSIS — M47.816 LUMBAR SPONDYLOSIS: ICD-10-CM

## 2021-05-26 RX ORDER — AMLODIPINE BESYLATE 5 MG/1
TABLET ORAL
Qty: 30 TABLET | Refills: 2 | Status: SHIPPED | OUTPATIENT
Start: 2021-05-26 | End: 2021-08-11

## 2021-05-27 RX ORDER — HYDROCODONE BITARTRATE AND ACETAMINOPHEN 5; 325 MG/1; MG/1
.5-1 TABLET ORAL DAILY PRN
Qty: 30 TABLET | Refills: 0 | Status: SHIPPED | OUTPATIENT
Start: 2021-05-27 | End: 2021-08-08 | Stop reason: SDUPTHER

## 2021-05-27 NOTE — TELEPHONE ENCOUNTER
Last fill: 2/19/21  Last office visit: 2/4/21  Next office visit: 8/5/21  CSA up-to-date? 2/4/21  Date of last UDS: 2/4/21  UDS consistent: INCONSISTENT, POSITIVE FOR ALCOHOL    PATIENT IS OVERDUE FOR FOLLOW UP APPOINTMENT, NEEDS APPOINTMENT EVERY 3 MONTHS FOR CONTROLLED SUBSTANCE.

## 2021-06-17 RX ORDER — HYDROCHLOROTHIAZIDE 25 MG/1
TABLET ORAL
Qty: 30 TABLET | Refills: 0 | Status: SHIPPED | OUTPATIENT
Start: 2021-06-17 | End: 2021-07-09

## 2021-06-18 RX ORDER — ATORVASTATIN CALCIUM 20 MG/1
TABLET, FILM COATED ORAL
Qty: 30 TABLET | Refills: 0 | Status: SHIPPED | OUTPATIENT
Start: 2021-06-18 | End: 2021-07-16

## 2021-06-18 RX ORDER — CITALOPRAM 20 MG/1
TABLET ORAL
Qty: 30 TABLET | Refills: 0 | Status: SHIPPED | OUTPATIENT
Start: 2021-06-18 | End: 2021-07-16

## 2021-06-19 DIAGNOSIS — K21.9 GASTROESOPHAGEAL REFLUX DISEASE WITHOUT ESOPHAGITIS: ICD-10-CM

## 2021-06-21 RX ORDER — OMEPRAZOLE 20 MG/1
CAPSULE, DELAYED RELEASE ORAL
Qty: 30 CAPSULE | Refills: 1 | Status: SHIPPED | OUTPATIENT
Start: 2021-06-21 | End: 2021-08-11

## 2021-07-09 RX ORDER — HYDROCHLOROTHIAZIDE 25 MG/1
TABLET ORAL
Qty: 30 TABLET | Refills: 0 | Status: SHIPPED | OUTPATIENT
Start: 2021-07-09 | End: 2021-08-03

## 2021-07-15 DIAGNOSIS — F41.9 ANXIETY: ICD-10-CM

## 2021-07-15 DIAGNOSIS — G47.9 SLEEP DISORDER: ICD-10-CM

## 2021-07-16 RX ORDER — TEMAZEPAM 15 MG/1
CAPSULE ORAL
Qty: 30 CAPSULE | Refills: 1 | Status: SHIPPED | OUTPATIENT
Start: 2021-07-16 | End: 2021-09-07

## 2021-07-16 RX ORDER — CITALOPRAM 20 MG/1
TABLET ORAL
Qty: 30 TABLET | Refills: 0 | Status: SHIPPED | OUTPATIENT
Start: 2021-07-16 | End: 2021-08-11

## 2021-07-16 RX ORDER — ATORVASTATIN CALCIUM 20 MG/1
TABLET, FILM COATED ORAL
Qty: 30 TABLET | Refills: 0 | Status: SHIPPED | OUTPATIENT
Start: 2021-07-16 | End: 2021-08-11

## 2021-08-03 RX ORDER — HYDROCHLOROTHIAZIDE 25 MG/1
TABLET ORAL
Qty: 30 TABLET | Refills: 0 | Status: SHIPPED | OUTPATIENT
Start: 2021-08-03 | End: 2021-09-03 | Stop reason: SDUPTHER

## 2021-08-03 NOTE — TELEPHONE ENCOUNTER
Rx Refill Note  Requested Prescriptions     Pending Prescriptions Disp Refills   • hydroCHLOROthiazide (HYDRODIURIL) 25 MG tablet [Pharmacy Med Name: hydroCHLOROthiazide 25 MG TABLET] 30 tablet 0     Sig: TAKE ONE TABLET BY MOUTH EVERY MORNING      Last office visit with prescribing clinician: 02/24/2021     Next office visit with prescribing clinician: 08/05/2021           Myrna Estrella MA  08/03/21, 14:08 EDT

## 2021-08-08 DIAGNOSIS — M47.816 LUMBAR SPONDYLOSIS: ICD-10-CM

## 2021-08-09 RX ORDER — HYDROCODONE BITARTRATE AND ACETAMINOPHEN 5; 325 MG/1; MG/1
.5-1 TABLET ORAL DAILY PRN
Qty: 30 TABLET | Refills: 0 | Status: SHIPPED | OUTPATIENT
Start: 2021-08-09 | End: 2021-10-24 | Stop reason: SDUPTHER

## 2021-08-09 NOTE — TELEPHONE ENCOUNTER
Rx Refill Note  Requested Prescriptions     Pending Prescriptions Disp Refills   • HYDROcodone-acetaminophen (NORCO) 5-325 MG per tablet 30 tablet 0     Sig: Take 0.5-1 tablets by mouth Daily As Needed for Moderate Pain .      Last office visit with prescribing clinician: 2/4/2021      Next office visit with prescribing clinician: 8/17/2021            Myrna Estrella MA  08/09/21, 09:13 EDT

## 2021-08-11 DIAGNOSIS — K21.9 GASTROESOPHAGEAL REFLUX DISEASE WITHOUT ESOPHAGITIS: ICD-10-CM

## 2021-08-11 RX ORDER — ATORVASTATIN CALCIUM 20 MG/1
TABLET, FILM COATED ORAL
Qty: 30 TABLET | Refills: 0 | Status: SHIPPED | OUTPATIENT
Start: 2021-08-11 | End: 2021-09-07

## 2021-08-11 RX ORDER — CITALOPRAM 20 MG/1
TABLET ORAL
Qty: 30 TABLET | Refills: 0 | Status: SHIPPED | OUTPATIENT
Start: 2021-08-11 | End: 2021-09-07

## 2021-08-11 RX ORDER — AMLODIPINE BESYLATE 5 MG/1
TABLET ORAL
Qty: 30 TABLET | Refills: 2 | Status: SHIPPED | OUTPATIENT
Start: 2021-08-11 | End: 2021-11-08

## 2021-08-11 RX ORDER — OMEPRAZOLE 20 MG/1
CAPSULE, DELAYED RELEASE ORAL
Qty: 30 CAPSULE | Refills: 1 | Status: SHIPPED | OUTPATIENT
Start: 2021-08-11 | End: 2021-09-03 | Stop reason: SDUPTHER

## 2021-08-11 NOTE — TELEPHONE ENCOUNTER
Rx Refill Note  Requested Prescriptions     Pending Prescriptions Disp Refills   • omeprazole (priLOSEC) 20 MG capsule [Pharmacy Med Name: OMEPRAZOLE DR 20 MG CAPSULE] 30 capsule 1     Sig: TAKE ONE CAPSULE BY MOUTH DAILY      Last office visit with prescribing clinician:  02/04/2021  Next office visit with prescribing clinician: 08/17/2021  3}  Debra Madrid MA  08/11/21, 13:12 EDT

## 2021-08-11 NOTE — TELEPHONE ENCOUNTER
Rx Refill Note  Requested Prescriptions     Pending Prescriptions Disp Refills   • amLODIPine (NORVASC) 5 MG tablet [Pharmacy Med Name: amLODIPine BESYLATE 5 MG TAB] 30 tablet 2     Sig: TAKE ONE TABLET BY MOUTH EVERY NIGHT AT BEDTIME   • citalopram (CeleXA) 20 MG tablet [Pharmacy Med Name: CITALOPRAM HBR 20 MG TABLET] 30 tablet 0     Sig: TAKE ONE TABLET BY MOUTH DAILY   • atorvastatin (LIPITOR) 20 MG tablet [Pharmacy Med Name: ATORVASTATIN 20 MG TABLET] 30 tablet 0     Sig: TAKE ONE TABLET BY MOUTH EVERY NIGHT AT BEDTIME      Last office visit with prescribing clinician: 2/4/2021      Next office visit with prescribing clinician: 8/17/2021            Myrna Estrella MA  08/11/21, 14:22 EDT

## 2021-08-17 ENCOUNTER — OFFICE VISIT (OUTPATIENT)
Dept: FAMILY MEDICINE CLINIC | Facility: CLINIC | Age: 66
End: 2021-08-17

## 2021-08-17 ENCOUNTER — LAB (OUTPATIENT)
Dept: LAB | Facility: HOSPITAL | Age: 66
End: 2021-08-17

## 2021-08-17 VITALS
HEIGHT: 61 IN | HEART RATE: 88 BPM | BODY MASS INDEX: 31.72 KG/M2 | WEIGHT: 168 LBS | RESPIRATION RATE: 16 BRPM | DIASTOLIC BLOOD PRESSURE: 78 MMHG | SYSTOLIC BLOOD PRESSURE: 132 MMHG | OXYGEN SATURATION: 96 %

## 2021-08-17 DIAGNOSIS — M47.816 LUMBAR SPONDYLOSIS: ICD-10-CM

## 2021-08-17 DIAGNOSIS — R74.8 ELEVATED VITAMIN B12 LEVEL: ICD-10-CM

## 2021-08-17 DIAGNOSIS — G47.9 SLEEP DISORDER: ICD-10-CM

## 2021-08-17 DIAGNOSIS — E87.1 HYPONATREMIA: ICD-10-CM

## 2021-08-17 DIAGNOSIS — Z78.0 POST-MENOPAUSAL: ICD-10-CM

## 2021-08-17 DIAGNOSIS — F41.9 ANXIETY: Primary | ICD-10-CM

## 2021-08-17 LAB
ANION GAP SERPL CALCULATED.3IONS-SCNC: 13 MMOL/L (ref 5–15)
BUN SERPL-MCNC: 15 MG/DL (ref 8–23)
BUN/CREAT SERPL: 19.7 (ref 7–25)
CALCIUM SPEC-SCNC: 9.5 MG/DL (ref 8.6–10.5)
CHLORIDE SERPL-SCNC: 95 MMOL/L (ref 98–107)
CO2 SERPL-SCNC: 27 MMOL/L (ref 22–29)
CREAT SERPL-MCNC: 0.76 MG/DL (ref 0.57–1)
DEPRECATED RDW RBC AUTO: 47.1 FL (ref 37–54)
ERYTHROCYTE [DISTWIDTH] IN BLOOD BY AUTOMATED COUNT: 12.3 % (ref 12.3–15.4)
GFR SERPL CREATININE-BSD FRML MDRD: 76 ML/MIN/1.73
GLUCOSE SERPL-MCNC: 88 MG/DL (ref 65–99)
HCT VFR BLD AUTO: 47.5 % (ref 34–46.6)
HGB BLD-MCNC: 15.5 G/DL (ref 12–15.9)
MCH RBC QN AUTO: 33.6 PG (ref 26.6–33)
MCHC RBC AUTO-ENTMCNC: 32.6 G/DL (ref 31.5–35.7)
MCV RBC AUTO: 103 FL (ref 79–97)
PLATELET # BLD AUTO: 291 10*3/MM3 (ref 140–450)
PMV BLD AUTO: 9.7 FL (ref 6–12)
POTASSIUM SERPL-SCNC: 3.3 MMOL/L (ref 3.5–5.2)
RBC # BLD AUTO: 4.61 10*6/MM3 (ref 3.77–5.28)
SODIUM SERPL-SCNC: 135 MMOL/L (ref 136–145)
WBC # BLD AUTO: 8.24 10*3/MM3 (ref 3.4–10.8)

## 2021-08-17 PROCEDURE — 85027 COMPLETE CBC AUTOMATED: CPT

## 2021-08-17 PROCEDURE — 80048 BASIC METABOLIC PNL TOTAL CA: CPT

## 2021-08-17 PROCEDURE — 82679 ASSAY OF ESTRONE: CPT

## 2021-08-17 PROCEDURE — 99214 OFFICE O/P EST MOD 30 MIN: CPT | Performed by: FAMILY MEDICINE

## 2021-08-17 PROCEDURE — 82670 ASSAY OF TOTAL ESTRADIOL: CPT

## 2021-08-23 LAB
ESTRADIOL SERPL-MCNC: 92.1 PG/ML
ESTRONE SERPL-MCNC: 132 PG/ML

## 2021-08-31 DIAGNOSIS — I10 ESSENTIAL HYPERTENSION: ICD-10-CM

## 2021-08-31 RX ORDER — LISINOPRIL 40 MG/1
TABLET ORAL
Qty: 30 TABLET | Refills: 3 | Status: SHIPPED | OUTPATIENT
Start: 2021-08-31 | End: 2021-12-14

## 2021-08-31 RX ORDER — HYDROCHLOROTHIAZIDE 25 MG/1
TABLET ORAL
Qty: 30 TABLET | Refills: 0 | OUTPATIENT
Start: 2021-08-31

## 2021-08-31 NOTE — TELEPHONE ENCOUNTER
Rx Refill Note  Requested Prescriptions     Pending Prescriptions Disp Refills   • lisinopril (PRINIVIL,ZESTRIL) 40 MG tablet [Pharmacy Med Name: LISINOPRIL 40 MG TABLET] 30 tablet      Sig: TAKE ONE TABLET BY MOUTH EVERY MORNING      Last office visit with prescribing clinician: 8/17/2021      Next office visit with prescribing clinician: Visit date not found            Myrna Estrella MA  08/31/21, 14:27 EDT

## 2021-08-31 NOTE — TELEPHONE ENCOUNTER
Rx Refill Note  Requested Prescriptions     Pending Prescriptions Disp Refills   • hydroCHLOROthiazide (HYDRODIURIL) 25 MG tablet [Pharmacy Med Name: hydroCHLOROthiazide 25 MG TABLET] 30 tablet 0     Sig: TAKE ONE TABLET BY MOUTH EVERY MORNING      Last office visit with prescribing  08/17/2021      Next office visit with prescribing clinician: Visit date not found            Myrna Estrella MA  08/31/21, 14:29 EDT

## 2021-09-03 ENCOUNTER — TELEPHONE (OUTPATIENT)
Dept: FAMILY MEDICINE CLINIC | Facility: CLINIC | Age: 66
End: 2021-09-03

## 2021-09-03 DIAGNOSIS — K21.9 GASTROESOPHAGEAL REFLUX DISEASE WITHOUT ESOPHAGITIS: ICD-10-CM

## 2021-09-03 RX ORDER — HYDROCHLOROTHIAZIDE 25 MG/1
25 TABLET ORAL EVERY MORNING
Qty: 30 TABLET | Refills: 0 | Status: SHIPPED | OUTPATIENT
Start: 2021-09-03 | End: 2021-10-21

## 2021-09-03 RX ORDER — HYDROCHLOROTHIAZIDE 25 MG/1
25 TABLET ORAL EVERY MORNING
Qty: 30 TABLET | Refills: 0 | Status: SHIPPED | OUTPATIENT
Start: 2021-09-03 | End: 2021-09-03 | Stop reason: SDUPTHER

## 2021-09-03 RX ORDER — OMEPRAZOLE 20 MG/1
20 CAPSULE, DELAYED RELEASE ORAL DAILY
Qty: 30 CAPSULE | Refills: 1 | Status: SHIPPED | OUTPATIENT
Start: 2021-09-03 | End: 2021-12-01

## 2021-09-03 NOTE — TELEPHONE ENCOUNTER
Rx Refill Note  Requested Prescriptions     Pending Prescriptions Disp Refills   • hydroCHLOROthiazide (HYDRODIURIL) 25 MG tablet 30 tablet 0     Sig: Take 1 tablet by mouth Every Morning.      Last office visit with prescribing clinician: 8/17/2021      Next office visit with prescribing clinician: Visit date not found            Juli Elena MA  09/03/21, 16:50 EDT

## 2021-09-03 NOTE — TELEPHONE ENCOUNTER
Rx Refill Note  Requested Prescriptions     Pending Prescriptions Disp Refills   • hydroCHLOROthiazide (HYDRODIURIL) 25 MG tablet 30 tablet 0     Sig: Take 1 tablet by mouth Every Morning.   • omeprazole (priLOSEC) 20 MG capsule 30 capsule 1     Sig: Take 1 capsule by mouth Daily.      Last office visit with prescribing clinician: 8/17/2021      Next office visit with prescribing clinician: Visit date not found            Og Marcelino MA  09/03/21, 14:02 EDT

## 2021-09-03 NOTE — TELEPHONE ENCOUNTER
----- Message from Luh Horner sent at 9/2/2021  5:48 PM EDT -----  Regarding: Prescription Question  Contact: [phone]  I need a refill of HCTZ and the pharmacist at [facility] has been waiting for two days for authorization from Dr Shafer.  Thanks   Luh Horner   [id number]

## 2021-09-03 NOTE — TELEPHONE ENCOUNTER
Pt called to check on the status of her med refill request of med noted below.  Pt states it's been denied because it's a duplicate request.  Pt is unsure of what that means and would like to pick it up at the same time as her other prescriptions.  Can someone please clarify the issue with the patient?    hydroCHLOROthiazide (HYDRODIURIL) 25 MG tablet

## 2021-09-07 DIAGNOSIS — F41.9 ANXIETY: ICD-10-CM

## 2021-09-07 DIAGNOSIS — G47.9 SLEEP DISORDER: ICD-10-CM

## 2021-09-07 RX ORDER — TEMAZEPAM 15 MG/1
CAPSULE ORAL
Qty: 30 CAPSULE | Refills: 0 | Status: SHIPPED | OUTPATIENT
Start: 2021-09-07 | End: 2021-10-07

## 2021-09-07 RX ORDER — ATORVASTATIN CALCIUM 20 MG/1
TABLET, FILM COATED ORAL
Qty: 30 TABLET | Refills: 0 | Status: SHIPPED | OUTPATIENT
Start: 2021-09-07 | End: 2021-10-07

## 2021-09-07 RX ORDER — CITALOPRAM 20 MG/1
TABLET ORAL
Qty: 30 TABLET | Refills: 0 | Status: SHIPPED | OUTPATIENT
Start: 2021-09-07 | End: 2021-10-07

## 2021-09-07 NOTE — TELEPHONE ENCOUNTER
Rx Refill Note  Requested Prescriptions     Pending Prescriptions Disp Refills   • citalopram (CeleXA) 20 MG tablet [Pharmacy Med Name: CITALOPRAM HBR 20 MG TABLET] 30 tablet 0     Sig: TAKE ONE TABLET BY MOUTH DAILY   • temazepam (RESTORIL) 15 MG capsule [Pharmacy Med Name: TEMAZEPAM 15 MG CAPSULE] 30 capsule      Sig: TAKE ONE CAPSULE BY MOUTH DAILY AND TAKE ONE CAPSULE BY MOUTH EVERY NIGHT AT BEDTIME AS NEEDED FOR ANXIETY   • atorvastatin (LIPITOR) 20 MG tablet [Pharmacy Med Name: ATORVASTATIN 20 MG TABLET] 30 tablet 0     Sig: TAKE ONE TABLET BY MOUTH EVERY NIGHT AT BEDTIME      Last office visit with prescribing clinician: 8/17/2021      Next office visit with prescribing clinician: Visit date not found     CSA: 4/04/2021  UDS: 2/04/2021       Marc Malik MA  09/07/21, 13:51 EDT

## 2021-09-30 RX ORDER — SUCRALFATE 1 G/1
TABLET ORAL
Qty: 60 TABLET | Refills: 1 | Status: SHIPPED | OUTPATIENT
Start: 2021-09-30 | End: 2021-12-02

## 2021-09-30 NOTE — TELEPHONE ENCOUNTER
Rx Refill Note  Requested Prescriptions     Pending Prescriptions Disp Refills   • sucralfate (CARAFATE) 1 g tablet [Pharmacy Med Name: SUCRALFATE 1 GM TABLET] 60 tablet 1     Sig: TAKE ONE TABLET BY MOUTH EVERY 12 HOURS      Last office visit with prescribing clinician: 8/17/2021      Next office visit with prescribing clinician: Visit date not found            gO Marcelino MA  09/30/21, 10:29 EDT     Last labs 8/17/21

## 2021-10-06 DIAGNOSIS — G47.9 SLEEP DISORDER: ICD-10-CM

## 2021-10-06 DIAGNOSIS — F41.9 ANXIETY: ICD-10-CM

## 2021-10-07 RX ORDER — ATORVASTATIN CALCIUM 20 MG/1
TABLET, FILM COATED ORAL
Qty: 30 TABLET | Refills: 0 | Status: SHIPPED | OUTPATIENT
Start: 2021-10-07 | End: 2021-11-08

## 2021-10-07 RX ORDER — TEMAZEPAM 15 MG/1
CAPSULE ORAL
Qty: 60 CAPSULE | Refills: 2 | Status: SHIPPED | OUTPATIENT
Start: 2021-10-07 | End: 2022-04-26

## 2021-10-07 RX ORDER — CITALOPRAM 20 MG/1
TABLET ORAL
Qty: 30 TABLET | Refills: 0 | Status: SHIPPED | OUTPATIENT
Start: 2021-10-07 | End: 2021-11-08

## 2021-10-07 NOTE — TELEPHONE ENCOUNTER
Rx Refill Note  Requested Prescriptions     Pending Prescriptions Disp Refills   • citalopram (CeleXA) 20 MG tablet [Pharmacy Med Name: CITALOPRAM HBR 20 MG TABLET] 30 tablet 0     Sig: TAKE ONE TABLET BY MOUTH DAILY   • atorvastatin (LIPITOR) 20 MG tablet [Pharmacy Med Name: ATORVASTATIN 20 MG TABLET] 30 tablet 0     Sig: TAKE ONE TABLET BY MOUTH EVERY NIGHT AT BEDTIME   • temazepam (RESTORIL) 15 MG capsule [Pharmacy Med Name: TEMAZEPAM 15 MG CAPSULE] 30 capsule      Sig: TAKE ONE CAPSULE BY MOUTH DAILY AND TAKE ONE CAPSULE BY MOUTH EVERY NIGHT AT BEDTIME AS NEEDED FOR ANXIETY      Last office visit with prescribing clinician: 8/17/2021      Next office visit with prescribing clinician: Visit date not found     UDS: 2/04/2021  CSA: 2/04/2021       Marc Malik MA  10/07/21, 08:37 EDT

## 2021-10-21 RX ORDER — HYDROCHLOROTHIAZIDE 25 MG/1
TABLET ORAL
Qty: 30 TABLET | Refills: 0 | Status: SHIPPED | OUTPATIENT
Start: 2021-10-21 | End: 2021-11-15

## 2021-10-21 NOTE — TELEPHONE ENCOUNTER
Rx Refill Note  Requested Prescriptions     Pending Prescriptions Disp Refills   • hydroCHLOROthiazide (HYDRODIURIL) 25 MG tablet [Pharmacy Med Name: hydroCHLOROthiazide 25 MG TABLET] 30 tablet 0     Sig: TAKE ONE TABLET BY MOUTH EVERY MORNING      Last office visit with prescribing clinician: 8/17/2021      Next office visit with prescribing clinician:  Return in about 6 months (around 2/17/2022) for Medicare Wellness.           Juli Elena MA  10/21/21, 08:23 EDT

## 2021-10-24 DIAGNOSIS — M47.816 LUMBAR SPONDYLOSIS: ICD-10-CM

## 2021-10-25 RX ORDER — HYDROCODONE BITARTRATE AND ACETAMINOPHEN 5; 325 MG/1; MG/1
.5-1 TABLET ORAL DAILY PRN
Qty: 30 TABLET | Refills: 0 | Status: SHIPPED | OUTPATIENT
Start: 2021-10-25 | End: 2022-02-14 | Stop reason: SDUPTHER

## 2021-10-25 NOTE — TELEPHONE ENCOUNTER
Rx Refill Note  Requested Prescriptions     Pending Prescriptions Disp Refills   • HYDROcodone-acetaminophen (NORCO) 5-325 MG per tablet 30 tablet 0     Sig: Take 0.5-1 tablets by mouth Daily As Needed for Moderate Pain .      Last office visit with prescribing clinician: 8/17/2021      Next office visit with prescribing clinician: Visit date not found   3}  Debra Madrid MA  10/25/21, 08:12 EDT     Last fill: 08/09/2021

## 2021-11-08 RX ORDER — AMLODIPINE BESYLATE 5 MG/1
TABLET ORAL
Qty: 30 TABLET | Refills: 2 | Status: SHIPPED | OUTPATIENT
Start: 2021-11-08 | End: 2021-12-06 | Stop reason: SDUPTHER

## 2021-11-08 RX ORDER — ATORVASTATIN CALCIUM 20 MG/1
TABLET, FILM COATED ORAL
Qty: 30 TABLET | Refills: 0 | Status: SHIPPED | OUTPATIENT
Start: 2021-11-08 | End: 2021-12-02

## 2021-11-08 RX ORDER — CITALOPRAM 20 MG/1
TABLET ORAL
Qty: 30 TABLET | Refills: 0 | Status: SHIPPED | OUTPATIENT
Start: 2021-11-08 | End: 2021-12-02

## 2021-11-08 NOTE — TELEPHONE ENCOUNTER
Rx Refill Note  Requested Prescriptions     Pending Prescriptions Disp Refills   • citalopram (CeleXA) 20 MG tablet [Pharmacy Med Name: CITALOPRAM HBR 20 MG TABLET] 30 tablet 0     Sig: TAKE ONE TABLET BY MOUTH DAILY   • amLODIPine (NORVASC) 5 MG tablet [Pharmacy Med Name: amLODIPine BESYLATE 5 MG TAB] 30 tablet 2     Sig: TAKE ONE TABLET BY MOUTH EVERY NIGHT AT BEDTIME   • atorvastatin (LIPITOR) 20 MG tablet [Pharmacy Med Name: ATORVASTATIN 20 MG TABLET] 30 tablet 0     Sig: TAKE ONE TABLET BY MOUTH EVERY NIGHT AT BEDTIME      Last office visit with prescribing clinician: 8/17/2021      Next office visit with prescribing clinician: Visit date not found        2/4/2021    Ashley Quinteros MA  11/08/21, 08:47 EST

## 2021-11-15 RX ORDER — HYDROCHLOROTHIAZIDE 25 MG/1
TABLET ORAL
Qty: 30 TABLET | Refills: 0 | Status: SHIPPED | OUTPATIENT
Start: 2021-11-15 | End: 2021-12-14

## 2021-11-15 NOTE — TELEPHONE ENCOUNTER
Rx Refill Note  Requested Prescriptions     Pending Prescriptions Disp Refills   • hydroCHLOROthiazide (HYDRODIURIL) 25 MG tablet [Pharmacy Med Name: hydroCHLOROthiazide 25 MG TABLET] 30 tablet 0     Sig: TAKE ONE TABLET BY MOUTH EVERY MORNING      Last office visit with prescribing clinician: 8/17/2021      Next office visit with prescribing clinician: Visit date not found   23}  Dbera Madrid MA  11/15/21, 09:03 EST     Last fill: 10/21/2021

## 2021-12-01 DIAGNOSIS — K21.9 GASTROESOPHAGEAL REFLUX DISEASE WITHOUT ESOPHAGITIS: ICD-10-CM

## 2021-12-01 RX ORDER — OMEPRAZOLE 20 MG/1
CAPSULE, DELAYED RELEASE ORAL
Qty: 30 CAPSULE | Refills: 1 | Status: SHIPPED | OUTPATIENT
Start: 2021-12-01 | End: 2022-01-24

## 2021-12-01 NOTE — TELEPHONE ENCOUNTER
Rx Refill Note  Requested Prescriptions     Pending Prescriptions Disp Refills   • citalopram (CeleXA) 20 MG tablet [Pharmacy Med Name: CITALOPRAM HBR 20 MG TABLET] 30 tablet 0     Sig: TAKE ONE TABLET BY MOUTH DAILY   • sucralfate (CARAFATE) 1 g tablet [Pharmacy Med Name: SUCRALFATE 1 GM TABLET] 60 tablet 1     Sig: TAKE ONE TABLET BY MOUTH EVERY 12 HOURS   • atorvastatin (LIPITOR) 20 MG tablet [Pharmacy Med Name: ATORVASTATIN 20 MG TABLET] 30 tablet 0     Sig: TAKE ONE TABLET BY MOUTH EVERY NIGHT AT BEDTIME      Last office visit with prescribing clinician: 8/17/2021      Next office visit with prescribing clinician: Visit date not found            Og Marcelino MA  12/01/21, 13:22 EST     Last labs 8/17/21    PATIENT NEEDS TO SCHEDULE 6 MONTH FOLLOW UP CALLED AND LEFT VM FOR PATIENT TO RETURN CALL    OK FOR HUB TO RELAY MESSAGE AND SCHEDULE APPOINTMENT

## 2021-12-01 NOTE — TELEPHONE ENCOUNTER
Rx Refill Note  Requested Prescriptions     Pending Prescriptions Disp Refills   • omeprazole (priLOSEC) 20 MG capsule [Pharmacy Med Name: OMEPRAZOLE DR 20 MG CAPSULE] 30 capsule 1     Sig: TAKE ONE CAPSULE BY MOUTH DAILY      Last office visit with prescribing clinician: 8/17/2021  Next office visit with prescribing clinician: Visit date not found   3}  Debra Madrid MA  12/01/21, 12:41 EST     Last fill: 9/3/2021

## 2021-12-02 RX ORDER — ATORVASTATIN CALCIUM 20 MG/1
TABLET, FILM COATED ORAL
Qty: 30 TABLET | Refills: 1 | Status: SHIPPED | OUTPATIENT
Start: 2021-12-02 | End: 2022-01-24

## 2021-12-02 RX ORDER — SUCRALFATE 1 G/1
TABLET ORAL
Qty: 60 TABLET | Refills: 1 | Status: SHIPPED | OUTPATIENT
Start: 2021-12-02 | End: 2022-06-23

## 2021-12-02 RX ORDER — CITALOPRAM 20 MG/1
TABLET ORAL
Qty: 30 TABLET | Refills: 1 | Status: SHIPPED | OUTPATIENT
Start: 2021-12-02 | End: 2022-01-24

## 2021-12-02 NOTE — TELEPHONE ENCOUNTER
Rx Refill Note  Requested Prescriptions     Pending Prescriptions Disp Refills   • citalopram (CeleXA) 20 MG tablet [Pharmacy Med Name: CITALOPRAM HBR 20 MG TABLET] 30 tablet 0     Sig: TAKE ONE TABLET BY MOUTH DAILY   • sucralfate (CARAFATE) 1 g tablet [Pharmacy Med Name: SUCRALFATE 1 GM TABLET] 60 tablet 1     Sig: TAKE ONE TABLET BY MOUTH EVERY 12 HOURS   • atorvastatin (LIPITOR) 20 MG tablet [Pharmacy Med Name: ATORVASTATIN 20 MG TABLET] 30 tablet 0     Sig: TAKE ONE TABLET BY MOUTH EVERY NIGHT AT BEDTIME      Last office visit with prescribing clinician: 8/17/2021      Next office visit with prescribing clinician: 2/12/2022            Og Marcelino MA  12/02/21, 10:24 EST

## 2021-12-06 ENCOUNTER — OFFICE VISIT (OUTPATIENT)
Dept: FAMILY MEDICINE CLINIC | Facility: CLINIC | Age: 66
End: 2021-12-06

## 2021-12-06 VITALS
HEART RATE: 98 BPM | HEIGHT: 61 IN | OXYGEN SATURATION: 98 % | WEIGHT: 164 LBS | SYSTOLIC BLOOD PRESSURE: 142 MMHG | DIASTOLIC BLOOD PRESSURE: 96 MMHG | BODY MASS INDEX: 30.96 KG/M2

## 2021-12-06 DIAGNOSIS — J30.9 ALLERGIC RHINITIS, UNSPECIFIED SEASONALITY, UNSPECIFIED TRIGGER: ICD-10-CM

## 2021-12-06 DIAGNOSIS — G43.009 MIGRAINE WITHOUT AURA AND WITHOUT STATUS MIGRAINOSUS, NOT INTRACTABLE: Primary | ICD-10-CM

## 2021-12-06 DIAGNOSIS — I10 PRIMARY HYPERTENSION: ICD-10-CM

## 2021-12-06 PROCEDURE — 99214 OFFICE O/P EST MOD 30 MIN: CPT | Performed by: STUDENT IN AN ORGANIZED HEALTH CARE EDUCATION/TRAINING PROGRAM

## 2021-12-06 RX ORDER — AMLODIPINE BESYLATE 10 MG/1
10 TABLET ORAL
Qty: 30 TABLET | Refills: 2 | Status: SHIPPED | OUTPATIENT
Start: 2021-12-06 | End: 2022-03-08

## 2021-12-06 NOTE — ASSESSMENT & PLAN NOTE
-Recent frontal headache lasting 2 days and reaching maximal intensity in 12 hours sounds consistent with migraine although patient does not have history of this. Symptoms have resolved as of today.   -Possibly due to uncontrolled HTN but if symptoms recur despite achieving appropriate BP, would suggest MR to evaluate new onset migraines in age 66  -Encouraged adequate sleep and hydration. NSAIDs prn headaches.

## 2021-12-06 NOTE — PROGRESS NOTES
Established Office Visit      Patient Name: Luh Horner  : 1955   MRN: [id number]   Care Team: Patient Care Team:  Daniel Shafer DO as PCP - General (Family Medicine)    Chief Complaint:    Chief Complaint   Patient presents with   • Hypertension   • Headache     ongoing since friday        History of Present Illness: Luh Horner is a 66 y.o. female who is here today to discuss headache and HTN    Headache - started 3 days ago in the morning. Describes frontal headache which gradually worsened, eventually reached maximal intensity over the course of 12 hours. Symptoms have gradually improved over the past 3 days with Tylenol and time. Today, she reports little to no headache symptoms. During her episode, she denies vision changes, numbness, tingling, photophobia, phonophobia, GI symptoms, neck pain, cognitive changes, dysphagia, dysarthria, changes in taste/smell. She does admit to a fall on Halloween but no falls in the past week.     She does admit to some congestion. Denies fevers, ear pain/fullness, sore throat, cough. Takes flonase daily.     HTN - has noticed elevated readings at home lately. Today her BP is 142/96, she reports her home readings are sometimes higher.     Subjective      Review of Systems:   Review of Systems - See HPI    I have reviewed and the following portions of the patient's history were updated as appropriate: past family history, past medical history, past social history, past surgical history and problem list.    Medications:     Current Outpatient Medications:   •  amLODIPine (NORVASC) 10 MG tablet, Take 1 tablet by mouth every night at bedtime., Disp: 30 tablet, Rfl: 2  •  atorvastatin (LIPITOR) 20 MG tablet, TAKE ONE TABLET BY MOUTH EVERY NIGHT AT BEDTIME, Disp: 30 tablet, Rfl: 1  •  citalopram (CeleXA) 20 MG tablet, TAKE ONE TABLET BY MOUTH DAILY, Disp: 30 tablet, Rfl: 1  •  clobetasol (TEMOVATE) 0.05 % cream, , Disp: , Rfl:   •  Coenzyme Q10 (CO  Q-10) 200 MG capsule, Take 1 capsule by mouth., Disp: , Rfl:   •  diclofenac (VOLTAREN) 1 % gel gel, Apply 4 g topically to the appropriate area as directed 4 (Four) Times a Day As Needed (back pain)., Disp: 100 g, Rfl: 0  •  estradiol (VIVELLE-DOT) 0.075 MG/24HR patch, Place  on the skin., Disp: , Rfl:   •  fluticasone (FLONASE) 50 MCG/ACT nasal spray, SPRAY ONE SPRAY IN BOTH AFFECTED NOSTRILS DAILY, Disp: 16 g, Rfl: 11  •  hydroCHLOROthiazide (HYDRODIURIL) 25 MG tablet, TAKE ONE TABLET BY MOUTH EVERY MORNING, Disp: 30 tablet, Rfl: 0  •  HYDROcodone-acetaminophen (NORCO) 5-325 MG per tablet, Take 0.5-1 tablets by mouth Daily As Needed for Moderate Pain ., Disp: 30 tablet, Rfl: 0  •  lisinopril (PRINIVIL,ZESTRIL) 40 MG tablet, TAKE ONE TABLET BY MOUTH EVERY MORNING, Disp: 30 tablet, Rfl: 3  •  magnesium oxide (MAGOX 400) 400 (241.3 MG) MG tablet tablet, Take 1 tablet by mouth., Disp: , Rfl:   •  melatonin 5 MG tablet tablet, Take 1 tablet by mouth Every Night., Disp: , Rfl:   •  nystatin (MYCOSTATIN) 378250 UNIT/GM cream, Apply  topically As Needed (itch)., Disp: 30 g, Rfl: 1  •  Omega-3 Fatty Acids (FISH OIL) 1000 MG capsule capsule, Take  by mouth., Disp: , Rfl:   •  omeprazole (priLOSEC) 20 MG capsule, TAKE ONE CAPSULE BY MOUTH DAILY, Disp: 30 capsule, Rfl: 1  •  sucralfate (CARAFATE) 1 g tablet, TAKE ONE TABLET BY MOUTH EVERY 12 HOURS, Disp: 60 tablet, Rfl: 1  •  temazepam (RESTORIL) 15 MG capsule, TAKE ONE CAPSULE BY MOUTH DAILY AND TAKE ONE CAPSULE BY MOUTH EVERY NIGHT AT BEDTIME AS NEEDED FOR ANXIETY, Disp: 60 capsule, Rfl: 2  •  valACYclovir (VALTREX) 500 MG tablet, Take 1 tablet by mouth Daily., Disp: , Rfl:   •  vitamin C (ASCORBIC ACID) 500 MG tablet, Take  by mouth daily., Disp: , Rfl:     Allergies:   Allergies   Allergen Reactions   • Shellfish Allergy      Head congestion   • Atenolol      Fatigue   • Buspirone       Headache   • Codeine      Depression   • Pseudoephedrine Anxiety   • Rosuvastatin      " Fatigue   • Westley-E.P.A. [Dha-Epa-Vitamin E] Anxiety   • Simvastatin      Facial numbness   • Trazodone      Nightmares       Objective     Physical Exam:  Vital Signs:   Vitals:    12/06/21 1121   BP: 142/96   Pulse: 98   SpO2: 98%   Weight: 74.4 kg (164 lb)   Height: 154.9 cm (60.98\")     Body mass index is 31.01 kg/m².     Physical Exam  Vitals reviewed.   Constitutional:       Appearance: Normal appearance.   Cardiovascular:      Rate and Rhythm: Normal rate and regular rhythm.      Pulses: Normal pulses.      Heart sounds: Normal heart sounds. No murmur heard.      Pulmonary:      Effort: Pulmonary effort is normal. No respiratory distress.   Skin:     General: Skin is warm and dry.   Neurological:      General: No focal deficit present.      Mental Status: She is alert and oriented to person, place, and time. Mental status is at baseline.      Cranial Nerves: No cranial nerve deficit.      Sensory: No sensory deficit.      Motor: No weakness.      Gait: Gait normal.   Psychiatric:         Mood and Affect: Mood normal.         Behavior: Behavior normal.         Judgment: Judgment normal.         Assessment / Plan      Assessment/Plan:   Problems Addressed This Visit  Diagnoses and all orders for this visit:    1. Migraine without aura and without status migrainosus, not intractable (Primary)  Assessment & Plan:  -Recent frontal headache lasting 2 days and reaching maximal intensity in 12 hours sounds consistent with migraine although patient does not have history of this. Symptoms have resolved as of today.   -Possibly due to uncontrolled HTN but if symptoms recur despite achieving appropriate BP, would suggest MR to evaluate new onset migraines in age 66  -Encouraged adequate sleep and hydration. NSAIDs prn headaches.           2. Primary hypertension  Assessment & Plan:  -Uncontrolled, goal <130/80   -Continue Lisinopril 40 mg daily, HCTZ 25mg daily, will increase Amlodipine to 10mg daily  -Encouraged her to " continue checking home BP and keep log. If persistently elevated she will let us know     Orders:  -     amLODIPine (NORVASC) 10 MG tablet; Take 1 tablet by mouth every night at bedtime.  Dispense: 30 tablet; Refill: 2    3. Allergic rhinitis, unspecified seasonality, unspecified trigger  Assessment & Plan:  -Continue flonase, recommended adding claritin          Plan of care reviewed with patient at the conclusion of today's visit. Education was provided regarding diagnosis and management.  Patient verbalizes understanding of and agreement with management plan.    Follow Up:   Return for Next scheduled follow up.        [facility] F[facility] [address]  [facility]  [address]  [facility] [address]  Fax [phone]  Phone [phone]

## 2021-12-06 NOTE — ASSESSMENT & PLAN NOTE
-Uncontrolled, goal <130/80   -Continue Lisinopril 40 mg daily, HCTZ 25mg daily, will increase Amlodipine to 10mg daily  -Encouraged her to continue checking home BP and keep log. If persistently elevated she will let us know

## 2021-12-14 DIAGNOSIS — I10 ESSENTIAL HYPERTENSION: ICD-10-CM

## 2021-12-14 RX ORDER — HYDROCHLOROTHIAZIDE 25 MG/1
TABLET ORAL
Qty: 90 TABLET | Refills: 1 | Status: SHIPPED | OUTPATIENT
Start: 2021-12-14 | End: 2022-05-31

## 2021-12-14 RX ORDER — LISINOPRIL 40 MG/1
TABLET ORAL
Qty: 90 TABLET | Refills: 1 | Status: SHIPPED | OUTPATIENT
Start: 2021-12-14 | End: 2022-05-31

## 2021-12-14 NOTE — TELEPHONE ENCOUNTER
Rx Refill Note  Requested Prescriptions     Pending Prescriptions Disp Refills   • hydroCHLOROthiazide (HYDRODIURIL) 25 MG tablet [Pharmacy Med Name: hydroCHLOROthiazide 25 MG TABLET] 30 tablet 0     Sig: TAKE ONE TABLET BY MOUTH EVERY MORNING   • lisinopril (PRINIVIL,ZESTRIL) 40 MG tablet [Pharmacy Med Name: LISINOPRIL 40 MG TABLET] 30 tablet 3     Sig: TAKE ONE TABLET BY MOUTH EVERY MORNING      Last office visit with prescribing clinician: 12/6/2021  Next office visit with prescribing clinician: 2/12/2022            Og Marcelino MA  12/14/21, 14:47 EST

## 2022-01-23 DIAGNOSIS — K21.9 GASTROESOPHAGEAL REFLUX DISEASE WITHOUT ESOPHAGITIS: ICD-10-CM

## 2022-01-24 RX ORDER — OMEPRAZOLE 20 MG/1
CAPSULE, DELAYED RELEASE ORAL
Qty: 30 CAPSULE | Refills: 1 | Status: SHIPPED | OUTPATIENT
Start: 2022-01-24 | End: 2022-03-15

## 2022-01-24 RX ORDER — CITALOPRAM 20 MG/1
TABLET ORAL
Qty: 30 TABLET | Refills: 1 | Status: SHIPPED | OUTPATIENT
Start: 2022-01-24 | End: 2022-01-28 | Stop reason: SDUPTHER

## 2022-01-24 RX ORDER — ATORVASTATIN CALCIUM 20 MG/1
TABLET, FILM COATED ORAL
Qty: 30 TABLET | Refills: 1 | Status: SHIPPED | OUTPATIENT
Start: 2022-01-24 | End: 2022-01-28 | Stop reason: SDUPTHER

## 2022-01-24 NOTE — TELEPHONE ENCOUNTER
Rx Refill Note  Requested Prescriptions     Pending Prescriptions Disp Refills   • atorvastatin (LIPITOR) 20 MG tablet [Pharmacy Med Name: ATORVASTATIN 20 MG TABLET] 30 tablet 1     Sig: TAKE ONE TABLET BY MOUTH EVERY NIGHT AT BEDTIME   • citalopram (CeleXA) 20 MG tablet [Pharmacy Med Name: CITALOPRAM HBR 20 MG TABLET] 30 tablet 1     Sig: TAKE ONE TABLET BY MOUTH DAILY      Last office visit with prescribing clinician: 8/17/2021      Next office visit with prescribing clinician: 2/12/2022            Estephania Lloyd MA  01/24/22, 08:52 EST

## 2022-01-24 NOTE — TELEPHONE ENCOUNTER
Rx Refill Note  Requested Prescriptions     Pending Prescriptions Disp Refills   • omeprazole (priLOSEC) 20 MG capsule [Pharmacy Med Name: OMEPRAZOLE DR 20 MG CAPSULE] 30 capsule 1     Sig: TAKE ONE CAPSULE BY MOUTH DAILY      Last office visit with prescribing clinician: Visit date not found      Next office visit with prescribing clinician: Visit date not found            Estephania Lloyd MA  01/24/22, 08:35 EST

## 2022-01-28 NOTE — TELEPHONE ENCOUNTER
Caller: Luh Horner    Relationship: Self    Best call back number: [phone]      Requested Prescriptions:   Requested Prescriptions     Pending Prescriptions Disp Refills   • atorvastatin (LIPITOR) 20 MG tablet 30 tablet 1     Sig: Take 1 tablet by mouth every night at bedtime.   • citalopram (CeleXA) 20 MG tablet 30 tablet 1     Sig: Take 1 tablet by mouth Daily.        Pharmacy where request should be sent:      [address] AT [facility] [phone] [facility] [phone] FX        Additional details provided by patient:   PT STATED THAT PHARMACY NEVER RECEIVED RX'S  Does the patient have less than a 3 day supply:  [x] Yes  [] No    Salomon Jones Rep   01/28/22 16:25 EST

## 2022-01-28 NOTE — TELEPHONE ENCOUNTER
Rx Refill Note  Requested Prescriptions     Pending Prescriptions Disp Refills   • atorvastatin (LIPITOR) 20 MG tablet 30 tablet 1     Sig: Take 1 tablet by mouth every night at bedtime.   • citalopram (CeleXA) 20 MG tablet 30 tablet 1     Sig: Take 1 tablet by mouth Daily.      Last office visit with prescribing clinician: 8/17/2021      Next office visit with prescribing clinician: 2/12/2022   3}  Debra Madrid MA  01/28/22, 17:23 EST     Last fill: 1/24/2022    *transmission failed to pharmacy for e scribe please send again.*    *allergy message with Atorvastatin

## 2022-01-31 RX ORDER — CITALOPRAM 20 MG/1
20 TABLET ORAL DAILY
Qty: 30 TABLET | Refills: 1 | Status: SHIPPED | OUTPATIENT
Start: 2022-01-31 | End: 2022-03-29

## 2022-01-31 RX ORDER — ATORVASTATIN CALCIUM 20 MG/1
20 TABLET, FILM COATED ORAL
Qty: 30 TABLET | Refills: 1 | Status: SHIPPED | OUTPATIENT
Start: 2022-01-31 | End: 2022-03-29

## 2022-02-14 DIAGNOSIS — M47.816 LUMBAR SPONDYLOSIS: ICD-10-CM

## 2022-02-14 RX ORDER — HYDROCODONE BITARTRATE AND ACETAMINOPHEN 5; 325 MG/1; MG/1
.5-1 TABLET ORAL DAILY PRN
Qty: 30 TABLET | Refills: 0 | Status: SHIPPED | OUTPATIENT
Start: 2022-02-14 | End: 2022-06-24 | Stop reason: SDUPTHER

## 2022-03-08 DIAGNOSIS — I10 PRIMARY HYPERTENSION: ICD-10-CM

## 2022-03-08 RX ORDER — AMLODIPINE BESYLATE 10 MG/1
TABLET ORAL
Qty: 30 TABLET | Refills: 2 | Status: SHIPPED | OUTPATIENT
Start: 2022-03-08 | End: 2022-05-31 | Stop reason: SDUPTHER

## 2022-03-08 NOTE — TELEPHONE ENCOUNTER
Rx Refill Note  Requested Prescriptions     Pending Prescriptions Disp Refills   • amLODIPine (NORVASC) 10 MG tablet [Pharmacy Med Name: amLODIPine BESYLATE 10 MG TAB] 30 tablet 2     Sig: TAKE ONE TABLET BY MOUTH EVERY NIGHT AT BEDTIME      Last office visit with prescribing clinician: 12/6/2021      Next office visit with prescribing clinician: 3/12/2022       {TIP  Please add Last Relevant Lab Date if appropriate:23}     Og Marcelino MA  03/08/22, 15:04 EST

## 2022-03-09 ENCOUNTER — LAB (OUTPATIENT)
Dept: LAB | Facility: HOSPITAL | Age: 67
End: 2022-03-09

## 2022-03-09 DIAGNOSIS — R74.8 ELEVATED VITAMIN B12 LEVEL: ICD-10-CM

## 2022-03-09 DIAGNOSIS — I10 ESSENTIAL HYPERTENSION: ICD-10-CM

## 2022-03-09 DIAGNOSIS — E78.00 PURE HYPERCHOLESTEROLEMIA: ICD-10-CM

## 2022-03-09 DIAGNOSIS — E55.9 VITAMIN D DEFICIENCY: ICD-10-CM

## 2022-03-09 DIAGNOSIS — R73.9 HYPERGLYCEMIA: ICD-10-CM

## 2022-03-09 DIAGNOSIS — I10 ESSENTIAL HYPERTENSION: Primary | ICD-10-CM

## 2022-03-09 LAB
25(OH)D3 SERPL-MCNC: 47.9 NG/ML (ref 30–100)
ALBUMIN SERPL-MCNC: 4.7 G/DL (ref 3.5–5.2)
ALBUMIN/GLOB SERPL: 1.6 G/DL
ALP SERPL-CCNC: 82 U/L (ref 39–117)
ALT SERPL W P-5'-P-CCNC: 20 U/L (ref 1–33)
ANION GAP SERPL CALCULATED.3IONS-SCNC: 14.1 MMOL/L (ref 5–15)
AST SERPL-CCNC: 20 U/L (ref 1–32)
BILIRUB SERPL-MCNC: 0.4 MG/DL (ref 0–1.2)
BILIRUB UR QL STRIP: NEGATIVE
BUN SERPL-MCNC: 11 MG/DL (ref 8–23)
BUN/CREAT SERPL: 16.4 (ref 7–25)
CALCIUM SPEC-SCNC: 9.2 MG/DL (ref 8.6–10.5)
CHLORIDE SERPL-SCNC: 98 MMOL/L (ref 98–107)
CHOLEST SERPL-MCNC: 223 MG/DL (ref 0–200)
CLARITY UR: CLEAR
CO2 SERPL-SCNC: 26.9 MMOL/L (ref 22–29)
COLOR UR: YELLOW
CREAT SERPL-MCNC: 0.67 MG/DL (ref 0.57–1)
DEPRECATED RDW RBC AUTO: 45.2 FL (ref 37–54)
EGFRCR SERPLBLD CKD-EPI 2021: 95.3 ML/MIN/1.73
ERYTHROCYTE [DISTWIDTH] IN BLOOD BY AUTOMATED COUNT: 12 % (ref 12.3–15.4)
GLOBULIN UR ELPH-MCNC: 2.9 GM/DL
GLUCOSE SERPL-MCNC: 93 MG/DL (ref 65–99)
GLUCOSE UR STRIP-MCNC: NEGATIVE MG/DL
HBA1C MFR BLD: 5.1 % (ref 4.8–5.6)
HCT VFR BLD AUTO: 44.9 % (ref 34–46.6)
HDLC SERPL-MCNC: 76 MG/DL (ref 40–60)
HGB BLD-MCNC: 14.9 G/DL (ref 12–15.9)
HGB UR QL STRIP.AUTO: NEGATIVE
KETONES UR QL STRIP: NEGATIVE
LDLC SERPL CALC-MCNC: 124 MG/DL (ref 0–100)
LDLC/HDLC SERPL: 1.58 {RATIO}
LEUKOCYTE ESTERASE UR QL STRIP.AUTO: NEGATIVE
MCH RBC QN AUTO: 33.7 PG (ref 26.6–33)
MCHC RBC AUTO-ENTMCNC: 33.2 G/DL (ref 31.5–35.7)
MCV RBC AUTO: 101.6 FL (ref 79–97)
NITRITE UR QL STRIP: NEGATIVE
PH UR STRIP.AUTO: 6.5 [PH] (ref 5–8)
PLATELET # BLD AUTO: 280 10*3/MM3 (ref 140–450)
PMV BLD AUTO: 9.7 FL (ref 6–12)
POTASSIUM SERPL-SCNC: 3.6 MMOL/L (ref 3.5–5.2)
PROT SERPL-MCNC: 7.6 G/DL (ref 6–8.5)
PROT UR QL STRIP: NEGATIVE
RBC # BLD AUTO: 4.42 10*6/MM3 (ref 3.77–5.28)
SODIUM SERPL-SCNC: 139 MMOL/L (ref 136–145)
SP GR UR STRIP: 1.01 (ref 1–1.03)
TRIGL SERPL-MCNC: 134 MG/DL (ref 0–150)
TSH SERPL DL<=0.05 MIU/L-ACNC: 2.5 UIU/ML (ref 0.27–4.2)
UROBILINOGEN UR QL STRIP: NORMAL
VIT B12 BLD-MCNC: 663 PG/ML (ref 211–946)
VLDLC SERPL-MCNC: 23 MG/DL (ref 5–40)
WBC NRBC COR # BLD: 5.04 10*3/MM3 (ref 3.4–10.8)

## 2022-03-09 PROCEDURE — 85027 COMPLETE CBC AUTOMATED: CPT

## 2022-03-09 PROCEDURE — 83036 HEMOGLOBIN GLYCOSYLATED A1C: CPT

## 2022-03-09 PROCEDURE — 80061 LIPID PANEL: CPT

## 2022-03-09 PROCEDURE — 80053 COMPREHEN METABOLIC PANEL: CPT

## 2022-03-09 PROCEDURE — 82607 VITAMIN B-12: CPT

## 2022-03-09 PROCEDURE — 81003 URINALYSIS AUTO W/O SCOPE: CPT

## 2022-03-09 PROCEDURE — 84443 ASSAY THYROID STIM HORMONE: CPT

## 2022-03-09 PROCEDURE — 82306 VITAMIN D 25 HYDROXY: CPT

## 2022-03-12 ENCOUNTER — TELEMEDICINE (OUTPATIENT)
Dept: FAMILY MEDICINE CLINIC | Facility: CLINIC | Age: 67
End: 2022-03-12

## 2022-03-12 VITALS
HEIGHT: 61 IN | SYSTOLIC BLOOD PRESSURE: 145 MMHG | WEIGHT: 170 LBS | BODY MASS INDEX: 32.1 KG/M2 | DIASTOLIC BLOOD PRESSURE: 85 MMHG

## 2022-03-12 DIAGNOSIS — I34.0 NONRHEUMATIC MITRAL VALVE REGURGITATION: ICD-10-CM

## 2022-03-12 DIAGNOSIS — G47.9 SLEEP DISORDER: ICD-10-CM

## 2022-03-12 DIAGNOSIS — Z00.00 MEDICARE ANNUAL WELLNESS VISIT, SUBSEQUENT: Primary | ICD-10-CM

## 2022-03-12 DIAGNOSIS — F32.89 OTHER DEPRESSION: ICD-10-CM

## 2022-03-12 DIAGNOSIS — E78.00 PURE HYPERCHOLESTEROLEMIA: Chronic | ICD-10-CM

## 2022-03-12 DIAGNOSIS — R19.8 CLENCHING OF TEETH: ICD-10-CM

## 2022-03-12 DIAGNOSIS — I10 PRIMARY HYPERTENSION: Chronic | ICD-10-CM

## 2022-03-12 PROCEDURE — 1159F MED LIST DOCD IN RCRD: CPT | Performed by: FAMILY MEDICINE

## 2022-03-12 PROCEDURE — 1170F FXNL STATUS ASSESSED: CPT | Performed by: FAMILY MEDICINE

## 2022-03-12 PROCEDURE — G0439 PPPS, SUBSEQ VISIT: HCPCS | Performed by: FAMILY MEDICINE

## 2022-03-12 NOTE — ASSESSMENT & PLAN NOTE
Worsened some with limited activity after the cataract surgery, but she will get back to it. Continue atorva 20

## 2022-03-12 NOTE — PATIENT INSTRUCTIONS
Advance Care Planning and Advance Directives     You make decisions on a daily basis - decisions about where you want to live, your career, your home, your life. Perhaps one of the most important decisions you face is your choice for future medical care. Take time to talk with your family and your healthcare team and start planning today.  Advance Care Planning is a process that can help you:  Understand possible future healthcare decisions in light of your own experiences  Reflect on those decision in light of your goals and values  Discuss your decisions with those closest to you and the healthcare professionals that care for you  Make a plan by creating a document that reflects your wishes    Surrogate Decision Maker  In the event of a medical emergency, which has left you unable to communicate or to make your own decisions, you would need someone to make decisions for you.  It is important to discuss your preferences for medical treatment with this person while you are in good health.     Qualities of a surrogate decision maker:  Willing to take on this role and responsibility  Knows what you want for future medical care  Willing to follow your wishes even if they don't agree with them  Able to make difficult medical decisions under stressful circumstances    Advance Directives  These are legal documents you can create that will guide your healthcare team and decision maker(s) when needed. These documents can be stored in the electronic medical record.    Living Will - a legal document to guide your care if you have a terminal condition or a serious illness and are unable to communicate. States vary by statute in document names/types, but most forms may include one or more of the following:        -  Directions regarding life-prolonging treatments        -  Directions regarding artificially provided nutrition/hydration        -  Choosing a healthcare decision maker        -  Direction regarding organ/tissue  donation    Durable Power of  for Healthcare - this document names an -in-fact to make medical decisions for you, but it may also allow this person to make personal and financial decisions for you. Please seek the advice of an  if you need this type of document.    **Advance Directives are not required and no one may discriminate against you if you do not sign one.    Medical Orders  Many states allow specific forms/orders signed by your physician to record your wishes for medical treatment in your current state of health. This form, signed in personal communication with your physician, addresses resuscitation and other medical interventions that you may or may not want.      For more information or to schedule a time with a [facility] Advance Care Planning Facilitator contact: [facility] or call [phone] and someone will contact you directly.  You are due for adacel Tdap vaccination. (provides protection against tetanus, diptheria and whooping cough) Please  get the immunization at your local pharmacy at your earliest convenience. This immunization will next be due in 10 years. Please click on the link for more information about this vaccine.    [facility] Tdap Vaccine Information    You are due for Shingrix vaccination series ( the newest shingles vaccine).  It is a two shot series spaced 2-6 months apart. Please get this vaccine series started at your earliest convenience at your local pharmacy to help avoid shingles outbreak. It is more effective than the old Zostavax vaccine and is recommended even if you have had the Zostavax vaccine in the past.  Once the Shingrix series is completed, it does not need to be repeated.   For more information, please look at the website below:  [facility] Shingrix Vaccine Information      Medicare Wellness  Personal Prevention Plan of Service     Date of Office Visit:    Encounter Provider:  Daniel Shafer DO  Place of Service:  [facility]  Patient Name: Luh Horner  :  1955    As part of the Medicare Wellness portion of your visit today, we are providing you with this personalized preventive plan of services (PPPS). This plan is based upon recommendations of the [place] Preventive Services Task Force (USPSTF) and the Advisory Committee on Immunization Practices (ACIP).    This lists the preventive care services that should be considered, and provides dates of when you are due. Items listed as completed are up-to-date and do not require any further intervention.    Health Maintenance   Topic Date Due    ZOSTER VACCINE (1 of 2) Never done    TDAP/TD VACCINES (3 - Td or Tdap) 2019    PAP SMEAR  2019    COVID-19 Vaccine (3 - Booster for Moderna series) 2021    ANNUAL WELLNESS VISIT  2022    LIPID PANEL  2023    MAMMOGRAM  2023    COLORECTAL CANCER SCREENING  2025    DXA SCAN  04/10/2027    HEPATITIS C SCREENING  Completed    INFLUENZA VACCINE  Completed    Pneumococcal Vaccine 65+  Completed       No orders of the defined types were placed in this encounter.      Return in about 6 months (around 2022) for Annual.

## 2022-03-12 NOTE — PROGRESS NOTES
The ABCs of the Annual Wellness Visit  Subsequent Medicare Wellness Visit    Chief Complaint   Patient presents with   • Medicare Wellness-subsequent      Subjective    History of Present Illness:  Luh Horner is a 66 y.o. female who presents for a Subsequent Medicare Wellness Visit. Exercise has gone down after cataract surgery January 6th and 20th. She also had glaucoma stents at the same time.    The following portions of the patient's history were reviewed and   updated as appropriate: allergies, current medications, past family history, past medical history, past social history, past surgical history and problem list.    Compared to one year ago, the patient feels her physical   health is the same.    Compared to one year ago, the patient feels her mental   health is the same.    Recent Hospitalizations:  She was not admitted to the hospital during the last year.       Current Medical Providers:  Patient Care Team:  Daniel Shafer DO as PCP - General (Family Medicine)    Outpatient Medications Prior to Visit   Medication Sig Dispense Refill   • amLODIPine (NORVASC) 10 MG tablet TAKE ONE TABLET BY MOUTH EVERY NIGHT AT BEDTIME 30 tablet 2   • atorvastatin (LIPITOR) 20 MG tablet Take 1 tablet by mouth every night at bedtime. 30 tablet 1   • citalopram (CeleXA) 20 MG tablet Take 1 tablet by mouth Daily. 30 tablet 1   • clobetasol (TEMOVATE) 0.05 % cream      • Coenzyme Q10 (CO Q-10) 200 MG capsule Take 1 capsule by mouth.     • diclofenac (VOLTAREN) 1 % gel gel Apply 4 g topically to the appropriate area as directed 4 (Four) Times a Day As Needed (back pain). 100 g 0   • estradiol (VIVELLE-DOT) 0.075 MG/24HR patch Place  on the skin.     • fluticasone (FLONASE) 50 MCG/ACT nasal spray SPRAY ONE SPRAY IN BOTH AFFECTED NOSTRILS DAILY 16 g 11   • hydroCHLOROthiazide (HYDRODIURIL) 25 MG tablet TAKE ONE TABLET BY MOUTH EVERY MORNING 90 tablet 1   • HYDROcodone-acetaminophen (NORCO) 5-325 MG per tablet Take  0.5-1 tablets by mouth Daily As Needed for Moderate Pain . 30 tablet 0   • lisinopril (PRINIVIL,ZESTRIL) 40 MG tablet TAKE ONE TABLET BY MOUTH EVERY MORNING 90 tablet 1   • magnesium oxide (MAGOX 400) 400 (241.3 MG) MG tablet tablet Take 1 tablet by mouth.     • melatonin 5 MG tablet tablet Take 1 tablet by mouth Every Night.     • nystatin (MYCOSTATIN) 601610 UNIT/GM cream Apply  topically As Needed (itch). 30 g 1   • Omega-3 Fatty Acids (FISH OIL) 1000 MG capsule capsule Take  by mouth.     • omeprazole (priLOSEC) 20 MG capsule TAKE ONE CAPSULE BY MOUTH DAILY 30 capsule 1   • sucralfate (CARAFATE) 1 g tablet TAKE ONE TABLET BY MOUTH EVERY 12 HOURS 60 tablet 1   • temazepam (RESTORIL) 15 MG capsule TAKE ONE CAPSULE BY MOUTH DAILY AND TAKE ONE CAPSULE BY MOUTH EVERY NIGHT AT BEDTIME AS NEEDED FOR ANXIETY 60 capsule 2   • valACYclovir (VALTREX) 500 MG tablet Take 1 tablet by mouth Daily.     • vitamin C (ASCORBIC ACID) 500 MG tablet Take  by mouth daily.       No facility-administered medications prior to visit.       Opioid medication/s are on active medication list.  and I have evaluated her active treatment plan and pain score trends (see table).  There were no vitals filed for this visit.  I have reviewed the chart for potential of high risk medication and harmful drug interactions in the elderly.            Aspirin is not on active medication list.  Aspirin use is not indicated based on review of current medical condition/s. Risk of harm outweighs potential benefits.  .    Patient Active Problem List   Diagnosis   • Atopic rhinitis   • Anxiety   • Carpal tunnel syndrome   • Clenching of teeth   • Depression   • Sleep disorder   • Gastroesophageal reflux disease   • Hyperlipidemia   • Hypertension   • Low back pain   • Mitral valve insufficiency   • Adiposity   • Gluteal tendinitis of right buttock   • Postmenopausal disorder   • Preventative health care   • Other chronic pain   • Lumbar spondylosis   • Lumbar  "scoliosis   • Irritable bowel syndrome   • Migraine without aura and without status migrainosus, not intractable     Advance Care Planning  Advance Directive is not on file.  ACP discussion was held with the patient during this visit. Patient does not have an advance directive, information provided.          Objective    Vitals:    22 1546 22 1113   BP:  145/85   Weight:  77.1 kg (170 lb)   Height: 154.9 cm (60.98\") 154.9 cm (61\")     BMI Readings from Last 1 Encounters:   22 32.12 kg/m²   BMI is above normal parameters. Recommendations include: educational material and exercise counseling    Does the patient have evidence of cognitive impairment? No    Physical Exam  Lab Results   Component Value Date    TRIG 134 2022    HDL 76 (H) 2022     (H) 2022    VLDL 23 2022    HGBA1C 5.10 2022            HEALTH RISK ASSESSMENT    Smoking Status:  Social History     Tobacco Use   Smoking Status Former Smoker   • Packs/day: 2.00   • Years: 10.00   • Pack years: 20.00   • Types: Cigarettes   • Start date: 1971   • Quit date: 1981   • Years since quittin.2   Smokeless Tobacco Never Used     Alcohol Consumption:  Social History     Substance and Sexual Activity   Alcohol Use Yes   • Alcohol/week: 3.0 standard drinks   • Types: 3 Glasses of wine per week     Fall Risk Screen:    KRYSTA Fall Risk Assessment was completed, and patient is at LOW risk for falls.Assessment completed on:3/11/2022    Depression Screening:  PHQ-2/PHQ-9 Depression Screening 3/11/2022   Retired Total Score -   Little Interest or Pleasure in Doing Things 0-->not at all   Feeling Down, Depressed or Hopeless 0-->not at all   PHQ-9: Brief Depression Severity Measure Score 0       Health Habits and Functional and Cognitive Screening:  Functional & Cognitive Status 3/11/2022   Do you have difficulty preparing food and eating? No   Do you have difficulty bathing yourself, getting dressed or " grooming yourself? No   Do you have difficulty using the toilet? No   Do you have difficulty moving around from place to place? No   Do you have trouble with steps or getting out of a bed or a chair? No   Current Diet Well Balanced Diet   Dental Exam Up to date   Eye Exam Up to date   Exercise (times per week) 4 times per week   Current Exercises Include Dancing        Exercise Comment pilates, cardio   Current Exercise Activities Include -   Do you need help using the phone?  No   Are you deaf or do you have serious difficulty hearing?  No   Do you need help with transportation? No   Do you need help shopping? No   Do you need help preparing meals?  No   Do you need help with housework?  No   Do you need help with laundry? No   Do you need help taking your medications? No   Do you need help managing money? No   Do you ever drive or ride in a car without wearing a seat belt? No   Have you felt unusual stress, anger or loneliness in the last month? No   Who do you live with? Spouse   If you need help, do you have trouble finding someone available to you? No   Have you been bothered in the last four weeks by sexual problems? No   Do you have difficulty concentrating, remembering or making decisions? No       Age-appropriate Screening Schedule:  Refer to the list below for future screening recommendations based on patient's age, sex and/or medical conditions. Orders for these recommended tests are listed in the plan section. The patient has been provided with a written plan.    Health Maintenance   Topic Date Due   • ZOSTER VACCINE (1 of 2) Never done   • TDAP/TD VACCINES (3 - Td or Tdap) 02/25/2019   • PAP SMEAR  07/01/2019   • LIPID PANEL  03/09/2023   • MAMMOGRAM  05/06/2023   • DXA SCAN  04/10/2027   • INFLUENZA VACCINE  Completed              Assessment/Plan   CMS Preventative Services Quick Reference  Risk Factors Identified During Encounter  Glaucoma or Family History of  Glaucoma  Inactivity/Sedentary  Obesity/Overweight    Alcohol use is 1 glass of wine per night.  The above risks/problems have been discussed with the patient.  Follow up actions/plans if indicated are seen below in the Assessment/Plan Section.  Pertinent information has been shared with the patient in the After Visit Summary.    Diagnoses and all orders for this visit:    1. Medicare annual wellness visit, subsequent (Primary)    2. Pure hypercholesterolemia  Assessment & Plan:  Worsened some with limited activity after the cataract surgery, but she will get back to it. Continue atorva 20      3. Primary hypertension  Assessment & Plan:  Continue lisinopril 40mg, amlodipine 10mg, HCTZ 25mg. No side effects noted.      4. Sleep disorder    5. Other depression    6. Clenching of teeth    7. Nonrheumatic mitral valve regurgitation    Problem List Items Addressed This Visit        Cardiac and Vasculature    Hyperlipidemia (Chronic)    Overview     The 10-year ASCVD risk score (Sujata EDWARD Jr., et al., 2013) is: 12.4%    Values used to calculate the score:      Age: 66 years      Sex: Female      Is Non- : No      Diabetic: No      Tobacco smoker: No      Systolic Blood Pressure: 145 mmHg      Is BP treated: Yes      HDL Cholesterol: 76 mg/dL      Total Cholesterol: 223 mg/dL.           Current Assessment & Plan     Worsened some with limited activity after the cataract surgery, but she will get back to it. Continue atorva 20           Hypertension (Chronic)    Current Assessment & Plan     Continue lisinopril 40mg, amlodipine 10mg, HCTZ 25mg. No side effects noted.           Mitral valve insufficiency    Overview     Stable, asymptomatic              Gastrointestinal Abdominal     Clenching of teeth    Overview     Has caused some TMJ on left              Mental Health    Depression    Overview     Citalopram 20mg daily              Sleep    Sleep disorder    Overview     She has been on temazepam  for years, discussed risks of ongoing benzodiazepine use over age 65, patient cautioned.  UDS, controlled substance agreement updated 2/4/2021.  This patient is on a controlled substance which improves symptoms/quality of life and is aware of the risks, benefits and possible side-effects current treatment. The patient denies any medication side-effects at this time. A controlled substance agreement will be obtained or is currently on file. We reviewed required monitoring for controlled substances including but not limited to quarterly follow-up visits, annual depression screening, and urine drug screens to which the patient is agreeable. A SALLY report has been or shortly will be reviewed. There are no signs of deviation or misuse.             Other Visit Diagnoses     Medicare annual wellness visit, subsequent    -  Primary          Follow Up:   Return in about 6 months (around 9/12/2022) for Annual.     An After Visit Summary and PPPS were made available to the patient.

## 2022-03-15 DIAGNOSIS — K21.9 GASTROESOPHAGEAL REFLUX DISEASE WITHOUT ESOPHAGITIS: ICD-10-CM

## 2022-03-15 RX ORDER — OMEPRAZOLE 20 MG/1
CAPSULE, DELAYED RELEASE ORAL
Qty: 30 CAPSULE | Refills: 1 | Status: SHIPPED | OUTPATIENT
Start: 2022-03-15 | End: 2022-05-31

## 2022-03-15 NOTE — TELEPHONE ENCOUNTER
Rx Refill Note  Requested Prescriptions     Pending Prescriptions Disp Refills   • omeprazole (priLOSEC) 20 MG capsule [Pharmacy Med Name: OMEPRAZOLE DR 20 MG CAPSULE] 30 capsule 1     Sig: TAKE ONE CAPSULE BY MOUTH DAILY      Last office visit with prescribing clinician: 03/12/2022      Next office visit with prescribing clinician: Visit date not found       {TIP  Please add Last Relevant Lab Date if appropriate:23}     Gabbi Rodriguez MA  03/15/22, 16:09 EDT

## 2022-03-29 RX ORDER — ATORVASTATIN CALCIUM 20 MG/1
TABLET, FILM COATED ORAL
Qty: 30 TABLET | Refills: 1 | Status: SHIPPED | OUTPATIENT
Start: 2022-03-29 | End: 2022-05-31

## 2022-03-29 RX ORDER — CITALOPRAM 20 MG/1
TABLET ORAL
Qty: 30 TABLET | Refills: 1 | Status: SHIPPED | OUTPATIENT
Start: 2022-03-29 | End: 2022-05-31

## 2022-03-29 NOTE — TELEPHONE ENCOUNTER
Rx Refill Note  Requested Prescriptions     Pending Prescriptions Disp Refills   • atorvastatin (LIPITOR) 20 MG tablet [Pharmacy Med Name: ATORVASTATIN 20 MG TABLET] 30 tablet 1     Sig: TAKE ONE TABLET BY MOUTH EVERY NIGHT AT BEDTIME   • citalopram (CeleXA) 20 MG tablet [Pharmacy Med Name: CITALOPRAM HBR 20 MG TABLET] 30 tablet 1     Sig: TAKE ONE TABLET BY MOUTH DAILY      Last office visit with prescribing clinician: 03/12/2022      Next office visit with prescribing clinician: Visit date not found            Gabbi Rodriguez MA  03/29/22, 12:04 EDT

## 2022-04-05 ENCOUNTER — PRIOR AUTHORIZATION (OUTPATIENT)
Dept: FAMILY MEDICINE CLINIC | Facility: CLINIC | Age: 67
End: 2022-04-05

## 2022-04-21 ENCOUNTER — OFFICE VISIT (OUTPATIENT)
Dept: GASTROENTEROLOGY | Facility: CLINIC | Age: 67
End: 2022-04-21

## 2022-04-21 ENCOUNTER — LAB (OUTPATIENT)
Dept: LAB | Facility: HOSPITAL | Age: 67
End: 2022-04-21

## 2022-04-21 VITALS
HEIGHT: 61 IN | OXYGEN SATURATION: 97 % | HEART RATE: 87 BPM | WEIGHT: 173 LBS | TEMPERATURE: 98.7 F | SYSTOLIC BLOOD PRESSURE: 118 MMHG | BODY MASS INDEX: 32.66 KG/M2 | DIASTOLIC BLOOD PRESSURE: 82 MMHG

## 2022-04-21 DIAGNOSIS — R14.0 BLOATING: Primary | ICD-10-CM

## 2022-04-21 DIAGNOSIS — R14.0 BLOATING: ICD-10-CM

## 2022-04-21 DIAGNOSIS — R10.13 DYSPEPSIA: ICD-10-CM

## 2022-04-21 LAB — FOLATE SERPL-MCNC: 15.1 NG/ML (ref 4.78–24.2)

## 2022-04-21 PROCEDURE — 99204 OFFICE O/P NEW MOD 45 MIN: CPT | Performed by: INTERNAL MEDICINE

## 2022-04-21 PROCEDURE — 86258 DGP ANTIBODY EACH IG CLASS: CPT

## 2022-04-21 PROCEDURE — 82746 ASSAY OF FOLIC ACID SERUM: CPT

## 2022-04-21 PROCEDURE — 86364 TISS TRNSGLTMNASE EA IG CLAS: CPT

## 2022-04-21 PROCEDURE — 82784 ASSAY IGA/IGD/IGG/IGM EACH: CPT

## 2022-04-21 NOTE — PROGRESS NOTES
Patient Name: Luh Horner  YOB: 1955   Medical Record number: [id number]     PCP: Daniel Shafer DO    Chief Complaint   Patient presents with   • Consult     Abdominal cramping, bloating       History of Present Illness:   HPI  Mrs. Horner presents to the office today for abdominal discomfort and bloating.  Mrs. Horner states that for over a month she has experienced  abdominal discomfort with bloating.  The pain is described as sharp but occasionally crampy.  There have been no episodes of vomiting.  She has experienced a little bit of nausea at times.  She does admit to increased gas from below without belching.  She has regular bowel habits without blood in the stool.  There is no history of unexplained weight loss.  The patient denies night sweats, fever or chills.  The patient denies any heartburn.  She takes omeprazole daily with good relief.  She has not noticed any change in the color of her eyes or skin.  The last colonoscopy in August 2017 was unremarkable for any polyps.  This was performed by Dr. Shea.  Past Medical History:   Diagnosis Date   • Allergic rhinitis    • Bone pain     foot   • Cancer (HCC) 2005    Skin cancer   • Cataract 2010    Surgery recently   • Depression 1981 1981 - Hospitalized   • Fibrocystic breast disease 2000    Current benign breast biopsies   • GERD (gastroesophageal reflux disease) 2001   • Glaucoma Surgery recently   • Headache 2021    None this year   • Hiatal hernia    • Hyperlipidemia    • Hypertension 1997   • Insomnia 2013   • Irritable bowel syndrome (IBS) 2002    Recurrent abdominal cramps   • Low back pain 2008   • Lumbar scoliosis 2017   • Lumbar spondylosis 2000    Chronic low back pain   • Mitral valve insufficiency    • Obesity 2000   • Osteoarthritis    • Osteopenia    • Post menopausal syndrome 2004    Refractory hot flashes       Past Surgical History:   Procedure Laterality Date   • BREAST BIOPSY Right remote    benign  disease   • CHOLECYSTECTOMY  1998   • COLONOSCOPY  ?   • HYSTERECTOMY  1998   • REFRACTIVE SURGERY  1994    RK   • URETHRAL SUSPENSION  2011    laparoscopic for stress incontinence         Current Outpatient Medications:   •  amLODIPine (NORVASC) 10 MG tablet, TAKE ONE TABLET BY MOUTH EVERY NIGHT AT BEDTIME, Disp: 30 tablet, Rfl: 2  •  atorvastatin (LIPITOR) 20 MG tablet, TAKE ONE TABLET BY MOUTH EVERY NIGHT AT BEDTIME, Disp: 30 tablet, Rfl: 1  •  citalopram (CeleXA) 20 MG tablet, TAKE ONE TABLET BY MOUTH DAILY, Disp: 30 tablet, Rfl: 1  •  Coenzyme Q10 (CO Q-10) 200 MG capsule, Take 1 capsule by mouth., Disp: , Rfl:   •  estradiol (MINIVELLE, VIVELLE-DOT) 0.075 MG/24HR patch, Place  on the skin., Disp: , Rfl:   •  fluticasone (FLONASE) 50 MCG/ACT nasal spray, SPRAY ONE SPRAY IN BOTH AFFECTED NOSTRILS DAILY, Disp: 16 g, Rfl: 11  •  hydroCHLOROthiazide (HYDRODIURIL) 25 MG tablet, TAKE ONE TABLET BY MOUTH EVERY MORNING, Disp: 90 tablet, Rfl: 1  •  HYDROcodone-acetaminophen (NORCO) 5-325 MG per tablet, Take 0.5-1 tablets by mouth Daily As Needed for Moderate Pain ., Disp: 30 tablet, Rfl: 0  •  lisinopril (PRINIVIL,ZESTRIL) 40 MG tablet, TAKE ONE TABLET BY MOUTH EVERY MORNING, Disp: 90 tablet, Rfl: 1  •  melatonin 5 MG tablet tablet, Take 1 tablet by mouth Every Night., Disp: , Rfl:   •  nystatin (MYCOSTATIN) 051663 UNIT/GM cream, Apply  topically As Needed (itch)., Disp: 30 g, Rfl: 1  •  Omega-3 Fatty Acids (FISH OIL) 1000 MG capsule capsule, Take  by mouth., Disp: , Rfl:   •  omeprazole (priLOSEC) 20 MG capsule, TAKE ONE CAPSULE BY MOUTH DAILY, Disp: 30 capsule, Rfl: 1  •  sucralfate (CARAFATE) 1 g tablet, TAKE ONE TABLET BY MOUTH EVERY 12 HOURS, Disp: 60 tablet, Rfl: 1  •  temazepam (RESTORIL) 15 MG capsule, TAKE ONE CAPSULE BY MOUTH DAILY AND TAKE ONE CAPSULE BY MOUTH EVERY NIGHT AT BEDTIME AS NEEDED FOR ANXIETY, Disp: 60 capsule, Rfl: 2  •  valACYclovir (VALTREX) 500 MG tablet, Take 1 tablet by mouth Daily., Disp: ,  Rfl:   •  vitamin C (ASCORBIC ACID) 500 MG tablet, Take  by mouth daily., Disp: , Rfl:   •  clobetasol (TEMOVATE) 0.05 % cream, As Needed., Disp: , Rfl:   •  diclofenac (VOLTAREN) 1 % gel gel, Apply 4 g topically to the appropriate area as directed 4 (Four) Times a Day As Needed (back pain)., Disp: 100 g, Rfl: 0  •  magnesium oxide (MAGOX) 400 (241.3 Mg) MG tablet tablet, Take 1 tablet by mouth., Disp: , Rfl:     Allergies   Allergen Reactions   • Shellfish Allergy      Head congestion   • Atenolol      Fatigue   • Buspirone       Headache   • Codeine      Depression   • Pseudoephedrine Anxiety   • Rosuvastatin      Fatigue   • Westley-E.P.A. [Dha-Epa-Vitamin E] Anxiety   • Simvastatin      Facial numbness   • Trazodone      Nightmares       Family History   Problem Relation Age of Onset   • Pulmonary fibrosis Mother          age 82   • Hypertension Mother    • Lumbar disc disease Mother    • Prostate cancer Father          age 76   • Hypertension Sister            • Goiter Sister    • Hypothyroidism Sister    • Anxiety disorder Sister    • Heart disease Brother          Age 48 heart attack was cigarette smoker   • Hiatal hernia Brother    • Breast cancer Other         Maternal and paternal aunts   • Hypertension Brother    • Obesity Maternal Uncle    • Diabetes Maternal Uncle    • Arthritis Paternal Aunt    • Hypertension Brother        Social History     Socioeconomic History   • Marital status:    Tobacco Use   • Smoking status: Former Smoker     Packs/day: 2.00     Years: 10.00     Pack years: 20.00     Types: Cigarettes     Start date: 1971     Quit date: 1981     Years since quittin.3   • Smokeless tobacco: Never Used   Vaping Use   • Vaping Use: Never used   Substance and Sexual Activity   • Alcohol use: Yes     Alcohol/week: 3.0 standard drinks     Types: 3 Glasses of wine per week     Comment: 3 glasses a wine day   • Drug use: No   • Sexual activity: Defer       Review  of Systems   Constitutional: Positive for appetite change. Negative for activity change, fatigue, fever and unexpected weight change.   HENT: Negative for dental problem, hearing loss, mouth sores, postnasal drip, sneezing, trouble swallowing and voice change.    Eyes: Negative for pain, redness, itching and visual disturbance.   Respiratory: Negative for cough, choking, chest tightness, shortness of breath and wheezing.    Cardiovascular: Negative for chest pain, palpitations and leg swelling.   Gastrointestinal: Positive for abdominal distention (bloating & gas) and abdominal pain. Negative for anal bleeding, blood in stool, constipation, diarrhea, nausea, rectal pain and vomiting.   Endocrine: Negative for cold intolerance, heat intolerance, polydipsia, polyphagia and polyuria.   Genitourinary: Negative.  Negative for dysuria, enuresis, flank pain, hematuria and urgency.   Musculoskeletal: Negative for arthralgias, back pain, gait problem, joint swelling and myalgias.   Skin: Negative for color change, pallor and rash.   Allergic/Immunologic: Negative for environmental allergies, food allergies and immunocompromised state.   Neurological: Negative for dizziness, tremors, seizures, facial asymmetry, speech difficulty, numbness and headaches.   Hematological: Negative for adenopathy.   Psychiatric/Behavioral: Negative for behavioral problems, confusion, dysphoric mood, hallucinations and self-injury.       Vitals:    04/21/22 1222   BP: 118/82   Pulse: 87   Temp: 98.7 °F (37.1 °C)   SpO2: 97%       Physical Exam  Vitals reviewed.   Constitutional:       General: She is not in acute distress.     Appearance: Normal appearance.   HENT:      Head: Normocephalic and atraumatic.      Nose: Nose normal.      Mouth/Throat:      Mouth: Mucous membranes are moist.      Pharynx: Oropharynx is clear.   Eyes:      General: No scleral icterus.     Extraocular Movements: Extraocular movements intact.   Cardiovascular:      Rate  and Rhythm: Normal rate and regular rhythm.      Heart sounds: No murmur heard.    No gallop.   Pulmonary:      Breath sounds: Normal breath sounds. No wheezing or rales.   Abdominal:      General: Bowel sounds are normal.      Palpations: Abdomen is soft.      Tenderness: There is abdominal tenderness (epigastrium).   Musculoskeletal:         General: No swelling. Normal range of motion.      Cervical back: Normal range of motion and neck supple.   Skin:     General: Skin is dry.      Coloration: Skin is not jaundiced.   Neurological:      Mental Status: She is alert and oriented to person, place, and time.   Psychiatric:         Mood and Affect: Mood normal.         Thought Content: Thought content normal.         Judgment: Judgment normal.         Diagnoses and all orders for this visit:    1. Bloating (Primary)  -     Esophagogastroduodenoscopy; Future  -     Folate; Future  -     Celiac Ab tTG DGP TIgA; Future    2. Dyspepsia  -     Esophagogastroduodenoscopy; Future  -     Folate; Future  -     Celiac Ab tTG DGP TIgA; Future    The differential diagnosis includes H. pylori gastropathy, peptic ulcer disease, celiac sprue and functional dyspepsia.  There are no alarm features of progressive dysphagia, anemia or weight loss.      Plan: Will schedule EGD with small bowel biopsies.           Will check celiac panel and folate level.

## 2022-04-22 ENCOUNTER — TELEPHONE (OUTPATIENT)
Dept: GASTROENTEROLOGY | Facility: CLINIC | Age: 67
End: 2022-04-22

## 2022-04-22 LAB
GLIADIN PEPTIDE IGA SER-ACNC: 2 UNITS (ref 0–19)
GLIADIN PEPTIDE IGG SER-ACNC: 1 UNITS (ref 0–19)
IGA SERPL-MCNC: 194 MG/DL (ref 87–352)
TTG IGA SER-ACNC: <2 U/ML (ref 0–3)
TTG IGG SER-ACNC: <2 U/ML (ref 0–5)

## 2022-04-22 NOTE — TELEPHONE ENCOUNTER
----- Message from Felice oLpez MD sent at 4/22/2022  1:21 PM EDT -----  Let Ms. Horner know the folate level was normal.

## 2022-04-26 ENCOUNTER — OFFICE VISIT (OUTPATIENT)
Dept: FAMILY MEDICINE CLINIC | Facility: CLINIC | Age: 67
End: 2022-04-26

## 2022-04-26 VITALS
TEMPERATURE: 97.5 F | HEIGHT: 61 IN | BODY MASS INDEX: 32.13 KG/M2 | HEART RATE: 91 BPM | OXYGEN SATURATION: 97 % | WEIGHT: 170.2 LBS | RESPIRATION RATE: 16 BRPM | DIASTOLIC BLOOD PRESSURE: 78 MMHG | SYSTOLIC BLOOD PRESSURE: 118 MMHG

## 2022-04-26 DIAGNOSIS — F41.9 ANXIETY: ICD-10-CM

## 2022-04-26 DIAGNOSIS — R05.9 COUGH: ICD-10-CM

## 2022-04-26 DIAGNOSIS — J40 BRONCHITIS: Primary | ICD-10-CM

## 2022-04-26 DIAGNOSIS — G47.9 SLEEP DISORDER: ICD-10-CM

## 2022-04-26 LAB
EXPIRATION DATE: NORMAL
FLUAV AG UPPER RESP QL IA.RAPID: NOT DETECTED
FLUBV AG UPPER RESP QL IA.RAPID: NOT DETECTED
INTERNAL CONTROL: NORMAL
Lab: NORMAL
SARS-COV-2 AG UPPER RESP QL IA.RAPID: NOT DETECTED

## 2022-04-26 PROCEDURE — 87428 SARSCOV & INF VIR A&B AG IA: CPT | Performed by: FAMILY MEDICINE

## 2022-04-26 PROCEDURE — 99214 OFFICE O/P EST MOD 30 MIN: CPT | Performed by: FAMILY MEDICINE

## 2022-04-26 RX ORDER — ALBUTEROL SULFATE 90 UG/1
2 AEROSOL, METERED RESPIRATORY (INHALATION) EVERY 4 HOURS PRN
Qty: 8 G | Refills: 1 | Status: SHIPPED | OUTPATIENT
Start: 2022-04-26 | End: 2022-10-21

## 2022-04-26 RX ORDER — FLUTICASONE PROPIONATE 50 MCG
SPRAY, SUSPENSION (ML) NASAL
Qty: 16 ML | Refills: 3 | Status: SHIPPED | OUTPATIENT
Start: 2022-04-26 | End: 2022-09-09

## 2022-04-26 RX ORDER — BROMPHENIRAMINE MALEATE, PSEUDOEPHEDRINE HYDROCHLORIDE, AND DEXTROMETHORPHAN HYDROBROMIDE 2; 30; 10 MG/5ML; MG/5ML; MG/5ML
5 SYRUP ORAL 4 TIMES DAILY PRN
Qty: 118 ML | Refills: 1 | Status: SHIPPED | OUTPATIENT
Start: 2022-04-26 | End: 2022-05-03

## 2022-04-26 RX ORDER — TEMAZEPAM 7.5 MG/1
CAPSULE ORAL
Qty: 60 CAPSULE | Refills: 0 | Status: SHIPPED | OUTPATIENT
Start: 2022-04-26 | End: 2022-07-12

## 2022-04-26 NOTE — TELEPHONE ENCOUNTER
Rx Refill Note  Requested Prescriptions     Pending Prescriptions Disp Refills   • fluticasone (FLONASE) 50 MCG/ACT nasal spray [Pharmacy Med Name: FLUTICASONE PROP 50 MCG SPRAY] 48 mL      Sig: SPRAY 2 SPRAYS IN EACH NOSTRIL DAILY      Last office visit with prescribing clinician: 3/12/2022     Next office visit with prescribing clinician: Visit date not found            Og Marcelino MA  04/26/22, 10:25 EDT

## 2022-05-08 ENCOUNTER — CLINICAL SUPPORT NO REQUIREMENTS (OUTPATIENT)
Dept: PREADMISSION TESTING | Facility: HOSPITAL | Age: 67
End: 2022-05-08

## 2022-05-08 LAB — SARS-COV-2 RNA PNL SPEC NAA+PROBE: NOT DETECTED

## 2022-05-08 PROCEDURE — U0004 COV-19 TEST NON-CDC HGH THRU: HCPCS

## 2022-05-08 PROCEDURE — C9803 HOPD COVID-19 SPEC COLLECT: HCPCS

## 2022-05-11 ENCOUNTER — OUTSIDE FACILITY SERVICE (OUTPATIENT)
Dept: GASTROENTEROLOGY | Facility: CLINIC | Age: 67
End: 2022-05-11

## 2022-05-11 PROCEDURE — 88305 TISSUE EXAM BY PATHOLOGIST: CPT | Performed by: INTERNAL MEDICINE

## 2022-05-11 PROCEDURE — 43239 EGD BIOPSY SINGLE/MULTIPLE: CPT | Performed by: INTERNAL MEDICINE

## 2022-05-12 ENCOUNTER — LAB REQUISITION (OUTPATIENT)
Dept: LAB | Facility: HOSPITAL | Age: 67
End: 2022-05-12

## 2022-05-12 DIAGNOSIS — K21.00 GASTRO-ESOPHAGEAL REFLUX DISEASE WITH ESOPHAGITIS, WITHOUT BLEEDING: ICD-10-CM

## 2022-05-12 DIAGNOSIS — K44.9 DIAPHRAGMATIC HERNIA WITHOUT OBSTRUCTION OR GANGRENE: ICD-10-CM

## 2022-05-12 DIAGNOSIS — R10.13 EPIGASTRIC PAIN: ICD-10-CM

## 2022-05-12 DIAGNOSIS — R14.0 ABDOMINAL DISTENSION (GASEOUS): ICD-10-CM

## 2022-05-13 ENCOUNTER — TELEPHONE (OUTPATIENT)
Dept: GASTROENTEROLOGY | Facility: CLINIC | Age: 67
End: 2022-05-13

## 2022-05-13 DIAGNOSIS — K58.9 IRRITABLE BOWEL SYNDROME, UNSPECIFIED TYPE: Primary | ICD-10-CM

## 2022-05-13 LAB
CYTO UR: NORMAL
LAB AP CASE REPORT: NORMAL
LAB AP CLINICAL INFORMATION: NORMAL
LAB AP DIAGNOSIS COMMENT: NORMAL
PATH REPORT.FINAL DX SPEC: NORMAL
PATH REPORT.GROSS SPEC: NORMAL

## 2022-05-13 RX ORDER — DICYCLOMINE HYDROCHLORIDE 10 MG/1
10 CAPSULE ORAL
Qty: 120 CAPSULE | Refills: 1 | Status: SHIPPED | OUTPATIENT
Start: 2022-05-13

## 2022-05-13 NOTE — TELEPHONE ENCOUNTER
I called and discussed the results of the pathology. There is short segment Rausch's esophagus without dysplasia. She is dong well on Omeprazole once daily without breakthrough symptoms.

## 2022-05-13 NOTE — TELEPHONE ENCOUNTER
Pharmacist called and stated they received a refill for Dicyclomine.  Pharmacist wants to know if you know that the Dicyclomine cause back flow of acid with patients that has gerd with esophagitis. Do you still want patient to take Dicyclomine? Please advise. Thanks

## 2022-05-26 DIAGNOSIS — I10 ESSENTIAL HYPERTENSION: ICD-10-CM

## 2022-05-26 DIAGNOSIS — K21.9 GASTROESOPHAGEAL REFLUX DISEASE WITHOUT ESOPHAGITIS: ICD-10-CM

## 2022-05-31 DIAGNOSIS — I10 PRIMARY HYPERTENSION: ICD-10-CM

## 2022-05-31 RX ORDER — HYDROCHLOROTHIAZIDE 25 MG/1
TABLET ORAL
Qty: 30 TABLET | Refills: 1 | Status: SHIPPED | OUTPATIENT
Start: 2022-05-31 | End: 2022-07-18

## 2022-05-31 RX ORDER — CITALOPRAM 20 MG/1
TABLET ORAL
Qty: 30 TABLET | Refills: 1 | Status: SHIPPED | OUTPATIENT
Start: 2022-05-31 | End: 2022-07-18

## 2022-05-31 RX ORDER — OMEPRAZOLE 20 MG/1
CAPSULE, DELAYED RELEASE ORAL
Qty: 30 CAPSULE | Refills: 1 | Status: SHIPPED | OUTPATIENT
Start: 2022-05-31 | End: 2022-07-20 | Stop reason: SDUPTHER

## 2022-05-31 RX ORDER — ATORVASTATIN CALCIUM 20 MG/1
TABLET, FILM COATED ORAL
Qty: 30 TABLET | Refills: 1 | Status: SHIPPED | OUTPATIENT
Start: 2022-05-31 | End: 2022-07-18

## 2022-05-31 RX ORDER — LISINOPRIL 40 MG/1
TABLET ORAL
Qty: 30 TABLET | Refills: 1 | Status: SHIPPED | OUTPATIENT
Start: 2022-05-31 | End: 2022-07-18

## 2022-05-31 NOTE — TELEPHONE ENCOUNTER
Rx Refill Note  Requested Prescriptions     Pending Prescriptions Disp Refills   • lisinopril (PRINIVIL,ZESTRIL) 40 MG tablet [Pharmacy Med Name: LISINOPRIL 40 MG TABLET] 30 tablet      Sig: TAKE ONE TABLET BY MOUTH EVERY MORNING   • hydroCHLOROthiazide (HYDRODIURIL) 25 MG tablet [Pharmacy Med Name: hydroCHLOROthiazide 25 MG TABLET] 30 tablet      Sig: TAKE ONE TABLET BY MOUTH EVERY MORNING   • citalopram (CeleXA) 20 MG tablet [Pharmacy Med Name: CITALOPRAM HBR 20 MG TABLET] 30 tablet 1     Sig: TAKE ONE TABLET BY MOUTH DAILY   • atorvastatin (LIPITOR) 20 MG tablet [Pharmacy Med Name: ATORVASTATIN 20 MG TABLET] 30 tablet 1     Sig: TAKE ONE TABLET BY MOUTH EVERY NIGHT AT BEDTIME      Last office visit with prescribing clinician: 4/26/2022      Next office visit with prescribing clinician: Visit date not found            Og Marcelino MA  05/31/22, 09:12 EDT

## 2022-05-31 NOTE — TELEPHONE ENCOUNTER
Rx Refill Note  Requested Prescriptions     Pending Prescriptions Disp Refills   • omeprazole (priLOSEC) 20 MG capsule [Pharmacy Med Name: OMEPRAZOLE DR 20 MG CAPSULE] 30 capsule 1     Sig: TAKE ONE CAPSULE BY MOUTH DAILY      Last office visit with prescribing clinician: 4/26/2022     Next office visit with prescribing clinician: Visit date not found            Og Marcelino MA  05/31/22, 09:13 EDT

## 2022-06-01 ENCOUNTER — OFFICE VISIT (OUTPATIENT)
Dept: FAMILY MEDICINE CLINIC | Facility: CLINIC | Age: 67
End: 2022-06-01

## 2022-06-01 VITALS
SYSTOLIC BLOOD PRESSURE: 132 MMHG | DIASTOLIC BLOOD PRESSURE: 80 MMHG | BODY MASS INDEX: 31.91 KG/M2 | WEIGHT: 169 LBS | HEART RATE: 101 BPM | OXYGEN SATURATION: 97 % | HEIGHT: 61 IN | TEMPERATURE: 98.7 F

## 2022-06-01 DIAGNOSIS — M54.2 ACUTE NECK PAIN: Primary | ICD-10-CM

## 2022-06-01 PROCEDURE — 99213 OFFICE O/P EST LOW 20 MIN: CPT | Performed by: NURSE PRACTITIONER

## 2022-06-01 RX ORDER — AMLODIPINE BESYLATE 10 MG/1
10 TABLET ORAL
Qty: 30 TABLET | Refills: 2 | Status: SHIPPED | OUTPATIENT
Start: 2022-06-01 | End: 2022-08-14 | Stop reason: SDUPTHER

## 2022-06-01 RX ORDER — METHYLPREDNISOLONE 4 MG/1
TABLET ORAL
Qty: 1 EACH | Refills: 0 | Status: SHIPPED | OUTPATIENT
Start: 2022-06-01 | End: 2022-07-12

## 2022-06-01 RX ORDER — METHOCARBAMOL 500 MG/1
500 TABLET, FILM COATED ORAL 3 TIMES DAILY
Qty: 9 TABLET | Refills: 0 | Status: SHIPPED | OUTPATIENT
Start: 2022-06-01 | End: 2022-06-04

## 2022-06-01 NOTE — TELEPHONE ENCOUNTER
Rx Refill Note  Requested Prescriptions     Pending Prescriptions Disp Refills   • amLODIPine (NORVASC) 10 MG tablet 30 tablet 2     Sig: Take 1 tablet by mouth every night at bedtime.      Last office visit with prescribing clinician: 12/6/2021      Next office visit with prescribing clinician: Visit date not found            Estephania Lloyd MA  06/01/22, 08:19 EDT

## 2022-06-01 NOTE — PROGRESS NOTES
"Chief Complaint  Neck Pain and Back Pain    Subjective        Luh Horner presents to [facility]  Pt is here today with concerns of neck pain that has been ongoing for about 2 weeks. Pain is a pulling pain on the left side. She states she woke up with the pain. No change in activity. She states she does exercise daily, but exercise routing has not changed. No known injury.    She also report pain in the right side of her back. States it feels like a stitch in her side.     Neck Pain   This is a new problem. The current episode started 1 to 4 weeks ago. The problem occurs daily. The problem has been waxing and waning. The pain is associated with nothing. The pain is present in the left side. Quality: pulling. The pain is at a severity of 4/10. The pain is moderate. Exacerbated by: worse at night. The pain is worse during the night. She has tried NSAIDs for the symptoms. The treatment provided moderate relief.   Back Pain  This is a new problem. The current episode started more than 1 month ago. The problem occurs rarely. The problem is unchanged. The pain does not radiate. The pain is mild.     Objective   Vital Signs:  /80 (BP Location: Right arm, Patient Position: Sitting, Cuff Size: Adult)   Pulse 101   Temp 98.7 °F (37.1 °C)   Ht 154.9 cm (60.98\")   Wt 76.7 kg (169 lb)   SpO2 97%   BMI 31.95 kg/m²         Physical Exam  Vitals reviewed.   Constitutional:       Appearance: Normal appearance.   HENT:      Nose: Nose normal.      Mouth/Throat:      Mouth: Mucous membranes are moist.   Neck:      Comments: Left neck pain. Worse when turning head to left; no pain with turning head to right.   Cardiovascular:      Rate and Rhythm: Normal rate and regular rhythm.      Pulses: Normal pulses.      Heart sounds: Normal heart sounds.   Pulmonary:      Breath sounds: Normal breath sounds.   Musculoskeletal:         General: Normal range of motion.   Skin:     General: Skin is " warm.   Neurological:      Mental Status: She is alert and oriented to person, place, and time.   Psychiatric:         Mood and Affect: Mood normal.         Behavior: Behavior normal.        Result Review :                Assessment and Plan   Diagnoses and all orders for this visit:    1. Acute neck pain (Primary)  -     methylPREDNISolone (MEDROL) 4 MG dose pack; Take as directed on package instructions.  Dispense: 1 each; Refill: 0  -     methocarbamol (Robaxin) 500 MG tablet; Take 1 tablet by mouth 3 (Three) Times a Day for 3 days. PRN  Dispense: 9 tablet; Refill: 0           Follow Up   No follow-ups on file.  Patient was given instructions and counseling regarding her condition or for health maintenance advice. Please see specific information pulled into the AVS if appropriate.

## 2022-06-23 RX ORDER — SUCRALFATE 1 G/1
TABLET ORAL
Qty: 60 TABLET | Refills: 1 | Status: SHIPPED | OUTPATIENT
Start: 2022-06-23 | End: 2022-10-03

## 2022-06-23 NOTE — TELEPHONE ENCOUNTER
Rx Refill Note  Requested Prescriptions     Pending Prescriptions Disp Refills   • sucralfate (CARAFATE) 1 g tablet [Pharmacy Med Name: SUCRALFATE 1 GM TABLET] 60 tablet 1     Sig: TAKE ONE TABLET BY MOUTH EVERY 12 HOURS      Last office visit with prescribing clinician: 06/01/2022      Next office visit with prescribing clinician: Visit date not found            Gabbi Rodriguez MA  06/23/22, 14:52 EDT

## 2022-06-24 DIAGNOSIS — M47.816 LUMBAR SPONDYLOSIS: ICD-10-CM

## 2022-06-26 RX ORDER — HYDROCODONE BITARTRATE AND ACETAMINOPHEN 5; 325 MG/1; MG/1
.5-1 TABLET ORAL DAILY PRN
Qty: 30 TABLET | Refills: 0 | Status: SHIPPED | OUTPATIENT
Start: 2022-06-26 | End: 2022-07-28 | Stop reason: SDUPTHER

## 2022-07-12 ENCOUNTER — OFFICE VISIT (OUTPATIENT)
Dept: FAMILY MEDICINE CLINIC | Facility: CLINIC | Age: 67
End: 2022-07-12

## 2022-07-12 VITALS
DIASTOLIC BLOOD PRESSURE: 70 MMHG | TEMPERATURE: 98 F | OXYGEN SATURATION: 96 % | BODY MASS INDEX: 31.95 KG/M2 | HEIGHT: 61 IN | SYSTOLIC BLOOD PRESSURE: 128 MMHG | HEART RATE: 90 BPM

## 2022-07-12 DIAGNOSIS — M54.2 ACUTE NECK PAIN: ICD-10-CM

## 2022-07-12 DIAGNOSIS — M47.816 LUMBAR SPONDYLOSIS: Primary | ICD-10-CM

## 2022-07-12 DIAGNOSIS — R09.81 STUFFY NOSE: ICD-10-CM

## 2022-07-12 DIAGNOSIS — M41.26 OTHER IDIOPATHIC SCOLIOSIS, LUMBAR REGION: ICD-10-CM

## 2022-07-12 DIAGNOSIS — Z51.81 THERAPEUTIC DRUG MONITORING: ICD-10-CM

## 2022-07-12 PROCEDURE — 87428 SARSCOV & INF VIR A&B AG IA: CPT | Performed by: FAMILY MEDICINE

## 2022-07-12 PROCEDURE — 99214 OFFICE O/P EST MOD 30 MIN: CPT | Performed by: FAMILY MEDICINE

## 2022-07-12 NOTE — PROGRESS NOTES
Established Patient Office Visit      Patient Name: Luh Horner  : 1955   MRN: [id number]   Care Team: Patient Care Team:  Dainel Shafer DO as PCP - General (Family Medicine)  Felice Lopez MD as Consulting Physician (Gastroenterology)    Chief Complaint:    Chief Complaint   Patient presents with   • Back Pain   • Hip Pain     bilateral   • therapeutic drug monitoring   • Nasal Congestion   • Headache       History of Present Illness: Luh Horner is a 66 y.o. female who is here today for chief complaint.    HPI      The patient presents today for bilateral hip pain and back pain. She also has new concerns of nasal congestion and headache.    The patient reports that she is no longer taking the temazepam. She mentions that she was on a half dose for a couple of weeks and then she stopped it. She reports that she has been sleeping well. She mentions that it did help her taking the half in the morning and half at night. She reports that it helped her stomach. She mentions that she was having a lot of irritable bowel syndrome. She reports that it has been okay most of the time. She mentions that she has had a little trouble the last day or two. She reports that she has not tried the Bentyl. She mentions that she had a colonoscopy in 2017. She reports that she has had an endoscopy in the past. She mentions that she did not have polyps that time, but one other time she had polyps and she had to come back in 3 years.    She reports that she does not need any refills today. She admits that she has been using more Flonase. She reports that she is using the pain medication every day. She mentions that she used to be using it 1.5 to 1 tablets every week or so. She reports that the last couple of weeks have been awful. She reports that she was traveling, but it was bothering her before then quite a bit. She mentions that she has trouble standing up for any length of time. She reports  that her back is worse than her hips. She mentions that she has had back pain for some time. She reports that she was here approximately 1 month ago with neck pain. She reports that she can feel it. She admits that she has trouble moving, but it does not hurt like it did. She reports that she was put on steroids and the nurse practitioner did, and that helped. She mentions that she was given muscle relaxers, but she did not use very many of those. She reports that she did not get any x-rays of the neck. She mentions that it is more of a discomfort whereas before it was pain all day.      This patient is accompanied by their self who contributes to the history of their care.    The following portions of the patient's history were reviewed and updated as appropriate: allergies, current medications, past family history, past medical history, past social history, past surgical history and problem list.    Subjective      Review of Systems:   Review of Systems - See HPI    Past Medical History:   Past Medical History:   Diagnosis Date   • Allergic rhinitis    • Bone pain     foot   • Cancer (HCC) 2005    Skin cancer   • Cataract 2010    Surgery recently   • Depression 1981 1981 - Hospitalized   • Fibrocystic breast disease 2000    Current benign breast biopsies   • GERD (gastroesophageal reflux disease) 2001   • Glaucoma Surgery recently   • Headache 2021    None this year   • Hiatal hernia    • Hyperlipidemia    • Hypertension 1997   • Insomnia 2013   • Irritable bowel syndrome (IBS) 2002    Recurrent abdominal cramps   • Low back pain 2008   • Lumbar scoliosis 2017   • Lumbar spondylosis 2000    Chronic low back pain   • Mitral valve insufficiency    • Obesity 2000   • Osteoarthritis    • Osteopenia    • Post menopausal syndrome 2004    Refractory hot flashes       Past Surgical History:   Past Surgical History:   Procedure Laterality Date   • BREAST BIOPSY Right remote    benign disease   • CHOLECYSTECTOMY  1998    • COLONOSCOPY  ?   • HYSTERECTOMY     • REFRACTIVE SURGERY      RK   • URETHRAL SUSPENSION      laparoscopic for stress incontinence       Family History:   Family History   Problem Relation Age of Onset   • Pulmonary fibrosis Mother          age 82   • Hypertension Mother    • Lumbar disc disease Mother    • Prostate cancer Father          age 76   • Hypertension Sister            • Goiter Sister    • Hypothyroidism Sister    • Anxiety disorder Sister    • Heart disease Brother          Age 48 heart attack was cigarette smoker   • Hiatal hernia Brother    • Breast cancer Other         Maternal and paternal aunts   • Hypertension Brother    • Obesity Maternal Uncle    • Diabetes Maternal Uncle    • Arthritis Paternal Aunt    • Hypertension Brother        Social History:   Social History     Socioeconomic History   • Marital status:    Tobacco Use   • Smoking status: Former Smoker     Packs/day: 2.00     Years: 10.00     Pack years: 20.00     Types: Cigarettes     Start date: 1971     Quit date: 1981     Years since quittin.5   • Smokeless tobacco: Never Used   Vaping Use   • Vaping Use: Never used   Substance and Sexual Activity   • Alcohol use: Yes     Alcohol/week: 3.0 standard drinks     Types: 3 Glasses of wine per week     Comment: 3 glasses a wine day   • Drug use: No   • Sexual activity: Defer       Tobacco History:   Social History     Tobacco Use   Smoking Status Former Smoker   • Packs/day: 2.00   • Years: 10.00   • Pack years: 20.00   • Types: Cigarettes   • Start date: 1971   • Quit date: 1981   • Years since quittin.5   Smokeless Tobacco Never Used       Medications:     Current Outpatient Medications:   •  albuterol sulfate  (90 Base) MCG/ACT inhaler, Inhale 2 puffs Every 4 (Four) Hours As Needed for Wheezing or Shortness of Air., Disp: 8 g, Rfl: 1  •  amLODIPine (NORVASC) 10 MG tablet, Take 1 tablet by mouth every night at  bedtime., Disp: 30 tablet, Rfl: 2  •  atorvastatin (LIPITOR) 20 MG tablet, TAKE ONE TABLET BY MOUTH EVERY NIGHT AT BEDTIME, Disp: 30 tablet, Rfl: 1  •  citalopram (CeleXA) 20 MG tablet, TAKE ONE TABLET BY MOUTH DAILY, Disp: 30 tablet, Rfl: 1  •  clobetasol (TEMOVATE) 0.05 % cream, As Needed., Disp: , Rfl:   •  Coenzyme Q10 (CO Q-10) 200 MG capsule, Take 1 capsule by mouth., Disp: , Rfl:   •  diclofenac (VOLTAREN) 1 % gel gel, Apply 4 g topically to the appropriate area as directed 4 (Four) Times a Day As Needed (back pain)., Disp: 100 g, Rfl: 0  •  dicyclomine (Bentyl) 10 MG capsule, Take 1 capsule by mouth 4 (Four) Times a Day Before Meals & at Bedtime., Disp: 120 capsule, Rfl: 1  •  estradiol (MINIVELLE, VIVELLE-DOT) 0.075 MG/24HR patch, Place  on the skin., Disp: , Rfl:   •  fluticasone (FLONASE) 50 MCG/ACT nasal spray, SPRAY 2 SPRAYS IN EACH NOSTRIL DAILY, Disp: 16 mL, Rfl: 3  •  hydroCHLOROthiazide (HYDRODIURIL) 25 MG tablet, TAKE ONE TABLET BY MOUTH EVERY MORNING, Disp: 30 tablet, Rfl: 1  •  HYDROcodone-acetaminophen (NORCO) 5-325 MG per tablet, Take 0.5-1 tablets by mouth Daily As Needed for Moderate Pain ., Disp: 30 tablet, Rfl: 0  •  lisinopril (PRINIVIL,ZESTRIL) 40 MG tablet, TAKE ONE TABLET BY MOUTH EVERY MORNING, Disp: 30 tablet, Rfl: 1  •  magnesium oxide (MAGOX) 400 (241.3 Mg) MG tablet tablet, Take 1 tablet by mouth., Disp: , Rfl:   •  melatonin 5 MG tablet tablet, Take 1 tablet by mouth Every Night., Disp: , Rfl:   •  nystatin (MYCOSTATIN) 891068 UNIT/GM cream, Apply  topically As Needed (itch)., Disp: 30 g, Rfl: 1  •  Omega-3 Fatty Acids (FISH OIL) 1000 MG capsule capsule, Take  by mouth., Disp: , Rfl:   •  omeprazole (priLOSEC) 20 MG capsule, TAKE ONE CAPSULE BY MOUTH DAILY, Disp: 30 capsule, Rfl: 1  •  sucralfate (CARAFATE) 1 g tablet, TAKE ONE TABLET BY MOUTH EVERY 12 HOURS, Disp: 60 tablet, Rfl: 1  •  valACYclovir (VALTREX) 500 MG tablet, Take 1 tablet by mouth Daily., Disp: , Rfl:   •  vitamin  "C (ASCORBIC ACID) 500 MG tablet, Take  by mouth daily., Disp: , Rfl:     Allergies:   Allergies   Allergen Reactions   • Shellfish Allergy      Head congestion   • Atenolol      Fatigue   • Buspirone       Headache   • Codeine      Depression   • Pseudoephedrine Anxiety   • Rosuvastatin      Fatigue   • Westley-E.P.A. [Dha-Epa-Vitamin E] Anxiety   • Simvastatin      Facial numbness   • Trazodone      Nightmares       Objective   Objective     Physical Exam:  Vital Signs:   Vitals:    07/12/22 1421   BP: 128/70   Pulse: 90   Temp: 98 °F (36.7 °C)   SpO2: 96%   Height: 154.9 cm (60.98\")     Body mass index is 31.95 kg/m².     Physical Exam  Nursing note reviewed  Const: NAD, A&Ox4, Pleasant, Cooperative  Eyes: EOMI, no conjunctivitis  ENT: No nasal discharge present, neck supple  Cardiac: Regular rate and rhythm, no cyanosis  Resp: Respiratory rate within normal limits, no increased work of breathing, no audible wheezing or retractions noted  GI: No distention or ascites  MSK: Motor and sensation grossly intact in bilateral upper extremities  Neurologic: CN II-XII grossly intact  Psych: Appropriate mood and behavior.  Skin: Warm, dry  Procedures/Radiology     Procedures  XR Spine Cervical 2 or 3 View    Result Date: 7/15/2022  Vertebral body heights are maintained without evidence of acute fracture and alignment is anatomic without evidence of listhesis or subluxation. Multilevel advanced cervical spondylosis changes evident with areas of disc osteophyte complex formation and facet arthropathy, appearing most advanced at C5-6 and C6-7.  This report was finalized on 7/15/2022 1:18 PM by Nicolas Crandall.         Assessment & Plan   Assessment / Plan      Assessment/Plan:   Problems Addressed This Visit  Diagnoses and all orders for this visit:    1. Lumbar spondylosis (Primary)  -     Ambulatory Referral to Physical Therapy Evaluate and treat    2. Other idiopathic scoliosis, lumbar region  -     Ambulatory Referral to " Physical Therapy Evaluate and treat    3. Acute neck pain  -     XR Spine Cervical 2 or 3 View; Future    4. Therapeutic drug monitoring  -     Compliance Drug Analysis, Ur - Urine, Clean Catch; Future    5. Stuffy nose  -     POCT SARS-CoV-2 Antigen ANA      Problem List Items Addressed This Visit        Musculoskeletal and Injuries    Lumbar scoliosis    Relevant Orders    Ambulatory Referral to Physical Therapy Evaluate and treat       Neuro    Lumbar spondylosis - Primary    Relevant Orders    Ambulatory Referral to Physical Therapy Evaluate and treat      Other Visit Diagnoses     Acute neck pain        Relevant Orders    XR Spine Cervical 2 or 3 View (Completed)    Therapeutic drug monitoring        Relevant Orders    Compliance Drug Analysis, Ur - Urine, Clean Catch    Stuffy nose        Relevant Orders    POCT SARS-CoV-2 Antigen ANA (Completed)          1. Bilateral hip pain and back pain.  - I will order an x-ray of the neck. I will refer her to physical therapy.    2. Irritable bowel syndrome.  - She will continue taking Bentyl as prescribed.          There are no Patient Instructions on file for this visit.    Follow Up:   Return in about 3 months (around 10/12/2022) for video visit.      AIYANA Shafer DO   [address]  [facility]  [address] [facility] [address]  Fax [phone]  Phone [phone]       Transcribed from ambient dictation for Daniel Shafer DO by PAKO HENSLEY.  07/12/22   16:12 EDT    Patient verbalized consent to the visit recording.  I have personally performed the services described in this document as transcribed by the above individual, and it is both accurate and complete.  Daniel Shafer DO  7/18/2022  08:10 EDT

## 2022-07-15 ENCOUNTER — HOSPITAL ENCOUNTER (OUTPATIENT)
Dept: GENERAL RADIOLOGY | Facility: HOSPITAL | Age: 67
Discharge: HOME | End: 2022-07-15
Admitting: FAMILY MEDICINE

## 2022-07-15 DIAGNOSIS — M54.2 ACUTE NECK PAIN: ICD-10-CM

## 2022-07-15 PROCEDURE — 72040 X-RAY EXAM NECK SPINE 2-3 VW: CPT

## 2022-07-18 ENCOUNTER — PATIENT MESSAGE (OUTPATIENT)
Dept: FAMILY MEDICINE CLINIC | Facility: CLINIC | Age: 67
End: 2022-07-18

## 2022-07-18 DIAGNOSIS — I10 ESSENTIAL HYPERTENSION: ICD-10-CM

## 2022-07-18 DIAGNOSIS — M54.2 ACUTE NECK PAIN: Primary | ICD-10-CM

## 2022-07-18 DIAGNOSIS — M50.30 DDD (DEGENERATIVE DISC DISEASE), CERVICAL: ICD-10-CM

## 2022-07-18 LAB
DRUGS UR: NORMAL
Lab: NORMAL

## 2022-07-18 RX ORDER — ATORVASTATIN CALCIUM 20 MG/1
TABLET, FILM COATED ORAL
Qty: 30 TABLET | Refills: 1 | Status: SHIPPED | OUTPATIENT
Start: 2022-07-18 | End: 2022-10-03

## 2022-07-18 RX ORDER — LISINOPRIL 40 MG/1
TABLET ORAL
Qty: 30 TABLET | Refills: 1 | Status: SHIPPED | OUTPATIENT
Start: 2022-07-18 | End: 2022-10-03

## 2022-07-18 RX ORDER — HYDROCHLOROTHIAZIDE 25 MG/1
TABLET ORAL
Qty: 30 TABLET | Refills: 1 | Status: SHIPPED | OUTPATIENT
Start: 2022-07-18 | End: 2022-10-03

## 2022-07-18 RX ORDER — CITALOPRAM 20 MG/1
TABLET ORAL
Qty: 30 TABLET | Refills: 1 | Status: SHIPPED | OUTPATIENT
Start: 2022-07-18 | End: 2022-10-03

## 2022-07-18 NOTE — TELEPHONE ENCOUNTER
Rx Refill Note  Requested Prescriptions     Pending Prescriptions Disp Refills   • lisinopril (PRINIVIL,ZESTRIL) 40 MG tablet [Pharmacy Med Name: LISINOPRIL 40 MG TABLET] 30 tablet 1     Sig: TAKE ONE TABLET BY MOUTH EVERY MORNING   • hydroCHLOROthiazide (HYDRODIURIL) 25 MG tablet [Pharmacy Med Name: hydroCHLOROthiazide 25 MG TABLET] 30 tablet 1     Sig: TAKE ONE TABLET BY MOUTH EVERY MORNING   • citalopram (CeleXA) 20 MG tablet [Pharmacy Med Name: CITALOPRAM HBR 20 MG TABLET] 30 tablet 1     Sig: TAKE ONE TABLET BY MOUTH DAILY   • atorvastatin (LIPITOR) 20 MG tablet [Pharmacy Med Name: ATORVASTATIN 20 MG TABLET] 30 tablet 1     Sig: TAKE ONE TABLET BY MOUTH EVERY NIGHT AT BEDTIME      Last office visit with prescribing clinician: 7/12/2022      Next office visit with prescribing clinician: Visit date not found            Marc Malik MA  07/18/22, 08:38 EDT

## 2022-07-20 DIAGNOSIS — K21.9 GASTROESOPHAGEAL REFLUX DISEASE WITHOUT ESOPHAGITIS: ICD-10-CM

## 2022-07-20 RX ORDER — OMEPRAZOLE 20 MG/1
20 CAPSULE, DELAYED RELEASE ORAL DAILY
Qty: 30 CAPSULE | Refills: 1 | Status: SHIPPED | OUTPATIENT
Start: 2022-07-20 | End: 2022-10-03

## 2022-07-20 NOTE — TELEPHONE ENCOUNTER
Rx Refill Note  Requested Prescriptions     Pending Prescriptions Disp Refills   • omeprazole (priLOSEC) 20 MG capsule 30 capsule 1     Sig: Take 1 capsule by mouth Daily.      Last office visit with prescribing clinician: 7/12/2022      Next office visit with prescribing clinician: Visit date not found            Gabbi Rodriguez MA  07/20/22, 13:54 EDT

## 2022-07-28 DIAGNOSIS — M47.816 LUMBAR SPONDYLOSIS: ICD-10-CM

## 2022-07-28 RX ORDER — HYDROCODONE BITARTRATE AND ACETAMINOPHEN 5; 325 MG/1; MG/1
.5-1 TABLET ORAL DAILY PRN
Qty: 30 TABLET | Refills: 0 | Status: SHIPPED | OUTPATIENT
Start: 2022-07-28 | End: 2022-10-05 | Stop reason: SDUPTHER

## 2022-07-28 NOTE — TELEPHONE ENCOUNTER
Rx Refill Note  Requested Prescriptions     Pending Prescriptions Disp Refills   • HYDROcodone-acetaminophen (NORCO) 5-325 MG per tablet 30 tablet 0     Sig: Take 0.5-1 tablets by mouth Daily As Needed for Moderate Pain .      Last office visit with prescribing clinician: 7/12/2022      Next office visit with prescribing clinician: Visit date not found            Og Marcelino MA  07/28/22, 14:35 EDT     UDS 2/4/21  CSA 7/12/22

## 2022-08-03 ENCOUNTER — TREATMENT (OUTPATIENT)
Dept: PHYSICAL THERAPY | Facility: CLINIC | Age: 67
End: 2022-08-03

## 2022-08-03 DIAGNOSIS — M47.816 LUMBAR SPONDYLOSIS: ICD-10-CM

## 2022-08-03 DIAGNOSIS — M41.26 OTHER IDIOPATHIC SCOLIOSIS, LUMBAR REGION: ICD-10-CM

## 2022-08-03 PROCEDURE — 97162 PT EVAL MOD COMPLEX 30 MIN: CPT | Performed by: PHYSICAL THERAPIST

## 2022-08-03 PROCEDURE — 97110 THERAPEUTIC EXERCISES: CPT | Performed by: PHYSICAL THERAPIST

## 2022-08-03 NOTE — PROGRESS NOTES
Physical Therapy Initial Evaluation and Plan of Care    Patient: Luh Horner   : 1955  Diagnosis/ICD-10 Code:  No primary diagnosis found.  Referring practitioner: Daniel Shafer DO  Date of Initial Visit: 8/3/2022  Today's Date: 8/3/2022  Patient seen for 1 session         Visit Diagnoses:  No diagnosis found.      Subjective Questionnaire: Oswestry:       Subjective Evaluation    History of Present Illness  Mechanism of injury: The pt reported a 7-8 year history of LBP and B hip pain that began with no apparent ARYAN. Symptoms have gradually worsened since that time and she now has difficulty with standing and walking for long periods of time. She used to be a recreational walker but stated she has been unable to do this for the last year. She has been to PT in the past but cannot remember how long ago. She remembered that it helped and stated she is a motivated patient.     She is a regular exerciser and goes daily to a class at the [employer]. She has been unable to participate in tougher classes in the last few weeks due to pain.     She began experiencing a worsening of R sided hip pain in the last 2-3 weeks with no apparent cause. Pain is worsened with sleeping on the R side, prolonged walking and standing, and with exercises. Pain is improved with stretching and with rest. She attempted NSAID use and felt it helped her hip but it upset her stomach. She is primarily concerned with this pain at this time.    She reported a one month history of neck pain as well but feels this is resolving.    Pain  Current pain ratin  At best pain ratin  At worst pain rating: 10  Location: R hip   Quality: dull ache  Relieving factors: rest, medications and change in position  Aggravating factors: ambulation, movement, standing, squatting and repetitive movement  Progression: no change    Social Support  Lives with: spouse    Diagnostic Tests  X-ray: abnormal (L1/L2  JAIDA)    Treatments  Previous treatment: chiropractic, physical therapy and medication  Patient Goals  Patient goals for therapy: decreased pain, increased motion, increased strength and return to sport/leisure activities             Objective          Palpation   Left   Hypertonic in the erector spinae, lumbar paraspinals and quadratus lumborum.   Tenderness of the erector spinae, lumbar paraspinals and quadratus lumborum.     Right   Hypertonic in the erector spinae, lumbar paraspinals and quadratus lumborum. Tenderness of the erector spinae, lumbar paraspinals and quadratus lumborum.     Tenderness     Lumbar Spine  Tenderness in the spinous process (throughout lumblar spine; worse superiorly).     Additional Tenderness Details  Hypomobility of lower lumbar spine with PA assessment    Neurological Testing     Sensation     Lumbar   Left   Intact: light touch    Right   Intact: light touch    Reflexes   Left   Patellar (L4): normal (2+)  Achilles (S1): normal (2+)    Right   Patellar (L4): normal (2+)  Achilles (S1): normal (2+)    Active Range of Motion     Additional Active Range of Motion Details  Lumbar flexion: 0 cm to the floor   Lumbar extension: 50%  R Lateral flexion: 5 cm to KJL  L Lateral flexion: 2 cm to KJL  R Rotation: 30 deg  L Rotation: 35 deg    R sided LBP with R rotation and RSB  R sided pain with R quadrant    Strength/Myotome Testing     Left Hip   Planes of Motion   Flexion: 4+  Extension: 3+  Abduction: 4-  External rotation: 4+    Right Hip   Planes of Motion   Flexion: 4  Extension: 3+  Abduction: 3+  External rotation: 4-    Left Knee   Flexion: 5  Extension: 5    Right Knee   Flexion: 5  Extension: 5    Left Ankle/Foot   Dorsiflexion: 5  Plantar flexion: 5  Great toe extension: 5    Right Ankle/Foot   Dorsiflexion: 5  Plantar flexion: 5  Great toe extension: 5    Lumbar Flexibility Comments:   Tightness of R ITB and piriformis           Assessment & Plan     Assessment  Impairments:  abnormal gait, abnormal muscle firing, abnormal or restricted ROM, activity intolerance, impaired balance, impaired physical strength, lacks appropriate home exercise program and pain with function  Functional Limitations: lifting, walking, uncomfortable because of pain, standing, stooping and unable to perform repetitive tasks  Assessment details: The patient is a 65 yo female who presented to PT with evolving characteristics of acute on chronic R sided hip pain and LBP with moderate complexity. Signs and symptoms are consistent with acute gluteal tendinitis in the face of chronic non-specific LBP. The hip was only mildly irritable in the clinic but symptoms were reproduced with glute med strength testing and palpation. She had weakness in B glutes, L > R, and overcompensation with lumbar extensors, which likely causes a fair amount of her back pain. She was educated on methods for reducing inflammation outside of medication including ice and stretching. She was prescribed an HEP for stretching and isometric glute strengthening. I expect the patient to make a timely recovery with skilled PT intervention.     Prognosis: good    Goals  Plan Goals: Short Term Goals (4 weeks):     1. The patient will be independent and compliant with initial HEP.     2. The patient will report pain at rest 0/10 or less and worst pain 5/10 or less.    3. The patient will display decreased TTP in the R glute med and dec mm tension in the surrounding musculature.    4. The patient will demonstrate inc strength evidenced by MMT as follows: hip abd 4/5, hip ext 4/5, hip ER 4+/5, and hip IR 4+/5.    5. LEFS will improve by 9 points or more.         Long Term Goals (8 weeks):     1. The patient will be appropriate for independent management and compliant with progressed HEP.     2. The patient will report pain at rest 0/10 or less and worst pain 2/10 or less.    3. The patient will return to work duties and/or ADLs with no limitations due to  hip pain or dysfunction.    4. The patient will return to recreational and community activities with no limitations due to hip pain or dysfunction.      Plan  Therapy options: will be seen for skilled therapy services  Planned modality interventions: cryotherapy, electrical stimulation/Russian stimulation, iontophoresis, TENS and thermotherapy (hydrocollator packs)  Planned therapy interventions: ADL retraining, body mechanics training, balance/weight-bearing training, flexibility, functional ROM exercises, gait training, home exercise program, joint mobilization, manual therapy, neuromuscular re-education, postural training, soft tissue mobilization, strengthening, stretching, therapeutic activities and transfer training  Frequency: 1x week  Duration in visits: 8  Duration in weeks: 8  Treatment plan discussed with: patient  Plan details: The pt will likely benefit from TE/TA/NMED to improve strength of the hips, quads, and hamstrings, to improve balance, and to restore normal proprioception. MT will be utilized to improve ROM and jt mobility. Modalities will be used as needed for pain modulation. Dry needling as indicated.        History # of Personal Factors and/or Comorbidities: MODERATE (1-2)  Examination of Body System(s): # of elements: MODERATE (3)  Clinical Presentation: EVOLVING  Clinical Decision Making: MODERATE      Timed:         Manual Therapy:    0     mins  62655;     Therapeutic Exercise:    10     mins  92945;     Neuromuscular Brennan:    0    mins  06481;    Therapeutic Activity:     0     mins  87922;     Gait Trainin     mins  81828;     Ultrasound:     0     mins  66169;    Ionto                               0    mins   03917  Self Care                       0     mins   11439  Canalith Repos    0     mins 34558      Un-Timed:  Electrical Stimulation:    0     mins  44199 ( );  Dry Needling     0     mins self-pay  Traction     0     mins 69997  Low Eval     0     Mins   19652  Mod Eval     25     Mins  46730  High Eval                       0     Mins  88555        Timed Treatment:   10   mins   Total Treatment:     35   mins          PT: Thierry Vega PT     License Number: [id number]  Electronically signed by Thierry Vega PT, 08/03/22, 2:04 PM EDT    Certification Period: 8/3/2022 thru 10/31/2022  I certify that the therapy services are furnished while this patient is under my care.  The services outlined above are required by this patient, and will be reviewed every 90 days.         Physician Signature:__________________________________________________    PHYSICIAN: Daniel Shafer DO  NPI: [id number]                                      DATE:      Please sign and return via fax to [email or web address] . Thank you, [facility].

## 2022-08-10 ENCOUNTER — TREATMENT (OUTPATIENT)
Dept: PHYSICAL THERAPY | Facility: CLINIC | Age: 67
End: 2022-08-10

## 2022-08-10 DIAGNOSIS — M47.816 LUMBAR SPONDYLOSIS: Primary | ICD-10-CM

## 2022-08-10 PROCEDURE — 97140 MANUAL THERAPY 1/> REGIONS: CPT | Performed by: PHYSICAL THERAPIST

## 2022-08-10 PROCEDURE — 97110 THERAPEUTIC EXERCISES: CPT | Performed by: PHYSICAL THERAPIST

## 2022-08-10 NOTE — PROGRESS NOTES
Physical Therapy Daily Treatment Note      Patient: Luh Horner   : 1955  Referring practitioner: Daniel Shafer DO  Date of Initial Visit: Type: THERAPY  Noted: 8/3/2022  Today's Date: 8/10/2022  Patient seen for 2 sessions       Visit Diagnoses:    ICD-10-CM ICD-9-CM   1. Lumbar spondylosis  M47.816 721.3       Subjective Evaluation    History of Present Illness  Mechanism of injury: The pt stated that her R hip has been feeling better but reported acute onset of L sided mid-back pain that began yesterday and has persisted through today. She has tried to stretch it out but stated it has been cramping. She did not go to her group exercise class today as a result. She has been compliant with her HEP for her hip pain and stated she has tolerated it well. She continues to report hip pain first thing in the morning.    Pain  Current pain ratin  Location: R hip           Objective   See Exercise, Manual, and Modality Logs for complete treatment.       Assessment & Plan     Assessment    Assessment details: The pt reported reduced pain in the R hip but noted new onset of L sided thoracolumbar pain and mm cramping. Because this limited her tolerance to activity, interventions initially targeted calming the area down and included joint mobs, STM, stretching, and heat. This was somewhat beneficial and allowed for introduction to PPT exercises and core strengthening. These proved challenging due to core weakness and exposed the need for stabilization activities. Her HEP for hip interventions was reviewed and isometrics were unchanged.    Plan  Plan details: Review PPT activities and progress stabilization exercises as tolerated. Consider eccentric glute exercise.          Timed:         Manual Therapy:    23     mins  86682;     Therapeutic Exercise:    15     mins  62844;     Neuromuscular Brennan:    0    mins  59257;    Therapeutic Activity:     0     mins  83305;     Gait Trainin     mins   64214;     Ultrasound:     0     mins  23618;    Ionto                               0    mins   25780  Self Care                       0     mins   91830  Canalith Repos    0     mins 12507      Un-Timed:  Electrical Stimulation:    0     mins  18456 ( );  Dry Needling     0     mins self-pay  Traction     0     mins 76559      Timed Treatment:   38   mins   Total Treatment:     55   mins    Thierry Vega, PT  [place] License: [id number]

## 2022-08-14 DIAGNOSIS — I10 PRIMARY HYPERTENSION: ICD-10-CM

## 2022-08-15 RX ORDER — AMLODIPINE BESYLATE 10 MG/1
10 TABLET ORAL
Qty: 30 TABLET | Refills: 2 | Status: SHIPPED | OUTPATIENT
Start: 2022-08-15 | End: 2022-10-27

## 2022-08-15 NOTE — TELEPHONE ENCOUNTER
Rx Refill Note  Requested Prescriptions     Pending Prescriptions Disp Refills   • amLODIPine (NORVASC) 10 MG tablet 30 tablet 2     Sig: Take 1 tablet by mouth every night at bedtime.      Last office visit with prescribing clinician: 07/12/2022  Next office visit with prescribing clinician: Visit date not found   Debra Madrid MA  08/15/22, 09:58 EDT     Last fill: 06/01/2022

## 2022-08-17 DIAGNOSIS — Z12.11 COLON CANCER SCREENING: Primary | ICD-10-CM

## 2022-08-25 ENCOUNTER — TREATMENT (OUTPATIENT)
Dept: PHYSICAL THERAPY | Facility: CLINIC | Age: 67
End: 2022-08-25

## 2022-08-25 DIAGNOSIS — M47.816 LUMBAR SPONDYLOSIS: Primary | ICD-10-CM

## 2022-08-25 PROCEDURE — 97140 MANUAL THERAPY 1/> REGIONS: CPT | Performed by: PHYSICAL THERAPIST

## 2022-08-25 PROCEDURE — 97110 THERAPEUTIC EXERCISES: CPT | Performed by: PHYSICAL THERAPIST

## 2022-08-25 NOTE — PROGRESS NOTES
Physical Therapy Daily Treatment Note      Patient: Luh Horner   : 1955  Referring practitioner: Daniel Shafer DO  Date of Initial Visit: Type: THERAPY  Noted: 8/3/2022  Today's Date: 2022  Patient seen for 3 sessions       Visit Diagnoses:    ICD-10-CM ICD-9-CM   1. Lumbar spondylosis  M47.816 721.3       Subjective Evaluation    History of Present Illness  Mechanism of injury: The pt reported that her R hip has continued to bother her with increased activity levels and with direct pressure. Her L sided LBP has improved from last visit but general LBP persists. She has been compliant with her HEP and feels it is getting easy. She attempted increased reps of PPT with marching this week.     Pain  Current pain ratin  Location: R hip           Objective   See Exercise, Manual, and Modality Logs for complete treatment.       Assessment & Plan     Assessment    Assessment details: The pt continues to experience pain in the lateral R hip with strenuous activity and direct pressure, indicative of ongoing tendinitis pathology. She was encouraged to discontinue glute medius strengthening exercises at her exercise classes and was educated on several common exercises. Instead, a SL clamshell with light resistance with prescribed with an eccentric lower and isometric hip abd at the wall was continued. Her core exercises appeared appropriately challenging and were not progressed. STM was performed in the clinic and was tolerated well.     Plan  Plan details: Repeat MT and heat. Continue isometric and eccentric hip strengthening.           Timed:         Manual Therapy:    15     mins  91902;     Therapeutic Exercise:    15     mins  66367;     Neuromuscular Brennan:    0    mins  03408;    Therapeutic Activity:     0     mins  39865;     Gait Trainin     mins  78674;     Ultrasound:     0     mins  63457;    Ionto                               0    mins   79272  Self Care                        0     mins   49545  Canalith Repos    0     mins 28164      Un-Timed:  Electrical Stimulation:    0     mins  43476 ( );  Dry Needling     0     mins self-pay  Traction     0     mins 87700      Timed Treatment:   30   mins   Total Treatment:     45   mins    Thierry Vega, PT  [place] License: [id number]

## 2022-09-02 ENCOUNTER — TREATMENT (OUTPATIENT)
Dept: PHYSICAL THERAPY | Facility: CLINIC | Age: 67
End: 2022-09-02

## 2022-09-02 DIAGNOSIS — M47.816 LUMBAR SPONDYLOSIS: Primary | ICD-10-CM

## 2022-09-02 PROCEDURE — 97140 MANUAL THERAPY 1/> REGIONS: CPT | Performed by: PHYSICAL THERAPIST

## 2022-09-02 PROCEDURE — 97110 THERAPEUTIC EXERCISES: CPT | Performed by: PHYSICAL THERAPIST

## 2022-09-02 NOTE — PROGRESS NOTES
Physical Therapy Daily Treatment Note      Patient: Luh Horner   : 1955  Referring practitioner: Daniel Sahfer DO  Date of Initial Visit: Type: THERAPY  Noted: 8/3/2022  Today's Date: 2022  Patient seen for 4 sessions       Visit Diagnoses:    ICD-10-CM ICD-9-CM   1. Lumbar spondylosis  M47.816 721.3       Subjective Evaluation    History of Present Illness  Mechanism of injury: The pt stated that the sharp pain in the R hip has subsided. She continues to report mod-severe pain in the low back and B hips that prevents her from performing her recreational activities. She stated she cannot tolerate more than 5 consecutive minutes of her exercise class at the [facility]. She manages pain with figure 4 stretches.    Pain  Current pain ratin  Location: R hip; LBP 8/10           Objective   See Exercise, Manual, and Modality Logs for complete treatment.       Assessment & Plan     Assessment    Assessment details: The pt has seen a resolution in sharp lateral R hip pain indicating that the tendinitis/bursitis process has improved. She continues to experience mod-severe LBP and reported that she is frustrated it has not resolved with PT yet. We discussed realistic expectations for chronic LBP healing and she was educated on the fact that rehab often lasts several months and consists of activity modifications and pain management techniques that are typically continued indefinitely following formal care. She had significant mm tightness in the lumbar paraspinals and QL so STM and joint mobs were performed regionally. She reported that manual therapy felt good. She was prescribed an open book mobilizations to improve rotational mobility.     Plan  Plan details: Continue MT and progress core and glute strengthening as tolerated.          Timed:         Manual Therapy:    45     mins  44955;     Therapeutic Exercise:    8     mins  17997;     Neuromuscular Brennan:    0    mins  26538;     Therapeutic Activity:     0     mins  82973;     Gait Trainin     mins  53539;     Ultrasound:     0     mins  56131;    Ionto                               0    mins   38664  Self Care                       0     mins   52520  Canalith Repos    0     mins 52911      Un-Timed:  Electrical Stimulation:    0     mins  93976 ( );  Dry Needling     0     mins self-pay  Traction     0     mins 40179      Timed Treatment:   53   mins   Total Treatment:     53   mins    Thierry Vega, PT  [place] License: [id number]

## 2022-09-09 ENCOUNTER — TREATMENT (OUTPATIENT)
Dept: PHYSICAL THERAPY | Facility: CLINIC | Age: 67
End: 2022-09-09

## 2022-09-09 DIAGNOSIS — M47.816 LUMBAR SPONDYLOSIS: Primary | ICD-10-CM

## 2022-09-09 PROCEDURE — 97110 THERAPEUTIC EXERCISES: CPT | Performed by: PHYSICAL THERAPIST

## 2022-09-09 PROCEDURE — 97140 MANUAL THERAPY 1/> REGIONS: CPT | Performed by: PHYSICAL THERAPIST

## 2022-09-09 RX ORDER — FLUTICASONE PROPIONATE 50 MCG
SPRAY, SUSPENSION (ML) NASAL
Qty: 16 ML | Refills: 1 | Status: SHIPPED | OUTPATIENT
Start: 2022-09-09 | End: 2022-10-31

## 2022-09-09 NOTE — TELEPHONE ENCOUNTER
Rx Refill Note  Requested Prescriptions     Pending Prescriptions Disp Refills   • fluticasone (FLONASE) 50 MCG/ACT nasal spray [Pharmacy Med Name: FLUTICASONE PROP 50 MCG SPRAY] 16 mL 3     Sig: SPRAY TWO SPRAYS IN EACH NOSTRIL ONCE DAILY      Last office visit with prescribing clinician: 7/12/2022      Next office visit with prescribing clinician: Visit date not found            Marc Malik MA  09/09/22, 10:15 EDT

## 2022-09-09 NOTE — PROGRESS NOTES
Physical Therapy Re Certification Of Plan of Care  Patient: Luh Horner   : 1955  Diagnosis/ICD-10 Code:  Lumbar spondylosis [M47.816]  Referring practitioner: Daniel Shafer DO  Date of Initial Visit: Type: THERAPY  Noted: 8/3/2022  Today's Date: 2022  Patient seen for 5 sessions         Visit Diagnoses:    ICD-10-CM ICD-9-CM   1. Lumbar spondylosis  M47.816 721.3       Subjective Questionnaire: Oswestry:   Clinical Progress: unchanged LBP, improved R hip pain  Home Program Compliance: Yes  Treatment has included: therapeutic exercise, neuromuscular re-education, manual therapy and therapeutic activity      Subjective Evaluation    History of Present Illness  Mechanism of injury: The pt stated that she has had more back spasms than usual in the past few days. She has been managing with muscle relaxers and oxycodone. She is unsure what has caused the recent spasms and stated the only change has been her HEP. She googled her symptoms and believes she may have stenosis. She denied any distal symptoms. She stated that her R hip pain has improved since beginning PT but denied improvement in LBP.    Pain  Current pain ratin  Location: LBP             Objective          Palpation   Left   Hypertonic in the erector spinae, lumbar paraspinals and quadratus lumborum.   Tenderness of the erector spinae, lumbar paraspinals and quadratus lumborum.     Right   Hypertonic in the erector spinae, lumbar paraspinals and quadratus lumborum. Tenderness of the erector spinae, lumbar paraspinals and quadratus lumborum.     Tenderness     Lumbar Spine  Tenderness in the spinous process (throughout lumblar spine; worse superiorly).     Additional Tenderness Details  Hypomobility of lower lumbar spine with PA assessment    Neurological Testing     Sensation     Lumbar   Left   Intact: light touch    Right   Intact: light touch    Reflexes   Left   Patellar (L4): normal (2+)  Achilles (S1): normal  (2+)    Right   Patellar (L4): normal (2+)  Achilles (S1): normal (2+)    Active Range of Motion     Additional Active Range of Motion Details  Lumbar flexion: 0 cm to the floor   Lumbar extension: 50%  R Lateral flexion: 5 cm to KJL  L Lateral flexion: 2 cm to KJL  R Rotation: 30 deg  L Rotation: 35 deg    R sided LBP with R rotation and RSB  R sided pain with R quadrant    Strength/Myotome Testing     Left Hip   Planes of Motion   Flexion: 4+  Extension: 3+  Abduction: 3+  External rotation: 4-    Right Hip   Planes of Motion   Flexion: 4  Extension: 3+  Abduction: 3+  External rotation: 4    Left Knee   Flexion: 5  Extension: 5    Right Knee   Flexion: 5  Extension: 5    Left Ankle/Foot   Dorsiflexion: 5  Plantar flexion: 5  Great toe extension: 5    Right Ankle/Foot   Dorsiflexion: 5  Plantar flexion: 5  Great toe extension: 5    Lumbar Flexibility Comments:   Tightness of R ITB and piriformis          Assessment & Plan     Assessment  Impairments: abnormal gait, abnormal muscle firing, abnormal or restricted ROM, activity intolerance, impaired balance, impaired physical strength, lacks appropriate home exercise program and pain with function  Functional Limitations: lifting, walking, uncomfortable because of pain, standing, stooping and unable to perform repetitive tasks  Assessment details: The pt saw a reduction in sharp lateral R hip pain with PT interventions in the last 4 weeks but continues to have TTP in the glute tendons and pain with resisted ER. This pain is more similar to the baseline level of discomfort that she has reported in the past and her acute tendinitis seems to be resolving. She was advised to return to isometrics if symptoms recurred, but attention was turned to the lumbar spine today. She has an 8 year history of low back pain that has worsened in recent months. She has been experiencing more frequent cramping in the low back with light activity but stated she feels fine when exercising.  This may be neurological and she was encouraged to discuss this with her PCP, though she does have symptoms consistent with early stage lumbar stenosis. She was prescribed several flexion based exercises for the core and glutes today and should see reduced pain with performance, though she was educated again on realistic expectations for PT with chronic pain.   Prognosis: fair    Goals  Plan Goals: Short Term Goals (4 weeks):     1. The patient will be independent and compliant with initial HEP. Met    2. The patient will report pain at rest 0/10 or less and worst pain 5/10 or less. Ongoing    3. The patient will display decreased TTP in the R glute med and dec mm tension in the surrounding musculature. Met    4. The patient will demonstrate inc strength evidenced by MMT as follows: hip abd 4/5, hip ext 4/5, hip ER 4+/5, and hip IR 4+/5. Ongoing    5. BASIA will improve by 5 points or more. New        Long Term Goals (8 weeks):     1. The patient will be appropriate for independent management and compliant with progressed HEP. Ongoing    2. The patient will report pain at rest 0/10 or less and worst pain 2/10 or less. Ongoing    3. The patient will return to work duties and/or ADLs with no limitations due to hip pain or dysfunction. Ongoing    4. The patient will return to recreational and community activities with no limitations due to hip pain or dysfunction. Ongoing      Plan  Therapy options: will be seen for skilled therapy services  Planned modality interventions: cryotherapy, electrical stimulation/Russian stimulation, iontophoresis, TENS and thermotherapy (hydrocollator packs)  Planned therapy interventions: ADL retraining, body mechanics training, balance/weight-bearing training, flexibility, functional ROM exercises, gait training, home exercise program, joint mobilization, manual therapy, neuromuscular re-education, postural training, soft tissue mobilization, strengthening, stretching, therapeutic  activities and transfer training  Frequency: 1x week  Duration in visits: 8  Duration in weeks: 8  Treatment plan discussed with: patient  Plan details: Continue flexion based exercises for core and glute strengthening. Opening mobilizations for the lumbar spine.           Recommendations: Continue as planned  Timeframe: 2 months  Prognosis to achieve goals: fair      Timed:         Manual Therapy:    15     mins  16758;     Therapeutic Exercise:    40     mins  37079;     Neuromuscular Brennan:    0    mins  11863;    Therapeutic Activity:     0     mins  78045;     Gait Trainin     mins  01068;     Ultrasound:     0     mins  09331;    Ionto                               0    mins   22101  Self Care                       0     mins   09807  Canalith Repos    0     mins 83970      Un-Timed:  Electrical Stimulation:    0     mins  53037 ( );  Dry Needling     0     mins self-pay  Traction     0     mins 77259  Re-Eval                           0    mins  08595      Timed Treatment:   55   mins   Total Treatment:     55   mins          PT: Thierry Vega PT     [place] License:  [id number]    Electronically signed by Thierry Vega PT, 22, 12:57 PM EDT    Certification Period: 2022 thru 2022  I certify that the therapy services are furnished while this patient is under my care.  The services outlined above are required by this patient, and will be reviewed every 90 days.         Physician Signature:__________________________________________________    PHYSICIAN: Daniel Shafer DO  NPI: [id number]                                      DATE:  :     Please sign and return via fax to [email or web address] . Thank you, [facility] Physical Therapy

## 2022-09-20 ENCOUNTER — TREATMENT (OUTPATIENT)
Dept: PHYSICAL THERAPY | Facility: CLINIC | Age: 67
End: 2022-09-20

## 2022-09-20 DIAGNOSIS — M47.816 LUMBAR SPONDYLOSIS: Primary | ICD-10-CM

## 2022-09-20 PROCEDURE — 97140 MANUAL THERAPY 1/> REGIONS: CPT | Performed by: PHYSICAL THERAPIST

## 2022-09-21 NOTE — PROGRESS NOTES
Physical Therapy Daily Treatment Note      Patient: Luh Horner   : 1955  Referring practitioner: Daniel Shafer DO  Date of Initial Visit: Type: THERAPY  Noted: 8/3/2022  Today's Date: 2022  Patient seen for 6 sessions       Visit Diagnoses:    ICD-10-CM ICD-9-CM   1. Lumbar spondylosis  M47.816 721.3       Subjective Evaluation    History of Present Illness  Mechanism of injury: The pt stated that her back pain was increased today and reported that she has had back spasms most of the day. She continues to manage these with stretching but has not been able to head them off proactively. She remains frustrated by the ongoing symptoms despite HEP performance and continued participation in her exercise class.     Pain  Current pain ratin  Location: LBP           Objective   See Exercise, Manual, and Modality Logs for complete treatment.       Assessment & Plan     Assessment    Assessment details: The pt reported severe pain in the low back today and experienced mm spasms to the back throughout treatment. Several interventions were attempted to reduce mm tension and back pain but nothing was tolerated particularly well today and no significant improvement in pain was made in-visit. She did tolerate most flexion based exercise well but required frequent change in position to limit cramping. Joint mobs were somewhat successful in reducing pain but no major difference was noted after treatment.     Plan  Plan details: Assess response to flexion based exercise and continue manual therapy.          Timed:         Manual Therapy:    25     mins  36361;     Therapeutic Exercise:    5     mins  06742;     Neuromuscular Brennan:    0    mins  80723;    Therapeutic Activity:     0     mins  48269;     Gait Trainin     mins  11738;     Ultrasound:     0     mins  62483;    Ionto                               0    mins   85633  Self Care                       0     mins   47179  [facility]     0     mins 90429      Un-Timed:  Electrical Stimulation:    0     mins  49433 ( );  Dry Needling     0     mins self-pay  Traction     0     mins 38886      Timed Treatment:   30   mins   Total Treatment:     60   mins    Thierry Vega, PT  [place] License: [id number]

## 2022-10-02 DIAGNOSIS — I10 ESSENTIAL HYPERTENSION: ICD-10-CM

## 2022-10-02 DIAGNOSIS — K21.9 GASTROESOPHAGEAL REFLUX DISEASE WITHOUT ESOPHAGITIS: ICD-10-CM

## 2022-10-03 ENCOUNTER — TREATMENT (OUTPATIENT)
Dept: PHYSICAL THERAPY | Facility: CLINIC | Age: 67
End: 2022-10-03

## 2022-10-03 DIAGNOSIS — M41.26 OTHER IDIOPATHIC SCOLIOSIS, LUMBAR REGION: ICD-10-CM

## 2022-10-03 DIAGNOSIS — M47.816 LUMBAR SPONDYLOSIS: Primary | ICD-10-CM

## 2022-10-03 PROCEDURE — 97110 THERAPEUTIC EXERCISES: CPT | Performed by: PHYSICAL THERAPIST

## 2022-10-03 PROCEDURE — 97140 MANUAL THERAPY 1/> REGIONS: CPT | Performed by: PHYSICAL THERAPIST

## 2022-10-03 RX ORDER — HYDROCHLOROTHIAZIDE 25 MG/1
TABLET ORAL
Qty: 30 TABLET | Refills: 1 | Status: SHIPPED | OUTPATIENT
Start: 2022-10-03 | End: 2022-12-27 | Stop reason: SDUPTHER

## 2022-10-03 RX ORDER — OMEPRAZOLE 20 MG/1
CAPSULE, DELAYED RELEASE ORAL
Qty: 30 CAPSULE | Refills: 0 | Status: SHIPPED | OUTPATIENT
Start: 2022-10-03 | End: 2022-11-11 | Stop reason: SDUPTHER

## 2022-10-03 RX ORDER — CITALOPRAM 20 MG/1
TABLET ORAL
Qty: 30 TABLET | Refills: 1 | Status: SHIPPED | OUTPATIENT
Start: 2022-10-03 | End: 2022-11-29

## 2022-10-03 RX ORDER — SUCRALFATE 1 G/1
TABLET ORAL
Qty: 60 TABLET | Refills: 1 | Status: SHIPPED | OUTPATIENT
Start: 2022-10-03 | End: 2022-12-27 | Stop reason: SDUPTHER

## 2022-10-03 RX ORDER — ATORVASTATIN CALCIUM 20 MG/1
TABLET, FILM COATED ORAL
Qty: 30 TABLET | Refills: 1 | Status: SHIPPED | OUTPATIENT
Start: 2022-10-03 | End: 2022-12-27 | Stop reason: SDUPTHER

## 2022-10-03 RX ORDER — LISINOPRIL 40 MG/1
TABLET ORAL
Qty: 30 TABLET | Refills: 1 | Status: SHIPPED | OUTPATIENT
Start: 2022-10-03 | End: 2022-12-27 | Stop reason: SDUPTHER

## 2022-10-03 NOTE — PROGRESS NOTES
Physical Therapy Reassessment      Patient: Luh Horner   : 1955  Referring practitioner: Daniel Shafer DO  Date of Initial Visit: Type: THERAPY  Noted: 8/3/2022  Today's Date: 10/3/2022  Patient seen for 7 sessions       Visit Diagnoses:    ICD-10-CM ICD-9-CM   1. Lumbar spondylosis  M47.816 721.3   2. Other idiopathic scoliosis, lumbar region  M41.26 737.30       Subjective Evaluation    History of Present Illness  Mechanism of injury: The pt stated that her back pain has been relatively unchanged with PT. She has had less cramping in the back this week but reported it comes and goes. She is interested in having an MRI and seeking care from a specialist. She denied R hip pain and feels this has resolved. She feels confident managing this independently if pain returns.    Pain  Current pain ratin  Location: LBP           Objective          Palpation   Left   Hypertonic in the erector spinae, lumbar paraspinals and quadratus lumborum.   Tenderness of the erector spinae, lumbar paraspinals and quadratus lumborum.     Right   Hypertonic in the erector spinae, lumbar paraspinals and quadratus lumborum. Tenderness of the erector spinae, lumbar paraspinals and quadratus lumborum.     Tenderness     Lumbar Spine  Tenderness in the spinous process (throughout lumblar spine; worse superiorly).     Additional Tenderness Details  Hypomobility of lower lumbar spine with PA assessment    Neurological Testing     Sensation     Lumbar   Left   Intact: light touch    Right   Intact: light touch    Reflexes   Left   Patellar (L4): normal (2+)  Achilles (S1): normal (2+)    Right   Patellar (L4): normal (2+)  Achilles (S1): normal (2+)    Active Range of Motion     Additional Active Range of Motion Details  Lumbar flexion: 0 cm to the floor   Lumbar extension: 50%  R Lateral flexion: 5 cm to KJL  L Lateral flexion: 2 cm to KJL  R  Rotation: 30 deg  L Rotation: 35 deg    R sided LBP with R rotation and RSB  R sided pain with R quadrant    Strength/Myotome Testing     Left Hip   Planes of Motion   Flexion: 4+  Extension: 4-  Abduction: 4-  External rotation: 4+    Right Hip   Planes of Motion   Flexion: 4+  Extension: 4-  Abduction: 4-  External rotation: 4+    Left Knee   Flexion: 5  Extension: 5    Right Knee   Flexion: 5  Extension: 5    Left Ankle/Foot   Dorsiflexion: 5  Plantar flexion: 5  Great toe extension: 5    Right Ankle/Foot   Dorsiflexion: 5  Plantar flexion: 5  Great toe extension: 5      See Exercise, Manual, and Modality Logs for complete treatment.       Assessment & Plan     Assessment  Impairments: abnormal gait, abnormal muscle firing, abnormal or restricted ROM, activity intolerance, impaired balance, impaired physical strength, lacks appropriate home exercise program and pain with function  Functional Limitations: lifting, walking, uncomfortable because of pain, standing, stooping and unable to perform repetitive tasks  Assessment details: The patient responded well to initial interventions for acute on chronic R hip pain and saw a rapid reduction in symptoms back to the patient's baseline. Care was transitioned to her low back but interventions were challenging to perform due to severe cramping in the low back during manual therapy and attempted exercise for multiple visits. Manual interventions were attempted in prone today but were not tolerated due to cramping, so they were performed in sidelying and were tolerated better but no immediate improvement in symptoms was observed. She saw some improvement in glute strength with exercise in the last month, but most objective measures for the low back were unchanged in that time frame. She is interested in taking a break from PT and pursuing advanced imaging and neurosurgery consult.    Prognosis: good    Goals  Plan Goals: Short Term Goals (4 weeks):     1. The patient will  be independent and compliant with initial HEP. Met     2. The patient will report pain at rest 0/10 or less and worst pain 5/10 or less. Ongoing     3. The patient will display decreased TTP in the R glute med and dec mm tension in the surrounding musculature. Met     4. The patient will demonstrate inc strength evidenced by MMT as follows: hip abd 4/5, hip ext 4/5, hip ER 4+/5, and hip IR 4+/5. Ongoing     5. LEFS will improve by 9 points or more. BASIA assessed but MDC not met        Long Term Goals (8 weeks):     1. The patient will be appropriate for independent management and compliant with progressed HEP. Ongoing     2. The patient will report pain at rest 0/10 or less and worst pain 2/10 or less. Ongoing     3. The patient will return to work duties and/or ADLs with no limitations due to hip pain or dysfunction. Ongoing     4. The patient will return to recreational and community activities with no limitations due to hip pain or dysfunction. Ongoing       Plan  Therapy options: will be seen for skilled therapy services  Planned modality interventions: cryotherapy, electrical stimulation/Russian stimulation, iontophoresis, TENS and thermotherapy (hydrocollator packs)  Planned therapy interventions: ADL retraining, body mechanics training, balance/weight-bearing training, flexibility, functional ROM exercises, gait training, home exercise program, joint mobilization, manual therapy, neuromuscular re-education, postural training, soft tissue mobilization, strengthening, stretching, therapeutic activities and transfer training  Frequency: 1x week  Duration in visits: 8  Duration in weeks: 8  Treatment plan discussed with: patient  Plan details: The pt will attempted independent management as she weeks additional imaging and specialist consult. She is to return as needed if symptoms worsen. If no visits are scheduled in the next 30 days, this will serve as a d/c note with all ongoing goal assumed to be not met.            Timed:         Manual Therapy:    10     mins  94999;     Therapeutic Exercise:    35     mins  85565;     Neuromuscular Brennan:    0    mins  74151;    Therapeutic Activity:     0     mins  60625;     Gait Trainin     mins  53330;     Ultrasound:     0     mins  69368;    Ionto                               0    mins   42519  Self Care                       0     mins   62224  Canalith Repos    0     mins 79981      Un-Timed:  Electrical Stimulation:    0     mins  70965 ( );  Dry Needling     0     mins self-pay  Traction     0     mins 87343      Timed Treatment:   45   mins   Total Treatment:     45   mins    Thierry Vega PT  [place] License: [id number]

## 2022-10-05 ENCOUNTER — OFFICE VISIT (OUTPATIENT)
Dept: FAMILY MEDICINE CLINIC | Facility: CLINIC | Age: 67
End: 2022-10-05

## 2022-10-05 VITALS
BODY MASS INDEX: 32.73 KG/M2 | OXYGEN SATURATION: 97 % | SYSTOLIC BLOOD PRESSURE: 122 MMHG | HEART RATE: 98 BPM | TEMPERATURE: 97.7 F | WEIGHT: 173.14 LBS | DIASTOLIC BLOOD PRESSURE: 68 MMHG

## 2022-10-05 DIAGNOSIS — G89.29 CHRONIC BILATERAL LOW BACK PAIN WITHOUT SCIATICA: ICD-10-CM

## 2022-10-05 DIAGNOSIS — Z23 IMMUNIZATION DUE: ICD-10-CM

## 2022-10-05 DIAGNOSIS — M47.816 LUMBAR SPONDYLOSIS: ICD-10-CM

## 2022-10-05 DIAGNOSIS — M50.30 DDD (DEGENERATIVE DISC DISEASE), CERVICAL: ICD-10-CM

## 2022-10-05 DIAGNOSIS — Z23 NEED FOR INFLUENZA VACCINATION: Primary | ICD-10-CM

## 2022-10-05 DIAGNOSIS — M54.50 CHRONIC BILATERAL LOW BACK PAIN WITHOUT SCIATICA: ICD-10-CM

## 2022-10-05 PROCEDURE — 99213 OFFICE O/P EST LOW 20 MIN: CPT | Performed by: FAMILY MEDICINE

## 2022-10-05 PROCEDURE — 0124A COVID-19 (PFIZER) BIVALENT BOOSTER 12+YRS: CPT | Performed by: FAMILY MEDICINE

## 2022-10-05 PROCEDURE — 90662 IIV NO PRSV INCREASED AG IM: CPT | Performed by: FAMILY MEDICINE

## 2022-10-05 PROCEDURE — 91312 COVID-19 (PFIZER) BIVALENT BOOSTER 12+YRS: CPT | Performed by: FAMILY MEDICINE

## 2022-10-05 PROCEDURE — G0008 ADMIN INFLUENZA VIRUS VAC: HCPCS | Performed by: FAMILY MEDICINE

## 2022-10-05 RX ORDER — HYDROCODONE BITARTRATE AND ACETAMINOPHEN 5; 325 MG/1; MG/1
.5-1 TABLET ORAL DAILY PRN
Qty: 30 TABLET | Refills: 0 | Status: SHIPPED | OUTPATIENT
Start: 2022-10-05 | End: 2022-11-22 | Stop reason: SDUPTHER

## 2022-10-05 RX ORDER — SODIUM, POTASSIUM,MAG SULFATES 17.5-3.13G
1 SOLUTION, RECONSTITUTED, ORAL ORAL TAKE AS DIRECTED
Qty: 354 ML | Refills: 0 | Status: SHIPPED | OUTPATIENT
Start: 2022-10-05 | End: 2023-01-17

## 2022-10-05 RX ORDER — POLYETHYLENE GLYCOL-3350 AND ELECTROLYTES 236; 6.74; 5.86; 2.97; 22.74 G/274.31G; G/274.31G; G/274.31G; G/274.31G; G/274.31G
POWDER, FOR SOLUTION ORAL
COMMUNITY
Start: 2022-08-17 | End: 2022-12-29

## 2022-10-05 NOTE — PROGRESS NOTES
Established Patient Office Visit      Patient Name: Luh Horner  : 1955   MRN: [id number]   Care Team: Patient Care Team:  Daniel Shafer DO as PCP - General (Family Medicine)  Felice Lopez MD as Consulting Physician (Gastroenterology)    Chief Complaint:    Chief Complaint   Patient presents with   • Back Pain   • Hip Pain     Pt complains of continuing back and hip pain.  Previously tried PT, not helpful       History of Present Illness: Luh Horner is a 66 y.o. female who is here today for chief complaint.    Back Pain  This is a chronic problem. The current episode started more than 1 year ago. The problem occurs constantly. The problem has been gradually worsening since onset. The pain is present in the sacro-iliac. The quality of the pain is described as aching. The pain does not radiate. The pain is at a severity of 7/10. The symptoms are aggravated by bending and standing. Stiffness is present in the morning. Associated symptoms include weakness. Pertinent negatives include no abdominal pain, bladder incontinence, bowel incontinence, chest pain, dysuria, fever, headaches, leg pain, numbness, paresis, paresthesias, pelvic pain, perianal numbness, tingling or weight loss. Risk factors include history of cancer, menopause and obesity.       The patient presents today for follow-up regarding bilateral hip and lower back pain. She has been attending physical therapy for the past 3 months and was last seen here in 2022. The patient had x-ray imaging of the lumbar spine in 2019.     The patient reports continued back pain but improvement in her neck pain. She shares that she has to sit every 5 minutes when cooking or exercising in the morning and denies her pain radiating down her legs. She states her pain is worse in her hips than back and is in need of a refill on Norco. She would like an influenza vaccination as well as a COVID-19 booster injection.     This patient is  accompanied by their self who contributes to the history of their care.    The following portions of the patient's history were reviewed and updated as appropriate: allergies, current medications, past family history, past medical history, past social history, past surgical history and problem list.    Subjective      Review of Systems:   Review of Systems   Constitutional: Negative for fever and unexpected weight loss.   Cardiovascular: Negative for chest pain.   Gastrointestinal: Negative for abdominal pain and bowel incontinence.   Genitourinary: Negative for dysuria, pelvic pain and urinary incontinence.   Musculoskeletal: Positive for back pain.   Neurological: Positive for weakness. Negative for tingling, numbness and paresthesias.    - See HPI    Past Medical History:   Past Medical History:   Diagnosis Date   • Allergic rhinitis    • Bone pain     foot   • Cancer (HCC) 2005    Skin cancer   • Cataract 2010    Surgery recently   • Depression 1981 1981 - Hospitalized   • Fibrocystic breast disease 2000    Current benign breast biopsies   • GERD (gastroesophageal reflux disease) 2001   • Glaucoma Surgery recently   • Headache 2021    None this year   • Hiatal hernia    • Hyperlipidemia    • Hypertension 1997   • Insomnia 2013   • Irritable bowel syndrome (IBS) 2002    Recurrent abdominal cramps   • Low back pain 2008   • Lumbar scoliosis 2017   • Lumbar spondylosis 2000    Chronic low back pain   • Mitral valve insufficiency    • Obesity 2000   • Osteoarthritis    • Osteopenia    • Post menopausal syndrome 2004    Refractory hot flashes       Past Surgical History:   Past Surgical History:   Procedure Laterality Date   • BREAST BIOPSY Right remote    benign disease   • CHOLECYSTECTOMY  1998   • COLONOSCOPY  ?   • HYSTERECTOMY  1998   • REFRACTIVE SURGERY  1994    [facility]   • URETHRAL SUSPENSION  2011    laparoscopic for stress incontinence       Family History:   Family History   Problem Relation Age of Onset   •  Pulmonary fibrosis Mother          age 82   • Hypertension Mother    • Lumbar disc disease Mother    • Prostate cancer Father          age 76   • Hypertension Sister            • Goiter Sister    • Hypothyroidism Sister    • Anxiety disorder Sister    • Heart disease Brother          Age 48 heart attack was cigarette smoker   • Hiatal hernia Brother    • Breast cancer Other         Maternal and paternal aunts   • Hypertension Brother    • Obesity Maternal Uncle    • Diabetes Maternal Uncle    • Arthritis Paternal Aunt    • Hypertension Brother        Social History:   Social History     Socioeconomic History   • Marital status:    Tobacco Use   • Smoking status: Former     Packs/day: 2.00     Years: 10.00     Pack years: 20.00     Types: Cigarettes     Start date: 1971     Quit date: 1981     Years since quittin.8   • Smokeless tobacco: Never   Vaping Use   • Vaping Use: Never used   Substance and Sexual Activity   • Alcohol use: Yes     Alcohol/week: 3.0 standard drinks     Types: 3 Glasses of wine per week     Comment: 3 glasses a wine day   • Drug use: No   • Sexual activity: Defer       Tobacco History:   Social History     Tobacco Use   Smoking Status Former   • Packs/day: 2.00   • Years: 10.00   • Pack years: 20.00   • Types: Cigarettes   • Start date: 1971   • Quit date: 1981   • Years since quittin.8   Smokeless Tobacco Never       Medications:     Current Outpatient Medications:   •  amLODIPine (NORVASC) 10 MG tablet, Take 1 tablet by mouth every night at bedtime., Disp: 30 tablet, Rfl: 2  •  atorvastatin (LIPITOR) 20 MG tablet, TAKE ONE TABLET BY MOUTH EVERY NIGHT AT BEDTIME, Disp: 30 tablet, Rfl: 1  •  citalopram (CeleXA) 20 MG tablet, TAKE ONE TABLET BY MOUTH DAILY, Disp: 30 tablet, Rfl: 1  •  Coenzyme Q10 (CO Q-10) 200 MG capsule, Take 1 capsule by mouth., Disp: , Rfl:   •  dicyclomine (Bentyl) 10 MG capsule, Take 1 capsule by mouth 4 (Four) Times a  Day Before Meals & at Bedtime., Disp: 120 capsule, Rfl: 1  •  estradiol (MINIVELLE, VIVELLE-DOT) 0.075 MG/24HR patch, Place  on the skin., Disp: , Rfl:   •  fluticasone (FLONASE) 50 MCG/ACT nasal spray, SPRAY TWO SPRAYS IN EACH NOSTRIL ONCE DAILY, Disp: 16 mL, Rfl: 1  •  GaviLyte-G 236 g solution, , Disp: , Rfl:   •  hydroCHLOROthiazide (HYDRODIURIL) 25 MG tablet, TAKE ONE TABLET BY MOUTH EVERY MORNING, Disp: 30 tablet, Rfl: 1  •  HYDROcodone-acetaminophen (NORCO) 5-325 MG per tablet, Take 0.5-1 tablets by mouth Daily As Needed for Moderate Pain., Disp: 30 tablet, Rfl: 0  •  lisinopril (PRINIVIL,ZESTRIL) 40 MG tablet, TAKE ONE TABLET BY MOUTH EVERY MORNING, Disp: 30 tablet, Rfl: 1  •  magnesium oxide (MAGOX) 400 (241.3 Mg) MG tablet tablet, Take 1 tablet by mouth., Disp: , Rfl:   •  melatonin 5 MG tablet tablet, Take 1 tablet by mouth Every Night., Disp: , Rfl:   •  Omega-3 Fatty Acids (FISH OIL) 1000 MG capsule capsule, Take  by mouth., Disp: , Rfl:   •  omeprazole (priLOSEC) 20 MG capsule, TAKE ONE CAPSULE BY MOUTH DAILY, Disp: 30 capsule, Rfl: 0  •  sodium-potassium-magnesium sulfates (Suprep Bowel Prep Kit) 17.5-3.13-1.6 GM/177ML solution oral solution, Take 1 bottle by mouth Take As Directed. Follow instructions that were mailed to your home. If you didn't receive these call [phone]., Disp: 354 mL, Rfl: 0  •  sucralfate (CARAFATE) 1 g tablet, TAKE ONE TABLET BY MOUTH EVERY 12 HOURS, Disp: 60 tablet, Rfl: 1  •  valACYclovir (VALTREX) 500 MG tablet, Take 1 tablet by mouth Daily., Disp: , Rfl:   •  vitamin C (ASCORBIC ACID) 500 MG tablet, Take  by mouth daily., Disp: , Rfl:   •  albuterol sulfate  (90 Base) MCG/ACT inhaler, Inhale 2 puffs Every 4 (Four) Hours As Needed for Wheezing or Shortness of Air., Disp: 8 g, Rfl: 1  •  clobetasol (TEMOVATE) 0.05 % cream, As Needed., Disp: , Rfl:   •  diclofenac (VOLTAREN) 1 % gel gel, Apply 4 g topically to the appropriate area as directed 4 (Four) Times a Day  As Needed (back pain)., Disp: 100 g, Rfl: 0  •  methocarbamol (Robaxin) 500 MG tablet, Take 1 tablet by mouth 3 (Three) Times a Day., Disp: 90 tablet, Rfl: 0  •  nystatin (MYCOSTATIN) 738427 UNIT/GM cream, Apply  topically As Needed (itch)., Disp: 30 g, Rfl: 1    Allergies:   Allergies   Allergen Reactions   • Shellfish Allergy      Head congestion   • Atenolol      Fatigue   • Buspirone       Headache   • Codeine      Depression   • Pseudoephedrine Anxiety   • Rosuvastatin      Fatigue   • Westley-E.P.A. [Dha-Epa-Vitamin E] Anxiety   • Simvastatin      Facial numbness   • Trazodone      Nightmares       Objective   Objective     Physical Exam:  Vital Signs:   Vitals:    10/05/22 1049   BP: 122/68   BP Location: Left arm   Patient Position: Sitting   Cuff Size: Adult   Pulse: 98   Temp: 97.7 °F (36.5 °C)   TempSrc: Infrared   SpO2: 97%   Weight: 78.5 kg (173 lb 2.2 oz)   PainSc:   7     Body mass index is 32.73 kg/m².     Physical Exam  Nursing note reviewed  Const: NAD, A&Ox4, Pleasant, Cooperative  Eyes: EOMI, no conjunctivitis  ENT: No nasal discharge present, neck supple  Cardiac: Regular rate and rhythm, no cyanosis  Resp: Respiratory rate within normal limits, no increased work of breathing, no audible wheezing or retractions noted  GI: No distention or ascites  MSK: Motor and sensation grossly intact in bilateral upper extremities  Neurologic: CN II-XII grossly intact  Psych: Appropriate mood and behavior.  Skin: Warm, dry  Procedures/Radiology     Procedures  No radiology results for the last 7 days     Assessment & Plan   Assessment / Plan      Assessment/Plan:   Problems Addressed This Visit  Diagnoses and all orders for this visit:    1. Need for influenza vaccination (Primary)  -     Fluzone High-Dose 65+yrs (1924-2181)    2. Lumbar spondylosis  -     HYDROcodone-acetaminophen (NORCO) 5-325 MG per tablet; Take 0.5-1 tablets by mouth Daily As Needed for Moderate Pain.  Dispense: 30 tablet; Refill: 0  -      MRI Lumbar Spine Without Contrast; Future    3. DDD (degenerative disc disease), cervical  Comments:  Orders for an MRI of the lumbar spine and refill of Norco have been placed.     4. Chronic bilateral low back pain without sciatica    5. Immunization due  Comments:  The patient will receive the influenza vaccine and a COVID-19 booster injection.  Orders:  -     COVID-19 Bivalent Booster (Pfizer) 12+yrs      Problem List Items Addressed This Visit        Musculoskeletal and Injuries    Low back pain       Neuro    Lumbar spondylosis    Relevant Medications    HYDROcodone-acetaminophen (NORCO) 5-325 MG per tablet    Other Relevant Orders    MRI Lumbar Spine Without Contrast   Other Visit Diagnoses     Need for influenza vaccination    -  Primary    Relevant Orders    Fluzone High-Dose 65+yrs (2000-5561) (Completed)    DDD (degenerative disc disease), cervical        Orders for an MRI of the lumbar spine and refill of Norco have been placed.     Immunization due        The patient will receive the influenza vaccine and a COVID-19 booster injection.    Relevant Orders    COVID-19 Bivalent Booster (Pfizer) 12+yrs (Completed)            There are no Patient Instructions on file for this visit.    Follow Up:   No follow-ups on file.    MDM       [facility] [address]  [facility]  [address]  [facility] [address]  Fax [phone]  Phone [phone]     Transcribed from ambient dictation for Daniel Shafer DO by Haider Richardson.  10/05/22   11:45 EDT    I have personally performed the services described in this document as transcribed by the above individual, and it is both accurate and complete.  Patient verbalized consent to the visit recording.

## 2022-10-19 ENCOUNTER — OUTSIDE FACILITY SERVICE (OUTPATIENT)
Dept: GASTROENTEROLOGY | Facility: CLINIC | Age: 67
End: 2022-10-19

## 2022-10-19 PROCEDURE — G0105 COLORECTAL SCRN; HI RISK IND: HCPCS | Performed by: INTERNAL MEDICINE

## 2022-10-26 DIAGNOSIS — I10 PRIMARY HYPERTENSION: ICD-10-CM

## 2022-10-27 RX ORDER — AMLODIPINE BESYLATE 10 MG/1
TABLET ORAL
Qty: 30 TABLET | Refills: 2 | Status: SHIPPED | OUTPATIENT
Start: 2022-10-27 | End: 2022-12-27 | Stop reason: SDUPTHER

## 2022-10-31 ENCOUNTER — HOSPITAL ENCOUNTER (OUTPATIENT)
Dept: MRI IMAGING | Facility: HOSPITAL | Age: 67
Discharge: HOME | End: 2022-10-31
Admitting: FAMILY MEDICINE

## 2022-10-31 DIAGNOSIS — M47.816 LUMBAR SPONDYLOSIS: ICD-10-CM

## 2022-10-31 PROCEDURE — 72148 MRI LUMBAR SPINE W/O DYE: CPT

## 2022-10-31 RX ORDER — FLUTICASONE PROPIONATE 50 MCG
SPRAY, SUSPENSION (ML) NASAL
Qty: 16 ML | Refills: 1 | Status: SHIPPED | OUTPATIENT
Start: 2022-10-31 | End: 2022-12-27 | Stop reason: SDUPTHER

## 2022-11-07 ENCOUNTER — PATIENT MESSAGE (OUTPATIENT)
Dept: FAMILY MEDICINE CLINIC | Facility: CLINIC | Age: 67
End: 2022-11-07

## 2022-11-11 DIAGNOSIS — K21.9 GASTROESOPHAGEAL REFLUX DISEASE WITHOUT ESOPHAGITIS: ICD-10-CM

## 2022-11-12 RX ORDER — OMEPRAZOLE 20 MG/1
20 CAPSULE, DELAYED RELEASE ORAL DAILY
Qty: 30 CAPSULE | Refills: 4 | Status: SHIPPED | OUTPATIENT
Start: 2022-11-12 | End: 2022-12-27 | Stop reason: SDUPTHER

## 2022-11-15 ENCOUNTER — TELEPHONE (OUTPATIENT)
Dept: FAMILY MEDICINE CLINIC | Facility: CLINIC | Age: 67
End: 2022-11-15

## 2022-11-18 ENCOUNTER — TELEPHONE (OUTPATIENT)
Dept: FAMILY MEDICINE CLINIC | Facility: CLINIC | Age: 67
End: 2022-11-18

## 2022-11-18 NOTE — TELEPHONE ENCOUNTER
PT Called stated that she is wanting to discuss her MRI results with  for further recommendations on what she should do. Please advise

## 2022-11-22 DIAGNOSIS — M47.816 LUMBAR SPONDYLOSIS: ICD-10-CM

## 2022-11-22 RX ORDER — HYDROCODONE BITARTRATE AND ACETAMINOPHEN 5; 325 MG/1; MG/1
.5-1 TABLET ORAL DAILY PRN
Qty: 30 TABLET | Refills: 0 | Status: SHIPPED | OUTPATIENT
Start: 2022-11-22 | End: 2023-01-10 | Stop reason: SDUPTHER

## 2022-11-22 NOTE — TELEPHONE ENCOUNTER
Rx Refill Note  Requested Prescriptions     Pending Prescriptions Disp Refills   • HYDROcodone-acetaminophen (NORCO) 5-325 MG per tablet 30 tablet 0     Sig: Take 0.5-1 tablets by mouth Daily As Needed for Moderate Pain.      Last office visit with prescribing clinician: 10/5/2022      Next office visit with prescribing clinician: Visit date not found            Michelle Rowan MA  11/22/22, 09:54 EST

## 2022-11-24 ENCOUNTER — PRIOR AUTHORIZATION (OUTPATIENT)
Dept: FAMILY MEDICINE CLINIC | Facility: CLINIC | Age: 67
End: 2022-11-24

## 2022-11-28 ENCOUNTER — PATIENT MESSAGE (OUTPATIENT)
Dept: FAMILY MEDICINE CLINIC | Facility: CLINIC | Age: 67
End: 2022-11-28

## 2022-11-28 DIAGNOSIS — M48.00 CENTRAL STENOSIS OF SPINAL CANAL: Primary | ICD-10-CM

## 2022-11-29 RX ORDER — CITALOPRAM 20 MG/1
TABLET ORAL
Qty: 90 TABLET | Refills: 1 | Status: SHIPPED | OUTPATIENT
Start: 2022-11-29 | End: 2022-12-27 | Stop reason: SDUPTHER

## 2022-11-29 NOTE — TELEPHONE ENCOUNTER
Rx Refill Note  Requested Prescriptions     Pending Prescriptions Disp Refills   • citalopram (CeleXA) 20 MG tablet [Pharmacy Med Name: CITALOPRAM HBR 20 MG TABLET] 30 tablet 1     Sig: TAKE ONE TABLET BY MOUTH DAILY      Last office visit with prescribing clinician: 10/5/2022   Next office visit with prescribing clinician: Visit date not found     Juli Elena MA  11/29/22, 10:01 EST

## 2022-12-08 ENCOUNTER — OFFICE VISIT (OUTPATIENT)
Dept: FAMILY MEDICINE CLINIC | Facility: CLINIC | Age: 67
End: 2022-12-08

## 2022-12-08 VITALS
TEMPERATURE: 97.7 F | BODY MASS INDEX: 31.95 KG/M2 | DIASTOLIC BLOOD PRESSURE: 82 MMHG | WEIGHT: 169 LBS | HEART RATE: 87 BPM | SYSTOLIC BLOOD PRESSURE: 138 MMHG | OXYGEN SATURATION: 98 %

## 2022-12-08 DIAGNOSIS — M48.00 CENTRAL STENOSIS OF SPINAL CANAL: Primary | ICD-10-CM

## 2022-12-08 DIAGNOSIS — M47.816 LUMBAR SPONDYLOSIS: ICD-10-CM

## 2022-12-08 PROCEDURE — 99213 OFFICE O/P EST LOW 20 MIN: CPT | Performed by: FAMILY MEDICINE

## 2022-12-19 NOTE — PROGRESS NOTES
Established Patient Office Visit      Patient Name: Luh Horner  : 1955   MRN: [id number]   Care Team: Patient Care Team:  Daniel Shafer DO as PCP - General (Family Medicine)  Felice Lopez MD as Consulting Physician (Gastroenterology)    Chief Complaint:    Chief Complaint   Patient presents with   • Back Pain     Follow-up after MRI due to back pain       History of Present Illness: Luh Horner is a 67 y.o. female who is here today for chief complaint.    Back Pain  This is a recurrent problem. The current episode started more than 1 year ago. The problem occurs daily. The problem has been gradually worsening since onset. The pain is present in the sacro-iliac. The quality of the pain is described as aching. The pain does not radiate. The pain is at a severity of 5/10. The pain is worse during the day. The symptoms are aggravated by standing. Stiffness is present in the morning. Pertinent negatives include no abdominal pain, bladder incontinence, bowel incontinence, chest pain, dysuria, fever, headaches, leg pain, numbness, paresis, paresthesias, pelvic pain, perianal numbness, tingling, weakness or weight loss. Risk factors include menopause and obesity.     This patient is accompanied by their self who contributes to the history of their care.    The following portions of the patient's history were reviewed and updated as appropriate: allergies, current medications, past family history, past medical history, past social history, past surgical history and problem list.    Subjective      Review of Systems:   Review of Systems   Constitutional: Negative for fever and unexpected weight loss.   Cardiovascular: Negative for chest pain.   Gastrointestinal: Negative for abdominal pain and bowel incontinence.   Genitourinary: Negative for dysuria, pelvic pain and urinary incontinence.   Musculoskeletal: Positive for back pain.   Neurological: Negative for tingling, weakness, numbness  and paresthesias.    - See HPI    Past Medical History:   Past Medical History:   Diagnosis Date   • Allergic rhinitis    • Bone pain     foot   • Cancer (HCC)     Skin cancer   • Cataract 2010    Surgery recently   • Depression 1981 - Hospitalized   • Fibrocystic breast disease     Current benign breast biopsies   • GERD (gastroesophageal reflux disease)    • Glaucoma Surgery recently   • Headache     None this year   • Hiatal hernia    • Hyperlipidemia    • Hypertension    • Insomnia    • Irritable bowel syndrome (IBS)     Recurrent abdominal cramps   • Low back pain    • Lumbar scoliosis    • Lumbar spondylosis     Chronic low back pain   • Mitral valve insufficiency    • Obesity    • Osteoarthritis    • Osteopenia    • Post menopausal syndrome     Refractory hot flashes       Past Surgical History:   Past Surgical History:   Procedure Laterality Date   • BREAST BIOPSY Right remote    benign disease   • CHOLECYSTECTOMY     • COLONOSCOPY  ?   • HYSTERECTOMY     • REFRACTIVE SURGERY      [facility]   • URETHRAL SUSPENSION      laparoscopic for stress incontinence       Family History:   Family History   Problem Relation Age of Onset   • Pulmonary fibrosis Mother          age 82   • Hypertension Mother    • Lumbar disc disease Mother    • Prostate cancer Father          age 76   • Hypertension Sister            • Goiter Sister    • Hypothyroidism Sister    • Anxiety disorder Sister    • Heart disease Brother          Age 48 heart attack was cigarette smoker   • Hiatal hernia Brother    • Breast cancer Other         Maternal and paternal aunts   • Hypertension Brother    • Obesity Maternal Uncle    • Diabetes Maternal Uncle    • Arthritis Paternal Aunt    • Hypertension Brother        Social History:   Social History     Socioeconomic History   • Marital status:    Tobacco Use   • Smoking status: Former     Packs/day: 2.00      Years: 10.00     Pack years: 20.00     Types: Cigarettes     Start date: 1971     Quit date: 1981     Years since quittin.9   • Smokeless tobacco: Never   Vaping Use   • Vaping Use: Never used   Substance and Sexual Activity   • Alcohol use: Yes     Alcohol/week: 3.0 standard drinks     Types: 3 Glasses of wine per week     Comment: 3 glasses a wine day   • Drug use: No   • Sexual activity: Defer       Tobacco History:   Social History     Tobacco Use   Smoking Status Former   • Packs/day: 2.00   • Years: 10.00   • Pack years: 20.00   • Types: Cigarettes   • Start date: 1971   • Quit date: 1981   • Years since quittin.9   Smokeless Tobacco Never       Medications:     Current Outpatient Medications:   •  amLODIPine (NORVASC) 10 MG tablet, TAKE ONE TABLET BY MOUTH EVERY NIGHT AT BEDTIME, Disp: 30 tablet, Rfl: 2  •  atorvastatin (LIPITOR) 20 MG tablet, TAKE ONE TABLET BY MOUTH EVERY NIGHT AT BEDTIME, Disp: 30 tablet, Rfl: 1  •  citalopram (CeleXA) 20 MG tablet, TAKE ONE TABLET BY MOUTH DAILY, Disp: 90 tablet, Rfl: 1  •  estradiol (MINIVELLE, VIVELLE-DOT) 0.075 MG/24HR patch, Place  on the skin., Disp: , Rfl:   •  fluticasone (FLONASE) 50 MCG/ACT nasal spray, SPRAY 2 SPRAYS IN EACH NOSTRIL DAILY, Disp: 16 mL, Rfl: 1  •  hydroCHLOROthiazide (HYDRODIURIL) 25 MG tablet, TAKE ONE TABLET BY MOUTH EVERY MORNING, Disp: 30 tablet, Rfl: 1  •  HYDROcodone-acetaminophen (NORCO) 5-325 MG per tablet, Take 0.5-1 tablets by mouth Daily As Needed for Moderate Pain., Disp: 30 tablet, Rfl: 0  •  lisinopril (PRINIVIL,ZESTRIL) 40 MG tablet, TAKE ONE TABLET BY MOUTH EVERY MORNING, Disp: 30 tablet, Rfl: 1  •  magnesium oxide (MAGOX) 400 (241.3 Mg) MG tablet tablet, Take 1 tablet by mouth., Disp: , Rfl:   •  melatonin 5 MG tablet tablet, Take 1 tablet by mouth Every Night., Disp: , Rfl:   •  methocarbamol (Robaxin) 500 MG tablet, Take 1 tablet by mouth 3 (Three) Times a Day., Disp: 90 tablet, Rfl: 0  •  nystatin  (MYCOSTATIN) 516222 UNIT/GM cream, Apply  topically As Needed (itch)., Disp: 30 g, Rfl: 1  •  Omega-3 Fatty Acids (FISH OIL) 1000 MG capsule capsule, Take  by mouth., Disp: , Rfl:   •  omeprazole (priLOSEC) 20 MG capsule, Take 1 capsule by mouth Daily., Disp: 30 capsule, Rfl: 4  •  sucralfate (CARAFATE) 1 g tablet, TAKE ONE TABLET BY MOUTH EVERY 12 HOURS, Disp: 60 tablet, Rfl: 1  •  valACYclovir (VALTREX) 500 MG tablet, Take 1 tablet by mouth Daily., Disp: , Rfl:   •  vitamin C (ASCORBIC ACID) 500 MG tablet, Take  by mouth daily., Disp: , Rfl:   •  clobetasol (TEMOVATE) 0.05 % cream, As Needed., Disp: , Rfl:   •  Coenzyme Q10 (CO Q-10) 200 MG capsule, Take 1 capsule by mouth., Disp: , Rfl:   •  diclofenac (VOLTAREN) 1 % gel gel, Apply 4 g topically to the appropriate area as directed 4 (Four) Times a Day As Needed (back pain)., Disp: 100 g, Rfl: 0  •  dicyclomine (Bentyl) 10 MG capsule, Take 1 capsule by mouth 4 (Four) Times a Day Before Meals & at Bedtime., Disp: 120 capsule, Rfl: 1  •  GaviLyte-G 236 g solution, , Disp: , Rfl:   •  sodium-potassium-magnesium sulfates (Suprep Bowel Prep Kit) 17.5-3.13-1.6 GM/177ML solution oral solution, Take 1 bottle by mouth Take As Directed. Follow instructions that were mailed to your home. If you didn't receive these call [phone]., Disp: 354 mL, Rfl: 0    Allergies:   Allergies   Allergen Reactions   • Shellfish Allergy      Head congestion   • Atenolol      Fatigue   • Buspirone       Headache   • Codeine      Depression   • Pseudoephedrine Anxiety   • Rosuvastatin      Fatigue   • Westley-E.P.A. [Dha-Epa-Vitamin E] Anxiety   • Simvastatin      Facial numbness   • Trazodone      Nightmares       Objective   Objective     Physical Exam:  Vital Signs:   Vitals:    12/08/22 1450   BP: 138/82   BP Location: Left arm   Patient Position: Sitting   Cuff Size: Adult   Pulse: 87   Temp: 97.7 °F (36.5 °C)   TempSrc: Infrared   SpO2: 98%   Weight: 76.7 kg (169 lb)     Body mass index  is 31.95 kg/m².     Physical Exam  Nursing note reviewed  Const: NAD, A&Ox4, Pleasant, Cooperative  Eyes: EOMI, no conjunctivitis  ENT: No nasal discharge present, neck supple  Cardiac: Regular rate and rhythm, no cyanosis  Resp: Respiratory rate within normal limits, no increased work of breathing, no audible wheezing or retractions noted  GI: No distention or ascites  MSK: Motor and sensation grossly intact in bilateral upper extremities  Neurologic: CN II-XII grossly intact  Psych: Appropriate mood and behavior.  Skin: Warm, dry  Procedures/Radiology     Procedures  No radiology results for the last 30 days.    Assessment & Plan   Assessment / Plan      Assessment/Plan:   Problems Addressed This Visit  Diagnoses and all orders for this visit:    1. Central stenosis of spinal canal (Primary)    2. Lumbar spondylosis      Problem List Items Addressed This Visit        Neuro    Lumbar spondylosis   Other Visit Diagnoses     Central stenosis of spinal canal    -  Primary        Referral to neurosurgery placed in previous encounter    There are no Patient Instructions on file for this visit.    Follow Up:   No follow-ups on file.    [address]  [facility]  [address]  [facility] [address]  Fax [phone]  Phone [phone]

## 2022-12-27 DIAGNOSIS — K21.9 GASTROESOPHAGEAL REFLUX DISEASE WITHOUT ESOPHAGITIS: ICD-10-CM

## 2022-12-27 DIAGNOSIS — I10 PRIMARY HYPERTENSION: ICD-10-CM

## 2022-12-27 DIAGNOSIS — I10 ESSENTIAL HYPERTENSION: ICD-10-CM

## 2022-12-27 RX ORDER — FLUTICASONE PROPIONATE 50 MCG
2 SPRAY, SUSPENSION (ML) NASAL DAILY
Qty: 16 ML | Refills: 1 | Status: SHIPPED | OUTPATIENT
Start: 2022-12-27 | End: 2023-02-07

## 2022-12-27 RX ORDER — CITALOPRAM 20 MG/1
20 TABLET ORAL DAILY
Qty: 90 TABLET | Refills: 1 | Status: SHIPPED | OUTPATIENT
Start: 2022-12-27

## 2022-12-27 RX ORDER — ATORVASTATIN CALCIUM 20 MG/1
20 TABLET, FILM COATED ORAL
Qty: 30 TABLET | Refills: 1 | Status: SHIPPED | OUTPATIENT
Start: 2022-12-27 | End: 2023-02-07

## 2022-12-27 RX ORDER — SUCRALFATE 1 G/1
1 TABLET ORAL EVERY 12 HOURS
Qty: 60 TABLET | Refills: 1 | Status: SHIPPED | OUTPATIENT
Start: 2022-12-27 | End: 2023-02-07

## 2022-12-27 RX ORDER — HYDROCHLOROTHIAZIDE 25 MG/1
25 TABLET ORAL EVERY MORNING
Qty: 30 TABLET | Refills: 1 | Status: SHIPPED | OUTPATIENT
Start: 2022-12-27 | End: 2023-02-07

## 2022-12-27 RX ORDER — LISINOPRIL 40 MG/1
40 TABLET ORAL EVERY MORNING
Qty: 30 TABLET | Refills: 1 | Status: SHIPPED | OUTPATIENT
Start: 2022-12-27 | End: 2023-02-07

## 2022-12-28 RX ORDER — AMLODIPINE BESYLATE 10 MG/1
10 TABLET ORAL
Qty: 30 TABLET | Refills: 2 | Status: SHIPPED | OUTPATIENT
Start: 2022-12-28 | End: 2023-03-07

## 2022-12-28 NOTE — TELEPHONE ENCOUNTER
Rx Refill Note  Requested Prescriptions     Pending Prescriptions Disp Refills   • amLODIPine (NORVASC) 10 MG tablet 30 tablet 2     Sig: Take 1 tablet by mouth every night at bedtime.      Last office visit with prescribing clinician: 12/6/2021   Last telemedicine visit with prescribing clinician: 3/16/2023   Next office visit with prescribing clinician: Visit date not found                         Would you like a call back once the refill request has been completed: [] Yes [] No    If the office needs to give you a call back, can they leave a voicemail: [] Yes [] No    Estephania Lloyd MA  12/28/22, 08:22 EST

## 2022-12-29 ENCOUNTER — OFFICE VISIT (OUTPATIENT)
Dept: NEUROSURGERY | Facility: CLINIC | Age: 67
End: 2022-12-29

## 2022-12-29 VITALS
WEIGHT: 173 LBS | BODY MASS INDEX: 32.66 KG/M2 | HEIGHT: 61 IN | TEMPERATURE: 97.3 F | DIASTOLIC BLOOD PRESSURE: 82 MMHG | SYSTOLIC BLOOD PRESSURE: 140 MMHG

## 2022-12-29 DIAGNOSIS — R29.2 HYPERREFLEXIA: ICD-10-CM

## 2022-12-29 DIAGNOSIS — M48.062 SPINAL STENOSIS OF LUMBAR REGION WITH NEUROGENIC CLAUDICATION: Primary | ICD-10-CM

## 2022-12-29 DIAGNOSIS — M51.36 DDD (DEGENERATIVE DISC DISEASE), LUMBAR: ICD-10-CM

## 2022-12-29 PROCEDURE — 99204 OFFICE O/P NEW MOD 45 MIN: CPT | Performed by: NEUROLOGICAL SURGERY

## 2022-12-29 NOTE — PROGRESS NOTES
NAME: RIMMA CASTILLO   DOS: 2022  : 1955  PCP: Daniel Shafer DO    Chief Complaint:    Chief Complaint   Patient presents with   • Low back & bilateral hip pain       History of Present Illness:  67 y.o. female   I saw this 67-year-old in neurosurgical consultation.  She presents with quite young attitude.  She has some questions about stiff man syndrome but really her symptoms center around low back bilateral buttock hip pain.  She has neurogenic claudication she is done extensive walking housework etc. and physical therapy to no avail and denies any bowel bladder issues she notes generalized gait dysfunction as well she feels like 1 leg is shorter than the other and does have some urinary spasticity symptoms she is otherwise reasonably healthy and is here for evaluation    PMHX  Allergies:  Allergies   Allergen Reactions   • Shellfish Allergy      Head congestion   • Atenolol      Fatigue   • Buspirone       Headache   • Codeine      Depression   • Pseudoephedrine Anxiety   • Rosuvastatin      Fatigue   • Westley-E.P.A. [Dha-Epa-Vitamin E] Anxiety   • Simvastatin      Facial numbness   • Trazodone      Nightmares     Medications    Current Outpatient Medications:   •  amLODIPine (NORVASC) 10 MG tablet, Take 1 tablet by mouth every night at bedtime., Disp: 30 tablet, Rfl: 2  •  atorvastatin (LIPITOR) 20 MG tablet, Take 1 tablet by mouth every night at bedtime., Disp: 30 tablet, Rfl: 1  •  citalopram (CeleXA) 20 MG tablet, Take 1 tablet by mouth Daily., Disp: 90 tablet, Rfl: 1  •  clobetasol (TEMOVATE) 0.05 % cream, As Needed., Disp: , Rfl:   •  Coenzyme Q10 (CO Q-10) 200 MG capsule, Take 1 capsule by mouth., Disp: , Rfl:   •  diclofenac (VOLTAREN) 1 % gel gel, Apply 4 g topically to the appropriate area as directed 4 (Four) Times a Day As Needed (back pain)., Disp: 100 g, Rfl: 0  •  dicyclomine (Bentyl) 10 MG capsule, Take 1 capsule by mouth 4 (Four) Times a Day Before Meals & at Bedtime.,  Disp: 120 capsule, Rfl: 1  •  estradiol (MINIVELLE, VIVELLE-DOT) 0.075 MG/24HR patch, Place  on the skin., Disp: , Rfl:   •  fluticasone (FLONASE) 50 MCG/ACT nasal spray, 2 sprays by Each Nare route Daily., Disp: 16 mL, Rfl: 1  •  hydroCHLOROthiazide (HYDRODIURIL) 25 MG tablet, Take 1 tablet by mouth Every Morning., Disp: 30 tablet, Rfl: 1  •  HYDROcodone-acetaminophen (NORCO) 5-325 MG per tablet, Take 0.5-1 tablets by mouth Daily As Needed for Moderate Pain., Disp: 30 tablet, Rfl: 0  •  lisinopril (PRINIVIL,ZESTRIL) 40 MG tablet, Take 1 tablet by mouth Every Morning., Disp: 30 tablet, Rfl: 1  •  magnesium oxide (MAGOX) 400 (241.3 Mg) MG tablet tablet, Take 1 tablet by mouth., Disp: , Rfl:   •  melatonin 5 MG tablet tablet, Take 1 tablet by mouth Every Night., Disp: , Rfl:   •  methocarbamol (Robaxin) 500 MG tablet, Take 1 tablet by mouth 3 (Three) Times a Day., Disp: 90 tablet, Rfl: 0  •  nystatin (MYCOSTATIN) 063628 UNIT/GM cream, Apply  topically As Needed (itch)., Disp: 30 g, Rfl: 1  •  Omega-3 Fatty Acids (FISH OIL) 1000 MG capsule capsule, Take  by mouth., Disp: , Rfl:   •  omeprazole (priLOSEC) 20 MG capsule, Take 1 capsule by mouth Daily., Disp: 30 capsule, Rfl: 4  •  sodium-potassium-magnesium sulfates (Suprep Bowel Prep Kit) 17.5-3.13-1.6 GM/177ML solution oral solution, Take 1 bottle by mouth Take As Directed. Follow instructions that were mailed to your home. If you didn't receive these call [phone]., Disp: 354 mL, Rfl: 0  •  sucralfate (CARAFATE) 1 g tablet, Take 1 tablet by mouth Every 12 (Twelve) Hours., Disp: 60 tablet, Rfl: 1  •  valACYclovir (VALTREX) 500 MG tablet, Take 1 tablet by mouth Daily., Disp: , Rfl:   •  vitamin C (ASCORBIC ACID) 500 MG tablet, Take  by mouth daily., Disp: , Rfl:   Past Medical History:  Past Medical History:   Diagnosis Date   • Allergic rhinitis    • Bone pain     foot   • Cancer (HCC) 2005    Skin cancer   • Cataract 2010    Surgery recently   • CTS (carpal  tunnel syndrome)    • Depression 1981 - Hospitalized   • Fibrocystic breast disease     Current benign breast biopsies   • GERD (gastroesophageal reflux disease)    • Glaucoma Surgery recently   • Headache     None this year   • Hiatal hernia    • Hyperlipidemia    • Hypertension    • Insomnia    • Irritable bowel syndrome (IBS)     Recurrent abdominal cramps   • Low back pain 2008   • Lumbar scoliosis 2017   • Lumbar spondylosis     Chronic low back pain   • Lumbosacral disc disease    • Mitral valve insufficiency    • Obesity    • Osteoarthritis    • Osteopenia    • Post menopausal syndrome     Refractory hot flashes   • Thoracic disc disorder      Past Surgical History:  Past Surgical History:   Procedure Laterality Date   • BREAST BIOPSY Right remote    benign disease   • CHOLECYSTECTOMY     • COLONOSCOPY  ?   • HYSTERECTOMY     • REFRACTIVE SURGERY      RK   • TRIGGER POINT INJECTION      Knee   • URETHRAL SUSPENSION      laparoscopic for stress incontinence     Social Hx:  Social History     Tobacco Use   • Smoking status: Former     Packs/day: 2.00     Years: 10.00     Pack years: 20.00     Types: Cigarettes     Start date: 1971     Quit date: 1981     Years since quittin.0   • Smokeless tobacco: Never   Vaping Use   • Vaping Use: Never used   Substance Use Topics   • Alcohol use: Yes     Alcohol/week: 3.0 standard drinks     Types: 3 Glasses of wine per week     Comment: 3 glasses a wine day   • Drug use: No     Family Hx:  Family History   Problem Relation Age of Onset   • Pulmonary fibrosis Mother          age 82   • Hypertension Mother    • Lumbar disc disease Mother    • Prostate cancer Father          age 76   • Hypertension Sister            • Goiter Sister    • Hypothyroidism Sister    • Anxiety disorder Sister    • Miscarriages / Stillbirths Sister    • Thyroid disease Sister    • Heart disease Brother           Age 48 heart attack was cigarette smoker   • Hiatal hernia Brother    • Breast cancer Other         Maternal and paternal aunts   • Hypertension Brother    • Obesity Maternal Uncle    • Diabetes Maternal Uncle    • Arthritis Paternal Aunt    • Hypertension Brother    • Hyperlipidemia Brother      Review of Systems:        Review of Systems   Constitutional: Negative for activity change, appetite change, chills, diaphoresis, fatigue, fever and unexpected weight change.   HENT: Positive for congestion, postnasal drip, sinus pressure and sneezing. Negative for dental problem, drooling, ear discharge, ear pain, facial swelling, hearing loss, mouth sores, nosebleeds, rhinorrhea, sinus pain, sore throat, tinnitus, trouble swallowing and voice change.    Eyes: Negative for photophobia, pain, discharge, redness, itching and visual disturbance.   Respiratory: Negative for apnea, cough, choking, chest tightness, shortness of breath, wheezing and stridor.    Cardiovascular: Negative for chest pain, palpitations and leg swelling.   Gastrointestinal: Positive for nausea. Negative for abdominal distention, abdominal pain, anal bleeding, blood in stool, constipation, diarrhea, rectal pain and vomiting.   Endocrine: Negative for cold intolerance, heat intolerance, polydipsia, polyphagia and polyuria.   Genitourinary: Positive for frequency, genital sores, urgency and vaginal pain. Negative for decreased urine volume, difficulty urinating, dyspareunia, dysuria, enuresis, flank pain, hematuria, menstrual problem, pelvic pain, vaginal bleeding and vaginal discharge.   Musculoskeletal: Positive for back pain, gait problem and neck stiffness. Negative for arthralgias, joint swelling, myalgias and neck pain.   Skin: Positive for rash. Negative for color change, pallor and wound.   Allergic/Immunologic: Positive for environmental allergies. Negative for food allergies and immunocompromised state.   Neurological: Positive for  numbness and headaches. Negative for dizziness, tremors, seizures, syncope, facial asymmetry, speech difficulty, weakness and light-headedness.   Hematological: Negative for adenopathy. Does not bruise/bleed easily.   Psychiatric/Behavioral: Positive for sleep disturbance. Negative for agitation, behavioral problems, confusion, decreased concentration, dysphoric mood, hallucinations, self-injury and suicidal ideas. The patient is nervous/anxious. The patient is not hyperactive.    I have reviewed this note template and all pertinent parts of the review of systems social, family history, surgical history and medication list        Physical Examination:  Vitals:    12/29/22 0958   BP: 140/82   Temp: 97.3 °F (36.3 °C)      General Appearance:   Well developed, well nourished, well groomed, alert, and cooperative.  Neurological examination:  Neurologic Exam    Vital signs were reviewed and documented in the chart  Patient appeared in good neurologic function with normal comprehension fluent speech  Mood and affect are normal  Sense of smell deferred  COVID mass left in place        Muscle bulk and tone normal  5 out of 5 strength no motor drift  Gait normal antalgic and stooped forward  Negative Romberg  She has a trace Oliver's on the left there may be some intrinsic atrophy bilaterally    Reflexes she is markedly brisk bilaterally with a few beats of sustained clonus on the left side  No edema noted and extremities skin appears normal  Gait is somewhat wide-based  Straight leg raise sign absent  No signs of intrinsic hip dysfunction  Back is without any lesions or abnormality  Feet are warm and well perfused      Review of Imaging/DATA:  MRI of the lumbar spine demonstrates L4-5 lumbar spinal stenosis with what I considered to be moderate to severe central canal stenosis.  She interestingly has a curvature coronally on the  images of the L1-2 area with some fatty replacement and disc osteophyte complex with  "lateral recess stenosis but it seems mostly open  Diagnoses/Plan:    Ms. Horner is a 67 y.o. female   1.  Mildly spastic gait few beats of clonus on the left side suspect occult myelopathy \"carpal tunnel \"diagnosis as well as a history of neck pain over the summer would recommend MRI cervical spine for further work-up I suspect it may be normal but better x-ray does show significant spondylosis  2.  Gait instability neurogenic claudication high-grade spinal stenosis L4-5.  I explained the risk benefits and expected outcome of major elective surgery for their problem, complications from approach, and infection, the risk of neurologic implications after surgery as well as need for repeat surgeries and most importantly failure to achieve quality of life improvement from the surgery to the patient.  I think she is a candidate for MIS laminectomy if she done listhesis I check a lumbar flexion-extension film and hip x-rays  3.  L1 to coronal deformity lateral recess may cause some of her thoracolumbar symptomatology I think right now that would be on the back burner as it would involve more invasive procedure    We will also continue to try some physical therapy and see her back I made a referral to interventional pain management she is not really interested in surgery right now but I suspect she would do well with MIS laminectomy if her lumbar flexion-extension film and hip x-rays are unremarkable    Its been a pleasure to provide neurosurgical consultation    "

## 2022-12-29 NOTE — TELEPHONE ENCOUNTER
Rx Refill Note  Requested Prescriptions     Pending Prescriptions Disp Refills   • omeprazole (priLOSEC) 20 MG capsule 30 capsule 4     Sig: Take 1 capsule by mouth Daily.      Last office visit with prescribing clinician: Visit date not found   Last telemedicine visit with prescribing clinician: 12/27/2022   Next office visit with prescribing clinician: Visit date not found                         Would you like a call back once the refill request has been completed: [] Yes [] No    If the office needs to give you a call back, can they leave a voicemail: [] Yes [] No    Latia Hardin MA  12/29/22, 08:20 EST     LVM stating there are 4 refills at pt pharmacy. Stated for pt to give us a call back if pharmacy did not have those refills.     HUB MAY RELAY MESSAGE AND DOCUMENT.

## 2022-12-30 RX ORDER — OMEPRAZOLE 20 MG/1
20 CAPSULE, DELAYED RELEASE ORAL DAILY
Qty: 30 CAPSULE | Refills: 4 | Status: SHIPPED | OUTPATIENT
Start: 2022-12-30

## 2023-01-10 DIAGNOSIS — M47.816 LUMBAR SPONDYLOSIS: ICD-10-CM

## 2023-01-11 RX ORDER — HYDROCODONE BITARTRATE AND ACETAMINOPHEN 5; 325 MG/1; MG/1
.5-1 TABLET ORAL DAILY PRN
Qty: 30 TABLET | Refills: 0 | Status: SHIPPED | OUTPATIENT
Start: 2023-01-11 | End: 2023-03-23 | Stop reason: SDUPTHER

## 2023-01-13 NOTE — PROGRESS NOTES
"Chief Complaint: \"Low back and bilateral hip pain\"          History of Present Illness:   Patient: Ms. Luh Horner, 67 y.o. female   Referring Physician: Dr. Jonel Browning  Reason for Referral: Consultation for chronic intractable lower back and bilateral hip pain.   Pain History: Patient reports a longstanding history of lower back pain, bilateral hip pain, and neurogenic claudication, which began without incident, refractory to conservative measures. MRI of the lumbar spine without contrast on 10/31/2022 revealed multilevel spondylosis. Levoconvex curvature of the thoracolumbar junction. Degenerative endplate signal changes along the right aspect of the T12-L1 disc. Multilevel degenerative changes with disc bulges, facet hypertrophy, ligamentum flavum hypertrophy, epidural lipomatosis contributing to various degrees of canal and NF stenosis, most significant at L3-L4 Moderate to severe spinal canal stenosis. Mild-to-moderate left and mild right neuroforaminal stenosis. At L4-L5: Circumferential disc bulge, severe bilateral facet arthropathy, ligamentum flavum hypertrophy. Epidural lipomatosis. Severe spinal canal stenosis. Moderate to severe bilateral neuroforaminal stenosis. At L5-S1: Mild spinal canal stenosis. Severe bilateral neuroforaminal stenosis. Luh Horner underwent neurosurgical consultation with Dr. Jonel Browning on 12/29/2022, and reported no interest in prospects of surgical intervention. Dr. Browning recommended MRI of the cervical spine, flexion-extension films of the lumbar spine, as well as hip x-rays. He advised the possibility of lumbar laminectomy if flexion-extension films and hip x-rays were unremarkable. Patient has failed to obtain pain relief with conservative measures for more than 5 years including oral analgesics, opioids, topical analgesics, ice, heat, several rounds of physical therapy (last visit in 9/2022/no relief), chiropractic therapy, HEP physical therapist " directed home exercise program (ongoing), independent exercise program (ongoing at the [facility]), water aerobics, to name a few. Pain has progressed in intensity over the past years.  Pain Description: Constant lower back and bilateral hip pain with intermittent exacerbation, described as aching, burning, dull, sharp, shooting, stabbing, throbbing, tight band and tingling sensation.   Radiation of Pain: The pain radiates into the hips   Pain intensity today: 5/10   Average pain intensity last week: 5/10  Pain intensity ranges from: 1/10 to 8/10  Aggravating factors: Pain increases with bending, twisting, standing, walking. Patient describes neurogenic claudication at about 100 yards. Patient does not use a cane or walker  Alleviating factors: Pain decreases with sitting down, lying down  Associated Symptoms:   Patient denies pain, numbness, or weakness in the lower extremities.   Patient denies any new bladder or bowel problems.   Patient denies difficulties with her balance or recent falls.   Pain interferes with ADLs, general activities (ability to walk, stand, transition from different positions), and affects patient's quality of life  Pain does not interfere with sleep   Muscle spasms in the right paravertebral region RT>LT  Stiffness in the AM    Review of previous therapies and additional medical records:  Luh Horner has already failed the following measures, including:   Conservative Measures: Oral analgesics, opioids, topical analgesics, ice, heat, several rounds of physical therapy (last visit in 9/2022/no relief), chiropractic therapy, HEP physical therapist directed home exercise program (ongoing), independent exercise program (ongoing at the [facility]), water aerobics  Interventional Measures: None  Surgical Measures: No history of previous cervical spine, lumbar spine or hip surgery    Luh Horner underwent neurosurgical consultation with Dr. Jonel Browning on 12/29/2022,  and reported no interest in prospects of surgical intervention.    Luh Horner presents with significant comorbidities including anxiety and depression, GERD, headache, hyperlipidemia, hypertension, insomnia IBS, mitral valve insufficiency, obesity, osteopenia, migraine without aura and without status migrainosus, not intractable  In terms of current analgesics, Luh Horner takes: Diclofenac gel, methocarbamol, Norco. Patient also takes citalopram, melatonin 5 mg nightly. Patient failed trials with gabapentin in the past (SE)  I have reviewed Joshua Report consistent with medication reconciliation.  SOAPP: Low Risk     PHQ-2 Depression Screening  Little interest or pleasure in doing things? 0-->not at all   Feeling down, depressed, or hopeless? 0-->not at all   PHQ-2 Total Score 0     Patient screened negative for depression based on a PHQ-2 score of 0 on 01/17/2023.   Patient reports remission of depression with current medications    Global Pain Scale 01-17 2023          Pain 10          Feelings 8          Clinical outcomes 10          Activities 13          GPS Total: 41            The [place] Back Pain Disability Scale   DATE 01-17 2023          Sleep through the night 3          Turn over in bed 0          Get out of bed 0          Make your bed 0          Put on socks (pantyhose) 0          Ride in a car 2          Sit in a chair for several hours 3          Stand up for 20-30 minutes 4          Climb one flight of stairs 1          Walk a few blocks (200-300 yards)  2          Walk several miles 5          Run one block (about 50 yards) 5          Take food out of the refrigerator 0          Reach up to high shelves 1          Move a chair 2          Pull or push heavy doors 2          Bend over to clean the bathtub 4          Throw a ball 1          Carry two bags of groceries 0          Lift and carry a heavy suitcase 3          Total score 43            Review of Diagnostic Studies:  I have  reviewed and interpreted the images with the patient and used the images and a tridimensional spine model to explain findings. I have reviewed the report, as well.  MRI OF THE LUMBAR SPINE WITHOUT CONTRAST ON 10/31/2022 revealed levoconvex curvature of the thoracolumbar junction. Degenerative endplate signal changes along the right aspect of the T12-L1 disc. Vertebral body heights are normal. Cauda equina and conus medullaris with normal morphology and signal intensity. The conus terminates at the inferior endplate of L1. Multilevel degenerative changes. Axial imaging:  T12-L1: Circumferential disc bulge. Mild spinal canal stenosis. No significant neuroforaminal stenosis.   L1-L2: Right eccentric circumferential disc bulge. Mild spinal canal stenosis. Moderate right-sided neuroforaminal stenosis.  L2-L3: Circumferential disc bulge. Epidural lipomatosis. Ligamentum flavum hypertrophy. Moderate spinal canal stenosis. Mild bilateral neuroforaminal stenosis  L3-L4: Circumferential disc bulge. Ligamentum flavum hypertrophy. Epidural lipomatosis. Moderate to severe spinal canal stenosis. Mild-to-moderate left and mild right neuroforaminal stenosis.  L4-L5: Circumferential disc bulge, severe bilateral facet arthropathy, ligamentum flavum hypertrophy. Epidural lipomatosis. Severe spinal canal stenosis. Moderate to severe bilateral neuroforaminal stenosis.  L5-S1: Disc bulge, severe bilateral facet arthropathy, epidural lipomatosis. Mild spinal canal stenosis. Severe bilateral neuroforaminal stenosis.  XR SPINE CERVICAL 2 OR 3 VW-on 7/15/2022 Vertebral body heights and alignment are maintained. Multilevel advanced cervical spondylosis with disc osteophyte complex formation and facet arthropathy, most advanced at C5-C6 and C6-C7.      Review of Systems   Musculoskeletal: Positive for back pain.   All other systems reviewed and are negative.        Patient Active Problem List   Diagnosis   • Atopic rhinitis   • Anxiety   •  Carpal tunnel syndrome   • Clenching of teeth   • Depression   • Sleep disorder   • Gastroesophageal reflux disease   • Hyperlipidemia   • Hypertension   • Low back pain   • Mitral valve insufficiency   • Class 1 obesity due to excess calories with body mass index (BMI) of 32.0 to 32.9 in adult   • Gluteal tendinitis of right buttock   • Postmenopausal disorder   • Preventative health care   • Other chronic pain   • Spondylosis of lumbar region without myelopathy or radiculopathy   • Lumbar scoliosis   • Irritable bowel syndrome   • Migraine without aura and without status migrainosus, not intractable   • Lumbar stenosis with neurogenic claudication   • Ligamentum flavum hypertrophy   • Degeneration of lumbar or lumbosacral intervertebral disc   • Epidural lipomatosis   • Lumbar paraspinal muscle spasm       Past Medical History:   Diagnosis Date   • Allergic rhinitis    • Bone pain     foot   • Cancer (HCC) 2005    Skin cancer   • Cataract 2010    Surgery recently   • Cervical disc disorder 2022   • Chronic pain disorder 2012   • CTS (carpal tunnel syndrome)    • Depression 1981    1981 - Hospitalized   • Fibrocystic breast disease 2000    Current benign breast biopsies   • GERD (gastroesophageal reflux disease) 2001   • Glaucoma Surgery recently   • Headache 2021    None this year   • Hiatal hernia    • Hyperlipidemia    • Hypertension 1997   • Insomnia 2013   • Irritable bowel syndrome (IBS) 2002    Recurrent abdominal cramps   • Joint pain 2012   • Low back pain 2008   • Lumbar scoliosis 2017   • Lumbar spondylosis 2000    Chronic low back pain   • Lumbosacral disc disease    • Migraine 2021   • Mitral valve insufficiency    • Neck pain 2022   • Obesity 2000   • Osteoarthritis    • Osteopenia    • Peripheral neuropathy    • Post menopausal syndrome 2004    Refractory hot flashes   • Spinal stenosis 2022   • Thoracic disc disorder          Past Surgical History:   Procedure Laterality Date   • BREAST BIOPSY Right  remote    benign disease   • CATARACT EXTRACTION, BILATERAL Bilateral    • CHOLECYSTECTOMY     • COLONOSCOPY  ?   • HYSTERECTOMY     • REFRACTIVE SURGERY      RK   • TRIGGER POINT INJECTION      Knee   • URETHRAL SUSPENSION      laparoscopic for stress incontinence         Family History   Problem Relation Age of Onset   • Pulmonary fibrosis Mother          age 82   • Hypertension Mother    • Lumbar disc disease Mother    • Prostate cancer Father          age 76   • Hypertension Sister            • Goiter Sister    • Hypothyroidism Sister    • Anxiety disorder Sister    • Miscarriages / Stillbirths Sister    • Thyroid disease Sister    • Heart disease Brother          Age 48 heart attack was cigarette smoker   • Hiatal hernia Brother    • Breast cancer Other         Maternal and paternal aunts   • Hypertension Brother    • Obesity Maternal Uncle    • Diabetes Maternal Uncle    • Arthritis Paternal Aunt    • Hypertension Brother    • Hyperlipidemia Brother          Social History     Socioeconomic History   • Marital status:    Tobacco Use   • Smoking status: Former     Packs/day: 2.00     Years: 10.00     Pack years: 20.00     Types: Cigarettes     Start date: 1971     Quit date: 1981     Years since quittin.0   • Smokeless tobacco: Never   Vaping Use   • Vaping Use: Never used   Substance and Sexual Activity   • Alcohol use: Yes     Alcohol/week: 3.0 standard drinks     Types: 3 Glasses of wine per week     Comment: 3 glasses a wine day   • Drug use: No   • Sexual activity: Yes     Partners: Male     Birth control/protection: Hysterectomy           Current Outpatient Medications:   •  amLODIPine (NORVASC) 10 MG tablet, Take 1 tablet by mouth every night at bedtime., Disp: 30 tablet, Rfl: 2  •  atorvastatin (LIPITOR) 20 MG tablet, Take 1 tablet by mouth every night at bedtime., Disp: 30 tablet, Rfl: 1  •  citalopram (CeleXA) 20 MG tablet, Take 1 tablet  by mouth Daily., Disp: 90 tablet, Rfl: 1  •  Coenzyme Q10 (CO Q-10) 200 MG capsule, Take 1 capsule by mouth., Disp: , Rfl:   •  dicyclomine (Bentyl) 10 MG capsule, Take 1 capsule by mouth 4 (Four) Times a Day Before Meals & at Bedtime., Disp: 120 capsule, Rfl: 1  •  estradiol (MINIVELLE, VIVELLE-DOT) 0.05 MG/24HR patch, , Disp: , Rfl:   •  fluticasone (FLONASE) 50 MCG/ACT nasal spray, 2 sprays by Each Nare route Daily., Disp: 16 mL, Rfl: 1  •  hydroCHLOROthiazide (HYDRODIURIL) 25 MG tablet, Take 1 tablet by mouth Every Morning., Disp: 30 tablet, Rfl: 1  •  HYDROcodone-acetaminophen (NORCO) 5-325 MG per tablet, Take 0.5-1 tablets by mouth Daily As Needed for Moderate Pain., Disp: 30 tablet, Rfl: 0  •  lisinopril (PRINIVIL,ZESTRIL) 40 MG tablet, Take 1 tablet by mouth Every Morning., Disp: 30 tablet, Rfl: 1  •  magnesium oxide (MAGOX) 400 (241.3 Mg) MG tablet tablet, Take 1 tablet by mouth., Disp: , Rfl:   •  melatonin 5 MG tablet tablet, Take 1 tablet by mouth Every Night., Disp: , Rfl:   •  methocarbamol (Robaxin) 500 MG tablet, Take 1 tablet by mouth 3 (Three) Times a Day., Disp: 90 tablet, Rfl: 0  •  Omega-3 Fatty Acids (FISH OIL) 1000 MG capsule capsule, Take  by mouth., Disp: , Rfl:   •  omeprazole (priLOSEC) 20 MG capsule, Take 1 capsule by mouth Daily., Disp: 30 capsule, Rfl: 4  •  sucralfate (CARAFATE) 1 g tablet, Take 1 tablet by mouth Every 12 (Twelve) Hours., Disp: 60 tablet, Rfl: 1  •  triamcinolone (KENALOG) 0.025 % ointment, APPLY 1 APPLICATION TOPICALLY TO THE AFFECTED AREA(S) 2 TIMES A DAY AS NEEDED, Disp: , Rfl:   •  valACYclovir (VALTREX) 500 MG tablet, Take 1 tablet by mouth Daily., Disp: , Rfl:   •  vitamin C (ASCORBIC ACID) 500 MG tablet, Take  by mouth daily., Disp: , Rfl:       Allergies   Allergen Reactions   • Shellfish Allergy      Head congestion   • Atenolol      Fatigue   • Buspirone       Headache   • Codeine      Depression   • Pseudoephedrine Anxiety   • Rosuvastatin      Fatigue   •  "Westley-E.P.A. [Dha-Epa-Vitamin E] Anxiety   • Simvastatin      Facial numbness   • Trazodone      Nightmares         Pulse 80   Temp 95.9 °F (35.5 °C)   Ht 154.9 cm (61\")   Wt 77.5 kg (170 lb 12.8 oz)   SpO2 96%   BMI 32.27 kg/m²       Physical Exam:  Constitutional: Patient appears well-developed, well-nourished, well-hydrated, appears younger than stated age  HEENT: Head: Normocephalic and atraumatic  Eyes: Conjunctivae and lids are normal  Pupils: Equal, round, reactive to light  Peripheral vascular exam: Femoral: right 2+, left 2+. Posterior tibialis: right 2+ and left 2+. Dorsalis pedis: right 2+ and left 2+. No edema.   Musculoskeletal   Gait and station: Gait evaluation demonstrated a normal gait. Able to walk on heels, toes, tandem walking   Lumbar spine: Passive and active range of motion are limited secondary to pain. Extension, rotation of the lumbar spine increased and reproduced pain. Lumbar facet joint loading maneuvers are positive.  Don test and Gaenslen's test are negative   Piriformis maneuvers are negative   Hip joints: The range of motion of the hip joints is almost full and without pain   Palpation of the bilateral greater trochanter, unrevealing   Examination of the Iliotibial band: unrevealing   Neurological:   Patient is alert and oriented to person, place, and time.   Speech: Normal.   Cortical function: Normal mental status.   Reflex Scores:  Right brachioradialis: 3+  Left brachioradialis: 3+  Right biceps: 3+  Left biceps: 3+  Right triceps: 3+  Left triceps: 3+  Right patellar: 3+  Left patellar: 3+  Right Achilles: 3+  Left Achilles: 3+  Motor strength: 5/5  Motor Tone: Normal  Involuntary movements: None.   Superficial/Primitive Reflexes: Primitive reflexes were absent.   Right Carter: Absent  Left Carter: Absent  Right ankle clonus: Absent  Left ankle clonus: Absent   Babinsky: Absent  Spurling sign: Negative. Neck tornado test: Negative. Lhermitte sign: Negative. Long tract " signs: Negative. Straight leg raising test: Negative. Femoral stretch sign: Negative.   Sensory exam: Intact to light touch, intact pain and temperature sensation, intact vibration sensation and normal proprioception.   Coordination: Normal finger to nose. Normal balance and negative Romberg's sign   Skin and subcutaneous tissue: Skin is warm and intact. No rash noted. No cyanosis.   Psychiatric: Judgment and insight: Normal. Recent and remote memory: Intact. Mood and affect: Normal.     Luh Horner   1955    ASSESSMENT:   1. Spondylosis of lumbar region without myelopathy or radiculopathy    2. Lumbar paraspinal muscle spasm    3. Degeneration of lumbar or lumbosacral intervertebral disc    4. Epidural lipomatosis    5. Ligamentum flavum hypertrophy    6. Lumbar stenosis with neurogenic claudication    7. Other idiopathic scoliosis, lumbar region    8. Class 1 obesity due to excess calories with body mass index (BMI) of 32.0 to 32.9 in adult, unspecified whether serious comorbidity present    9. Migraine without aura and without status migrainosus, not intractable    10. Sleep disorder    11. Anxiety    12. Other depression          PLAN/MEDICAL DECISION MAKING:  Ms. Luh Horner, 67 y.o. female presents with a longstanding history of chronic progressive mechanical lower back pain, which began without incident and has been refractory to conservative measures. She also reports some neurogenic claudication. She denies radicular symptoms. MRI of the lumbar spine without contrast on 10/31/2022 revealed multilevel spondylosis. Levoconvex curvature of the thoracolumbar junction. Degenerative endplate signal changes along the right aspect of the T12-L1 disc. Multilevel degenerative changes with disc bulges, facet hypertrophy, ligamentum flavum hypertrophy, epidural lipomatosis contributing to various degrees of canal and NF stenosis, most significant at L3-L4 Moderate to severe spinal canal stenosis.  Mild-to-moderate left and mild right neuroforaminal stenosis. At L4-L5: Circumferential disc bulge, severe bilateral facet arthropathy, ligamentum flavum hypertrophy. Epidural lipomatosis. Severe spinal canal stenosis. Moderate to severe bilateral neuroforaminal stenosis. At L5-S1: Mild spinal canal stenosis. Severe bilateral neuroforaminal stenosis. Luh Horner underwent neurosurgical consultation with Dr. Jonel Browning on 12/29/2022, and reported no interest in prospects of surgical intervention. Dr. Browning recommended MRI of the cervical spine, flexion-extension films of the lumbar spine, as well as hip x-rays. He advised the possibility of lumbar laminectomy if flexion-extension films and hip x-rays were unremarkable. Patient has failed to obtain pain relief with conservative measures for more than 5 years including oral analgesics, opioids, topical analgesics, ice, heat, several rounds of physical therapy (last visit in 9/2022/no relief), chiropractic therapy, HEP physical therapist directed home exercise program (ongoing), independent exercise program (ongoing at the [facility]), water aerobics, to name a few. Pain has progressed in intensity over the past years. A comprehensive evaluation including history and physical exam along with pertinent physiologic and functional assessment was performed. Patient presents with intractable pain due to the diagnoses listed above. Patient has failed to respond to conservative modalities, as referenced under HPI including the impact of patient's moderate-to-severe pain contributing to significant impairment in daily activities, ADLs, and a negative impact on quality of life. Supporting diagnostic studies of patient's chronic pain condition have been reviewed. I have reviewed all available patient's medical records as well as previous therapies as referenced above. I had a lengthy conversation with Ms. Luh Horner regarding her chronic pain condition  and potential therapeutic options including risks, benefits, alternative therapies, to name a few. We have discussed using a stepwise approach starting with the shortest or least intense level of treatment, care, or service as determined by the extent required to diagnose and or treat patient's condition. The treatments proposed are consistent with the patient's medical condition and are known to be as safe and effective by current guidelines and standard of care. There is no evidence of absolute contraindications for the proposed procedures under the current circumstances. These treatments are not considered experimental or investigational. The duration and frequency proposed are considered appropriate for the service in accordance with accepted standards of medical practice for the diagnosis and or treatment of the patient's condition and or intended to improve the patient's level of function. These services will be furnished in a setting appropriate to the patient's medical needs and condition. Therefore, I have proposed the following plan:  1. Interventional pain management measures: Patient presents with chronic unrelenting moderate to severe axial mechanical lumbar spine facetogenic pain without evidence of underlying or untreated radiculopathy and that causes functional deficit measured on pain scales and or functional and disability scales. Pain has been present for several years. Luh Horner average pain level for the past months has been rated as 5/10) ranging from 3/10 to 8/10. Patient has failed to obtain pain relief or functional improvement from conservative measures, as referenced under HPI. Pain assessment has been performed and documented at baseline, and will be reassessed after each diagnostic procedure using the same pain scale for each assessment. A disability scale has also been obtained at baseline and will be used for functional assessment.  Diagnostic interventions will be performed  without sedation or with the use of light sedation (but without the use of opioids) depending on patient's specific medical requirements. For radiofrequency procedures; we may proceed without sedation or with various degrees of sedation according to patient's specific requirements. All bilateral procedures will be done in one encounter. Patient will be scheduled for a first set of diagnostic bilateral lumbar medial branch blocks at L2, L3, L4; for bilateral lumbar facet joints at L3-L4, and L4-L5, to clarify the origin of chronic refractory mechanical lower back pain. If patient experiences 80% or more relief along with significant functional improvement and increase in the the range of motion of the lumbar spine, then, patient will be scheduled for a second set of diagnostic bilateral lumbar medial branch blocks, to then, proceed with bilateral lumbar medial branch rhizotomies. If patient experiences 50% or more pain relief and functional improvement for at least six months, we could repeat the procedure. If more than 2 years elapse since last RFTC, then, patient will be required to undergo diagnostic blocks prior to repeating RFTC. Otherwise, we may proceed with diagnostic and therapeutic bilateral L4-L5 transforaminal epidural steroid injections using the lowest effective dose of steroids, under C-arm fluoroscopic guidance, with the use of contrast dye (unless contraindicated) to confirm appropriate needle placement and spread of contrast dye. We may repeat therapeutic bilateral L4-L5 transforaminal epidural steroid injections depending on patient's outcome.  As per current guidelines, epidurals will be limited to a maximum of 4 sessions per spinal region in a rolling twelve (12) month period. Continuation of epidural steroid injections over 12 months would only be considered under the following provisions;  • Patient is a high-risk surgical candidate, or the patient does not desire surgery, or recurrence of pain  in the same location relieved with ESIs for at least three months and epidural provides at least 50% sustained improvement of pain and/or 50% objective improvement in function (using same scale as baseline)  • Pain is severe enough to cause a significant degree of functional disability or vocational disability  • The primary care provider will be notified regarding continuation of procedures and repeat steroid use   Patient will follow-up with Dr. Browning thereafter. Patient is not interested in prospects of surgical intervention. We have discussed other options such as MILD L3-L4 and L4-L5 (depending on flex ext X-rays) and a spinal cord stimulator trial with [facility] or [employer].  2. Diagnostic studies: Flexion-extension x-rays of the lumbar spine have been ordered along with hip x-rays and a cervical MRI (scheduled on 2/3/2023)  3. Pharmacological measures: Reviewed and discussed; Patient uses diclofenac gel, and takes methocarbamol, Norco. Patient also takes citalopram, melatonin 5 mg nightly. Patient failed trials with gabapentin in the past (SE). Patient has declined additional pharmacological measures.  I will consider changing her methocarbamol to either baclofen or tizanidine since she has been dealing with significant paravertebral muscle spasm  4. Long-term rehabilitation efforts:  A. The patient does not have a history of falls. I did complete a risk assessment for falls.   B. Patient will start a comprehensive physical therapy program at [facility], core strengthening, gait and balance training, neurodynamics, ASTYM, E-STIM, myofascial release, cupping, dry needling, home exercise program once pain is under control  C. Continue exercise program at the [facility] center.  I have also recommended aqua therapy   D. Contrast therapy: Apply ice-packs for 15-20 minutes, followed by heating pads for 15-20 minutes to affected area   E. Patient will need a referral to Dr. Felipe Yousif  for psychological screening for spinal cord stimulation and intrathecal therapies prior to a spinal cord stimulator trial.  F. Referral to [facility]. Patient's Body mass index is 32.27 kg/m². Patient counseled on the importance of weight loss to help with overall health and pain control.  In fact, one of the components of her lumbar stenosis is epidural lipomatosis for which weight loss is the only treatment  G. Luh Horner  reports that she quit smoking about 42 years ago. Her smoking use included cigarettes. She started smoking about 52 years ago. She has a 20.00 pack-year smoking history. She has never used smokeless tobacco.   5. The patient has been instructed to contact my office with any questions or difficulties. The patient understands the plan and agrees to proceed accordingly.    The patient has a documented plan of care to address chronic pain. Luh Horner reports a pain score of  5/10.  Given her pain assessment as noted, treatment options were discussed and the following options were decided upon as a follow-up plan to address the patient's pain: continuation of current treatment plan for pain, educational materials on pain management, home exercises and therapy, prescription for non-opiod analgesics, referral to Physical Therapy, referral to specialist for assistance in pain treatment guidance, steroid injections, use of non-medical modalities (ice, heat, stretching and/or behavior modifications) and interventional pain management measures.          Pain Management Panel     Pain Management Panel Latest Ref Rng & Units 2/4/2021    CREATININE UR mg/dL 23.7         SALLY query complete. SALLY reviewed by Dandy Mcrae MD.     Pain Medications             citalopram (CeleXA) 20 MG tablet Take 1 tablet by mouth Daily.    HYDROcodone-acetaminophen (NORCO) 5-325 MG per tablet Take 0.5-1 tablets by mouth Daily As Needed for Moderate Pain.     methocarbamol (Robaxin) 500 MG tablet Take 1 tablet by mouth 3 (Three) Times a Day.         Time spent reviewing diagnostic studies, consultation reports, previous treatments, pre-chartin minutes  Time spent facet-to-face with the patient: 49 minutes  Time spent ordering diagnostic studies, referrals, and writing this note: 28 minutes  Total Time: 97 minutes    Please note that portions of this note were completed with a voice recognition program.     Dandy Mcrae MD    Patient Care Team:  Daniel Shafer DO as PCP - General (Family Medicine)  Felice Lopez MD as Consulting Physician (Gastroenterology)  Dandy Mcrae MD as Consulting Physician (Pain Medicine)     No orders of the defined types were placed in this encounter.        Future Appointments   Date Time Provider Department [place]   2/3/2023 11:00 AM [facility] MRI 1  [facility]   2023 12:30 PM Jonel Browning MD [facility]   3/16/2023 10:30 AM Daniel Shafer DO [facility]

## 2023-01-14 PROBLEM — E88.2 EPIDURAL LIPOMATOSIS: Status: ACTIVE | Noted: 2023-01-14

## 2023-01-14 PROBLEM — M51.379 DEGENERATION OF LUMBAR OR LUMBOSACRAL INTERVERTEBRAL DISC: Status: ACTIVE | Noted: 2023-01-14

## 2023-01-14 PROBLEM — M24.28 LIGAMENTUM FLAVUM HYPERTROPHY: Status: ACTIVE | Noted: 2023-01-14

## 2023-01-14 PROBLEM — M48.062 LUMBAR STENOSIS WITH NEUROGENIC CLAUDICATION: Status: ACTIVE | Noted: 2023-01-14

## 2023-01-14 PROBLEM — M51.37 DEGENERATION OF LUMBAR OR LUMBOSACRAL INTERVERTEBRAL DISC: Status: ACTIVE | Noted: 2023-01-14

## 2023-01-14 PROBLEM — D17.79 EPIDURAL LIPOMATOSIS: Status: ACTIVE | Noted: 2023-01-14

## 2023-01-17 ENCOUNTER — OFFICE VISIT (OUTPATIENT)
Dept: PAIN MEDICINE | Facility: CLINIC | Age: 68
End: 2023-01-17
Payer: MEDICARE

## 2023-01-17 ENCOUNTER — HOSPITAL ENCOUNTER (OUTPATIENT)
Dept: GENERAL RADIOLOGY | Facility: HOSPITAL | Age: 68
Discharge: HOME | End: 2023-01-17
Admitting: NEUROLOGICAL SURGERY
Payer: MEDICARE

## 2023-01-17 VITALS
OXYGEN SATURATION: 96 % | BODY MASS INDEX: 32.25 KG/M2 | TEMPERATURE: 95.9 F | HEIGHT: 61 IN | HEART RATE: 80 BPM | WEIGHT: 170.8 LBS

## 2023-01-17 DIAGNOSIS — M62.830 LUMBAR PARASPINAL MUSCLE SPASM: ICD-10-CM

## 2023-01-17 DIAGNOSIS — M47.816 SPONDYLOSIS OF LUMBAR REGION WITHOUT MYELOPATHY OR RADICULOPATHY: Primary | ICD-10-CM

## 2023-01-17 DIAGNOSIS — M41.26 OTHER IDIOPATHIC SCOLIOSIS, LUMBAR REGION: ICD-10-CM

## 2023-01-17 DIAGNOSIS — G43.009 MIGRAINE WITHOUT AURA AND WITHOUT STATUS MIGRAINOSUS, NOT INTRACTABLE: ICD-10-CM

## 2023-01-17 DIAGNOSIS — M48.062 SPINAL STENOSIS OF LUMBAR REGION WITH NEUROGENIC CLAUDICATION: ICD-10-CM

## 2023-01-17 DIAGNOSIS — Z00.8 PRE-SURGICAL PSYCHOLOGICAL ASSESSMENT, ENCOUNTER FOR: ICD-10-CM

## 2023-01-17 DIAGNOSIS — M47.816 SPONDYLOSIS OF LUMBAR REGION WITHOUT MYELOPATHY OR RADICULOPATHY: ICD-10-CM

## 2023-01-17 DIAGNOSIS — M24.28 LIGAMENTUM FLAVUM HYPERTROPHY: ICD-10-CM

## 2023-01-17 DIAGNOSIS — M51.37 DEGENERATION OF LUMBAR OR LUMBOSACRAL INTERVERTEBRAL DISC: ICD-10-CM

## 2023-01-17 DIAGNOSIS — F32.89 OTHER DEPRESSION: ICD-10-CM

## 2023-01-17 DIAGNOSIS — F41.9 ANXIETY: ICD-10-CM

## 2023-01-17 DIAGNOSIS — D17.79 EPIDURAL LIPOMATOSIS: ICD-10-CM

## 2023-01-17 DIAGNOSIS — E66.09 CLASS 1 OBESITY DUE TO EXCESS CALORIES WITH BODY MASS INDEX (BMI) OF 32.0 TO 32.9 IN ADULT, UNSPECIFIED WHETHER SERIOUS COMORBIDITY PRESENT: ICD-10-CM

## 2023-01-17 DIAGNOSIS — M48.062 LUMBAR STENOSIS WITH NEUROGENIC CLAUDICATION: ICD-10-CM

## 2023-01-17 DIAGNOSIS — G47.9 SLEEP DISORDER: ICD-10-CM

## 2023-01-17 PROCEDURE — 99205 OFFICE O/P NEW HI 60 MIN: CPT | Performed by: ANESTHESIOLOGY

## 2023-01-17 PROCEDURE — 72120 X-RAY BEND ONLY L-S SPINE: CPT

## 2023-01-17 PROCEDURE — 73521 X-RAY EXAM HIPS BI 2 VIEWS: CPT

## 2023-01-17 RX ORDER — ESTRADIOL 0.05 MG/D
FILM, EXTENDED RELEASE TRANSDERMAL
COMMUNITY
Start: 2023-01-09

## 2023-01-17 RX ORDER — TRIAMCINOLONE ACETONIDE 0.25 MG/G
OINTMENT TOPICAL
COMMUNITY
Start: 2023-01-13

## 2023-01-18 ENCOUNTER — PATIENT ROUNDING (BHMG ONLY) (OUTPATIENT)
Dept: PAIN MEDICINE | Facility: CLINIC | Age: 68
End: 2023-01-18
Payer: MEDICARE

## 2023-01-18 ENCOUNTER — OUTSIDE FACILITY SERVICE (OUTPATIENT)
Dept: PAIN MEDICINE | Facility: CLINIC | Age: 68
End: 2023-01-18
Payer: MEDICARE

## 2023-01-18 PROCEDURE — 64493 INJ PARAVERT F JNT L/S 1 LEV: CPT | Performed by: ANESTHESIOLOGY

## 2023-01-18 PROCEDURE — 64494 INJ PARAVERT F JNT L/S 2 LEV: CPT | Performed by: ANESTHESIOLOGY

## 2023-01-18 PROCEDURE — 99152 MOD SED SAME PHYS/QHP 5/>YRS: CPT | Performed by: ANESTHESIOLOGY

## 2023-01-18 NOTE — PROGRESS NOTES
A MY-CHART MESSAGE HAS BEEN SENT TO THE PATIENT FOR PATIENT ROUNDING WITH [facility].

## 2023-01-19 ENCOUNTER — TELEPHONE (OUTPATIENT)
Dept: PAIN MEDICINE | Facility: CLINIC | Age: 68
End: 2023-01-19
Payer: MEDICARE

## 2023-01-19 DIAGNOSIS — M47.816 SPONDYLOSIS OF LUMBAR REGION WITHOUT MYELOPATHY OR RADICULOPATHY: Primary | ICD-10-CM

## 2023-01-19 NOTE — TELEPHONE ENCOUNTER
FOLLOW-UP CALL AFTER PROCEDURE  Spoke with pt regarding how they are feeling after yesterday's procedure with Dr. Mcrae. Patient advises that they are doing well.   Pain level before procedure: 7/10   Pain level after procedure: 0/10  Patient reports that the pain relief lasted for approximately 1-2 hours. (pain level 5/10-pain when sitting) OR  pain has returned to baseline after 1-2 hours   Patient denies side effects or complications  Patient does not have any questions or concerns at this time  Disposition: 2/1/23 at 0815 am arrival.   I provided information regarding date and time of next procedure:   I advised that patient must have a , and that the  must remain in the premises until after the procedure is completed and the patient is ready to be discharged.  Nothing by mouth (eat or drink) for at least 8 hours prior to the procedure. Patient can take medications with a sip of water.

## 2023-01-23 DIAGNOSIS — I10 PRIMARY HYPERTENSION: Primary | ICD-10-CM

## 2023-01-23 DIAGNOSIS — Z13.0 SCREENING FOR DEFICIENCY ANEMIA: ICD-10-CM

## 2023-01-23 DIAGNOSIS — R73.9 HYPERGLYCEMIA: ICD-10-CM

## 2023-01-23 DIAGNOSIS — E55.9 VITAMIN D DEFICIENCY: ICD-10-CM

## 2023-01-23 DIAGNOSIS — Z13.29 SCREENING FOR ENDOCRINE DISORDER: ICD-10-CM

## 2023-01-23 DIAGNOSIS — E78.00 PURE HYPERCHOLESTEROLEMIA: ICD-10-CM

## 2023-01-24 ENCOUNTER — LAB (OUTPATIENT)
Dept: LAB | Facility: HOSPITAL | Age: 68
End: 2023-01-24
Payer: MEDICARE

## 2023-01-24 DIAGNOSIS — Z13.29 SCREENING FOR ENDOCRINE DISORDER: ICD-10-CM

## 2023-01-24 DIAGNOSIS — E55.9 VITAMIN D DEFICIENCY: ICD-10-CM

## 2023-01-24 DIAGNOSIS — Z13.0 SCREENING FOR DEFICIENCY ANEMIA: ICD-10-CM

## 2023-01-24 DIAGNOSIS — E78.00 PURE HYPERCHOLESTEROLEMIA: ICD-10-CM

## 2023-01-24 DIAGNOSIS — I10 PRIMARY HYPERTENSION: ICD-10-CM

## 2023-01-24 DIAGNOSIS — R73.9 HYPERGLYCEMIA: ICD-10-CM

## 2023-01-24 LAB
25(OH)D3 SERPL-MCNC: 47.1 NG/ML (ref 30–100)
ALBUMIN SERPL-MCNC: 5 G/DL (ref 3.5–5.2)
ALBUMIN/GLOB SERPL: 1.9 G/DL
ALP SERPL-CCNC: 90 U/L (ref 39–117)
ALT SERPL W P-5'-P-CCNC: 29 U/L (ref 1–33)
ANION GAP SERPL CALCULATED.3IONS-SCNC: 13.3 MMOL/L (ref 5–15)
AST SERPL-CCNC: 23 U/L (ref 1–32)
BACTERIA UR QL AUTO: ABNORMAL /HPF
BILIRUB SERPL-MCNC: 0.4 MG/DL (ref 0–1.2)
BILIRUB UR QL STRIP: NEGATIVE
BUN SERPL-MCNC: 12 MG/DL (ref 8–23)
BUN/CREAT SERPL: 15 (ref 7–25)
CALCIUM SPEC-SCNC: 9.5 MG/DL (ref 8.6–10.5)
CHLORIDE SERPL-SCNC: 95 MMOL/L (ref 98–107)
CHOLEST SERPL-MCNC: 234 MG/DL (ref 0–200)
CLARITY UR: CLEAR
CO2 SERPL-SCNC: 28.7 MMOL/L (ref 22–29)
COLOR UR: YELLOW
CREAT SERPL-MCNC: 0.8 MG/DL (ref 0.57–1)
DEPRECATED RDW RBC AUTO: 43.3 FL (ref 37–54)
EGFRCR SERPLBLD CKD-EPI 2021: 79.9 ML/MIN/1.73
ERYTHROCYTE [DISTWIDTH] IN BLOOD BY AUTOMATED COUNT: 12.2 % (ref 12.3–15.4)
GLOBULIN UR ELPH-MCNC: 2.7 GM/DL
GLUCOSE SERPL-MCNC: 95 MG/DL (ref 65–99)
GLUCOSE UR STRIP-MCNC: NEGATIVE MG/DL
HBA1C MFR BLD: 5.1 % (ref 4.8–5.6)
HCT VFR BLD AUTO: 45.9 % (ref 34–46.6)
HDLC SERPL-MCNC: 84 MG/DL (ref 40–60)
HGB BLD-MCNC: 15.6 G/DL (ref 12–15.9)
HGB UR QL STRIP.AUTO: NEGATIVE
HYALINE CASTS UR QL AUTO: ABNORMAL /LPF
KETONES UR QL STRIP: NEGATIVE
LDLC SERPL CALC-MCNC: 128 MG/DL (ref 0–100)
LDLC/HDLC SERPL: 1.49 {RATIO}
LEUKOCYTE ESTERASE UR QL STRIP.AUTO: ABNORMAL
MCH RBC QN AUTO: 33.5 PG (ref 26.6–33)
MCHC RBC AUTO-ENTMCNC: 34 G/DL (ref 31.5–35.7)
MCV RBC AUTO: 98.7 FL (ref 79–97)
NITRITE UR QL STRIP: NEGATIVE
PH UR STRIP.AUTO: 6.5 [PH] (ref 5–8)
PLATELET # BLD AUTO: 310 10*3/MM3 (ref 140–450)
PMV BLD AUTO: 9.4 FL (ref 6–12)
POTASSIUM SERPL-SCNC: 3.6 MMOL/L (ref 3.5–5.2)
PROT SERPL-MCNC: 7.7 G/DL (ref 6–8.5)
PROT UR QL STRIP: NEGATIVE
RBC # BLD AUTO: 4.65 10*6/MM3 (ref 3.77–5.28)
RBC # UR STRIP: ABNORMAL /HPF
REF LAB TEST METHOD: ABNORMAL
SODIUM SERPL-SCNC: 137 MMOL/L (ref 136–145)
SP GR UR STRIP: 1.01 (ref 1–1.03)
SQUAMOUS #/AREA URNS HPF: ABNORMAL /HPF
TRIGL SERPL-MCNC: 126 MG/DL (ref 0–150)
TSH SERPL DL<=0.05 MIU/L-ACNC: 2.87 UIU/ML (ref 0.27–4.2)
UROBILINOGEN UR QL STRIP: ABNORMAL
VLDLC SERPL-MCNC: 22 MG/DL (ref 5–40)
WBC # UR STRIP: ABNORMAL /HPF
WBC NRBC COR # BLD: 7.08 10*3/MM3 (ref 3.4–10.8)

## 2023-01-24 PROCEDURE — 80061 LIPID PANEL: CPT

## 2023-01-24 PROCEDURE — 85027 COMPLETE CBC AUTOMATED: CPT

## 2023-01-24 PROCEDURE — 81001 URINALYSIS AUTO W/SCOPE: CPT

## 2023-01-24 PROCEDURE — 80053 COMPREHEN METABOLIC PANEL: CPT

## 2023-01-24 PROCEDURE — 82306 VITAMIN D 25 HYDROXY: CPT

## 2023-01-24 PROCEDURE — 84443 ASSAY THYROID STIM HORMONE: CPT

## 2023-01-24 PROCEDURE — 83036 HEMOGLOBIN GLYCOSYLATED A1C: CPT

## 2023-02-01 ENCOUNTER — OUTSIDE FACILITY SERVICE (OUTPATIENT)
Dept: PAIN MEDICINE | Facility: CLINIC | Age: 68
End: 2023-02-01
Payer: MEDICARE

## 2023-02-01 DIAGNOSIS — M47.816 SPONDYLOSIS OF LUMBAR REGION WITHOUT MYELOPATHY OR RADICULOPATHY: Primary | ICD-10-CM

## 2023-02-01 PROCEDURE — 64493 INJ PARAVERT F JNT L/S 1 LEV: CPT | Performed by: ANESTHESIOLOGY

## 2023-02-01 PROCEDURE — 64494 INJ PARAVERT F JNT L/S 2 LEV: CPT | Performed by: ANESTHESIOLOGY

## 2023-02-02 ENCOUNTER — TELEPHONE (OUTPATIENT)
Dept: PAIN MEDICINE | Facility: CLINIC | Age: 68
End: 2023-02-02
Payer: MEDICARE

## 2023-02-02 NOTE — TELEPHONE ENCOUNTER
LVM for pt regarding how they’re feeling after yesterday’s procedure with Dr. Mcrae. Advised of next procedure date and time. Also advised nothing to eat or drink the night prior, must have a  and that as of right now the  can come in, and that the procedure is in the [address]rd floor. Advised pt to contact office as if needed.       Spoke with pt who advised that she felt that she didn't get any relief from the MBB. I spoke with Dr. Mcrae and he advised that she can have a TESI, go back and see Dr. Browning and see if she's a surgical candidate, or see if she gets clearance from Dr. Yousif and see about a stimulator. Pt advised that she gets a MRI on her neck tomorrow, sees Dr. Browning Monday and she will give us a call back once she decides. I advised that I cancelled her procedure appointment and she can just give us a call back once she decides.

## 2023-02-03 ENCOUNTER — HOSPITAL ENCOUNTER (OUTPATIENT)
Dept: MRI IMAGING | Facility: HOSPITAL | Age: 68
Discharge: HOME | End: 2023-02-03
Admitting: NEUROLOGICAL SURGERY
Payer: MEDICARE

## 2023-02-03 DIAGNOSIS — R29.2 HYPERREFLEXIA: ICD-10-CM

## 2023-02-03 PROCEDURE — 72141 MRI NECK SPINE W/O DYE: CPT

## 2023-02-06 ENCOUNTER — OFFICE VISIT (OUTPATIENT)
Dept: NEUROSURGERY | Facility: CLINIC | Age: 68
End: 2023-02-06
Payer: MEDICARE

## 2023-02-06 VITALS — HEIGHT: 61 IN | RESPIRATION RATE: 17 BRPM | WEIGHT: 170.6 LBS | BODY MASS INDEX: 32.21 KG/M2

## 2023-02-06 DIAGNOSIS — M48.062 SPINAL STENOSIS OF LUMBAR REGION WITH NEUROGENIC CLAUDICATION: Primary | ICD-10-CM

## 2023-02-06 DIAGNOSIS — M51.36 DDD (DEGENERATIVE DISC DISEASE), LUMBAR: ICD-10-CM

## 2023-02-06 PROCEDURE — 99214 OFFICE O/P EST MOD 30 MIN: CPT | Performed by: NEUROLOGICAL SURGERY

## 2023-02-06 NOTE — PROGRESS NOTES
NAME: RIMMA CASTILLO   DOS: 2023  : 1955  PCP: Daniel Shafer DO    Chief Complaint:    Chief Complaint   Patient presents with   • Low back & bilateral hip pain     Right side greater         History of Present Illness:  67 y.o. female   Is a 67-year-old female in neurosurgical consultation.  She presents with a history of complex lower extremity and back pain.  She is a type A personality and is a go-getter.  She had L4-5 spinal stenosis I noticed hyperreflexia she is here for follow-up had a medial branch block but it did not help and she is here for follow-up she still has symptoms of neurogenic claudication bilateral hip pain denies any cauda equina syndrome    PMHX  Allergies:  Allergies   Allergen Reactions   • Shellfish Allergy      Head congestion   • Atenolol      Fatigue   • Buspirone       Headache   • Codeine      Depression   • Pseudoephedrine Anxiety   • Rosuvastatin      Fatigue   • Westley-E.P.A. [Dha-Epa-Vitamin E] Anxiety   • Simvastatin      Facial numbness   • Trazodone      Nightmares     Medications    Current Outpatient Medications:   •  amLODIPine (NORVASC) 10 MG tablet, Take 1 tablet by mouth every night at bedtime., Disp: 30 tablet, Rfl: 2  •  atorvastatin (LIPITOR) 20 MG tablet, Take 1 tablet by mouth every night at bedtime., Disp: 30 tablet, Rfl: 1  •  citalopram (CeleXA) 20 MG tablet, Take 1 tablet by mouth Daily., Disp: 90 tablet, Rfl: 1  •  Coenzyme Q10 (CO Q-10) 200 MG capsule, Take 1 capsule by mouth., Disp: , Rfl:   •  dicyclomine (Bentyl) 10 MG capsule, Take 1 capsule by mouth 4 (Four) Times a Day Before Meals & at Bedtime., Disp: 120 capsule, Rfl: 1  •  estradiol (MINIVELLE, VIVELLE-DOT) 0.05 MG/24HR patch, , Disp: , Rfl:   •  fluticasone (FLONASE) 50 MCG/ACT nasal spray, 2 sprays by Each Nare route Daily., Disp: 16 mL, Rfl: 1  •  hydroCHLOROthiazide (HYDRODIURIL) 25 MG tablet, Take 1 tablet by mouth Every Morning., Disp: 30 tablet, Rfl: 1  •   HYDROcodone-acetaminophen (NORCO) 5-325 MG per tablet, Take 0.5-1 tablets by mouth Daily As Needed for Moderate Pain., Disp: 30 tablet, Rfl: 0  •  lisinopril (PRINIVIL,ZESTRIL) 40 MG tablet, Take 1 tablet by mouth Every Morning., Disp: 30 tablet, Rfl: 1  •  magnesium oxide (MAGOX) 400 (241.3 Mg) MG tablet tablet, Take 1 tablet by mouth., Disp: , Rfl:   •  melatonin 5 MG tablet tablet, Take 1 tablet by mouth Every Night., Disp: , Rfl:   •  methocarbamol (Robaxin) 500 MG tablet, Take 1 tablet by mouth 3 (Three) Times a Day., Disp: 90 tablet, Rfl: 0  •  Omega-3 Fatty Acids (FISH OIL) 1000 MG capsule capsule, Take  by mouth., Disp: , Rfl:   •  omeprazole (priLOSEC) 20 MG capsule, Take 1 capsule by mouth Daily., Disp: 30 capsule, Rfl: 4  •  sucralfate (CARAFATE) 1 g tablet, Take 1 tablet by mouth Every 12 (Twelve) Hours., Disp: 60 tablet, Rfl: 1  •  triamcinolone (KENALOG) 0.025 % ointment, APPLY 1 APPLICATION TOPICALLY TO THE AFFECTED AREA(S) 2 TIMES A DAY AS NEEDED, Disp: , Rfl:   •  valACYclovir (VALTREX) 500 MG tablet, Take 1 tablet by mouth Daily., Disp: , Rfl:   •  vitamin C (ASCORBIC ACID) 500 MG tablet, Take  by mouth daily., Disp: , Rfl:   Past Medical History:  Past Medical History:   Diagnosis Date   • Allergic rhinitis    • Bone pain     foot   • Cancer (HCC) 2005    Skin cancer   • Cataract 2010    Surgery recently   • Cervical disc disorder 2022   • Chronic pain disorder 2012   • CTS (carpal tunnel syndrome)    • Depression 1981 1981 - Hospitalized   • Fibrocystic breast disease 2000    Current benign breast biopsies   • GERD (gastroesophageal reflux disease) 2001   • Glaucoma Surgery recently   • Headache 2021    None this year   • Hiatal hernia    • Hyperlipidemia    • Hypertension 1997   • Insomnia 2013   • Irritable bowel syndrome (IBS) 2002    Recurrent abdominal cramps   • Joint pain 2012   • Low back pain 2008   • Lumbar scoliosis 2017   • Lumbar spondylosis 2000    Chronic low back pain   •  Lumbosacral disc disease    • Migraine    • Mitral valve insufficiency    • Neck pain    • Obesity    • Osteoarthritis    • Osteopenia    • Peripheral neuropathy    • Post menopausal syndrome     Refractory hot flashes   • Spinal stenosis    • Thoracic disc disorder      Past Surgical History:  Past Surgical History:   Procedure Laterality Date   • BREAST BIOPSY Right remote    benign disease   • CATARACT EXTRACTION, BILATERAL Bilateral    • CHOLECYSTECTOMY     • COLONOSCOPY  ?   • HYSTERECTOMY     • REFRACTIVE SURGERY      RK   • TRIGGER POINT INJECTION      Knee   • URETHRAL SUSPENSION      laparoscopic for stress incontinence     Social Hx:  Social History     Tobacco Use   • Smoking status: Former     Packs/day: 2.00     Years: 10.00     Pack years: 20.00     Types: Cigarettes     Start date: 1971     Quit date: 1981     Years since quittin.1   • Smokeless tobacco: Never   Vaping Use   • Vaping Use: Never used   Substance Use Topics   • Alcohol use: Yes     Alcohol/week: 3.0 standard drinks     Types: 3 Glasses of wine per week     Comment: 3 glasses a wine day   • Drug use: No     Family Hx:  Family History   Problem Relation Age of Onset   • Pulmonary fibrosis Mother          age 82   • Hypertension Mother    • Lumbar disc disease Mother    • Prostate cancer Father          age 76   • Hypertension Sister            • Goiter Sister    • Hypothyroidism Sister    • Anxiety disorder Sister    • Miscarriages / Stillbirths Sister    • Thyroid disease Sister    • Heart disease Brother          Age 48 heart attack was cigarette smoker   • Hiatal hernia Brother    • Breast cancer Other         Maternal and paternal aunts   • Hypertension Brother    • Obesity Maternal Uncle    • Diabetes Maternal Uncle    • Arthritis Paternal Aunt    • Hypertension Brother    • Hyperlipidemia Brother      Review of Systems:        Review of Systems    Constitutional: Negative for activity change, appetite change, chills, diaphoresis, fatigue, fever and unexpected weight change.   HENT: Negative for congestion, dental problem, drooling, ear discharge, ear pain, facial swelling, hearing loss, mouth sores, nosebleeds, postnasal drip, rhinorrhea, sinus pressure, sneezing, sore throat, tinnitus, trouble swallowing and voice change.    Eyes: Negative for photophobia, pain, discharge, redness, itching and visual disturbance.   Respiratory: Positive for stridor. Negative for apnea, cough, choking, chest tightness, shortness of breath and wheezing.    Cardiovascular: Negative for chest pain, palpitations and leg swelling.   Gastrointestinal: Negative for abdominal distention, abdominal pain, anal bleeding, blood in stool, constipation, diarrhea, nausea, rectal pain and vomiting.   Endocrine: Negative for cold intolerance, heat intolerance, polydipsia, polyphagia and polyuria.   Genitourinary: Negative for decreased urine volume, difficulty urinating, dysuria, enuresis, flank pain, frequency, genital sores, hematuria and urgency.   Musculoskeletal: Positive for back pain, gait problem and myalgias. Negative for arthralgias, joint swelling, neck pain and neck stiffness.   Skin: Negative for color change, pallor, rash and wound.   Allergic/Immunologic: Negative for environmental allergies, food allergies and immunocompromised state.   Neurological: Negative for dizziness, tremors, seizures, syncope, facial asymmetry, speech difficulty, weakness, light-headedness, numbness and headaches.   Hematological: Negative for adenopathy. Does not bruise/bleed easily.   Psychiatric/Behavioral: Positive for dysphoric mood. Negative for agitation, behavioral problems, confusion, decreased concentration, hallucinations, self-injury, sleep disturbance and suicidal ideas. The patient is not nervous/anxious and is not hyperactive.    All other systems reviewed and are negative.       I  "have reviewed this note template and all pertinent parts of the review of systems social, family history, surgical history and medication list    Physical Examination:  Vitals:    02/06/23 1122   Resp: 17      General Appearance:   Well developed, well nourished, well groomed, alert, and cooperative.  Neurological examination:  Neurologic Exam  She is wide awake alert and follows commands    She is strong in her lower extremities    She has no evidence of myelopathic findings today on exam    She has trace hyperreflexia her gait and station are normal she has no signs of intrinsic hip dysfunction  Review of Imaging/DATA:  X-rays of the hip demonstrates mild arthritic changes    Cervical spinal MRI she has mild to moderate central stenosis without evidence of high-grade operative pathology    Lumbar spinal MRI was reviewed that demonstrates L4-5 severe central canal and lateral recess stenosis and a right-sided L5-S1 lateral recess stenosis.  Most of her intraforaminal disease is contralateral to the majority of her hip pain  Lumbar flexion-extension films are unremarkable for listhesis  Diagnoses/Plan:    Ms. Horner is a 67 y.o. female   1.  Lumbar degenerative disc disease    Incidental hyperreflexia    3.  Neurogenic claudication    From a neurosurgical standpoint I think an L4-5 laminectomy would be reasonable I explained the risk benefits and rationale as well as the need for possible additional surgeries given her intraforaminal disease and could make no guarantees regarding some of her achiness    I do think it will help her however    She is a candidate for an epidural steroid block she had some concerns about \"the higher risk \"of that procedure over risotto me    From a neurosurgical standpoint I be happy to schedule her anytime for an L4-5 laminectomy we can have her see our PA back in the summer to see if she wishes to proceed    She does not really need any other imaging at that point as long as things " have not changed in an unusual manner    She is going to call us if she wishes to proceed

## 2023-02-07 DIAGNOSIS — I10 ESSENTIAL HYPERTENSION: ICD-10-CM

## 2023-02-07 RX ORDER — LISINOPRIL 40 MG/1
TABLET ORAL
Qty: 30 TABLET | Refills: 2 | Status: SHIPPED | OUTPATIENT
Start: 2023-02-07

## 2023-02-07 RX ORDER — SUCRALFATE 1 G/1
TABLET ORAL
Qty: 60 TABLET | Refills: 2 | Status: SHIPPED | OUTPATIENT
Start: 2023-02-07

## 2023-02-07 RX ORDER — HYDROCHLOROTHIAZIDE 25 MG/1
TABLET ORAL
Qty: 30 TABLET | Refills: 2 | Status: SHIPPED | OUTPATIENT
Start: 2023-02-07

## 2023-02-07 RX ORDER — FLUTICASONE PROPIONATE 50 MCG
SPRAY, SUSPENSION (ML) NASAL
Qty: 16 G | Refills: 2 | Status: SHIPPED | OUTPATIENT
Start: 2023-02-07

## 2023-02-07 RX ORDER — ATORVASTATIN CALCIUM 20 MG/1
TABLET, FILM COATED ORAL
Qty: 30 TABLET | Refills: 2 | Status: SHIPPED | OUTPATIENT
Start: 2023-02-07

## 2023-02-07 NOTE — TELEPHONE ENCOUNTER
Rx Refill Note  Requested Prescriptions     Pending Prescriptions Disp Refills   • sucralfate (CARAFATE) 1 g tablet [Pharmacy Med Name: SUCRALFATE TABS 1GM] 60 tablet 11     Sig: TAKE 1 TABLET EVERY 12 HOURS   • fluticasone (FLONASE) 50 MCG/ACT nasal spray [Pharmacy Med Name: FLUTICASONE PROP NASAL SPRAY 16GM 50MCG] 16 g 11     Sig: USE 2 SPRAYS IN EACH NOSTRIL DAILY   • hydroCHLOROthiazide (HYDRODIURIL) 25 MG tablet [Pharmacy Med Name: HYDROCHLOROTHIAZIDE TABS 25MG] 30 tablet 11     Sig: TAKE 1 TABLET EVERY MORNING   • atorvastatin (LIPITOR) 20 MG tablet [Pharmacy Med Name: ATORVASTATIN TABS 20MG] 30 tablet 11     Sig: TAKE 1 TABLET EVERY NIGHT AT BEDTIME   • lisinopril (PRINIVIL,ZESTRIL) 40 MG tablet [Pharmacy Med Name: LISINOPRIL TABS 40MG] 30 tablet 11     Sig: TAKE 1 TABLET EVERY MORNING      Last office visit with prescribing clinician: 12/8/2022   Last telemedicine visit with prescribing clinician: 3/16/2023   Next office visit with prescribing clinician: 3/16/2023                         Would you like a call back once the refill request has been completed: [] Yes [] No    If the office needs to give you a call back, can they leave a voicemail: [] Yes [] No    April WAQAS Feliciano  02/07/23, 16:36 EST

## 2023-03-06 ENCOUNTER — HOSPITAL ENCOUNTER (OUTPATIENT)
Dept: NUTRITION | Facility: HOSPITAL | Age: 68
Setting detail: RECURRING SERIES
Discharge: HOME | End: 2023-03-06

## 2023-03-06 PROCEDURE — 97802 MEDICAL NUTRITION INDIV IN: CPT

## 2023-03-06 NOTE — CONSULTS
[facility]          Initial 60 Minute Nutrition Visit    Date: 2023   Patient Name: Luh Horner  : 1955   MRN: [id number]   Referring Provider: Dandy Mcrae MD    Reason for Visit: nutrition counseling r/t healthy weight management  Visit Format: in person, individual     Nutrition Assessment       Social History:   Social History     Socioeconomic History   • Marital status:    Tobacco Use   • Smoking status: Former     Packs/day: 2.00     Years: 10.00     Pack years: 20.00     Types: Cigarettes     Start date: 1971     Quit date: 1981     Years since quittin.2   • Smokeless tobacco: Never   Vaping Use   • Vaping Use: Never used   Substance and Sexual Activity   • Alcohol use: Yes     Alcohol/week: 3.0 standard drinks     Types: 3 Glasses of wine per week     Comment: 3 glasses a wine day   • Drug use: No   • Sexual activity: Yes     Partners: Male     Birth control/protection: Hysterectomy     Active Problem List:   Patient Active Problem List    Diagnosis    • Lumbar paraspinal muscle spasm [M62.830]    • Lumbar stenosis with neurogenic claudication [M48.062]    • Ligamentum flavum hypertrophy [M24.28]    • Degeneration of lumbar or lumbosacral intervertebral disc [M51.37]    • Epidural lipomatosis [D17.79]    • Migraine without aura and without status migrainosus, not intractable [G43.009]    • Irritable bowel syndrome [K58.9]    • Lumbar scoliosis [M41.9]    • Spondylosis of lumbar region without myelopathy or radiculopathy [M47.816]    • Preventative health care [Z00.00]    • Other chronic pain [G89.29]    • Atopic rhinitis [J30.9]    • Anxiety [F41.9]    • Carpal tunnel syndrome [G56.00]    • Clenching of teeth [R19.8]    • Depression [F32.A]    • Sleep disorder [G47.9]    • Gastroesophageal reflux disease [K21.9]    • Hyperlipidemia [E78.5]    • Hypertension [I10]    • Low back pain [M54.50]    • Mitral valve insufficiency [I34.0]     • Class 1 obesity due to excess calories with body mass index (BMI) of 32.0 to 32.9 in adult [E66.09, Z68.32]    • Gluteal tendinitis of right buttock [M76.01]    • Postmenopausal disorder [N95.1]       Current Medications:   Current Outpatient Medications:   •  amLODIPine (NORVASC) 10 MG tablet, Take 1 tablet by mouth every night at bedtime., Disp: 30 tablet, Rfl: 2  •  atorvastatin (LIPITOR) 20 MG tablet, TAKE 1 TABLET EVERY NIGHT AT BEDTIME, Disp: 30 tablet, Rfl: 2  •  citalopram (CeleXA) 20 MG tablet, Take 1 tablet by mouth Daily., Disp: 90 tablet, Rfl: 1  •  Coenzyme Q10 (CO Q-10) 200 MG capsule, Take 1 capsule by mouth., Disp: , Rfl:   •  dicyclomine (Bentyl) 10 MG capsule, Take 1 capsule by mouth 4 (Four) Times a Day Before Meals & at Bedtime., Disp: 120 capsule, Rfl: 1  •  estradiol (MINIVELLE, VIVELLE-DOT) 0.05 MG/24HR patch, , Disp: , Rfl:   •  fluticasone (FLONASE) 50 MCG/ACT nasal spray, USE 2 SPRAYS IN EACH NOSTRIL DAILY, Disp: 16 g, Rfl: 2  •  hydroCHLOROthiazide (HYDRODIURIL) 25 MG tablet, TAKE 1 TABLET EVERY MORNING, Disp: 30 tablet, Rfl: 2  •  HYDROcodone-acetaminophen (NORCO) 5-325 MG per tablet, Take 0.5-1 tablets by mouth Daily As Needed for Moderate Pain., Disp: 30 tablet, Rfl: 0  •  lisinopril (PRINIVIL,ZESTRIL) 40 MG tablet, TAKE 1 TABLET EVERY MORNING, Disp: 30 tablet, Rfl: 2  •  magnesium oxide (MAGOX) 400 (241.3 Mg) MG tablet tablet, Take 1 tablet by mouth., Disp: , Rfl:   •  melatonin 5 MG tablet tablet, Take 1 tablet by mouth Every Night., Disp: , Rfl:   •  methocarbamol (Robaxin) 500 MG tablet, Take 1 tablet by mouth 3 (Three) Times a Day., Disp: 90 tablet, Rfl: 0  •  Omega-3 Fatty Acids (FISH OIL) 1000 MG capsule capsule, Take  by mouth., Disp: , Rfl:   •  omeprazole (priLOSEC) 20 MG capsule, Take 1 capsule by mouth Daily., Disp: 30 capsule, Rfl: 4  •  sucralfate (CARAFATE) 1 g tablet, TAKE 1 TABLET EVERY 12 HOURS, Disp: 60 tablet, Rfl: 2  •  triamcinolone (KENALOG) 0.025 % ointment,  "APPLY 1 APPLICATION TOPICALLY TO THE AFFECTED AREA(S) 2 TIMES A DAY AS NEEDED, Disp: , Rfl:   •  valACYclovir (VALTREX) 500 MG tablet, Take 1 tablet by mouth Daily., Disp: , Rfl:   •  vitamin C (ASCORBIC ACID) 500 MG tablet, Take  by mouth daily., Disp: , Rfl:     Labs:   Lab Results   Component Value Date    HGBA1C 5.10 2023     Lab Results   Component Value Date    CHOL 234 (H) 2023    CHOL 223 (H) 2022    CHOL 235 (H) 2021    CHLPL 260 (H) 2016    CHLPL 261 (H) 12/15/2015    CHLPL 221 (H) 10/30/2015     Lab Results   Component Value Date    TRIG 126 2023    TRIG 134 2022    TRIG 109 2021     Lab Results   Component Value Date    HDL 84 (H) 2023    HDL 76 (H) 2022    HDL 83 (H) 2021     Lab Results   Component Value Date     (H) 2023     (H) 2022     (H) 2021         Food & Nutrition Related History       Food Allergies: fish/seafood, potential peanut  Gastrointestinal conditions that impact intake or food choices: Hx of IBS  Nutrition Impact Symptoms: diarrhea  Details of home: lives independently with   Difficulty chewin - Normal  Difficulty swallowin - Normal  History of eating disorder/disordered eating habits: None  Language/communication details: english is proficient   Barriers to learning: No barriers identified at this time    24 Hour Recall:     See recall under \"media\" tab     Additional comments: Patient consumes 3 meals and 1-2 snacks daily. 2 glasses of wine in the evening.       Anthropometrics      Height:   Ht Readings from Last 1 Encounters:   23 154.9 cm (60.98\")     Weight:   Wt Readings from Last 3 Encounters:   23 77.4 kg (170 lb 9.6 oz)   23 77.1 kg (170 lb)   23 77.5 kg (170 lb 12.8 oz)     BMI: There is no height or weight on file to calculate BMI.   Weight Change: none     Physical Activity     Activity  Frequency Duration    Chair exercises " "4-5x/week 60 minutes   Walking 2-3x/week 60 minutes    Water aerobics Occasionally  60 minutes                          Barriers to physical activity: patient states their back pain occasionally limits movement     Physical activity comments: Patient is regularly physically active.      Estimated Needs     Estimated Energy Needs: 1609 kcals/day to promote 0.5# of wt loss     Estimated Protein Needs: 80 g/day     Estimated Fluid Needs: 2-2.5L     Discussion / Education      Patient seen today for nutrition counseling r/t healthy weight management. RD reviewed pertinent health hx with patient which includes IBS, weight gain, and chronic pain. Patient states their IBS is \"well managed\" and reports 1 instance of diarrhea \"most days but not all days\". Patient denies excessive gas/bloating, reports some excess gas with foods such as beans but no worsening diarrhea. Patient states their primary concern at this time is weight loss. RD reviewed calorie rationale with patient and encouraged slow weight loss for best results and long-term sustainability. Patient reports having a good appetite and enjoying most foods. Patient finds it challenging to make healthy food choices after drinking as it increases appetite and sometimes leads to over eating. Patient was advised to reduce alcohol intake due to the caloric content and impact on blood glucose, patient was agreeable to decreasing quantity and frequency. RD reviewed online websites and applications that can be used for finding new recipes. Patient plans to use resources to research recipes to increase intake of vegetables. RD reviewed the Plate Method and meal planning chart with patient to encourage appropriate portion sizing, adequacy, and balance.     Assessment of patient engagement: Engaged    Measurement of understanding: Patient verbalized understanding    Resources Provided: [facility] Healthy Meal and Snack Ideas    Goal (s)      Goal 1: Reduce quantity and frequency of " alcohol intake.      Goal 2: Practice meal planning to ensure balance and adequacy.      Plan of Care     PES Statement:   Overweight / Obesity related to diet and lifestye as evidenced by BMI.     Follow Up Visit      Follow Up:   5/2/2023 at 1:30pm    Total of 45 minutes spent with patient on nutrition counseling. Education based on Academy of Nutrition and Dietetics guidelines. Patient was provided with RD's contact information. Thank you for this referral.

## 2023-03-07 DIAGNOSIS — I10 PRIMARY HYPERTENSION: ICD-10-CM

## 2023-03-07 RX ORDER — AMLODIPINE BESYLATE 10 MG/1
TABLET ORAL
Qty: 90 TABLET | Refills: 1 | Status: SHIPPED | OUTPATIENT
Start: 2023-03-07

## 2023-03-07 NOTE — TELEPHONE ENCOUNTER
Rx Refill Note  Requested Prescriptions     Pending Prescriptions Disp Refills   • amLODIPine (NORVASC) 10 MG tablet [Pharmacy Med Name: AMLODIPINE BESYLATE TABS 10MG] 30 tablet 11     Sig: TAKE 1 TABLET EVERY NIGHT AT BEDTIME      Last office visit with prescribing clinician:12/8/22     Next office visit with prescribing clinician:3/16/23    Juli Elena MA  03/07/23, 15:51 EST

## 2023-03-16 ENCOUNTER — OFFICE VISIT (OUTPATIENT)
Dept: FAMILY MEDICINE CLINIC | Facility: CLINIC | Age: 68
End: 2023-03-16
Payer: MEDICARE

## 2023-03-16 VITALS
OXYGEN SATURATION: 97 % | HEIGHT: 61 IN | TEMPERATURE: 97.1 F | SYSTOLIC BLOOD PRESSURE: 120 MMHG | DIASTOLIC BLOOD PRESSURE: 62 MMHG | WEIGHT: 168.2 LBS | HEART RATE: 88 BPM | BODY MASS INDEX: 31.75 KG/M2

## 2023-03-16 DIAGNOSIS — Z00.00 PREVENTATIVE HEALTH CARE: ICD-10-CM

## 2023-03-16 DIAGNOSIS — R22.0 LIP SWELLING: ICD-10-CM

## 2023-03-16 DIAGNOSIS — Z00.00 MEDICARE ANNUAL WELLNESS VISIT, SUBSEQUENT: Primary | ICD-10-CM

## 2023-03-16 NOTE — PATIENT INSTRUCTIONS
Advance Care Planning and Advance Directives     You make decisions on a daily basis - decisions about where you want to live, your career, your home, your life. Perhaps one of the most important decisions you face is your choice for future medical care. Take time to talk with your family and your healthcare team and start planning today.  Advance Care Planning is a process that can help you:  · Understand possible future healthcare decisions in light of your own experiences  · Reflect on those decision in light of your goals and values  · Discuss your decisions with those closest to you and the healthcare professionals that care for you  · Make a plan by creating a document that reflects your wishes    Surrogate Decision Maker  In the event of a medical emergency, which has left you unable to communicate or to make your own decisions, you would need someone to make decisions for you.  It is important to discuss your preferences for medical treatment with this person while you are in good health.     Qualities of a surrogate decision maker:  • Willing to take on this role and responsibility  • Knows what you want for future medical care  • Willing to follow your wishes even if they don't agree with them  • Able to make difficult medical decisions under stressful circumstances    Advance Directives  These are legal documents you can create that will guide your healthcare team and decision maker(s) when needed. These documents can be stored in the electronic medical record.    · Living Will - a legal document to guide your care if you have a terminal condition or a serious illness and are unable to communicate. States vary by statute in document names/types, but most forms may include one or more of the following:        -  Directions regarding life-prolonging treatments        -  Directions regarding artificially provided nutrition/hydration        -  Choosing a healthcare decision maker        -  Direction  regarding organ/tissue donation    · Durable Power of  for Healthcare - this document names an -in-fact to make medical decisions for you, but it may also allow this person to make personal and financial decisions for you. Please seek the advice of an  if you need this type of document.    **Advance Directives are not required and no one may discriminate against you if you do not sign one.    Medical Orders  Many states allow specific forms/orders signed by your physician to record your wishes for medical treatment in your current state of health. This form, signed in personal communication with your physician, addresses resuscitation and other medical interventions that you may or may not want.      For more information or to schedule a time with a [facility] Advance Care Planning Facilitator contact: [facility] or call [phone] and someone will contact you directly.  You are due for adacel Tdap vaccination. (provides protection against tetanus, diptheria and whooping cough) Please  get the immunization at your local pharmacy at your earliest convenience. This immunization will next be due in 10 years. Please click on the link for more information about this vaccine.    [facility] Tdap Vaccine Information    You are due for Shingrix vaccination series ( the newest shingles vaccine).  It is a two shot series spaced 2-6 months apart. Please get this vaccine series started at your earliest convenience at your local pharmacy to help avoid shingles outbreak. It is more effective than the old Zostavax vaccine and is recommended even if you have had the Zostavax vaccine in the past.  Once the Shingrix series is completed, it does not need to be repeated.   For more information, please look at the website below:  [facility] Shingrix Vaccine Information      Medicare Wellness  Personal Prevention Plan of Service     Date of Office Visit:    Encounter Provider:  Daniel Shafer, DO  Place of  Service:  [facility]  Patient Name: Luh Horner  :  1955    As part of the Medicare Wellness portion of your visit today, we are providing you with this personalized preventive plan of services (PPPS). This plan is based upon recommendations of the [place] Preventive Services Task Force (USPSTF) and the Advisory Committee on Immunization Practices (ACIP).    This lists the preventive care services that should be considered, and provides dates of when you are due. Items listed as completed are up-to-date and do not require any further intervention.    Health Maintenance   Topic Date Due   • ZOSTER VACCINE (1 of 2) Never done   • TDAP/TD VACCINES (3 - Td or Tdap) 2019   • PAP SMEAR  2019   • ANNUAL WELLNESS VISIT  2023   • LIPID PANEL  2024   • MAMMOGRAM  2024   • DXA SCAN  04/10/2027   • COLORECTAL CANCER SCREENING  10/19/2027   • HEPATITIS C SCREENING  Completed   • COVID-19 Vaccine  Completed   • INFLUENZA VACCINE  Completed   • Pneumococcal Vaccine 65+  Completed       No orders of the defined types were placed in this encounter.      No follow-ups on file.

## 2023-03-16 NOTE — PROGRESS NOTES
Immunization records updated with most recent Tdap   [facility] 81 and Laparoscopic Surgery  SUBJECTIVE:    Chief Complaint: diverticulitis    Franchesca So   1990   33 y.o. male presents for follow-up after recent hospital admission for acute sigmoid diverticulitis with microperforation. Feels significantly better with pain and fevers all resolved. Bowel movements normal.  Tolerated diet. No major complaints. First episode of diverticulitis. Never had colonoscopy. No personal or family history of colorectal malignancy or inflammatory bowel disease. Significant medical conditions include hypertension. Never had abdominal surgery    Past Medical History:   Diagnosis Date    HTN (hypertension)      No past surgical history on file. Social History     Socioeconomic History    Marital status: Single     Spouse name: Not on file    Number of children: Not on file    Years of education: Not on file    Highest education level: Not on file   Occupational History    Not on file   Tobacco Use    Smoking status: Every Day     Packs/day: 1.00     Types: Cigarettes    Smokeless tobacco: Never   Substance and Sexual Activity    Alcohol use: Never    Drug use: Yes     Types: Marijuana Charmayne Stai)    Sexual activity: Not on file   Other Topics Concern    Not on file   Social History Narrative    Not on file     Social Determinants of Health     Financial Resource Strain: Not on file   Food Insecurity: Not on file   Transportation Needs: Not on file   Physical Activity: Not on file   Stress: Not on file   Social Connections: Not on file   Intimate Partner Violence: Not on file   Housing Stability: Not on file      No family history on file. Current Outpatient Medications   Medication Sig Dispense Refill    lactobacillus (CULTURELLE) capsule Take 1 capsule by mouth in the morning and 1 capsule in the evening. Take with meals. 20 capsule 0    lisinopril (PRINIVIL;ZESTRIL) 20 MG tablet Take 1 tablet by mouth in the morning.  30 tablet 3    carvedilol (COREG) 25 MG tablet Take 1 tablet by mouth in the morning and 1 tablet in the evening. Take with meals. 60 tablet 3    hydroCHLOROthiazide (HYDRODIURIL) 25 MG tablet Take 1 tablet by mouth in the morning. 30 tablet 3    spironolactone (ALDACTONE) 25 MG tablet Take 1 tablet by mouth in the morning and 1 tablet in the evening. 60 tablet 3    amoxicillin-clavulanate (AUGMENTIN) 875-125 MG per tablet Take 1 tablet by mouth in the morning and 1 tablet before bedtime. Do all this for 10 days. 20 tablet 0     No current facility-administered medications for this visit. No Known Allergies     Review of Systems:  Review of systems performed and negative with the exception of the above findings    OBJECTIVE:  /70   Ht 6' 2\" (1.88 m)   Wt (!) 324 lb (147 kg)   BMI 41.60 kg/m²      Physical Exam:  General appearance: alert, appears stated age, cooperative, and no distress  Head: Normocephalic, without obvious abnormality, atraumatic  Lungs: clear to auscultation bilaterally  Heart: regular rate and rhythm, S1, S2 normal, no murmur, click, rub or gallop  Abdomen:  soft, non-tender, non-distended    No results displayed because visit has over 200 results.           Echo Complete    Result Date: 8/1/2022  Transthoracic Echocardiography Report (TTE)  Demographics   Patient Name       Jennifer York   Date of Study      08/01/2022         Gender              Male   Patient Number     [id number]         Date of Birth       1990   Visit Number       [id number]          Age                 33 year(s)   Accession Number   [id number]         Room Number         8149   Corporate ID       [id number]          [address]                                                            [address] [place]   Ordering Physician Johnny Hernandez MD,                     Rashida Sims        Physician           [facility], [address]  Procedure Type of Study   TTE procedure:ECHOCARDIOGRAM COMPLETE 2D W DOPPLER W COLOR. Procedure Date Date: 08/01/2022 Start: 02:22 PM Study Location: [facility] - Echo Lab Technical Quality: Limited visualization due to body habitus. Additional Indications:Severe hypertension. Patient Status: Routine Contrast Medium: Definity. Height: 74 inches Weight: 320.01 pounds BSA: 2.65 m2 BMI: 41.09 kg/m2 BP: 136/80 mmHg  Conclusions   Summary  Technically difficult study due to body habitus. Definity was administered. Left ventricular cavity size is normal with mild concentric left ventricular  hypertrophy. Ejection fraction is visually estimated to be 60%. Indeterminate diastolic function. E/e' = 11. .0. Signature   ------------------------------------------------------------------  Electronically signed by Libra Madera MD, [facility], [address]  (Interpreting physician) on 08/01/2022 at 04:20 PM  ------------------------------------------------------------------   Findings   Left Ventricle  Left ventricular cavity size is normal with mild concentric left ventricular  hypertrophy. Ejection fraction is visually estimated to be 60%. Indeterminate diastolic function. E/e' = 11. .0. Mitral Valve  Mitral valve is structurally normal.  No evidence of mitral regurgitation. Left Atrium  The left atrium is normal in size. Aortic Valve  The aortic valve is structurally normal.  Trivial aortic regurgitation. Aorta  The aortic root is normal in size. Right Ventricle  The right ventricle is dilated with normal function. TAPSE: 2.85 cm. RVS  velocity: 14.7 cm/s. RVSP is estimated to be 13-16 mmHG. Tricuspid Valve  Tricuspid valve is structurally normal.  Trivial tricuspid regurgitation. Right Atrium  The right atrial size is normal.   Pulmonic Valve  The pulmonic valve is not well visualized. Pericardial Effusion  No evidence of any pericardial effusion. Pleural Effusion  No pleural effusion. Miscellaneous  IVC not well visualized.   M-Mode/2D Measurements (cm)   LV Diastolic Dimension: 1.58 cm LV Systolic Dimension: 1.53 cm  LV Septum Diastolic: 4.37 cm  LV PW Diastolic: 3.33 cm        AO Root Dimension: 3 cm                                  LA Dimension: 3.7 cm                                  LA Area: 17 cm2  LVOT: 2 cm                      LA volume/Index: 47 ml /18 ml/m2  Doppler Measurements   AV Peak Velocity: 120 cm/s     MV Peak E-Wave: 79 cm/s  AV Peak Gradient: 5.76 mmHg    MV Peak A-Wave: 80.8 cm/s  AV Mean Gradient: 3 mmHg       MV E/A Ratio: 0.98  LVOT Peak Velocity: 106 cm/s  AV Area (Continuity):2.94 cm2   TR Velocity:178 cm/s  TR Gradient:12.67 mmHg         MV Deceleration Time: 248 msec   E' Septal Velocity: 6.85 cm/s  E' Lateral Velocity: 7.62 cm/s   Aortic Valve   Peak Velocity: 120 cm/s     Mean Velocity: 81.7 cm/s  Peak Gradient: 5.76 mmHg    Mean Gradient: 3 mmHg  Area (continuity): 2.94 cm2  AV VTI: 23 cm  Aorta   Aortic Root: 3 cm  Ascending Aorta: 3.8 cm  LVOT Diameter: 2 cm      CT ABDOMEN PELVIS W IV CONTRAST Additional Contrast? None    Result Date: 7/28/2022  EXAMINATION: CT OF THE ABDOMEN AND PELVIS WITH CONTRAST 7/27/2022 11:45 pm TECHNIQUE: CT of the abdomen and pelvis was performed with the administration of intravenous contrast. Multiplanar reformatted images are provided for review. Automated exposure control, iterative reconstruction, and/or weight based adjustment of the mA/kV was utilized to reduce the radiation dose to as low as reasonably achievable. COMPARISON: None. HISTORY: ORDERING SYSTEM PROVIDED HISTORY: RLQ pain TECHNOLOGIST PROVIDED HISTORY: Reason for exam:->RLQ pain Additional Contrast?->None Decision Support Exception - unselect if not a suspected or confirmed emergency medical condition->Emergency Medical Condition (MA) Reason for Exam: RLQ pain Relevant Medical/Surgical History: Abdominal Pain (Pt states started having sharp abdominal pain since yesterday, pt states no constipation or diarrhea.  Pt states nausea no vomiting, chills on and off. Pt states only ate this morning hasn't eaten since. ) FINDINGS: Lower Chest: No acute findings. Organs: Findings compatible with hepatic steatosis. The gallbladder, pancreas, spleen, adrenals and kidneys reveal no acute findings. GI/Bowel: Wall thickening with inflammatory change involving the sigmoid colon is present with associated diverticula. Small focus of contained perforation along the posterior margin. No abscess or obstruction. Normal appendix. High-density intraluminal material within the distal small bowel may represent previously administered barium. Pelvis: No acute findings. Peritoneum/Retroperitoneum: No free air or free fluid. The aorta is normal in caliber. The visceral branches are patent. No lymphadenopathy. Bones/Soft Tissues: No abnormality identified. 1.  Acute sigmoid diverticulitis with small focus of contained perforation. No abscess, obstruction or free air in the upper abdomen. 2.  Normal appendix. ASSESSMENT:  Acute sigmoid diverticulitis with microperforation  Morbid obesity, BMI 42.2    PLAN:  1. Clinically improved, does not need additional antibiotics, continue current course  2. Diet as tolerated, may resume regular diet  3. Will refer to gastroenterology for colonoscopy  4. We will refer to primary care for establishment  5. Return to office in several months for a follow-up, do not anticipate will need elective sigmoidectomy as this is his first attack    Darell Dunbar MD, FACS  8/12/2022  2:36 PM

## 2023-03-16 NOTE — PROGRESS NOTES
The ABCs of the Annual Wellness Visit  Subsequent Medicare Wellness Visit    Subjective    Luh Horner is a 67 y.o. female who presents for a Subsequent Medicare Wellness Visit.    The following portions of the patient's history were reviewed and   updated as appropriate: allergies, current medications, past family history, past medical history, past social history, past surgical history and problem list.    Compared to one year ago, the patient feels her physical   health is the same.    Compared to one year ago, the patient feels her mental   health is the same.    Recent Hospitalizations:  She was not admitted to the hospital during the last year.       Current Medical Providers:  Patient Care Team:  Daniel Shafer DO as PCP - General (Family Medicine)  Felice Lopez MD as Consulting Physician (Gastroenterology)  Dandy Mcrae MD as Consulting Physician (Pain Medicine)    Outpatient Medications Prior to Visit   Medication Sig Dispense Refill   • amLODIPine (NORVASC) 10 MG tablet TAKE 1 TABLET EVERY NIGHT AT BEDTIME 90 tablet 1   • atorvastatin (LIPITOR) 20 MG tablet TAKE 1 TABLET EVERY NIGHT AT BEDTIME 30 tablet 2   • citalopram (CeleXA) 20 MG tablet Take 1 tablet by mouth Daily. 90 tablet 1   • Coenzyme Q10 (CO Q-10) 200 MG capsule Take 1 capsule by mouth.     • dicyclomine (Bentyl) 10 MG capsule Take 1 capsule by mouth 4 (Four) Times a Day Before Meals & at Bedtime. 120 capsule 1   • estradiol (MINIVELLE, VIVELLE-DOT) 0.05 MG/24HR patch      • fluticasone (FLONASE) 50 MCG/ACT nasal spray USE 2 SPRAYS IN EACH NOSTRIL DAILY 16 g 2   • hydroCHLOROthiazide (HYDRODIURIL) 25 MG tablet TAKE 1 TABLET EVERY MORNING 30 tablet 2   • lisinopril (PRINIVIL,ZESTRIL) 40 MG tablet TAKE 1 TABLET EVERY MORNING 30 tablet 2   • magnesium oxide (MAGOX) 400 (241.3 Mg) MG tablet tablet Take 1 tablet by mouth.     • melatonin 5 MG tablet tablet Take 1 tablet by mouth Every Night.     • methocarbamol (Robaxin)  500 MG tablet Take 1 tablet by mouth 3 (Three) Times a Day. 90 tablet 0   • Omega-3 Fatty Acids (FISH OIL) 1000 MG capsule capsule Take  by mouth.     • omeprazole (priLOSEC) 20 MG capsule Take 1 capsule by mouth Daily. 30 capsule 4   • sucralfate (CARAFATE) 1 g tablet TAKE 1 TABLET EVERY 12 HOURS 60 tablet 2   • triamcinolone (KENALOG) 0.025 % ointment APPLY 1 APPLICATION TOPICALLY TO THE AFFECTED AREA(S) 2 TIMES A DAY AS NEEDED     • valACYclovir (VALTREX) 500 MG tablet Take 1 tablet by mouth Daily.     • vitamin C (ASCORBIC ACID) 500 MG tablet Take  by mouth daily.     • HYDROcodone-acetaminophen (NORCO) 5-325 MG per tablet Take 0.5-1 tablets by mouth Daily As Needed for Moderate Pain. 30 tablet 0     No facility-administered medications prior to visit.       Opioid medication/s are on active medication list.  and I have evaluated her active treatment plan and pain score trends (see table).  Vitals:    03/16/23 1025   PainSc:   6   PainLoc: Hip     I have reviewed the chart for potential of high risk medication and harmful drug interactions in the elderly.            Aspirin is not on active medication list.  Aspirin use is not indicated based on review of current medical condition/s. Risk of harm outweighs potential benefits.  .    Patient Active Problem List   Diagnosis   • Atopic rhinitis   • Anxiety   • Carpal tunnel syndrome   • Clenching of teeth   • Depression   • Sleep disorder   • Gastroesophageal reflux disease   • Hyperlipidemia   • Hypertension   • Low back pain   • Mitral valve insufficiency   • Class 1 obesity due to excess calories with body mass index (BMI) of 32.0 to 32.9 in adult   • Gluteal tendinitis of right buttock   • Postmenopausal disorder   • Preventative health care   • Other chronic pain   • Spondylosis of lumbar region without myelopathy or radiculopathy   • Lumbar scoliosis   • Irritable bowel syndrome   • Migraine without aura and without status migrainosus, not intractable   •  "Lumbar stenosis with neurogenic claudication   • Ligamentum flavum hypertrophy   • Degeneration of lumbar or lumbosacral intervertebral disc   • Epidural lipomatosis   • Lumbar paraspinal muscle spasm     Advance Care Planning  Advance Directive is not on file.  ACP discussion was held with the patient during this visit. Patient does not have an advance directive, information provided.     Objective    Vitals:    03/16/23 1025   BP: 120/62   BP Location: Left arm   Patient Position: Sitting   Cuff Size: Adult   Pulse: 88   Temp: 97.1 °F (36.2 °C)   TempSrc: Infrared   SpO2: 97%   Weight: 76.3 kg (168 lb 3.2 oz)   Height: 154.9 cm (60.98\")   PainSc:   6   PainLoc: Hip     Estimated body mass index is 31.8 kg/m² as calculated from the following:    Height as of this encounter: 154.9 cm (60.98\").    Weight as of this encounter: 76.3 kg (168 lb 3.2 oz).    BMI is >= 30 and <35. (Class 1 Obesity). The following options were offered after discussion;: weight loss educational material (shared in after visit summary) and exercise counseling/recommendations      Does the patient have evidence of cognitive impairment? No  ATTENTION  What is the year: correct  What is the month of the year: correct  What is the day of the week?: correct  What is the date?: correct  MEMORY  Repeat address three times, only score third attempt: [address], [place]: 7  HOW MANY ANIMALS DID THE PATIENT NAME  Verbal Fluency -- Animal Names (0-25): 14-16  CLOCK DRAWING  Clock Drawing: All Correct  MEMORY RECALL  Tell me what you remember about that name and address we were repeating at the beginning: 3  ACE TOTAL SCORE  Total ACE Score - <25/30 strongly suggests cognitive impairment; <21/30 almost certainly shows dementia: 24      Lab Results   Component Value Date    TRIG 126 01/24/2023    HDL 84 (H) 01/24/2023     (H) 01/24/2023    VLDL 22 01/24/2023    HGBA1C 5.10 01/24/2023        HEALTH RISK " ASSESSMENT    Smoking Status:  Social History     Tobacco Use   Smoking Status Former   • Packs/day: 2.00   • Years: 10.00   • Pack years: 20.00   • Types: Cigarettes   • Start date: 1971   • Quit date: 1981   • Years since quittin.2   Smokeless Tobacco Never     Alcohol Consumption:  Social History     Substance and Sexual Activity   Alcohol Use Yes   • Alcohol/week: 3.0 standard drinks   • Types: 3 Glasses of wine per week    Comment: 3 glasses a wine day     Fall Risk Screen:    KRYSTA Fall Risk Assessment was completed, and patient is at LOW risk for falls.Assessment completed on:3/16/2023    Depression Screening:  PHQ-2/PHQ-9 Depression Screening 3/16/2023   Little Interest or Pleasure in Doing Things 0-->not at all   Feeling Down, Depressed or Hopeless 0-->not at all   PHQ-9: Brief Depression Severity Measure Score 0       Health Habits and Functional and Cognitive Screening:  Functional & Cognitive Status 3/16/2023   Do you have difficulty preparing food and eating? Yes   Do you have difficulty bathing yourself, getting dressed or grooming yourself? No   Do you have difficulty using the toilet? No   Do you have difficulty moving around from place to place? Yes   Do you have trouble with steps or getting out of a bed or a chair? Yes   Current Diet Limited Junk Food   Dental Exam Up to date   Eye Exam Up to date   Exercise (times per week) 5 times per week   Current Exercises Include Weightlifting;Aerobics        Exercise Comment -   Current Exercise Activities Include -   Do you need help using the phone?  No   Are you deaf or do you have serious difficulty hearing?  Yes   Do you need help with transportation? Yes   Do you need help shopping? No   Do you need help preparing meals?  No   Do you need help with housework?  No   Do you need help with laundry? No   Do you need help taking your medications? No   Do you need help managing money? No   Do you ever drive or ride in a car without wearing a  seat belt? No   Have you felt unusual stress, anger or loneliness in the last month? No   Who do you live with? Spouse   If you need help, do you have trouble finding someone available to you? No   Have you been bothered in the last four weeks by sexual problems? No   Do you have difficulty concentrating, remembering or making decisions? No       Age-appropriate Screening Schedule:  Refer to the list below for future screening recommendations based on patient's age, sex and/or medical conditions. Orders for these recommended tests are listed in the plan section. The patient has been provided with a written plan.    Health Maintenance   Topic Date Due   • ZOSTER VACCINE (1 of 2) Never done   • PAP SMEAR  07/01/2019   • LIPID PANEL  01/24/2024   • ANNUAL WELLNESS VISIT  03/16/2024   • MAMMOGRAM  05/24/2024   • DXA SCAN  04/10/2027   • COLORECTAL CANCER SCREENING  10/19/2027   • TDAP/TD VACCINES (4 - Td or Tdap) 07/10/2031   • HEPATITIS C SCREENING  Completed   • COVID-19 Vaccine  Completed   • INFLUENZA VACCINE  Completed   • Pneumococcal Vaccine 65+  Completed                CMS Preventative Services Quick Reference  Risk Factors Identified During Encounter  None Identified  The above risks/problems have been discussed with the patient.  Pertinent information has been shared with the patient in the After Visit Summary.  An After Visit Summary and PPPS were made available to the patient.    The wellness exam has been reviewed in detail.  The patient has been fully counseled on preventative guidelines for vaccines, cancer screenings, and other health maintenance needs.  Functional testing has been performed to assess capacity for independent living and need for other medical interventions.   The patient was counseled on maintaining a lifestyle to promote good health and to minimize chronic diseases.  The patient has been assisted with scheduling healthcare procedures for the coming year and given a written document  "outlining these recommendations.    Follow Up:   Next Medicare Wellness visit to be scheduled in 1 year.       Additional E&M Note during same encounter follows:  Patient has multiple medical problems which are significant and separately identifiable that require additional work above and beyond the Medicare Wellness Visit.      Chief Complaint  Medicare Wellness-subsequent    Subjective        HPI  Luh Horner is also being seen today for annual exam.         Objective   Vital Signs:  /62 (BP Location: Left arm, Patient Position: Sitting, Cuff Size: Adult)   Pulse 88   Temp 97.1 °F (36.2 °C) (Infrared)   Ht 154.9 cm (60.98\")   Wt 76.3 kg (168 lb 3.2 oz)   SpO2 97%   BMI 31.80 kg/m²     Physical Exam  Vitals and nursing note reviewed.   Constitutional:       Appearance: She is well-developed.   HENT:      Head: Normocephalic and atraumatic.      Right Ear: External ear normal.      Left Ear: External ear normal.   Eyes:      Conjunctiva/sclera: Conjunctivae normal.      Pupils: Pupils are equal, round, and reactive to light.   Neck:      Thyroid: No thyromegaly.      Vascular: No JVD.      Trachea: No tracheal deviation.   Cardiovascular:      Rate and Rhythm: Normal rate and regular rhythm.      Heart sounds: Normal heart sounds. No murmur heard.  Pulmonary:      Effort: Pulmonary effort is normal. No respiratory distress.      Breath sounds: Normal breath sounds.   Abdominal:      General: Bowel sounds are normal. There is no distension.      Palpations: Abdomen is soft.      Tenderness: There is no abdominal tenderness.   Musculoskeletal:      Cervical back: Normal range of motion and neck supple.   Lymphadenopathy:      Cervical: No cervical adenopathy.   Skin:     General: Skin is warm and dry.      Capillary Refill: Capillary refill takes less than 2 seconds.   Neurological:      Mental Status: She is alert and oriented to person, place, and time.      Cranial Nerves: No cranial nerve " deficit.   Psychiatric:         Behavior: Behavior normal.                         Assessment and Plan   Diagnoses and all orders for this visit:    1. Medicare annual wellness visit, subsequent (Primary)    2. Preventative health care    3. Lip swelling  Comments:  After eating peanut butter, would like testing  Orders:  -     Ambulatory Referral to Allergy      Problem List Items Addressed This Visit        Health Encounters    Preventative health care   Other Visit Diagnoses     Medicare annual wellness visit, subsequent    -  Primary    Lip swelling        After eating peanut butter, would like testing    Relevant Orders    Ambulatory Referral to Allergy (Completed)        The preventative exam has been reviewed in detail.  The patient has been fully counseled on preventative guidelines for vaccines, cancer screenings, and other health maintenance needs. The patient was counseled on maintaining a lifestyle to promote good health and to minimize chronic diseases.  The patient has been assisted with scheduling healthcare procedures for the coming year and given a written document outlining these recommendations. Age-appropriate screening measures have been ordered for the patient today as indicated above.           Follow Up   Return in about 6 months (around 9/16/2023).  Patient was given instructions and counseling regarding her condition or for health maintenance advice. Please see specific information pulled into the AVS if appropriate.

## 2023-03-23 DIAGNOSIS — M47.816 LUMBAR SPONDYLOSIS: ICD-10-CM

## 2023-03-23 NOTE — TELEPHONE ENCOUNTER
Rx Refill Note  Requested Prescriptions     Pending Prescriptions Disp Refills   • HYDROcodone-acetaminophen (NORCO) 5-325 MG per tablet 30 tablet 0     Sig: Take 0.5-1 tablets by mouth Daily As Needed for Moderate Pain.      Last office visit with prescribing clinician: 3/16/2023   Last telemedicine visit with prescribing clinician: 9/18/2023   Next office visit with prescribing clinician: 9/18/2023                         Would you like a call back once the refill request has been completed: [] Yes [] No    If the office needs to give you a call back, can they leave a voicemail: [] Yes [] No    Og Marcelino MA  03/23/23, 13:43 EDT     [facility] 12/29/22  UDS

## 2023-03-27 RX ORDER — HYDROCODONE BITARTRATE AND ACETAMINOPHEN 5; 325 MG/1; MG/1
.5-1 TABLET ORAL DAILY PRN
Qty: 30 TABLET | Refills: 0 | Status: SHIPPED | OUTPATIENT
Start: 2023-03-27

## 2023-05-02 ENCOUNTER — HOSPITAL ENCOUNTER (OUTPATIENT)
Dept: NUTRITION | Facility: HOSPITAL | Age: 68
Setting detail: RECURRING SERIES
Discharge: HOME | End: 2023-05-02

## 2023-05-02 NOTE — PROGRESS NOTES
Adult Outpatient Nutrition  Assessment    Patient Name:  Luh PURDY Horner  YOB: 1955  MRN: [id number]    Assessment Date:  5/2/2023    Comments:  Patient seen today for follow-up visit for nutrition counseling r/t healthy weight management. Patient reports difficulty with accomplishing goals set at initial appointment. RD reviewed goals and potential barriers to change, patient requested guidance with accessing websites for recipes. RD demonstrated how to navigate websites and applications for patient and patient demonstrates understanding. Patient describes relatively low motivation for change. RD advised patient to adjust goals to be both more attainable and desirable. Patient identified goal to increase protein intake and increase fiber intake, recognizing that these two nutrients help to promote satiety and may aid in weight loss. RD reviewed good food sources of these nutrients and demonstrated how to find interesting recipes to make the process of eating these foods more enjoyable. Patient was actively engaged during the session and during the goal setting process.       Patient did not wish to schedule a follow-up visit at this time, states they will call office with future questions or appointment needs.     Electronically signed by:  Stacey Blair RD  05/02/23 15:34 EDT

## 2023-05-05 DIAGNOSIS — I10 ESSENTIAL HYPERTENSION: ICD-10-CM

## 2023-05-05 RX ORDER — ATORVASTATIN CALCIUM 20 MG/1
TABLET, FILM COATED ORAL
Qty: 30 TABLET | Refills: 11 | Status: SHIPPED | OUTPATIENT
Start: 2023-05-05

## 2023-05-05 RX ORDER — LISINOPRIL 40 MG/1
TABLET ORAL
Qty: 30 TABLET | Refills: 11 | Status: SHIPPED | OUTPATIENT
Start: 2023-05-05

## 2023-05-09 DIAGNOSIS — K21.9 GASTROESOPHAGEAL REFLUX DISEASE WITHOUT ESOPHAGITIS: ICD-10-CM

## 2023-05-10 RX ORDER — OMEPRAZOLE 20 MG/1
CAPSULE, DELAYED RELEASE ORAL
Qty: 90 CAPSULE | Refills: 1 | Status: SHIPPED | OUTPATIENT
Start: 2023-05-10

## 2023-05-10 NOTE — TELEPHONE ENCOUNTER
Rx Refill Note  Requested Prescriptions     Pending Prescriptions Disp Refills   • omeprazole (priLOSEC) 20 MG capsule [Pharmacy Med Name: OMEPRAZOLE DR CAPS 20MG] 30 capsule 11     Sig: TAKE 1 CAPSULE DAILY      Last office visit with prescribing clinician: 3/16/23     Next office visit with prescribing clinician: 9/18/23  Juli Elena MA  05/10/23, 09:12 EDT

## 2023-05-16 RX ORDER — FLUTICASONE PROPIONATE 50 MCG
SPRAY, SUSPENSION (ML) NASAL
Qty: 16 G | Refills: 11 | Status: SHIPPED | OUTPATIENT
Start: 2023-05-16

## 2023-05-19 RX ORDER — HYDROCHLOROTHIAZIDE 25 MG/1
25 TABLET ORAL EVERY MORNING
Qty: 30 TABLET | Refills: 2 | Status: SHIPPED | OUTPATIENT
Start: 2023-05-19

## 2023-06-03 RX ORDER — CITALOPRAM 20 MG/1
TABLET ORAL
Qty: 90 TABLET | Refills: 3 | Status: SHIPPED | OUTPATIENT
Start: 2023-06-03

## 2023-06-03 NOTE — TELEPHONE ENCOUNTER
Rx Refill Note  Requested Prescriptions     Pending Prescriptions Disp Refills    citalopram (CeleXA) 20 MG tablet [Pharmacy Med Name: CITALOPRAM HYDROBROMIDE TABS 20MG] 90 tablet 3     Sig: TAKE 1 TABLET DAILY      Last office visit with prescribing clinician: 3/16/2023   Last telemedicine visit with prescribing clinician: Visit date not found   Next office visit with prescribing clinician: 9/18/2023                         Would you like a call back once the refill request has been completed: [] Yes [] No    If the office needs to give you a call back, can they leave a voicemail: [] Yes [] No    Estephania Lloyd MA  06/03/23, 09:50 EDT

## 2023-07-24 ENCOUNTER — OUTSIDE FACILITY SERVICE (OUTPATIENT)
Dept: PAIN MEDICINE | Facility: CLINIC | Age: 68
End: 2023-07-24
Payer: MEDICARE

## 2023-07-24 PROCEDURE — 64483 NJX AA&/STRD TFRM EPI L/S 1: CPT | Performed by: ANESTHESIOLOGY

## 2023-07-24 PROCEDURE — 99152 MOD SED SAME PHYS/QHP 5/>YRS: CPT | Performed by: ANESTHESIOLOGY

## 2023-07-31 ENCOUNTER — TELEPHONE (OUTPATIENT)
Dept: PAIN MEDICINE | Facility: CLINIC | Age: 68
End: 2023-07-31
Payer: MEDICARE

## 2023-07-31 RX ORDER — HYDROCHLOROTHIAZIDE 25 MG/1
TABLET ORAL
Qty: 30 TABLET | Refills: 11 | Status: SHIPPED | OUTPATIENT
Start: 2023-07-31

## 2023-07-31 RX ORDER — SUCRALFATE 1 G/1
TABLET ORAL
Qty: 60 TABLET | Refills: 11 | Status: SHIPPED | OUTPATIENT
Start: 2023-07-31

## 2023-08-27 DIAGNOSIS — I10 PRIMARY HYPERTENSION: ICD-10-CM

## 2023-08-28 RX ORDER — AMLODIPINE BESYLATE 10 MG/1
TABLET ORAL
Qty: 90 TABLET | Refills: 3 | Status: SHIPPED | OUTPATIENT
Start: 2023-08-28

## 2023-08-28 NOTE — TELEPHONE ENCOUNTER
Rx Refill Note  Requested Prescriptions     Pending Prescriptions Disp Refills    amLODIPine (NORVASC) 10 MG tablet [Pharmacy Med Name: AMLODIPINE BESYLATE TABS 10MG] 90 tablet 3     Sig: TAKE 1 TABLET EVERY NIGHT AT BEDTIME      Last office visit with prescribing clinician: 12/6/2021   Last telemedicine visit with prescribing clinician: Visit date not found   Next office visit with prescribing clinician: Visit date not found                         Would you like a call back once the refill request has been completed: [] Yes [] No    If the office needs to give you a call back, can they leave a voicemail: [] Yes [] No    Estephania Lloyd MA  08/28/23, 08:16 EDT

## 2023-09-18 ENCOUNTER — OFFICE VISIT (OUTPATIENT)
Dept: FAMILY MEDICINE CLINIC | Facility: CLINIC | Age: 68
End: 2023-09-18
Payer: MEDICARE

## 2023-09-18 ENCOUNTER — TELEPHONE (OUTPATIENT)
Dept: NEUROSURGERY | Facility: CLINIC | Age: 68
End: 2023-09-18
Payer: MEDICARE

## 2023-09-18 VITALS
HEIGHT: 61 IN | SYSTOLIC BLOOD PRESSURE: 102 MMHG | DIASTOLIC BLOOD PRESSURE: 68 MMHG | BODY MASS INDEX: 32.36 KG/M2 | WEIGHT: 171.4 LBS

## 2023-09-18 DIAGNOSIS — M47.816 LUMBAR SPONDYLOSIS: Primary | ICD-10-CM

## 2023-09-18 DIAGNOSIS — Z78.0 ASYMPTOMATIC AGE-RELATED POSTMENOPAUSAL STATE: ICD-10-CM

## 2023-09-18 DIAGNOSIS — I10 PRIMARY HYPERTENSION: Chronic | ICD-10-CM

## 2023-09-18 PROCEDURE — 3078F DIAST BP <80 MM HG: CPT | Performed by: FAMILY MEDICINE

## 2023-09-18 PROCEDURE — 99213 OFFICE O/P EST LOW 20 MIN: CPT | Performed by: FAMILY MEDICINE

## 2023-09-18 PROCEDURE — 3074F SYST BP LT 130 MM HG: CPT | Performed by: FAMILY MEDICINE

## 2023-09-18 NOTE — TELEPHONE ENCOUNTER
PATIENT CALLED IN AND STATES SHE IS READY TO PROCEED WITH SURGERY.  LAST TIME SHE WAS IN THE OFFICE WAS 06/20/23     PLEASE CALL PATIENT WITH ANY ADVICE.    THANK YOU!

## 2023-09-20 NOTE — TELEPHONE ENCOUNTER
PATIENT CALLED BACK IN TO CHECK ON MESSAGE- ADVISED THE PRACTICE IS WORKING ON THIS AND WILL CONTACT HER- THANK YOU

## 2023-09-25 ENCOUNTER — OFFICE VISIT (OUTPATIENT)
Dept: NEUROSURGERY | Facility: CLINIC | Age: 68
End: 2023-09-25
Payer: MEDICARE

## 2023-09-25 ENCOUNTER — HOSPITAL ENCOUNTER (OUTPATIENT)
Dept: GENERAL RADIOLOGY | Facility: HOSPITAL | Age: 68
Discharge: HOME | End: 2023-09-25
Admitting: NEUROLOGICAL SURGERY
Payer: MEDICARE

## 2023-09-25 VITALS
WEIGHT: 172 LBS | SYSTOLIC BLOOD PRESSURE: 158 MMHG | DIASTOLIC BLOOD PRESSURE: 88 MMHG | BODY MASS INDEX: 32.47 KG/M2 | TEMPERATURE: 97.7 F | HEIGHT: 61 IN

## 2023-09-25 DIAGNOSIS — M47.816 SPONDYLOSIS OF LUMBAR REGION WITHOUT MYELOPATHY OR RADICULOPATHY: Primary | ICD-10-CM

## 2023-09-25 DIAGNOSIS — M48.062 LUMBAR STENOSIS WITH NEUROGENIC CLAUDICATION: ICD-10-CM

## 2023-09-25 DIAGNOSIS — M47.816 SPONDYLOSIS OF LUMBAR REGION WITHOUT MYELOPATHY OR RADICULOPATHY: ICD-10-CM

## 2023-09-25 PROCEDURE — 3077F SYST BP >= 140 MM HG: CPT | Performed by: NEUROLOGICAL SURGERY

## 2023-09-25 PROCEDURE — 72120 X-RAY BEND ONLY L-S SPINE: CPT

## 2023-09-25 PROCEDURE — 3079F DIAST BP 80-89 MM HG: CPT | Performed by: NEUROLOGICAL SURGERY

## 2023-09-25 PROCEDURE — 99213 OFFICE O/P EST LOW 20 MIN: CPT | Performed by: NEUROLOGICAL SURGERY

## 2023-09-25 NOTE — PROGRESS NOTES
NAME: RIMMA CASTILLO   DOS: 2023  : 1955  PCP: Daniel Shafer DO    Chief Complaint:    Chief Complaint   Patient presents with    Follow-up     Surgery consultation    Back Pain    Leg Pain       History of Present Illness:  67 y.o. female   Is a very pleasant lady 67 years of age with multiple allergies presents for follow-up for back pain.  She has some multiple medical issues and is here for evaluation she is undergone conservative care with therapy and has failed she has L4-5 stenosis    PMHX  Allergies:  Allergies   Allergen Reactions    Shellfish Allergy      Head congestion    Atenolol      Fatigue    Buspirone       Headache    Codeine      Depression    Pseudoephedrine Anxiety    Rosuvastatin      Fatigue    Westley-E.P.A. [Dha-Epa-Vitamin E] Anxiety    Simvastatin      Facial numbness    Trazodone      Nightmares     Medications    Current Outpatient Medications:     amLODIPine (NORVASC) 10 MG tablet, TAKE 1 TABLET EVERY NIGHT AT BEDTIME, Disp: 90 tablet, Rfl: 3    atorvastatin (LIPITOR) 20 MG tablet, TAKE 1 TABLET EVERY NIGHT AT BEDTIME, Disp: 30 tablet, Rfl: 11    citalopram (CeleXA) 20 MG tablet, TAKE 1 TABLET DAILY, Disp: 90 tablet, Rfl: 3    Coenzyme Q10 (CO Q-10) 200 MG capsule, Take 1 capsule by mouth., Disp: , Rfl:     dicyclomine (Bentyl) 10 MG capsule, Take 1 capsule by mouth 4 (Four) Times a Day Before Meals & at Bedtime., Disp: 120 capsule, Rfl: 1    estradiol (MINIVELLE, VIVELLE-DOT) 0.05 MG/24HR patch, , Disp: , Rfl:     fluticasone (FLONASE) 50 MCG/ACT nasal spray, USE 2 SPRAYS IN EACH NOSTRIL DAILY, Disp: 16 g, Rfl: 11    hydroCHLOROthiazide (HYDRODIURIL) 25 MG tablet, TAKE 1 TABLET EVERY MORNING, Disp: 30 tablet, Rfl: 11    HYDROcodone-acetaminophen (NORCO) 5-325 MG per tablet, Take 0.5-1 tablets by mouth Daily As Needed for Moderate Pain., Disp: 30 tablet, Rfl: 0    lisinopril (PRINIVIL,ZESTRIL) 40 MG tablet, TAKE 1 TABLET EVERY MORNING, Disp: 30 tablet, Rfl: 11     magnesium oxide (MAGOX) 400 (241.3 Mg) MG tablet tablet, Take 1 tablet by mouth., Disp: , Rfl:     melatonin 5 MG tablet tablet, Take 1 tablet by mouth Every Night., Disp: , Rfl:     methocarbamol (Robaxin) 500 MG tablet, Take 1 tablet by mouth 3 (Three) Times a Day., Disp: 90 tablet, Rfl: 0    Omega-3 Fatty Acids (FISH OIL) 1000 MG capsule capsule, Take  by mouth., Disp: , Rfl:     omeprazole (priLOSEC) 20 MG capsule, TAKE 1 CAPSULE DAILY, Disp: 90 capsule, Rfl: 1    sucralfate (CARAFATE) 1 g tablet, TAKE 1 TABLET EVERY 12 HOURS, Disp: 60 tablet, Rfl: 11    triamcinolone (KENALOG) 0.025 % ointment, APPLY 1 APPLICATION TOPICALLY TO THE AFFECTED AREA(S) 2 TIMES A DAY AS NEEDED, Disp: , Rfl:     valACYclovir (VALTREX) 500 MG tablet, Take 1 tablet by mouth Daily., Disp: , Rfl:     vitamin C (ASCORBIC ACID) 500 MG tablet, Take  by mouth daily., Disp: , Rfl:   Past Medical History:  Past Medical History:   Diagnosis Date    Allergic rhinitis     Bone pain     foot    Cancer 2005    Skin cancer    Cataract 2010    Surgery recently    Cervical disc disorder 2022    Chronic pain disorder 2012    Coronary artery disease 2005    Mitral valve    CTS (carpal tunnel syndrome)     Depression 1981 1981 - Hospitalized    Fibrocystic breast disease 2000    Current benign breast biopsies    GERD (gastroesophageal reflux disease) 2001    Glaucoma Surgery recently    Headache 2021    None this year    Hiatal hernia     Hyperlipidemia     Hypertension 1997    Insomnia 2013    Irritable bowel syndrome (IBS) 2002    Recurrent abdominal cramps    Joint pain 2012    Low back pain 2008    Lumbar scoliosis 2017    Lumbar spondylosis 2000    Chronic low back pain    Lumbosacral disc disease     Migraine 2021    Mitral valve insufficiency     Neck pain 2022    Obesity 2000    Osteoarthritis     Osteopenia     Peripheral neuropathy     Post menopausal syndrome 2004    Refractory hot flashes    Spinal stenosis 2022    Thoracic disc disorder       Past Surgical History:  Past Surgical History:   Procedure Laterality Date    BREAST BIOPSY Right remote    benign disease    CATARACT EXTRACTION, BILATERAL Bilateral     CHOLECYSTECTOMY      COLONOSCOPY  ?    HYSTERECTOMY  1998    REFRACTIVE SURGERY      RK    TRIGGER POINT INJECTION      Knee    URETHRAL SUSPENSION      laparoscopic for stress incontinence     Social Hx:  Social History     Tobacco Use    Smoking status: Former     Packs/day: 2.00     Years: 10.00     Pack years: 20.00     Types: Cigarettes     Start date: 1971     Quit date: 1981     Years since quittin.7    Smokeless tobacco: Never   Vaping Use    Vaping Use: Never used   Substance Use Topics    Alcohol use: Yes     Alcohol/week: 2.0 standard drinks     Types: 2 Glasses of wine per week     Comment: 2 per night    Drug use: No     Family Hx:  Family History   Problem Relation Age of Onset    Pulmonary fibrosis Mother          age 82    Hypertension Mother     Lumbar disc disease Mother     Prostate cancer Father          age 76    Hypertension Sister             Goiter Sister     Hypothyroidism Sister     Anxiety disorder Sister     Miscarriages / Stillbirths Sister     Thyroid disease Sister     Heart disease Brother          Age 48 heart attack was cigarette smoker    Hiatal hernia Brother     Early death Brother         Heart attack at age 48    Breast cancer Other         Maternal and paternal aunts    Hypertension Brother     Obesity Maternal Uncle     Diabetes Maternal Uncle     Arthritis Paternal Aunt     Hypertension Brother     Hyperlipidemia Brother      Review of Systems:        Review of Systems   HENT:  Positive for facial swelling.    Genitourinary:  Positive for flank pain and frequency.   Musculoskeletal:  Positive for arthralgias, back pain and myalgias.   Neurological:  Positive for numbness.   Psychiatric/Behavioral:  The patient is nervous/anxious.       I have reviewed  this note template and all pertinent parts of the review of systems social, family history, surgical history and medication list    Physical Examination:  There were no vitals filed for this visit.   General Appearance:   Well developed, well nourished, well groomed, alert, and cooperative.  Neurological examination:  Neurologic Exam  She is wide awake alert and follows commands    No signs of intrinsic hip dysfunction    Strength in lower extremities as well    Back is without lesions    Legs are warm and well-perfused gait and station is antalgic    Review of Imaging/DATA:  MRIs of the lumbar spine personally reviewed and interpreted shows L4-5 stenosis there is no evidence of high-grade spondylolisthesis she does have a high-grade stenosis at the 4 5 area is the leading diagnostic cause of her back and leg pain, hip x-rays look good, lumbar flexion-extension films there is no listhesis I reviewed all these personally  Diagnoses/Plan:    Ms. Horner is a 67 y.o. female   1.  L4-5 spinal stenosis significant neurogenic claudication no other identifiable causes legs warm and well-perfused hips without arthritic stigmata    2.  Cardiac history multiple medical problems    I explained the risk benefits and expected outcome of major elective surgery for their problem, complications from approach, and infection, the risk of neurologic implications after surgery as well as need for repeat surgeries and most importantly failure to achieve quality of life improvement from the surgery to the patient.  I talked about the complexities she does not really want surgery but unfortunately she is at a place where it will offer her her only path forward as far as her neurogenic claudication    Plan will be minimally invasive tubular approach and left L4-5 minimally invasive laminectomy explained the risk benefits expected outcome as well as need for additional procedures    We will get her cardiac cleared prior to surgery

## 2023-09-27 ENCOUNTER — PREP FOR SURGERY (OUTPATIENT)
Dept: OTHER | Facility: HOSPITAL | Age: 68
End: 2023-09-27
Payer: MEDICARE

## 2023-09-27 DIAGNOSIS — M53.86 SCIATICA ASSOCIATED WITH DISORDER OF LUMBAR SPINE: Primary | ICD-10-CM

## 2023-09-27 PROBLEM — M48.062 NEUROGENIC CLAUDICATION DUE TO LUMBAR SPINAL STENOSIS: Status: ACTIVE | Noted: 2023-09-27

## 2023-09-27 RX ORDER — HYDROCODONE BITARTRATE AND ACETAMINOPHEN 7.5; 325 MG/1; MG/1
1 TABLET ORAL ONCE
OUTPATIENT
Start: 2023-09-27 | End: 2023-09-27

## 2023-09-27 RX ORDER — SODIUM CHLORIDE, SODIUM LACTATE, POTASSIUM CHLORIDE, CALCIUM CHLORIDE 600; 310; 30; 20 MG/100ML; MG/100ML; MG/100ML; MG/100ML
9 INJECTION, SOLUTION INTRAVENOUS CONTINUOUS
OUTPATIENT
Start: 2023-09-27

## 2023-09-27 RX ORDER — CHLORHEXIDINE GLUCONATE 4 G/100ML
SOLUTION TOPICAL
Qty: 120 ML | Refills: 0 | Status: SHIPPED | OUTPATIENT
Start: 2023-09-27

## 2023-09-27 RX ORDER — IBUPROFEN 800 MG/1
800 TABLET ORAL ONCE
OUTPATIENT
Start: 2023-09-27 | End: 2023-09-27

## 2023-09-27 RX ORDER — CEFAZOLIN SODIUM 2 G/100ML
2 INJECTION, SOLUTION INTRAVENOUS ONCE
OUTPATIENT
Start: 2023-09-27 | End: 2023-09-27

## 2023-09-27 RX ORDER — ACETAMINOPHEN 325 MG/1
650 TABLET ORAL ONCE
OUTPATIENT
Start: 2023-09-27 | End: 2023-09-27

## 2023-09-27 RX ORDER — FAMOTIDINE 20 MG/1
20 TABLET, FILM COATED ORAL
OUTPATIENT
Start: 2023-09-27

## 2023-09-28 NOTE — PROGRESS NOTES
Subjective   Luh Horner is a 67 y.o. female.     Chief Complaint   Patient presents with    Back Pain     3 month CSA       Back Pain  This is a chronic problem. The current episode started more than 1 year ago. The problem occurs constantly. The problem has been gradually worsening since onset. The pain is present in the gluteal, sacro-iliac and thoracic spine. The quality of the pain is described as stabbing. The pain does not radiate. The pain is at a severity of 7/10. The pain is Worse during the day. The symptoms are aggravated by bending, position, standing and twisting. Stiffness is present In the morning. Pertinent negatives include no bladder incontinence, bowel incontinence, chest pain, dysuria, fever, headaches, leg pain, numbness, paresis, paresthesias, pelvic pain, perianal numbness, tingling, weakness or weight loss. Risk factors include obesity and recent trauma.      Luh Horner presents today for   Chief Complaint   Patient presents with    Back Pain     3 month CSA     she is in need of chronic disease followup. she denies acute complaints today.    This patient is accompanied by their self who contributes to the history of their care.    The following portions of the patient's history were reviewed and updated as appropriate: allergies, current medications, past family history, past medical history, past social history, past surgical history and problem list.    Current Outpatient Medications   Medication Instructions    amLODIPine (NORVASC) 10 MG tablet TAKE 1 TABLET EVERY NIGHT AT BEDTIME    atorvastatin (LIPITOR) 20 MG tablet TAKE 1 TABLET EVERY NIGHT AT BEDTIME    chlorhexidine (HIBICLENS) 4 % external liquid Shower each day with solution for 5 days beginning 5 days before surgery.    citalopram (CeleXA) 20 MG tablet TAKE 1 TABLET DAILY    Coenzyme Q10 (CO Q-10) 200 MG capsule 1 capsule, Oral    dicyclomine (BENTYL) 10 mg, Oral, 4 Times Daily Before Meals & Nightly    estradiol  (MINIVELLE, VIVELLE-DOT) 0.05 MG/24HR patch No dose, route, or frequency recorded.    fluticasone (FLONASE) 50 MCG/ACT nasal spray USE 2 SPRAYS IN EACH NOSTRIL DAILY    hydroCHLOROthiazide (HYDRODIURIL) 25 MG tablet TAKE 1 TABLET EVERY MORNING    HYDROcodone-acetaminophen (NORCO) 5-325 MG per tablet 0.5-1 tablets, Oral, Daily PRN    lisinopril (PRINIVIL,ZESTRIL) 40 MG tablet TAKE 1 TABLET EVERY MORNING    magnesium oxide (MAGOX) 400 (241.3 Mg) MG tablet tablet 1 tablet, Oral    melatonin 5 mg, Oral, Nightly    methocarbamol (ROBAXIN) 500 mg, Oral, 3 Times Daily    mupirocin (BACTROBAN) 2 % nasal ointment Apply to the inside of each nostril with a cotton swab two times daily, morning and evening, for 5 days before surgery.    Omega-3 Fatty Acids (FISH OIL) 1000 MG capsule capsule Oral    omeprazole (priLOSEC) 20 MG capsule TAKE 1 CAPSULE DAILY    sucralfate (CARAFATE) 1 g tablet TAKE 1 TABLET EVERY 12 HOURS    triamcinolone (KENALOG) 0.025 % ointment APPLY 1 APPLICATION TOPICALLY TO THE AFFECTED AREA(S) 2 TIMES A DAY AS NEEDED    valACYclovir (VALTREX) 500 MG tablet 1 tablet, Oral, Daily    vitamin C (ASCORBIC ACID) 500 MG tablet Oral, Daily     Active Ambulatory Problems     Diagnosis Date Noted    Atopic rhinitis 06/14/2016    Anxiety 06/14/2016    Carpal tunnel syndrome 06/14/2016    Clenching of teeth 06/14/2016    Depression 06/14/2016    Sleep disorder 06/14/2016    Gastroesophageal reflux disease 06/14/2016    Hyperlipidemia 06/14/2016    Hypertension 06/14/2016    Low back pain 06/14/2016    Mitral valve insufficiency 06/14/2016    Class 1 obesity due to excess calories with body mass index (BMI) of 32.0 to 32.9 in adult 06/14/2016    Gluteal tendinitis of right buttock 06/14/2016    Postmenopausal disorder 06/14/2016    Preventative health care 09/29/2016    Other chronic pain 09/29/2016    Spondylosis of lumbar region without myelopathy or radiculopathy 06/08/2017    Lumbar scoliosis 06/10/2017    Irritable  bowel syndrome 2017    Migraine without aura and without status migrainosus, not intractable 2021    Lumbar stenosis with neurogenic claudication 2023    Ligamentum flavum hypertrophy 2023    Degeneration of lumbar or lumbosacral intervertebral disc 2023    Epidural lipomatosis 2023    Lumbar paraspinal muscle spasm 2023    Neurogenic claudication due to lumbar spinal stenosis 2023     Resolved Ambulatory Problems     Diagnosis Date Noted    Dizziness 2016    Insect bites 2016     Past Medical History:   Diagnosis Date    Allergic rhinitis     Bone pain     Cancer 2005    Cataract 2010    Cervical disc disorder     Chronic pain disorder 2012    Coronary artery disease 2005    CTS (carpal tunnel syndrome)     Fibrocystic breast disease 2000    GERD (gastroesophageal reflux disease) 2001    Glaucoma Surgery recently    Headache     Hiatal hernia     Insomnia 2013    Irritable bowel syndrome (IBS) 2002    Joint pain 2012    Lumbar spondylosis 2000    Lumbosacral disc disease     Migraine     Neck pain     Obesity 2000    Osteoarthritis     Osteopenia     Peripheral neuropathy     Post menopausal syndrome 2004    Spinal stenosis     Thoracic disc disorder      Past Surgical History:   Procedure Laterality Date    BREAST BIOPSY Right remote    benign disease    CATARACT EXTRACTION, BILATERAL Bilateral     CHOLECYSTECTOMY  1998    COLONOSCOPY  ?    HYSTERECTOMY  1998    REFRACTIVE SURGERY      RK    TRIGGER POINT INJECTION      Knee    URETHRAL SUSPENSION      laparoscopic for stress incontinence     Family History   Problem Relation Age of Onset    Pulmonary fibrosis Mother          age 82    Hypertension Mother     Lumbar disc disease Mother     Prostate cancer Father          age 76    Hypertension Sister             Goiter Sister     Hypothyroidism Sister     Anxiety disorder Sister     Miscarriages /  "Stillbirths Sister     Thyroid disease Sister     Heart disease Brother          Age 48 heart attack was cigarette smoker    Hiatal hernia Brother     Early death Brother         Heart attack at age 48    Breast cancer Other         Maternal and paternal aunts    Hypertension Brother     Obesity Maternal Uncle     Diabetes Maternal Uncle     Arthritis Paternal Aunt     Hypertension Brother     Hyperlipidemia Brother      Social History     Socioeconomic History    Marital status:    Tobacco Use    Smoking status: Former     Packs/day: 2.00     Years: 10.00     Pack years: 20.00     Types: Cigarettes     Start date: 1971     Quit date: 1981     Years since quittin.7    Smokeless tobacco: Never   Vaping Use    Vaping Use: Never used   Substance and Sexual Activity    Alcohol use: Yes     Alcohol/week: 2.0 standard drinks     Types: 2 Glasses of wine per week     Comment: 2 per night    Drug use: No    Sexual activity: Yes     Partners: Male     Birth control/protection: Hysterectomy       Review of Systems   Constitutional:  Negative for fever and weight loss.   Cardiovascular:  Negative for chest pain.   Gastrointestinal:  Negative for bowel incontinence.   Genitourinary:  Negative for bladder incontinence, dysuria and pelvic pain.   Musculoskeletal:  Positive for back pain.   Neurological:  Negative for tingling, weakness, numbness, headaches and paresthesias.   Review of Systems -  General ROS: negative for - chills, fever or night sweats  Cardiovascular ROS: no chest pain or dyspnea on exertion  Gastrointestinal ROS: no abdominal pain, change in bowel habits, or black or bloody stools  Genito-Urinary ROS: no trouble voiding or gross hematuria    Objective   Blood pressure 102/68, height 154.9 cm (61\"), weight 77.7 kg (171 lb 6.4 oz), not currently breastfeeding.  Nursing note reviewed  Physical Exam  Const: NAD, A&Ox4, Pleasant, Cooperative  Eyes: EOMI, no conjunctivitis  ENT: No nasal " discharge present, neck supple  Cardiac: Regular rate and rhythm, no cyanosis  Resp: Respiratory rate within normal limits, no increased work of breathing, no audible wheezing or retractions noted  GI: No distention or ascites  Procedures  Assessment & Plan     Problem List Items Addressed This Visit          Cardiac and Vasculature    Hypertension (Chronic)     Other Visit Diagnoses       Lumbar spondylosis    -  Primary    Asymptomatic age-related postmenopausal state        Relevant Orders    DEXA Bone Density Axial          See patient diagnoses and orders along with patient instructions for assessment, plan, and changes to care for patient.    There are no Patient Instructions on file for this visit.    Return in about 6 months (around 3/18/2024) for Medicare Wellness.    Ambulatory progress note signed and attested to by Daniel Shafer D.O.

## 2023-10-02 ENCOUNTER — OFFICE VISIT (OUTPATIENT)
Dept: CARDIOLOGY | Facility: CLINIC | Age: 68
End: 2023-10-02
Payer: MEDICARE

## 2023-10-02 VITALS
BODY MASS INDEX: 32.1 KG/M2 | HEART RATE: 90 BPM | HEIGHT: 61 IN | DIASTOLIC BLOOD PRESSURE: 70 MMHG | OXYGEN SATURATION: 97 % | WEIGHT: 170 LBS | SYSTOLIC BLOOD PRESSURE: 142 MMHG

## 2023-10-02 DIAGNOSIS — E78.5 HYPERLIPIDEMIA, UNSPECIFIED HYPERLIPIDEMIA TYPE: Primary | ICD-10-CM

## 2023-10-02 DIAGNOSIS — I34.1 MITRAL VALVE PROLAPSE: ICD-10-CM

## 2023-10-02 PROCEDURE — 93000 ELECTROCARDIOGRAM COMPLETE: CPT | Performed by: INTERNAL MEDICINE

## 2023-10-02 PROCEDURE — 3078F DIAST BP <80 MM HG: CPT | Performed by: INTERNAL MEDICINE

## 2023-10-02 PROCEDURE — 3077F SYST BP >= 140 MM HG: CPT | Performed by: INTERNAL MEDICINE

## 2023-10-02 PROCEDURE — 99204 OFFICE O/P NEW MOD 45 MIN: CPT | Performed by: INTERNAL MEDICINE

## 2023-10-02 NOTE — PROGRESS NOTES
"Chief Complaint  Cardiac Clearance (New Patient)      Subjective   History of Present Illness    Problem List  -Preop for back surgery  -atherosclerosis of aorta  -MVP  -HLD, LDL not at goal  -BMI 32  -EKG non specific changes    Ms. Horner is a 67 year old lady with above hx.  Occasional dyspnea when doing stairs but has been way for years and non limiting.  Able to adls and her exercise class.  Limiting factor is back pain.  No chest pain or syncope.  No prominent murmur but hx of MVP.  Brother  in 50s of MI.  ROS negative except for the above.         Objective   Vital Signs:  Vitals:    10/02/23 1503   BP: 142/70   Pulse: 90   SpO2: 97%     Estimated body mass index is 32.12 kg/m² as calculated from the following:    Height as of this encounter: 154.9 cm (61\").    Weight as of this encounter: 77.1 kg (170 lb).       Physical Exam  HENT:      Head: Normocephalic.   Eyes:      Extraocular Movements: Extraocular movements intact.   Cardiovascular:      Rate and Rhythm: Normal rate and regular rhythm.      Heart sounds: No murmur heard.    No gallop.   Pulmonary:      Breath sounds: Normal breath sounds.   Abdominal:      Palpations: Abdomen is soft.   Musculoskeletal:      Right lower leg: No edema.      Left lower leg: No edema.   Skin:     General: Skin is warm and dry.   Neurological:      General: No focal deficit present.      Mental Status: She is alert.   Psychiatric:         Mood and Affect: Mood normal.         ECG 12 Lead    Date/Time: 10/2/2023 3:57 PM  Performed by: Damián Devi MD  Authorized by: Damián Devi MD   Rhythm: sinus rhythm  Other findings: non-specific ST-T wave changes         Assessment   -Preop for back surgery  -atherosclerosis of aorta  -MVP  -HLD, LDL not at goal  -BMI 32  -EKG non specific changes    Plan   -can proceed with back surgery without further eval.  -echo, CT calcium score and blood work.  Will do studies to monitor MVP and need for aspirin " long term.  Also will likely titrate HLD meds.    -cont. Current meds    Return in about 3 months (around 1/2/2024).  Damián Devi MD  10/02/2023 15:57 EDT

## 2023-10-03 PROBLEM — M53.86 SCIATICA ASSOCIATED WITH DISORDER OF LUMBAR SPINE: Status: ACTIVE | Noted: 2023-10-03

## 2023-10-09 ENCOUNTER — LAB (OUTPATIENT)
Dept: LAB | Facility: HOSPITAL | Age: 68
End: 2023-10-09
Payer: MEDICARE

## 2023-10-09 DIAGNOSIS — E78.5 HYPERLIPIDEMIA, UNSPECIFIED HYPERLIPIDEMIA TYPE: ICD-10-CM

## 2023-10-09 DIAGNOSIS — I34.1 MITRAL VALVE PROLAPSE: ICD-10-CM

## 2023-10-09 LAB
ALBUMIN SERPL-MCNC: 4.9 G/DL (ref 3.5–5.2)
ALBUMIN/GLOB SERPL: 1.8 G/DL
ALP SERPL-CCNC: 91 U/L (ref 39–117)
ALT SERPL W P-5'-P-CCNC: 28 U/L (ref 1–33)
ANION GAP SERPL CALCULATED.3IONS-SCNC: 10.6 MMOL/L (ref 5–15)
AST SERPL-CCNC: 28 U/L (ref 1–32)
BASOPHILS # BLD AUTO: 0.07 10*3/MM3 (ref 0–0.2)
BASOPHILS NFR BLD AUTO: 1.2 % (ref 0–1.5)
BILIRUB SERPL-MCNC: 0.3 MG/DL (ref 0–1.2)
BUN SERPL-MCNC: 15 MG/DL (ref 8–23)
BUN/CREAT SERPL: 23.4 (ref 7–25)
CALCIUM SPEC-SCNC: 9.6 MG/DL (ref 8.6–10.5)
CHLORIDE SERPL-SCNC: 100 MMOL/L (ref 98–107)
CHOLEST SERPL-MCNC: 224 MG/DL (ref 0–200)
CO2 SERPL-SCNC: 27.4 MMOL/L (ref 22–29)
CREAT SERPL-MCNC: 0.64 MG/DL (ref 0.57–1)
CRP SERPL-MCNC: 0.37 MG/DL (ref 0.01–0.5)
DEPRECATED RDW RBC AUTO: 43.7 FL (ref 37–54)
EGFRCR SERPLBLD CKD-EPI 2021: 95.8 ML/MIN/1.73
EOSINOPHIL # BLD AUTO: 0.19 10*3/MM3 (ref 0–0.4)
EOSINOPHIL NFR BLD AUTO: 3.2 % (ref 0.3–6.2)
ERYTHROCYTE [DISTWIDTH] IN BLOOD BY AUTOMATED COUNT: 12.2 % (ref 12.3–15.4)
GLOBULIN UR ELPH-MCNC: 2.7 GM/DL
GLUCOSE SERPL-MCNC: 94 MG/DL (ref 65–99)
HCT VFR BLD AUTO: 42.3 % (ref 34–46.6)
HDLC SERPL-MCNC: 77 MG/DL (ref 40–60)
HGB BLD-MCNC: 14.9 G/DL (ref 12–15.9)
IMM GRANULOCYTES # BLD AUTO: 0.03 10*3/MM3 (ref 0–0.05)
IMM GRANULOCYTES NFR BLD AUTO: 0.5 % (ref 0–0.5)
LDLC SERPL CALC-MCNC: 120 MG/DL (ref 0–100)
LDLC/HDLC SERPL: 1.5 {RATIO}
LYMPHOCYTES # BLD AUTO: 1.06 10*3/MM3 (ref 0.7–3.1)
LYMPHOCYTES NFR BLD AUTO: 18.1 % (ref 19.6–45.3)
MCH RBC QN AUTO: 34.5 PG (ref 26.6–33)
MCHC RBC AUTO-ENTMCNC: 35.2 G/DL (ref 31.5–35.7)
MCV RBC AUTO: 97.9 FL (ref 79–97)
MONOCYTES # BLD AUTO: 0.59 10*3/MM3 (ref 0.1–0.9)
MONOCYTES NFR BLD AUTO: 10.1 % (ref 5–12)
NEUTROPHILS NFR BLD AUTO: 3.92 10*3/MM3 (ref 1.7–7)
NEUTROPHILS NFR BLD AUTO: 66.9 % (ref 42.7–76)
NRBC BLD AUTO-RTO: 0.2 /100 WBC (ref 0–0.2)
PLATELET # BLD AUTO: 300 10*3/MM3 (ref 140–450)
PMV BLD AUTO: 9 FL (ref 6–12)
POTASSIUM SERPL-SCNC: 4.1 MMOL/L (ref 3.5–5.2)
PROT SERPL-MCNC: 7.6 G/DL (ref 6–8.5)
RBC # BLD AUTO: 4.32 10*6/MM3 (ref 3.77–5.28)
SODIUM SERPL-SCNC: 138 MMOL/L (ref 136–145)
TRIGL SERPL-MCNC: 156 MG/DL (ref 0–150)
VLDLC SERPL-MCNC: 27 MG/DL (ref 5–40)
WBC NRBC COR # BLD: 5.86 10*3/MM3 (ref 3.4–10.8)

## 2023-10-09 PROCEDURE — 85025 COMPLETE CBC W/AUTO DIFF WBC: CPT

## 2023-10-09 PROCEDURE — 86141 C-REACTIVE PROTEIN HS: CPT

## 2023-10-09 PROCEDURE — 80061 LIPID PANEL: CPT

## 2023-10-09 PROCEDURE — 83695 ASSAY OF LIPOPROTEIN(A): CPT

## 2023-10-09 PROCEDURE — 36415 COLL VENOUS BLD VENIPUNCTURE: CPT

## 2023-10-09 PROCEDURE — 80053 COMPREHEN METABOLIC PANEL: CPT

## 2023-10-10 LAB — LPA SERPL-SCNC: 22.8 NMOL/L

## 2023-10-11 ENCOUNTER — PRE-ADMISSION TESTING (OUTPATIENT)
Dept: PREADMISSION TESTING | Facility: HOSPITAL | Age: 68
End: 2023-10-11
Payer: MEDICARE

## 2023-10-11 VITALS — WEIGHT: 174.82 LBS | HEIGHT: 61 IN | BODY MASS INDEX: 33.01 KG/M2

## 2023-10-11 DIAGNOSIS — M53.86 SCIATICA ASSOCIATED WITH DISORDER OF LUMBAR SPINE: ICD-10-CM

## 2023-10-11 PROCEDURE — 36415 COLL VENOUS BLD VENIPUNCTURE: CPT

## 2023-10-11 PROCEDURE — 87081 CULTURE SCREEN ONLY: CPT

## 2023-10-11 NOTE — PAT
An arrival time for procedure was not provided during PAT visit. If patient had any questions or concerns about their arrival time, they were instructed to contact their surgeon/physician.  Additionally, if the patient referred to an arrival time that was acquired from their my chart account, patient was encouraged to verify that time with their surgeon/physician. Arrival times are NOT provided in Pre Admission Testing Department.     Patient denies any current skin issues.      Cardiology note attached     Bactroban (if prescribed) and Chlorhexidine Prescription prescribed by physician before PAT visit.  Verified with patient that medication(s) were picked up from their pharmacy.  Written instructions given to patient during PAT visit.  Patient/family also instructed to complete skin prep checklist and return the checklist on the day of surgery to preoperative staff.  Patient/family verbalized understanding.     Per Anesthesia Request, patient instructed not to take their ACE/ARB medications on the AM of surgery.     Patient to apply Chlorhexadine wipes  to surgical area (as instructed) the night before procedure and the AM of procedure. Wipes provided.     Patient instructed to drink 20 ounces of Gatorade or Gatorlyte (if diabetic) and it needs to be completed 1 hour (for Main OR patients) or 2 hours (scheduled  section & [facility] patients) before given arrival time for procedure (NO RED Gatorade and NO Gatorade Zero).    Patient verbalized understanding.     Permit not signed. Pt reports she is waiting on review of scans to find out if MIS or more extensive surgery with hardware.

## 2023-10-11 NOTE — DISCHARGE INSTRUCTIONS
Bactroban (if prescribed) and Chlorhexidine Prescription prescribed by physician before PAT visit.  Verified with patient that medication(s) were picked up from their pharmacy.  Written instructions given to patient during PAT visit.  Patient/family also instructed to complete skin prep checklist and return the checklist on the day of surgery to preoperative staff.  Patient/family verbalized understanding.     Per Anesthesia Request, patient instructed not to take their ACE/ARB medications on the AM of surgery.     Patient to apply Chlorhexadine wipes  to surgical area (as instructed) the night before procedure and the AM of procedure. Wipes provided.     Patient instructed to drink 20 ounces of Gatorade or Gatorlyte (if diabetic) and it needs to be completed 1 hour (for Main OR patients) or 2 hours (scheduled  section & [facility] patients) before given arrival time for procedure (NO RED Gatorade and NO Gatorade Zero).    Patient verbalized understanding.     Patient viewed general PAT education video as instructed in their preoperative information received from their surgeon.  Patient stated the general PAT education video was viewed in its entirety and survey completed.  Copies of PAT general education handouts (Incentive Spirometry, Meds to Beds Program, Patient Belongings, Pre-op skin preparation instructions, Blood Glucose testing, Visitor policy, Surgery FAQ, Code H) distributed to patient if not printed. Education related to the PAT pass and skin preparation for surgery (if applicable) completed in PAT as a reinforcement to PAT education video. Patient instructed to return PAT pass provided today as well as completed skin preparation sheet (if applicable) on the day of procedure.     Additionally if patient had not viewed video yet but intended to view it at home or in our waiting area, then referred them to the handout with QR code/link provided during PAT visit.  Instructed patient to complete  survey after viewing the video in its entirety.  Encouraged patient/family to read PAT general education handouts thoroughly and notify PAT staff with any questions or concerns. Patient verbalized understanding of all information and priority content.

## 2023-10-12 LAB — MRSA SPEC QL CULT: NORMAL

## 2023-10-13 ENCOUNTER — HOSPITAL ENCOUNTER (OUTPATIENT)
Dept: BONE DENSITY | Facility: HOSPITAL | Age: 68
Discharge: HOME | End: 2023-10-13
Admitting: FAMILY MEDICINE
Payer: MEDICARE

## 2023-10-13 DIAGNOSIS — Z78.0 ASYMPTOMATIC AGE-RELATED POSTMENOPAUSAL STATE: ICD-10-CM

## 2023-10-13 PROCEDURE — 77080 DXA BONE DENSITY AXIAL: CPT

## 2023-10-16 RX ORDER — HYDROCHLOROTHIAZIDE 25 MG/1
25 TABLET ORAL EVERY MORNING
Qty: 30 TABLET | Refills: 11 | Status: SHIPPED | OUTPATIENT
Start: 2023-10-16

## 2023-10-18 ENCOUNTER — APPOINTMENT (OUTPATIENT)
Dept: GENERAL RADIOLOGY | Facility: HOSPITAL | Age: 68
End: 2023-10-18
Payer: MEDICARE

## 2023-10-18 ENCOUNTER — ANESTHESIA (OUTPATIENT)
Dept: PERIOP | Facility: HOSPITAL | Age: 68
End: 2023-10-18
Payer: MEDICARE

## 2023-10-18 ENCOUNTER — ANESTHESIA EVENT (OUTPATIENT)
Dept: PERIOP | Facility: HOSPITAL | Age: 68
End: 2023-10-18
Payer: MEDICARE

## 2023-10-18 ENCOUNTER — HOSPITAL ENCOUNTER (OUTPATIENT)
Facility: HOSPITAL | Age: 68
Discharge: HOME | End: 2023-10-18
Attending: NEUROLOGICAL SURGERY | Admitting: NEUROLOGICAL SURGERY
Payer: MEDICARE

## 2023-10-18 VITALS
OXYGEN SATURATION: 96 % | BODY MASS INDEX: 33.01 KG/M2 | HEART RATE: 75 BPM | TEMPERATURE: 98.1 F | DIASTOLIC BLOOD PRESSURE: 75 MMHG | HEIGHT: 61 IN | WEIGHT: 174.82 LBS | RESPIRATION RATE: 16 BRPM | SYSTOLIC BLOOD PRESSURE: 132 MMHG

## 2023-10-18 DIAGNOSIS — M48.062 NEUROGENIC CLAUDICATION DUE TO LUMBAR SPINAL STENOSIS: Primary | ICD-10-CM

## 2023-10-18 DIAGNOSIS — M53.86 SCIATICA ASSOCIATED WITH DISORDER OF LUMBAR SPINE: ICD-10-CM

## 2023-10-18 LAB — POTASSIUM SERPL-SCNC: 3.6 MMOL/L (ref 3.5–5.2)

## 2023-10-18 PROCEDURE — 25010000002 CEFAZOLIN IN DEXTROSE 2000 MG/ 100 ML SOLUTION: Performed by: NEUROLOGICAL SURGERY

## 2023-10-18 PROCEDURE — 25010000002 PROPOFOL 10 MG/ML EMULSION: Performed by: ANESTHESIOLOGY

## 2023-10-18 PROCEDURE — 25810000003 LACTATED RINGERS PER 1000 ML: Performed by: NEUROLOGICAL SURGERY

## 2023-10-18 PROCEDURE — 63047 LAM FACETEC & FORAMOT LUMBAR: CPT | Performed by: NEUROLOGICAL SURGERY

## 2023-10-18 PROCEDURE — 25010000002 MIDAZOLAM PER 1 MG: Performed by: ANESTHESIOLOGY

## 2023-10-18 PROCEDURE — 25010000002 SUGAMMADEX 200 MG/2ML SOLUTION: Performed by: ANESTHESIOLOGY

## 2023-10-18 PROCEDURE — 25010000002 BUPIVACAINE (PF) 0.25 % SOLUTION 30 ML VIAL: Performed by: NEUROLOGICAL SURGERY

## 2023-10-18 PROCEDURE — 25010000002 DEXAMETHASONE PER 1 MG: Performed by: ANESTHESIOLOGY

## 2023-10-18 PROCEDURE — 25010000002 ONDANSETRON PER 1 MG: Performed by: ANESTHESIOLOGY

## 2023-10-18 PROCEDURE — 25010000002 DEXAMETHASONE SODIUM PHOSPHATE 10 MG/ML SOLUTION 1 ML VIAL: Performed by: NEUROLOGICAL SURGERY

## 2023-10-18 PROCEDURE — 76000 FLUOROSCOPY <1 HR PHYS/QHP: CPT

## 2023-10-18 PROCEDURE — 25010000002 BUPRENORPHINE PER 0.1 MG: Performed by: NEUROLOGICAL SURGERY

## 2023-10-18 PROCEDURE — 84132 ASSAY OF SERUM POTASSIUM: CPT | Performed by: ANESTHESIOLOGY

## 2023-10-18 PROCEDURE — 63047 LAM FACETEC & FORAMOT LUMBAR: CPT | Performed by: PHYSICIAN ASSISTANT

## 2023-10-18 PROCEDURE — 25010000002 FENTANYL CITRATE (PF) 100 MCG/2ML SOLUTION: Performed by: ANESTHESIOLOGY

## 2023-10-18 DEVICE — FLOSEAL HEMOSTATIC MATRIX, 10ML
Type: IMPLANTABLE DEVICE | Site: SPINE LUMBAR | Status: FUNCTIONAL
Brand: FLOSEAL HEMOSTATIC MATRIX

## 2023-10-18 DEVICE — HEMOST ABS SURGIFOAM SZ100 8X12 10MM: Type: IMPLANTABLE DEVICE | Site: SPINE LUMBAR | Status: FUNCTIONAL

## 2023-10-18 RX ORDER — MAGNESIUM HYDROXIDE 1200 MG/15ML
LIQUID ORAL AS NEEDED
Status: DISCONTINUED | OUTPATIENT
Start: 2023-10-18 | End: 2023-10-18 | Stop reason: HOSPADM

## 2023-10-18 RX ORDER — DROPERIDOL 2.5 MG/ML
0.62 INJECTION, SOLUTION INTRAMUSCULAR; INTRAVENOUS ONCE AS NEEDED
Status: DISCONTINUED | OUTPATIENT
Start: 2023-10-18 | End: 2023-10-18 | Stop reason: HOSPADM

## 2023-10-18 RX ORDER — CEFAZOLIN SODIUM 2 G/100ML
2 INJECTION, SOLUTION INTRAVENOUS ONCE
Status: COMPLETED | OUTPATIENT
Start: 2023-10-18 | End: 2023-10-18

## 2023-10-18 RX ORDER — SODIUM CHLORIDE 9 MG/ML
40 INJECTION, SOLUTION INTRAVENOUS AS NEEDED
Status: DISCONTINUED | OUTPATIENT
Start: 2023-10-18 | End: 2023-10-18 | Stop reason: HOSPADM

## 2023-10-18 RX ORDER — DEXAMETHASONE SODIUM PHOSPHATE 4 MG/ML
INJECTION, SOLUTION INTRA-ARTICULAR; INTRALESIONAL; INTRAMUSCULAR; INTRAVENOUS; SOFT TISSUE AS NEEDED
Status: DISCONTINUED | OUTPATIENT
Start: 2023-10-18 | End: 2023-10-18 | Stop reason: SURG

## 2023-10-18 RX ORDER — FAMOTIDINE 20 MG/1
20 TABLET, FILM COATED ORAL
Status: DISCONTINUED | OUTPATIENT
Start: 2023-10-18 | End: 2023-10-18 | Stop reason: HOSPADM

## 2023-10-18 RX ORDER — ONDANSETRON 2 MG/ML
4 INJECTION INTRAMUSCULAR; INTRAVENOUS ONCE AS NEEDED
Status: DISCONTINUED | OUTPATIENT
Start: 2023-10-18 | End: 2023-10-18 | Stop reason: HOSPADM

## 2023-10-18 RX ORDER — SODIUM CHLORIDE 0.9 % (FLUSH) 0.9 %
3-10 SYRINGE (ML) INJECTION AS NEEDED
Status: DISCONTINUED | OUTPATIENT
Start: 2023-10-18 | End: 2023-10-18 | Stop reason: HOSPADM

## 2023-10-18 RX ORDER — HYDROCODONE BITARTRATE AND ACETAMINOPHEN 7.5; 325 MG/1; MG/1
1 TABLET ORAL ONCE
Status: COMPLETED | OUTPATIENT
Start: 2023-10-18 | End: 2023-10-18

## 2023-10-18 RX ORDER — MIDAZOLAM HYDROCHLORIDE 1 MG/ML
0.5 INJECTION INTRAMUSCULAR; INTRAVENOUS
Status: DISCONTINUED | OUTPATIENT
Start: 2023-10-18 | End: 2023-10-18

## 2023-10-18 RX ORDER — HYDROCODONE BITARTRATE AND ACETAMINOPHEN 5; 325 MG/1; MG/1
1-2 TABLET ORAL EVERY 4 HOURS PRN
Qty: 30 TABLET | Refills: 0 | Status: SHIPPED | OUTPATIENT
Start: 2023-10-18

## 2023-10-18 RX ORDER — ROCURONIUM BROMIDE 10 MG/ML
INJECTION, SOLUTION INTRAVENOUS AS NEEDED
Status: DISCONTINUED | OUTPATIENT
Start: 2023-10-18 | End: 2023-10-18 | Stop reason: SURG

## 2023-10-18 RX ORDER — PROMETHAZINE HYDROCHLORIDE 25 MG/1
25 SUPPOSITORY RECTAL ONCE AS NEEDED
Status: DISCONTINUED | OUTPATIENT
Start: 2023-10-18 | End: 2023-10-18 | Stop reason: HOSPADM

## 2023-10-18 RX ORDER — SODIUM CHLORIDE 0.9 % (FLUSH) 0.9 %
3 SYRINGE (ML) INJECTION EVERY 12 HOURS SCHEDULED
Status: DISCONTINUED | OUTPATIENT
Start: 2023-10-18 | End: 2023-10-18 | Stop reason: HOSPADM

## 2023-10-18 RX ORDER — SODIUM CHLORIDE 0.9 % (FLUSH) 0.9 %
10 SYRINGE (ML) INJECTION EVERY 12 HOURS SCHEDULED
Status: CANCELLED | OUTPATIENT
Start: 2023-10-18

## 2023-10-18 RX ORDER — SODIUM CHLORIDE 9 MG/ML
40 INJECTION, SOLUTION INTRAVENOUS AS NEEDED
Status: CANCELLED | OUTPATIENT
Start: 2023-10-18

## 2023-10-18 RX ORDER — PROMETHAZINE HYDROCHLORIDE 25 MG/1
25 TABLET ORAL ONCE AS NEEDED
Status: DISCONTINUED | OUTPATIENT
Start: 2023-10-18 | End: 2023-10-18 | Stop reason: HOSPADM

## 2023-10-18 RX ORDER — MIDAZOLAM HYDROCHLORIDE 1 MG/ML
1 INJECTION INTRAMUSCULAR; INTRAVENOUS
Status: DISCONTINUED | OUTPATIENT
Start: 2023-10-18 | End: 2023-10-18 | Stop reason: HOSPADM

## 2023-10-18 RX ORDER — IPRATROPIUM BROMIDE AND ALBUTEROL SULFATE 2.5; .5 MG/3ML; MG/3ML
3 SOLUTION RESPIRATORY (INHALATION) ONCE AS NEEDED
Status: DISCONTINUED | OUTPATIENT
Start: 2023-10-18 | End: 2023-10-18 | Stop reason: HOSPADM

## 2023-10-18 RX ORDER — FAMOTIDINE 20 MG/1
20 TABLET, FILM COATED ORAL
Status: COMPLETED | OUTPATIENT
Start: 2023-10-18 | End: 2023-10-18

## 2023-10-18 RX ORDER — LIDOCAINE HYDROCHLORIDE 10 MG/ML
INJECTION, SOLUTION EPIDURAL; INFILTRATION; INTRACAUDAL; PERINEURAL AS NEEDED
Status: DISCONTINUED | OUTPATIENT
Start: 2023-10-18 | End: 2023-10-18 | Stop reason: SURG

## 2023-10-18 RX ORDER — HYDRALAZINE HYDROCHLORIDE 20 MG/ML
5 INJECTION INTRAMUSCULAR; INTRAVENOUS
Status: DISCONTINUED | OUTPATIENT
Start: 2023-10-18 | End: 2023-10-18 | Stop reason: HOSPADM

## 2023-10-18 RX ORDER — PROPOFOL 10 MG/ML
VIAL (ML) INTRAVENOUS AS NEEDED
Status: DISCONTINUED | OUTPATIENT
Start: 2023-10-18 | End: 2023-10-18 | Stop reason: SURG

## 2023-10-18 RX ORDER — NALOXONE HYDROCHLORIDE 4 MG/.1ML
SPRAY NASAL
Qty: 2 EACH | Refills: 0 | Status: SHIPPED | OUTPATIENT
Start: 2023-10-18

## 2023-10-18 RX ORDER — ONDANSETRON 2 MG/ML
INJECTION INTRAMUSCULAR; INTRAVENOUS AS NEEDED
Status: DISCONTINUED | OUTPATIENT
Start: 2023-10-18 | End: 2023-10-18 | Stop reason: SURG

## 2023-10-18 RX ORDER — SODIUM CHLORIDE, SODIUM LACTATE, POTASSIUM CHLORIDE, CALCIUM CHLORIDE 600; 310; 30; 20 MG/100ML; MG/100ML; MG/100ML; MG/100ML
9 INJECTION, SOLUTION INTRAVENOUS CONTINUOUS PRN
Status: DISCONTINUED | OUTPATIENT
Start: 2023-10-18 | End: 2023-10-18 | Stop reason: HOSPADM

## 2023-10-18 RX ORDER — SODIUM CHLORIDE 0.9 % (FLUSH) 0.9 %
10 SYRINGE (ML) INJECTION AS NEEDED
Status: CANCELLED | OUTPATIENT
Start: 2023-10-18

## 2023-10-18 RX ORDER — IBUPROFEN 800 MG/1
800 TABLET ORAL ONCE
Status: COMPLETED | OUTPATIENT
Start: 2023-10-18 | End: 2023-10-18

## 2023-10-18 RX ORDER — LABETALOL HYDROCHLORIDE 5 MG/ML
5 INJECTION, SOLUTION INTRAVENOUS
Status: DISCONTINUED | OUTPATIENT
Start: 2023-10-18 | End: 2023-10-18 | Stop reason: HOSPADM

## 2023-10-18 RX ORDER — DROPERIDOL 2.5 MG/ML
0.62 INJECTION, SOLUTION INTRAMUSCULAR; INTRAVENOUS
Status: DISCONTINUED | OUTPATIENT
Start: 2023-10-18 | End: 2023-10-18 | Stop reason: HOSPADM

## 2023-10-18 RX ORDER — LIDOCAINE HYDROCHLORIDE AND EPINEPHRINE 5; 5 MG/ML; UG/ML
INJECTION, SOLUTION INFILTRATION; PERINEURAL AS NEEDED
Status: DISCONTINUED | OUTPATIENT
Start: 2023-10-18 | End: 2023-10-18 | Stop reason: HOSPADM

## 2023-10-18 RX ORDER — LIDOCAINE HYDROCHLORIDE 10 MG/ML
0.5 INJECTION, SOLUTION EPIDURAL; INFILTRATION; INTRACAUDAL; PERINEURAL ONCE AS NEEDED
Status: COMPLETED | OUTPATIENT
Start: 2023-10-18 | End: 2023-10-18

## 2023-10-18 RX ORDER — HYDROCODONE BITARTRATE AND ACETAMINOPHEN 5; 325 MG/1; MG/1
1 TABLET ORAL ONCE AS NEEDED
Status: DISCONTINUED | OUTPATIENT
Start: 2023-10-18 | End: 2023-10-18 | Stop reason: SDUPTHER

## 2023-10-18 RX ORDER — BUPIVACAINE HYDROCHLORIDE 2.5 MG/ML
INJECTION, SOLUTION EPIDURAL; INFILTRATION; INTRACAUDAL AS NEEDED
Status: DISCONTINUED | OUTPATIENT
Start: 2023-10-18 | End: 2023-10-18 | Stop reason: HOSPADM

## 2023-10-18 RX ORDER — NALOXONE HCL 0.4 MG/ML
0.4 VIAL (ML) INJECTION AS NEEDED
Status: DISCONTINUED | OUTPATIENT
Start: 2023-10-18 | End: 2023-10-18 | Stop reason: HOSPADM

## 2023-10-18 RX ORDER — HYDROCODONE BITARTRATE AND ACETAMINOPHEN 5; 325 MG/1; MG/1
1 TABLET ORAL ONCE AS NEEDED
Status: DISCONTINUED | OUTPATIENT
Start: 2023-10-18 | End: 2023-10-18 | Stop reason: HOSPADM

## 2023-10-18 RX ORDER — SODIUM CHLORIDE, SODIUM LACTATE, POTASSIUM CHLORIDE, CALCIUM CHLORIDE 600; 310; 30; 20 MG/100ML; MG/100ML; MG/100ML; MG/100ML
9 INJECTION, SOLUTION INTRAVENOUS CONTINUOUS
Status: DISCONTINUED | OUTPATIENT
Start: 2023-10-18 | End: 2023-10-18 | Stop reason: HOSPADM

## 2023-10-18 RX ORDER — MIDAZOLAM HYDROCHLORIDE 1 MG/ML
INJECTION INTRAMUSCULAR; INTRAVENOUS AS NEEDED
Status: DISCONTINUED | OUTPATIENT
Start: 2023-10-18 | End: 2023-10-18 | Stop reason: SURG

## 2023-10-18 RX ORDER — FENTANYL CITRATE 50 UG/ML
INJECTION, SOLUTION INTRAMUSCULAR; INTRAVENOUS AS NEEDED
Status: DISCONTINUED | OUTPATIENT
Start: 2023-10-18 | End: 2023-10-18 | Stop reason: SURG

## 2023-10-18 RX ORDER — ACETAMINOPHEN 325 MG/1
650 TABLET ORAL ONCE
Status: COMPLETED | OUTPATIENT
Start: 2023-10-18 | End: 2023-10-18

## 2023-10-18 RX ORDER — HYDROMORPHONE HYDROCHLORIDE 1 MG/ML
0.5 INJECTION, SOLUTION INTRAMUSCULAR; INTRAVENOUS; SUBCUTANEOUS
Status: DISCONTINUED | OUTPATIENT
Start: 2023-10-18 | End: 2023-10-18 | Stop reason: HOSPADM

## 2023-10-18 RX ORDER — PHENYLEPHRINE HCL IN 0.9% NACL 1 MG/10 ML
SYRINGE (ML) INTRAVENOUS AS NEEDED
Status: DISCONTINUED | OUTPATIENT
Start: 2023-10-18 | End: 2023-10-18 | Stop reason: SURG

## 2023-10-18 RX ORDER — FENTANYL CITRATE 50 UG/ML
50 INJECTION, SOLUTION INTRAMUSCULAR; INTRAVENOUS
Status: DISCONTINUED | OUTPATIENT
Start: 2023-10-18 | End: 2023-10-18 | Stop reason: HOSPADM

## 2023-10-18 RX ADMIN — SODIUM CHLORIDE, POTASSIUM CHLORIDE, SODIUM LACTATE AND CALCIUM CHLORIDE: 600; 310; 30; 20 INJECTION, SOLUTION INTRAVENOUS at 13:58

## 2023-10-18 RX ADMIN — HYDROCODONE BITARTRATE AND ACETAMINOPHEN 1 TABLET: 7.5; 325 TABLET ORAL at 09:40

## 2023-10-18 RX ADMIN — ACETAMINOPHEN 650 MG: 325 TABLET ORAL at 09:40

## 2023-10-18 RX ADMIN — ONDANSETRON 4 MG: 2 INJECTION INTRAMUSCULAR; INTRAVENOUS at 13:58

## 2023-10-18 RX ADMIN — LIDOCAINE HYDROCHLORIDE 0.5 ML: 10 INJECTION, SOLUTION EPIDURAL; INFILTRATION; INTRACAUDAL; PERINEURAL at 09:41

## 2023-10-18 RX ADMIN — CEFAZOLIN SODIUM 2 G: 2 INJECTION, SOLUTION INTRAVENOUS at 12:43

## 2023-10-18 RX ADMIN — FENTANYL CITRATE 50 MCG: 50 INJECTION, SOLUTION INTRAMUSCULAR; INTRAVENOUS at 14:16

## 2023-10-18 RX ADMIN — SUGAMMADEX 150 MG: 100 INJECTION, SOLUTION INTRAVENOUS at 14:00

## 2023-10-18 RX ADMIN — IBUPROFEN 800 MG: 800 TABLET, FILM COATED ORAL at 09:40

## 2023-10-18 RX ADMIN — MIDAZOLAM HYDROCHLORIDE 1 MG: 1 INJECTION, SOLUTION INTRAMUSCULAR; INTRAVENOUS at 10:54

## 2023-10-18 RX ADMIN — FENTANYL CITRATE 50 MCG: 50 INJECTION, SOLUTION INTRAMUSCULAR; INTRAVENOUS at 12:40

## 2023-10-18 RX ADMIN — LIDOCAINE HYDROCHLORIDE 50 MG: 10 INJECTION, SOLUTION EPIDURAL; INFILTRATION; INTRACAUDAL; PERINEURAL at 12:40

## 2023-10-18 RX ADMIN — PROPOFOL 200 MG: 10 INJECTION, EMULSION INTRAVENOUS at 12:40

## 2023-10-18 RX ADMIN — Medication 100 MCG: at 12:56

## 2023-10-18 RX ADMIN — FAMOTIDINE 20 MG: 20 TABLET, FILM COATED ORAL at 09:40

## 2023-10-18 RX ADMIN — ROCURONIUM BROMIDE 40 MG: 10 SOLUTION INTRAVENOUS at 12:40

## 2023-10-18 RX ADMIN — SODIUM CHLORIDE, POTASSIUM CHLORIDE, SODIUM LACTATE AND CALCIUM CHLORIDE 9 ML/HR: 600; 310; 30; 20 INJECTION, SOLUTION INTRAVENOUS at 09:41

## 2023-10-18 RX ADMIN — MIDAZOLAM HYDROCHLORIDE 2 MG: 1 INJECTION, SOLUTION INTRAMUSCULAR; INTRAVENOUS at 12:39

## 2023-10-18 RX ADMIN — DEXAMETHASONE SODIUM PHOSPHATE 4 MG: 4 INJECTION, SOLUTION INTRAMUSCULAR; INTRAVENOUS at 13:24

## 2023-10-18 NOTE — OP NOTE
Operation note      Preoperative diagnosis severe spinal stenosis L4-5  Postoperative diagnosis same    Procedures performed left-sided L4 5, minimally invasive approach laminectomy L4 550% bony removal L4 and L5 vertebral bodies with bilateral foraminal decompression at L4-5      Surgeon: Jonel Browning MD     Assistants: Shekhar Escamilla my physician's assistant was present and personally scrubbed the entirety of the procedure, they assisted suctioning, retraction, approach, and closure of the case  Anesthesia: Normal endotracheal anesthesia    ASA Class: 2    Severe ligamentous hypertrophy with spinal stenosis  Complications none    10 cc blood loss microscope used    Procedure in detail after formal written consent was obtained the patient was taken to the operating room endotracheally intubated given preoperative antimicrobial prophylaxis they were prepped and draped in the usual sterile manner all bony prominences and genitalia were padded to prevent neurologic injury.    At this point in time the patient was given local anesthesia at the operative level fluoroscopic guidance confirmed as 45 the correct level and tubular dilation system was placed on the lamina facet complex to ensure adequate exposure.  Partial medial facetectomy was performed at the left L4-5 area the ligament was very dense and adherent to the dura this was all removed until lateral recess decompression was performed    Attention was then turned across the midline where again ligament was removed the bony portions of removed the inferior portion of 50% of the spinous process and inferior lamina was performed as well as the superior margins of the L5 area to decompress the L4-5 level entirely and bilateral laminoforaminotomy's were completed at the 4 5 area with a ball probe that could easily pass      At the resolution the case meticulous hemostasis was maintained and in the incision was closed in layers I was present personally performed  the entirety of the procedure until the skin closure.

## 2023-10-18 NOTE — H&P
Pre-Op H&P  Luh Horner  [id number]  1955      Chief complaint: Back Pain      Subjective:  Patient is a 67 y.o.female presents for scheduled surgery by Dr. Browning. She anticipates a Left-sided approach L4-5 laminectomy , bilateral decompression - Bilateral today.  The patient endorses having progressively worsening back pain for the past couple years.  She also endorses a cramping pain in the back of both of her legs.  This pain is present when she is initially getting up or sitting down.   Nothing is helped to alleviate her pain.  She denies the use of a walker or assistive device to ambulate.    + cardiac clearance in [facility] from Dr. Devi from 10/2/2023.    Review of Systems:  Constitutional-- No fever, chills or sweats. No fatigue.  CV-- No chest pain, palpitation or syncope. +HTN, HLD.  Resp-- No SOB, cough, hemoptysis  Skin--No rashes or lesions      Allergies:   Allergies   Allergen Reactions    Shellfish Allergy Other (See Comments)     Head congestion    Atenolol Other (See Comments)     Fatigue    Buspirone Headache      Headache    Codeine Other (See Comments)     Depression    Pseudoephedrine Anxiety    Rosuvastatin Other (See Comments)     Fatigue    Westley-E.P.A. [Dha-Epa-Vitamin E] Anxiety    Simvastatin Other (See Comments)     Facial numbness    Trazodone Other (See Comments)     Nightmares         Home Meds:  Medications Prior to Admission   Medication Sig Dispense Refill Last Dose    amLODIPine (NORVASC) 10 MG tablet TAKE 1 TABLET EVERY NIGHT AT BEDTIME (Patient taking differently: Take 1 tablet by mouth Daily.) 90 tablet 3     atorvastatin (LIPITOR) 20 MG tablet TAKE 1 TABLET EVERY NIGHT AT BEDTIME (Patient taking differently: Take 1 tablet by mouth Daily.) 30 tablet 11     chlorhexidine (HIBICLENS) 4 % external liquid Shower each day with solution for 5 days beginning 5 days before surgery. 120 mL 0     citalopram (CeleXA) 20 MG tablet TAKE 1 TABLET DAILY (Patient taking  differently: Take 1 tablet by mouth Daily.) 90 tablet 3     Coenzyme Q10 (CO Q-10) 200 MG capsule Take 1 capsule by mouth.       dicyclomine (Bentyl) 10 MG capsule Take 1 capsule by mouth 4 (Four) Times a Day Before Meals & at Bedtime. (Patient taking differently: Take 1 capsule by mouth As Needed for Abdominal Cramping.) 120 capsule 1     estradiol (MINIVELLE, VIVELLE-DOT) 0.05 MG/24HR patch 1 patch 1 (One) Time Per Week. twice       fluticasone (FLONASE) 50 MCG/ACT nasal spray USE 2 SPRAYS IN EACH NOSTRIL DAILY (Patient taking differently: 2 sprays into the nostril(s) as directed by provider Daily.) 16 g 11     hydroCHLOROthiazide (HYDRODIURIL) 25 MG tablet Take 1 tablet by mouth Every Morning. 30 tablet 11     lisinopril (PRINIVIL,ZESTRIL) 40 MG tablet TAKE 1 TABLET EVERY MORNING (Patient taking differently: Take 1 tablet by mouth Daily.) 30 tablet 11     Magnesium Citrate 200 MG tablet Take 2 tablets by mouth Every Night.       melatonin 5 MG tablet tablet Take 1 tablet by mouth Every Night.       methocarbamol (Robaxin) 500 MG tablet Take 1 tablet by mouth 3 (Three) Times a Day. 90 tablet 0     mupirocin (BACTROBAN) 2 % nasal ointment Apply to the inside of each nostril with a cotton swab two times daily, morning and evening, for 5 days before surgery. 10 each 0     Omega-3 Fatty Acids (FISH OIL) 1000 MG capsule capsule Take  by mouth.       omeprazole (priLOSEC) 20 MG capsule TAKE 1 CAPSULE DAILY (Patient taking differently: Take 1 capsule by mouth Daily.) 90 capsule 1     sucralfate (CARAFATE) 1 g tablet TAKE 1 TABLET EVERY 12 HOURS (Patient taking differently: Take 1 tablet by mouth 2 (Two) Times a Day. Usually only takes one a day) 60 tablet 11     valACYclovir (VALTREX) 500 MG tablet Take 1 tablet by mouth Daily.       vitamin C (ASCORBIC ACID) 500 MG tablet Take 1 tablet by mouth Daily.            PMH:   Past Medical History:   Diagnosis Date    Allergic rhinitis     Bone pain     foot    Cancer 2005     Skin cancer squamous    Cataract 2010    Surgery recently    Cervical disc disorder     Chronic pain disorder 2012    CTS (carpal tunnel syndrome)     Depression 1981 - Hospitalized    Fibrocystic breast disease 2000    Current benign breast biopsies    GERD (gastroesophageal reflux disease) 2001    Glaucoma Surgery recently    Headache     None this year    Hiatal hernia     Hyperlipidemia     Hypertension 1997    Insomnia 2013    Irritable bowel syndrome (IBS) 2002    Recurrent abdominal cramps    Joint pain 2012    Low back pain 2008    Lumbar scoliosis 2017    Lumbar spondylosis 2000    Chronic low back pain    Lumbosacral disc disease     Migraine     Mitral valve insufficiency     Mitral valve prolapse 2000    Neck pain     Obesity 2000    Osteoarthritis     Osteopenia     Peripheral neuropathy     Post menopausal syndrome 2004    Refractory hot flashes    Spinal stenosis     Thoracic disc disorder     Wears reading eyeglasses      PSH:    Past Surgical History:   Procedure Laterality Date    BREAST BIOPSY Right remote    benign disease    CATARACT EXTRACTION, BILATERAL Bilateral     CHOLECYSTECTOMY  1998    COLONOSCOPY  ?    ENDOSCOPY      HYSTERECTOMY  1998    REFRACTIVE SURGERY      RK    SKIN BIOPSY      TRIGGER POINT INJECTION      Knee    URETHRAL SUSPENSION      laparoscopic for stress incontinence       Immunization History:  Influenza:   Pneumococcal: Yes  Tetanus: Yes  Covid : x5    Social History:   Tobacco:   Social History     Tobacco Use   Smoking Status Former    Packs/day: 2.00    Years: 10.00    Additional pack years: 0.00    Total pack years: 20.00    Types: Cigarettes    Start date: 1971    Quit date: 1981    Years since quittin.8   Smokeless Tobacco Never      Alcohol:     Social History     Substance and Sexual Activity   Alcohol Use Yes    Alcohol/week: 14.0 standard drinks of alcohol    Types: 14 Glasses of wine per week     Comment: 2 per night         Physical Exam:  BP:133/86  HR: 69  RR: 16  SPO2: 96% on RA  Temp: 97.5    General Appearance:    Alert, cooperative, no distress, appears stated age   Head:    Normocephalic, without obvious abnormality, atraumatic   Lungs:     Clear to auscultation bilaterally, respirations unlabored    Heart:   Regular rate and rhythm, S1 and S2 normal    Abdomen:    Soft without tenderness   Extremities:   Extremities normal, atraumatic, no cyanosis or edema   Skin:   Skin color, texture, turgor normal, no rashes or lesions   Neurologic:   Grossly intact     Results Review:     LABS:  Lab Results   Component Value Date    WBC 5.86 10/09/2023    HGB 14.9 10/09/2023    HCT 42.3 10/09/2023    MCV 97.9 (H) 10/09/2023     10/09/2023    NEUTROABS 3.92 10/09/2023    GLUCOSE 94 10/09/2023    BUN 15 10/09/2023    CREATININE 0.64 10/09/2023    EGFRIFNONA 76 08/17/2021     10/09/2023    K 4.1 10/09/2023     10/09/2023    CO2 27.4 10/09/2023    CALCIUM 9.6 10/09/2023    ALBUMIN 4.9 10/09/2023    AST 28 10/09/2023    ALT 28 10/09/2023    BILITOT 0.3 10/09/2023       RADIOLOGY:  Imaging Results (Last 72 Hours)       ** No results found for the last 72 hours. **            I reviewed the patient's new clinical results.      Impression:   Sciatica associated with disorder of lumbar spine       Plan: Left-sided approach L4-5 laminectomy , bilateral decompression - Bilateral       Candido Galindo, FLO   10/18/2023   09:17 EDT

## 2023-10-18 NOTE — ANESTHESIA PROCEDURE NOTES
Airway  Urgency: elective    Date/Time: 10/18/2023 12:42 PM  Airway not difficult    General Information and Staff    Patient location during procedure: OR  SRNA: Bessy Jimenez, FAIZA  Indications and Patient Condition  Indications for airway management: airway protection    Preoxygenated: yes  MILS not maintained throughout  Mask difficulty assessment: 1 - vent by mask    Final Airway Details  Final airway type: endotracheal airway      Successful airway: ETT  Cuffed: yes   Successful intubation technique: direct laryngoscopy  Endotracheal tube insertion site: oral  Blade: Sarkar  Blade size: 3  ETT size (mm): 7.0  Cormack-Lehane Classification: grade I - full view of glottis  Placement verified by: chest auscultation and capnometry   Measured from: lips  ETT/EBT  to lips (cm): 20  Number of attempts at approach: 1  Assessment: lips, teeth, and gum same as pre-op and atraumatic intubation    Additional Comments  Negative epigastric sounds, Breath sound equal bilaterally with symmetric chest rise and fall

## 2023-10-18 NOTE — ANESTHESIA POSTPROCEDURE EVALUATION
Patient: Luh Horner    Procedure Summary       Date: 10/18/23 Room / Location:  [facility] /  [facility]    Anesthesia Start: 1236 Anesthesia Stop: 1451    Procedure: Left-sided approach L4-5 laminectomy , bilateral decompression (Bilateral: Spine Lumbar) Diagnosis:       Sciatica associated with disorder of lumbar spine      (Sciatica associated with disorder of lumbar spine [M53.86])    Surgeons: Jonel Browning MD Provider: Bobby Pablo MD    Anesthesia Type: general ASA Status: 3            Anesthesia Type: general    Vitals  Vitals Value Taken Time   /72 10/18/23 1515   Temp 98.2 °F (36.8 °C) 10/18/23 1515   Pulse 74 10/18/23 1515   Resp 16 10/18/23 1515   SpO2 97 % 10/18/23 1515           Post Anesthesia Care and Evaluation    Patient location during evaluation: PACU  Patient participation: complete - patient participated  Level of consciousness: awake and alert and awake  Pain score: 0  Pain management: adequate    Airway patency: patent  Anesthetic complications: No anesthetic complications  PONV Status: none  Cardiovascular status: hemodynamically stable and acceptable  Respiratory status: nonlabored ventilation, acceptable and nasal cannula  Hydration status: acceptable

## 2023-10-18 NOTE — ANESTHESIA PREPROCEDURE EVALUATION
Anesthesia Evaluation     Patient summary reviewed and Nursing notes reviewed                Airway   Mallampati: II  TM distance: >3 FB  Neck ROM: full  No difficulty expected  Comment: BIG TONGUE  Dental - normal exam     Pulmonary - normal exam   (+) a smoker Former,  Cardiovascular - normal exam    (+) hypertension, valvular problems/murmurs MR and MVP, hyperlipidemia      Neuro/Psych  (+) headaches, numbness, psychiatric history Anxiety and Depression  GI/Hepatic/Renal/Endo    (+) obesity, hiatal hernia, GERD    Musculoskeletal     (+) neck pain  Abdominal  - normal exam    Bowel sounds: normal.   Substance History - negative use     OB/GYN negative ob/gyn ROS         Other   arthritis,   history of cancer                  Anesthesia Plan    ASA 3     general     intravenous induction     Anesthetic plan, risks, benefits, and alternatives have been provided, discussed and informed consent has been obtained with: patient.    Plan discussed with CRNA.    CODE STATUS:

## 2023-10-22 DIAGNOSIS — M47.816 SPONDYLOSIS OF LUMBAR REGION WITHOUT MYELOPATHY OR RADICULOPATHY: Primary | ICD-10-CM

## 2023-10-22 RX ORDER — METHYLPREDNISOLONE 4 MG/1
TABLET ORAL
Qty: 1 EACH | Refills: 0 | Status: SHIPPED | OUTPATIENT
Start: 2023-10-22

## 2023-10-23 ENCOUNTER — OFFICE VISIT (OUTPATIENT)
Dept: NEUROSURGERY | Facility: CLINIC | Age: 68
End: 2023-10-23
Payer: MEDICARE

## 2023-10-23 ENCOUNTER — HOSPITAL ENCOUNTER (OUTPATIENT)
Dept: GENERAL RADIOLOGY | Facility: HOSPITAL | Age: 68
Discharge: HOME | End: 2023-10-23
Admitting: PHYSICIAN ASSISTANT
Payer: MEDICARE

## 2023-10-23 VITALS
DIASTOLIC BLOOD PRESSURE: 70 MMHG | TEMPERATURE: 98 F | BODY MASS INDEX: 32.32 KG/M2 | SYSTOLIC BLOOD PRESSURE: 122 MMHG | HEIGHT: 61 IN | WEIGHT: 171.2 LBS

## 2023-10-23 DIAGNOSIS — M47.816 SPONDYLOSIS OF LUMBAR REGION WITHOUT MYELOPATHY OR RADICULOPATHY: ICD-10-CM

## 2023-10-23 DIAGNOSIS — M47.816 SPONDYLOSIS OF LUMBAR REGION WITHOUT MYELOPATHY OR RADICULOPATHY: Primary | ICD-10-CM

## 2023-10-23 PROCEDURE — 3074F SYST BP LT 130 MM HG: CPT | Performed by: PHYSICIAN ASSISTANT

## 2023-10-23 PROCEDURE — 99024 POSTOP FOLLOW-UP VISIT: CPT | Performed by: PHYSICIAN ASSISTANT

## 2023-10-23 PROCEDURE — 72114 X-RAY EXAM L-S SPINE BENDING: CPT

## 2023-10-23 PROCEDURE — 3078F DIAST BP <80 MM HG: CPT | Performed by: PHYSICIAN ASSISTANT

## 2023-10-23 NOTE — PROGRESS NOTES
Subjective   Patient ID: Luh Horner is a 67 y.o. female is here today for follow-up for postop follow-up.    HPI:      Patient is a very nice 67-year-old female known to the neurosurgical practice for undergoing a minimally invasive L4-5 laminectomy via right-sided approach.  Patient did very well Thursday and Friday but on Saturday noticed new onset of increased low back and bilateral lower extremity pain that got worse as the day went.  Sunday she called me when I was on call called in a steroid pack and ordered flexion-extension x-ray and got her into see me today.  She is currently postop day #5 and notices that after the steroid she is doing much better.  At least 50% better when compared to when I talked to her Sunday.  Patient admittedly has done a little bit too much and is not taking her muscle relaxers because they sedate her somewhat.    We had a good educational session on decreasing activity and being faithful with the muscle relaxers.  I encouraged her to try to break them in half to get a less sedating effect but also to continue coverage for a muscle spasm.  A lot of the pain she is describing sounds like it is spasmodic in her back which is causing swelling around the surgical bed causing some bilateral lower extremity symptoms.    That being said she is much better today I am going to hold the course and get her back into see us next week    Of note she does look like she has a hypersensitivity reaction to the chlorhexidine prep but she also has a adhesive allergy where the covered arm was..    The following portions of the patient's history were reviewed and updated as appropriate: allergies, current medications, past family history, past medical history, past social history, past surgical history and problem list.    Review of Systems   Constitutional:  Negative for activity change, appetite change, chills, diaphoresis, fatigue, fever and unexpected weight change.   HENT:  Negative for  congestion, dental problem, drooling, ear discharge, ear pain, facial swelling, hearing loss, mouth sores, nosebleeds, postnasal drip, rhinorrhea, sinus pressure, sinus pain, sneezing, sore throat, tinnitus, trouble swallowing and voice change.    Eyes:  Negative for photophobia, pain, discharge, redness, itching and visual disturbance.   Respiratory:  Negative for apnea, cough, choking, chest tightness, shortness of breath, wheezing and stridor.    Cardiovascular:  Negative for chest pain, palpitations and leg swelling.   Gastrointestinal:  Negative for abdominal distention, abdominal pain, anal bleeding, blood in stool, constipation, diarrhea, nausea, rectal pain and vomiting.   Endocrine: Negative for cold intolerance, heat intolerance, polydipsia, polyphagia and polyuria.   Genitourinary:  Negative for decreased urine volume, difficulty urinating, dyspareunia, dysuria, enuresis, flank pain, frequency, genital sores, hematuria, menstrual problem, pelvic pain, urgency, vaginal bleeding, vaginal discharge and vaginal pain.   Musculoskeletal:  Positive for back pain. Negative for arthralgias, gait problem, joint swelling, myalgias, neck pain and neck stiffness.   Skin:  Negative for color change, pallor, rash and wound.   Allergic/Immunologic: Negative for environmental allergies, food allergies and immunocompromised state.   Neurological:  Negative for dizziness, tremors, seizures, syncope, facial asymmetry, speech difficulty, weakness, light-headedness, numbness and headaches.   Hematological:  Negative for adenopathy. Does not bruise/bleed easily.   Psychiatric/Behavioral:  Negative for agitation, behavioral problems, confusion, decreased concentration, dysphoric mood, hallucinations, self-injury, sleep disturbance and suicidal ideas. The patient is not nervous/anxious and is not hyperactive.          Objective    reports that she quit smoking about 42 years ago. Her smoking use included cigarettes. She started  "smoking about 52 years ago. She has a 20.00 pack-year smoking history. She has never used smokeless tobacco. She reports current alcohol use of about 14.0 standard drinks of alcohol per week. She reports that she does not use drugs.   SMOKING STATUS: Non-smoker    Physical Exam:   Vitals:/70 (BP Location: Right arm, Patient Position: Sitting, Cuff Size: Adult)   Temp 98 °F (36.7 °C) (Infrared)   Ht 154.9 cm (61\")   Wt 77.7 kg (171 lb 3.2 oz)   BMI 32.35 kg/m²    BMI: Body mass index is 32.35 kg/m².       Incision:   The incision is well-healed and well approximated.  No signs of infection, bleeding, but there is some erythematous red bumps consistent with a hypersensitivity reaction    Musculoskeletal:                                            Dorsiflexion is 5/5 Bilaterally                    Plantarflexion is 5/5 bilaterally                    Hip Flexion 5/5 bilaterally.                        Neurologic:                                         The patient is alert and oriented by 3.                       Sensation is equal bilaterally with no deficit.                        Reflexes:  2+ throughout       Assessment & Plan   Independent Review of Radiographic Studies:    No new films reviewed at this visit  Medical Decision Making:    Patient has been encouraged to modify her activities and break her Robaxin's in half and take them 3 times a day.  Patient will also follow-up in about a week and a half for a closer follow-up visit.    I think activity modification will continue to help her.  If not she will give us call    There are no diagnoses linked to this encounter.  Return in about 10 days (around 11/2/2023).         "

## 2023-11-01 ENCOUNTER — TELEPHONE (OUTPATIENT)
Dept: NEUROSURGERY | Facility: CLINIC | Age: 68
End: 2023-11-01
Payer: MEDICARE

## 2023-11-01 NOTE — TELEPHONE ENCOUNTER
Spoke to PT this morning about R/S her 11/09 appointment to 11/10,However she is scheduled for tomorrow 11/02 ,If the 11/09 appointment isn't needed we will cancel

## 2023-11-02 ENCOUNTER — OFFICE VISIT (OUTPATIENT)
Dept: NEUROSURGERY | Facility: CLINIC | Age: 68
End: 2023-11-02
Payer: MEDICARE

## 2023-11-02 VITALS
BODY MASS INDEX: 31.53 KG/M2 | HEIGHT: 61 IN | TEMPERATURE: 97.8 F | SYSTOLIC BLOOD PRESSURE: 136 MMHG | DIASTOLIC BLOOD PRESSURE: 80 MMHG | WEIGHT: 167 LBS

## 2023-11-02 DIAGNOSIS — M47.816 SPONDYLOSIS OF LUMBAR REGION WITHOUT MYELOPATHY OR RADICULOPATHY: Primary | ICD-10-CM

## 2023-11-02 NOTE — PROGRESS NOTES
Subjective   Patient ID: Luh Horner is a 67 y.o. female is here today for follow-up for wound check.    HPI:      Patient is a very nice 67-year-old female who is known to the practice for undergoing a minimally invasive L4-5 laminectomy via right-sided approach.  Patient had some early onset of new pain a couple days after her procedure that emanated in the low back and down both lower extremities.  He got worse as the day went arm.  Her legs have gotten a lot better since that initial postop visit that was postop day 5 but she still notices quite a bit of soreness in her low back.  She is 50% better a couple days after the initial flareup now she is 75% of the way and she is only 2 weeks out of a laminectomy.  Think she is about where we would expect her at this point.    Patient has been pretty good as far as her overall activity level but has had days where she has been a little bit more active which exacerbates more pain.  I have educated her against doing too much.    The following portions of the patient's history were reviewed and updated as appropriate: allergies, current medications, past family history, past medical history, past social history, past surgical history and problem list.    Review of Systems   HENT:  Positive for sinus pain.    Genitourinary:  Positive for flank pain.   Musculoskeletal:  Positive for arthralgias, back pain, joint swelling and neck stiffness.   Neurological:  Positive for numbness and headaches.   Psychiatric/Behavioral:  The patient is nervous/anxious.          Objective    reports that she quit smoking about 42 years ago. Her smoking use included cigarettes. She started smoking about 52 years ago. She has a 20.00 pack-year smoking history. She has never used smokeless tobacco. She reports current alcohol use of about 2.0 standard drinks of alcohol per week. She reports that she does not use drugs.   SMOKING STATUS: Non-smoker    Physical Exam:   Vitals:/80 (BP  "Location: Right arm, Patient Position: Sitting, Cuff Size: Adult)   Temp 97.8 °F (36.6 °C) (Infrared)   Ht 154.9 cm (61\")   Wt 75.8 kg (167 lb)   BMI 31.55 kg/m²    BMI: Body mass index is 31.55 kg/m².         Incision:   The incision is well-healed and well approximated.  No signs of infection, bleeding, or erythema.    Musculoskeletal:                                            Dorsiflexion is 5/5 Bilaterally                    Plantarflexion is 5/5 bilaterally                    Hip Flexion 5/5 bilaterally.                        Neurologic:                                         The patient is alert and oriented by 3.                       Sensation is equal bilaterally with no deficit.                        Reflexes:  2+ throughout       Assessment & Plan   Independent Review of Radiographic Studies:    No new films reviewed at this visit  Medical Decision Making:    Patient is doing very well at this point.  Patient is pleased postsurgical outcome.  Pain has been reduced by about 75%.  incision is clean, dry.  No signs of infection, bleeding, or erythema.    The patient will follow-up in 4 weeks with flexion extension / AP and lateral x-ray of the Lumbar Spine, after completing physical therapy. The patient understands instructions and limitations for the best post-operative success.    It has been a pleasure providing neurosurgical care.    Alexy Coy PA-C     Diagnoses and all orders for this visit:    1. Spondylosis of lumbar region without myelopathy or radiculopathy (Primary)  -     XR Spine Lumbar Complete With Flex & Ext; Future  -     Ambulatory Referral to Physical Therapy Evaluate and treat (SP lami), POST OP      Return in about 4 weeks (around 11/30/2023).         Answers submitted by the patient for this visit:  Primary Reason for Visit (Submitted on 10/27/2023)  What is the primary reason for your visit?: Back Pain  Back Pain Questionnaire (Submitted on 10/27/2023)  Chief Complaint: Back " pain  Chronicity: chronic  Onset: more than 1 year ago  Frequency: constantly  Progression since onset: gradually improving  Pain location: gluteal, lumbar spine, sacro-iliac  Pain quality: shooting  Radiates to: left thigh  Pain - numeric: 3/10  Pain is: the same all the time  Aggravated by: bending, coughing, position, sitting, standing, twisting  Stiffness is present: in the morning  bladder incontinence: No  bowel incontinence: No  leg pain: No  paresis: No  paresthesias: No  perianal numbness: No  tingling: No  weight loss: No  Risk factors: history of cancer

## 2023-11-07 ENCOUNTER — TELEPHONE (OUTPATIENT)
Dept: NEUROSURGERY | Facility: CLINIC | Age: 68
End: 2023-11-07
Payer: MEDICARE

## 2023-11-07 NOTE — TELEPHONE ENCOUNTER
Provider: Nam    Surgery/Procedure:  Surgery with Jonel Browning MD (10/18/2023)      Last visit:  Office Visit with Alexy Coy PA-C (11/02/2023)    Next visit: Appointment with Alexy Coy PA-C (12/04/2023)      Reason for call: Pt LVM stating that she has been having headaches when she stands up and wants to know if this could be from the surgery.     [facility]. Unfortunately was unable to call patient back before 5. Instructed pt to call the on-call if she feels this requires immediate attention if not I will call her in the morning to get more information on her headaches.     Called pt back this morning to check on headaches. Pt stated that last night they did not resolve when laying down but did resolve with ibuprofen. Pt denies any tinnitus or stiff neck associated with headache but did have an elevated BP last night during headache. Instructed pt to lay flat and drink caffeine for the next couple of days and give us a call back if she still having a headache on Friday. Pt verbalized understanding and was thankful for the return call.

## 2023-11-10 DIAGNOSIS — M48.062 NEUROGENIC CLAUDICATION DUE TO LUMBAR SPINAL STENOSIS: ICD-10-CM

## 2023-11-10 RX ORDER — HYDROCODONE BITARTRATE AND ACETAMINOPHEN 5; 325 MG/1; MG/1
1-2 TABLET ORAL EVERY 4 HOURS PRN
Qty: 30 TABLET | Refills: 0 | OUTPATIENT
Start: 2023-11-10

## 2023-11-16 ENCOUNTER — TREATMENT (OUTPATIENT)
Dept: PHYSICAL THERAPY | Facility: CLINIC | Age: 68
End: 2023-11-16
Payer: MEDICARE

## 2023-11-16 DIAGNOSIS — M62.81 MUSCLE WEAKNESS: ICD-10-CM

## 2023-11-16 DIAGNOSIS — M54.50 ACUTE BILATERAL LOW BACK PAIN WITHOUT SCIATICA: Primary | ICD-10-CM

## 2023-11-16 DIAGNOSIS — M53.80 DECREASED RANGE OF MOTION OF SPINE: ICD-10-CM

## 2023-11-16 PROCEDURE — 97162 PT EVAL MOD COMPLEX 30 MIN: CPT | Performed by: PHYSICAL THERAPIST

## 2023-11-16 PROCEDURE — 97140 MANUAL THERAPY 1/> REGIONS: CPT | Performed by: PHYSICAL THERAPIST

## 2023-11-16 NOTE — PROGRESS NOTES
Physical Therapy Initial Evaluation and Plan of Care  [facility]- [place]  [address] [facility], [address] [address]    Patient: Luh Horner   : 1955  Diagnosis/ICD-10 Code:  Acute bilateral low back pain without sciatica [M54.50]  Referring practitioner: Alexy Coy PA-C  Date of Initial Visit: 2023  Today's Date: 2023  Patient seen for 1 session         Visit Diagnoses:    ICD-10-CM ICD-9-CM   1. Acute bilateral low back pain without sciatica  M54.50 724.2     338.19   2. Muscle weakness  M62.81 728.87   3. Decreased range of motion of spine  M53.80 724.9         Subjective Questionnaire: Oswestry:       Subjective Evaluation    History of Present Illness  Mechanism of injury: The patient presents s/p bilateral laminectomy of L4-5 performed on 10/18/23 by Dr. Browning. Since surgery, the pain in her low back has been consistent, but the pain down into her legs has improved. She did have a flare up of posterior leg pain shortly after her surgery, but it has since resolved. She has been walking for about 10 minutes per day, and is unable to tolerate any longer than that currently.     She describes her pain as sharp, and it is located across both sides of her low back. It worsens with walking, cooking, doing dishes, housework, and most weight bearing activities. She has not attempted bending, twisting, and lifting due to post-op precautions. Her pain improves with heat, sitting, lying down, and having pressure/support on her low back. She denies numbness/tingling.     Her goals for therapy include returning to lifting weights and low impact aerobics with no limitation due to back pain or dysfunction.       Patient Occupation: Retired Quality of life: excellent    Pain  Current pain ratin  At best pain ratin  At worst pain ratin  Location: Low back  Quality: sharp  Relieving factors: change in position, relaxation, rest, support, heat and  medications  Aggravating factors: ambulation, squatting, lifting, stairs, prolonged positioning, movement, standing and repetitive movement  Progression: no change    Social Support  Lives in: one-story house  Lives with: spouse    Treatments  Previous treatment: medication  Patient Goals  Patient goals for therapy: increased strength, decreased pain, independence with ADLs/IADLs, return to sport/leisure activities and increased motion             Objective          Postural Observations    Additional Postural Observation Details  Neutral lumbar posture in standing    Palpation   Left   Hypertonic in the lumbar paraspinals and quadratus lumborum.   Tenderness of the lumbar paraspinals and quadratus lumborum.     Right   Hypertonic in the lumbar paraspinals and quadratus lumborum. Tenderness of the lumbar paraspinals and quadratus lumborum.     Tenderness     Lumbar Spine  No tenderness in the spinous process.     Active Range of Motion     Additional Active Range of Motion Details  Lumbar AROM:  Flex: 0 cm to knee joint line, Simon LBP  Ext: 30%, Simon LBP  RSB: 0 cm to knee joint line, no pain  LSB: 5 cm to knee joint line, L-sided LBP  RR: 32 deg, L-sided LBP  LR: 32 deg, L-sided LBP    Strength/Myotome Testing     Left Hip   Planes of Motion   Flexion: 4+  Abduction: 4-  External rotation: 4+    Right Hip   Planes of Motion   Flexion: 4+  Abduction: 4  External rotation: 4+    Left Knee   Flexion: 5  Extension: 5    Right Knee   Flexion: 5  Extension: 5    Additional Strength Details  L-sided LBP w/ simon hip flex, L abd, and L ER testing    Tests     Lumbar     Left   Positive passive SLR.     Right   Positive passive SLR.     Additional Tests Details  (+) Slump test on L  SLR (+) for LBP          Assessment & Plan       Assessment  Impairments: abnormal coordination, abnormal muscle firing, abnormal or restricted ROM, activity intolerance, impaired physical strength, lacks appropriate home exercise program and pain  with function   Functional limitations: carrying objects, lifting, walking, pulling, pushing, uncomfortable because of pain, moving in bed, standing, stooping and unable to perform repetitive tasks   Assessment details: Patient is a 67 YO female presenting to PT w/ evolving characteristics of acute LBP, weakness, and range of motion deficits w/ moderate complexity. She had a bilateral laminectomy of L4-5 performed on 10/18/23 by Dr. Browning. She had lumbar mobility restrictions in all directions due to pain and lack of active movement since her surgery. Her hips were weak, specifically in abduction, which can be attributed to general deconditioning. She had mild sciatic nerve tension on the LLE, likely also due to tightness from lack of activity since surgery. HEP was prescribed focusing on lumbar flexion in supine, sciatic nerve stretching, and hip strengthening. She was educated to continue following her post-surgical precautions of no lifting, bending, or twisting. I expect the patient to make a timely recovery with skilled PT interventions.   Prognosis: good    Goals  Plan Goals: Short-term goals (4 weeks)  1. Patient will be independent and compliant w/ initial HEP.  2. Patient will report pain at rest 0/10 and at worst 4/10.  3. Patient will demo improved forward flexion AROM by touching fingertips to mid-tibia.  4. Oswestry score to improve by at least 6 points.   5. Patient will display decreased TTP and muscle tension of lumbar paraspinal mm.   6. Patient will have negative Slump test bilaterally.    Long-term goals (8 weeks)  1. Patient will be appropriate for independent management and compliant w/ progressed HEP.  2. Patient will reports pain at rest 0/10 and at worst 1/10.  3. Patient will demo improved hip strength by the following MMT grades: flex 5/5, abd 4+/5, and ER 5/5.  4. Patient will be able to walk for at least 30 minutes with no increase in LBP.  5. Patient will return to all housework  activities of cooking, cleaning, and laundry with no limitation due to back pain or dysfunction.  6. Patient will return to lifting weights and low impact aerobics with no limitation due to back pain or dysfunction.     Plan  Therapy options: will be seen for skilled therapy services  Planned modality interventions: dry needling, TENS, thermotherapy (hydrocollator packs) and cryotherapy  Planned therapy interventions: abdominal trunk stabilization, manual therapy, ADL retraining, motor coordination training, balance/weight-bearing training, neuromuscular re-education, body mechanics training, soft tissue mobilization, flexibility, spinal/joint mobilization, functional ROM exercises, strengthening, gait training, stretching, home exercise program and therapeutic activities  Frequency: 1x week  Duration in visits: 8  Duration in weeks: 8  Treatment plan discussed with: patient  Plan details: Patient will likely benefit from TE/NMED focused on lumbar mobility, LE stretching, and hip/core strengthening. MT for joint mobility and symptom management. Modalities and dry needling as needed for pain modulation.         History # of Personal Factors and/or Comorbidities: MODERATE (1-2)  Examination of Body System(s): # of elements: LOW (1-2)  Clinical Presentation: EVOLVING  Clinical Decision Making: MODERATE      Timed:         Manual Therapy:    15     mins  94531;     Therapeutic Exercise:    5     mins  77092;     Neuromuscular Brennan:    0    mins  24990;    Therapeutic Activity:     0     mins  29187;     Gait Trainin     mins  47539;     Ultrasound:     0     mins  70715;    Ionto                               0    mins   93439  Self Care                       0     mins   50365  Canalith Repos    0     mins 93881      Un-Timed:  Electrical Stimulation:    0     mins  43879 (MC );  Dry Needling     0     mins self-pay  Traction     0     mins 58243  Low Eval     0     Mins  79310  Mod Eval     35     Mins   71968  [facility]                       0     Mins  65408        Timed Treatment:   55   mins   Total Treatment:     55   mins          PT: Thierry Vega PT     License Number: [id number]  Electronically signed by Mini French, PT Student, 11/16/23, 10:58 AM EST    Mini French, Physical Therapist Student, completed treatment under my direct supervision.     11/16/2023        Certification Period: 11/16/2023 thru 2/13/2024  I certify that the therapy services are furnished while this patient is under my care.  The services outlined above are required by this patient, and will be reviewed every 90 days.         Physician Signature:__________________________________________________    PHYSICIAN: Alexy Coy PA-C  NPI: [id number]                                      DATE:      Please sign and return via fax to [email or web address] . Thank you, [facility].

## 2023-11-21 ENCOUNTER — TREATMENT (OUTPATIENT)
Dept: PHYSICAL THERAPY | Facility: CLINIC | Age: 68
End: 2023-11-21
Payer: MEDICARE

## 2023-11-21 DIAGNOSIS — M62.81 MUSCLE WEAKNESS: ICD-10-CM

## 2023-11-21 DIAGNOSIS — M53.80 DECREASED RANGE OF MOTION OF SPINE: ICD-10-CM

## 2023-11-21 DIAGNOSIS — M54.50 ACUTE BILATERAL LOW BACK PAIN WITHOUT SCIATICA: Primary | ICD-10-CM

## 2023-11-21 NOTE — PROGRESS NOTES
Physical Therapy Daily Treatment Note  [facility]- [place]  [address], [address]  [place] [address]      Patient: Luh Horner   : 1955  Referring practitioner: Alexy Coy PA-C  Date of Initial Visit: Type: THERAPY  Noted: 2023  Today's Date: 2023  Patient seen for 2 sessions       Visit Diagnoses:    ICD-10-CM ICD-9-CM   1. Acute bilateral low back pain without sciatica  M54.50 724.2     338.19   2. Muscle weakness  M62.81 728.87   3. Decreased range of motion of spine  M53.80 724.9       Subjective Evaluation    History of Present Illness  Mechanism of injury: Patient reports that she has not been able to walk very much since her last visit due to weather, but her HEP has been going well. She did go for about a 5 min walk before PT today, and noticed some soreness in her hips afterward. Her back pain has moved slightly upward to the lower thoracic region, and it is on both sides.     Pain  Current pain ratin  Location: LBP           Objective   See Exercise, Manual, and Modality Logs for complete treatment.       Assessment & Plan       Assessment  Assessment details: During review of HEP, patient reported an increase in low back pain w/ PPT exercise that she says she had not experienced when she did them at home. These were deferred today to avoid continued symptom aggravation, and session focused more on hip strengthening. She was able to perform several hip strengthening exercises without increased pain in her back, but she did fatigue toward the end of the sets. Her HEP was updated to include progressed LE strengthening and hamstring stretching.     Plan  Plan details: Plan to pick back up with PPT and progressions at next visit if she can tolerate them. Continue w/ hip strengthening and functional activities.           Timed:         Manual Therapy:    23     mins  12606;     Therapeutic Exercise:    24     mins  56652;     Neuromuscular Brennan:    0    mins  60193;     Therapeutic Activity:     8     mins  70108;     Gait Trainin     mins  23314;     Ultrasound:     0     mins  26132;    Ionto                               0    mins   80153  Self Care                       0     mins   18764  Canalith Repos    0     mins 19082      Un-Timed:  Electrical Stimulation:    0     mins  55194 ( );  Dry Needling     0     mins self-pay  Traction     0     mins 28464      Timed Treatment:   55   mins   Total Treatment:     55   mins    Thierry Vega, PT  [place] License: [id number]      Mini French, Physical Therapist Student, completed treatment under my direct supervision.     2023

## 2023-11-27 ENCOUNTER — HOSPITAL ENCOUNTER (OUTPATIENT)
Dept: CARDIOLOGY | Facility: HOSPITAL | Age: 68
Discharge: HOME | End: 2023-11-27
Admitting: INTERNAL MEDICINE
Payer: MEDICARE

## 2023-11-27 VITALS — BODY MASS INDEX: 31.55 KG/M2 | WEIGHT: 167.11 LBS | HEIGHT: 61 IN

## 2023-11-27 DIAGNOSIS — I34.1 MITRAL VALVE PROLAPSE: ICD-10-CM

## 2023-11-27 DIAGNOSIS — E78.5 HYPERLIPIDEMIA, UNSPECIFIED HYPERLIPIDEMIA TYPE: ICD-10-CM

## 2023-11-27 PROCEDURE — 93306 TTE W/DOPPLER COMPLETE: CPT

## 2023-11-27 PROCEDURE — 93306 TTE W/DOPPLER COMPLETE: CPT | Performed by: INTERNAL MEDICINE

## 2023-11-28 LAB
BH CV ECHO MEAS - AO MAX PG: 10.2 MMHG
BH CV ECHO MEAS - AO MEAN PG: 6 MMHG
BH CV ECHO MEAS - AO ROOT DIAM: 3.2 CM
BH CV ECHO MEAS - AO V2 MAX: 160 CM/SEC
BH CV ECHO MEAS - AO V2 VTI: 27.4 CM
BH CV ECHO MEAS - AVA(I,D): 2.7 CM2
BH CV ECHO MEAS - EDV(CUBED): 80.6 ML
BH CV ECHO MEAS - EDV(MOD-SP2): 72 ML
BH CV ECHO MEAS - EDV(MOD-SP4): 91 ML
BH CV ECHO MEAS - EF(MOD-BP): 58 %
BH CV ECHO MEAS - EF(MOD-SP2): 59.7 %
BH CV ECHO MEAS - EF(MOD-SP4): 58.2 %
BH CV ECHO MEAS - ESV(CUBED): 19.7 ML
BH CV ECHO MEAS - ESV(MOD-SP2): 29 ML
BH CV ECHO MEAS - ESV(MOD-SP4): 38 ML
BH CV ECHO MEAS - FS: 37.5 %
BH CV ECHO MEAS - IVS/LVPW: 1 CM
BH CV ECHO MEAS - IVSD: 0.89 CM
BH CV ECHO MEAS - LA DIMENSION: 3.9 CM
BH CV ECHO MEAS - LV DIASTOLIC VOL/BSA (35-75): 52 CM2
BH CV ECHO MEAS - LV MASS(C)D: 122.2 GRAMS
BH CV ECHO MEAS - LV MAX PG: 5.7 MMHG
BH CV ECHO MEAS - LV MEAN PG: 3 MMHG
BH CV ECHO MEAS - LV SYSTOLIC VOL/BSA (12-30): 21.7 CM2
BH CV ECHO MEAS - LV V1 MAX: 119 CM/SEC
BH CV ECHO MEAS - LV V1 VTI: 23.5 CM
BH CV ECHO MEAS - LVIDD: 4.3 CM
BH CV ECHO MEAS - LVIDS: 2.7 CM
BH CV ECHO MEAS - LVOT AREA: 3.1 CM2
BH CV ECHO MEAS - LVOT DIAM: 2 CM
BH CV ECHO MEAS - LVPWD: 0.89 CM
BH CV ECHO MEAS - MV A MAX VEL: 108 CM/SEC
BH CV ECHO MEAS - MV DEC TIME: 0.16 SEC
BH CV ECHO MEAS - MV E MAX VEL: 73.5 CM/SEC
BH CV ECHO MEAS - MV E/A: 0.68
BH CV ECHO MEAS - PA ACC SLOPE: 856 CM/SEC2
BH CV ECHO MEAS - PA ACC TIME: 0.08 SEC
BH CV ECHO MEAS - RAP SYSTOLE: 3 MMHG
BH CV ECHO MEAS - RVSP: 29 MMHG
BH CV ECHO MEAS - SI(MOD-SP2): 24.6 ML/M2
BH CV ECHO MEAS - SI(MOD-SP4): 30.3 ML/M2
BH CV ECHO MEAS - SV(LVOT): 73.8 ML
BH CV ECHO MEAS - SV(MOD-SP2): 43 ML
BH CV ECHO MEAS - SV(MOD-SP4): 53 ML
BH CV ECHO MEAS - TAPSE (>1.6): 2.4 CM
BH CV ECHO MEAS - TR MAX PG: 26.1 MMHG
BH CV ECHO MEAS - TR MAX VEL: 255.5 CM/SEC
BH CV VAS BP LEFT ARM: NORMAL MMHG
BH CV XLRA - RV BASE: 3.5 CM
BH CV XLRA - RV LENGTH: 6.5 CM
BH CV XLRA - RV MID: 2.7 CM
BH CV XLRA - TDI S': 21 CM/SEC

## 2023-11-30 ENCOUNTER — HOSPITAL ENCOUNTER (OUTPATIENT)
Dept: GENERAL RADIOLOGY | Facility: HOSPITAL | Age: 68
Discharge: HOME | End: 2023-11-30
Admitting: PHYSICIAN ASSISTANT
Payer: MEDICARE

## 2023-11-30 ENCOUNTER — TREATMENT (OUTPATIENT)
Dept: PHYSICAL THERAPY | Facility: CLINIC | Age: 68
End: 2023-11-30
Payer: MEDICARE

## 2023-11-30 DIAGNOSIS — M62.81 MUSCLE WEAKNESS: ICD-10-CM

## 2023-11-30 DIAGNOSIS — M47.816 SPONDYLOSIS OF LUMBAR REGION WITHOUT MYELOPATHY OR RADICULOPATHY: ICD-10-CM

## 2023-11-30 DIAGNOSIS — M53.80 DECREASED RANGE OF MOTION OF SPINE: ICD-10-CM

## 2023-11-30 DIAGNOSIS — M54.50 ACUTE BILATERAL LOW BACK PAIN WITHOUT SCIATICA: Primary | ICD-10-CM

## 2023-11-30 PROCEDURE — 72114 X-RAY EXAM L-S SPINE BENDING: CPT

## 2023-11-30 NOTE — PROGRESS NOTES
Physical Therapy Daily Treatment Note  [facility]- [place]  [address], [address]  [place] [address]      Patient: Luh Horner   : 1955  Referring practitioner: Alexy Coy PA-C  Date of Initial Visit: Type: THERAPY  Noted: 2023  Today's Date: 2023  Patient seen for 3 sessions       Visit Diagnoses:    ICD-10-CM ICD-9-CM   1. Acute bilateral low back pain without sciatica  M54.50 724.2     338.19   2. Muscle weakness  M62.81 728.87   3. Decreased range of motion of spine  M53.80 724.9       Subjective Evaluation    History of Present Illness  Mechanism of injury: Patient reports that she has not started walking other than about 5 mins on Tuesday. She states that after that 5 mins she was starting to hurt a little. She reports that her pain has moved up a bit higher in her back, but is not having any pain down her legs.     Pain  Current pain ratin  Location: Low back           Objective   See Exercise, Manual, and Modality Logs for complete treatment.       Assessment & Plan       Assessment  Assessment details: The patient was able to tolerate more core stabilization exercises today, as well as some low level trunk mobility exercises. Last visit she was unable to tolerate >5 reps of posterior pelvic tilting, and today she performed 2 sets of 10 with no major increase in symptoms or mm cramping. SB roll outs and lower trunk rotations were initiated to begin working on flexion and rotation, and she reported that both of these felt good. She was educated to continue her strength exercises at home this week, and we will likely progress those at her next visit. She will benefit from continued core and LE strengthening, trunk mobility exercises, and general aerobic conditioning.     Plan  Plan details: Depending on follow-up w/ Dr. Browning, continue working on low level trunk mobility exercises for flexion and rotation. Continue core stabilization training and progress as  needed. Progress HEP at next visit.           Timed:         Manual Therapy:    18     mins  64799;     Therapeutic Exercise:    25     mins  49125;     Neuromuscular Brennan:    0    mins  07984;    Therapeutic Activity:     12     mins  26755;     Gait Trainin     mins  83995;     Ultrasound:     0     mins  12308;    Ionto                               0    mins   80310  Self Care                       0     mins   85626  Canalith Repos    0     mins 60118      Un-Timed:  Electrical Stimulation:    0     mins  04647 ( );  Dry Needling     0     mins self-pay  Traction     0     mins 31047      Timed Treatment:   55   mins   Total Treatment:     55   mins    Thierry Vega, PT  [place] License: [id number]    Mini French, Physical Therapist Student, completed treatment under my direct supervision.     2023

## 2023-12-04 ENCOUNTER — OFFICE VISIT (OUTPATIENT)
Dept: NEUROSURGERY | Facility: CLINIC | Age: 68
End: 2023-12-04
Payer: MEDICARE

## 2023-12-04 VITALS
DIASTOLIC BLOOD PRESSURE: 78 MMHG | BODY MASS INDEX: 31.91 KG/M2 | TEMPERATURE: 97.7 F | WEIGHT: 169 LBS | HEIGHT: 61 IN | SYSTOLIC BLOOD PRESSURE: 136 MMHG

## 2023-12-04 DIAGNOSIS — M48.062 SPINAL STENOSIS OF LUMBAR REGION WITH NEUROGENIC CLAUDICATION: Primary | ICD-10-CM

## 2023-12-04 PROCEDURE — 3075F SYST BP GE 130 - 139MM HG: CPT | Performed by: PHYSICIAN ASSISTANT

## 2023-12-04 PROCEDURE — 99024 POSTOP FOLLOW-UP VISIT: CPT | Performed by: PHYSICIAN ASSISTANT

## 2023-12-04 PROCEDURE — 3078F DIAST BP <80 MM HG: CPT | Performed by: PHYSICIAN ASSISTANT

## 2023-12-04 NOTE — PROGRESS NOTES
Subjective   Patient ID: Luh Horner is a 68 y.o. female is here today for follow-up for x-ray follow-up.    HPI:      Patient is a very nice 68-year-old female known to the practice for having chronic low back pain that developed into neurogenic claudication with bilateral lower extremity symptoms.    Patient was deemed a candidate for surgery by Dr. Jonel Browning and taken to the OR on 10/18/2023 for a minimally invasive right-sided approach L4-5 laminectomy.  Procedure went without event but patient had a bit of a setback within the first week where she was doing exceedingly well but then on day 4 she noticed new onset of bilateral lower extremity pain.  Patient was treated conservatively and this lower extremity pain has now subsided.    Unfortunately she still is struggling with her chronic axial back pain that she has had for many years.  Dr. Mcrae has given her injections in the past without relief.    Patient has a flexion-extension x-ray today that shows no egregious motion or any signs of fracture    The following portions of the patient's history were reviewed and updated as appropriate: allergies, current medications, past family history, past medical history, past social history, past surgical history and problem list.    Review of Systems   Constitutional:  Positive for activity change.   HENT:  Positive for congestion.    Genitourinary:  Positive for flank pain.   Musculoskeletal:  Positive for arthralgias, back pain and myalgias.         Objective    reports that she quit smoking about 42 years ago. Her smoking use included cigarettes. She started smoking about 52 years ago. She has a 20.00 pack-year smoking history. She has never used smokeless tobacco. She reports that she does not currently use alcohol after a past usage of about 2.0 standard drinks of alcohol per week. She reports that she does not use drugs.   SMOKING STATUS: Non-smoker    Physical Exam:   Vitals:/78 (BP  "Location: Right arm, Patient Position: Sitting, Cuff Size: Adult)   Temp 97.7 °F (36.5 °C) (Infrared)   Ht 154.9 cm (61\")   Wt 76.7 kg (169 lb)   BMI 31.93 kg/m²    BMI: Body mass index is 31.93 kg/m².       Incision:   The incision is well-healed and well approximated.  No signs of infection, bleeding, or erythema.    Musculoskeletal:                                            Dorsiflexion is 5/5 Bilaterally                    Plantarflexion is 5/5 bilaterally                    Hip Flexion 5/5 bilaterally.                        Neurologic:                                         The patient is alert and oriented by 3.                       Sensation is equal bilaterally with no deficit.                        Reflexes:  2+ throughout       Assessment & Plan   Independent Review of Radiographic Studies:    AP lateral as well as flexion-extension x-rays reviewed not showing any signs of spondylolisthesis but anterior osteophytes noted in the upper lumbar area  Medical Decision Making:    From a neurosurgical perspective we could continue monitoring her.  I think a CT of the lumbar spine is not unreasonable in about 90 days.    Patient will also pursue pain management.  We are having a little bit of an issue with her insurance currently.  Apparently [employer] is not accepting [employer] anymore..     I will continue following with our postoperative patient as long as the insurance company will allow.     Diagnoses and all orders for this visit:    1. Spinal stenosis of lumbar region with neurogenic claudication (Primary)  -     Ambulatory Referral to Pain Management  -     CT Lumbar Spine Without Contrast; Future      Return in about 3 months (around 3/4/2024).         Answers submitted by the patient for this visit:  Primary Reason for Visit (Submitted on 11/27/2023)  What is the primary reason for your visit?: Back Pain  Back Pain Questionnaire (Submitted on 11/27/2023)  Chief Complaint: Back pain  Chronicity: " chronic  Onset: more than 1 year ago  Frequency: constantly  Progression since onset: unchanged  Pain location: sacro-iliac  Pain quality: stabbing  Pain - numeric: 7/10  Pain is: worse during the day  Aggravated by: standing  Stiffness is present: in the morning  bladder incontinence: No  bowel incontinence: No  leg pain: No  paresis: No  paresthesias: No  perianal numbness: No  tingling: No  weight loss: No

## 2023-12-06 ENCOUNTER — TREATMENT (OUTPATIENT)
Dept: PHYSICAL THERAPY | Facility: CLINIC | Age: 68
End: 2023-12-06
Payer: MEDICARE

## 2023-12-06 DIAGNOSIS — M53.80 DECREASED RANGE OF MOTION OF SPINE: ICD-10-CM

## 2023-12-06 DIAGNOSIS — M62.81 MUSCLE WEAKNESS: ICD-10-CM

## 2023-12-06 DIAGNOSIS — M54.50 ACUTE BILATERAL LOW BACK PAIN WITHOUT SCIATICA: Primary | ICD-10-CM

## 2023-12-06 PROCEDURE — 97140 MANUAL THERAPY 1/> REGIONS: CPT | Performed by: PHYSICAL THERAPIST

## 2023-12-06 PROCEDURE — 97110 THERAPEUTIC EXERCISES: CPT | Performed by: PHYSICAL THERAPIST

## 2023-12-06 NOTE — PROGRESS NOTES
Physical Therapy Daily Treatment Note  [facility]- [place]  [address], [facility] [address]  [place] [address]      Patient: Luh Horner   : 1955  Referring practitioner: Alexy Coy PA-C  Date of Initial Visit: Type: THERAPY  Noted: 2023  Today's Date: 2023  Patient seen for 4 sessions       Visit Diagnoses:    ICD-10-CM ICD-9-CM   1. Acute bilateral low back pain without sciatica  M54.50 724.2     338.19   2. Muscle weakness  M62.81 728.87   3. Decreased range of motion of spine  M53.80 724.9       Subjective Evaluation    History of Present Illness  Mechanism of injury: Patient states that she is still having pretty consistent L-sided low back pain, but it is not going down her legs. She reports that the exercises are going well but they seem to make her sore afterwards. She still has not began a walking program, but did walk around this morning to do [employer] shopping. The pt saw her neurosurgery team this week and was referred to pain management for ongoing back pain.    Pain  Current pain ratin  Location: L low back           Objective   See Exercise, Manual, and Modality Logs for complete treatment.       Assessment & Plan       Assessment  Assessment details: Patient notes L-sided back pain throughout the majority of the session today, but denied any symptoms down her legs. She does have mildly increased mm tension of her L paraspinals of the lower thoracic and lumbar regions. STM was performed again today with good response from the patient. Low level flexion and rotation activities were progressed today, and she didn't note an increase in symptoms with any of those exercises. She will continue to benefit from trunk mobility exercises, as well as LE strengthening and starting an aerobic program independently.     Plan  Plan details: Discuss aerobic program - walking, biking, etc. Continue w/ trunk mobility exercises and progress as tolerated. Continue w/ LE strengthening.            Timed:         Manual Therapy:    23     mins  68122;     Therapeutic Exercise:    32     mins  55017;     Neuromuscular Brennan:    0    mins  26327;    Therapeutic Activity:     0     mins  57030;     Gait Trainin     mins  91529;     Ultrasound:     0     mins  60152;    Ionto                               0    mins   93451  Self Care                       0     mins   90738  Canalith Repos    0     mins 89779      Un-Timed:  Electrical Stimulation:    0     mins  91065 ( );  Dry Needling     0     mins self-pay  Traction     0     mins 40578      Timed Treatment:   55   mins   Total Treatment:     55   mins    Thierry Vega PT  [place] License: [id number]    Mini French, Physical Therapist Student, completed treatment under my direct supervision.     2023

## 2023-12-11 ENCOUNTER — TREATMENT (OUTPATIENT)
Dept: PHYSICAL THERAPY | Facility: CLINIC | Age: 68
End: 2023-12-11
Payer: MEDICARE

## 2023-12-11 DIAGNOSIS — M54.50 ACUTE BILATERAL LOW BACK PAIN WITHOUT SCIATICA: Primary | ICD-10-CM

## 2023-12-11 DIAGNOSIS — M53.80 DECREASED RANGE OF MOTION OF SPINE: ICD-10-CM

## 2023-12-11 DIAGNOSIS — M62.81 MUSCLE WEAKNESS: ICD-10-CM

## 2023-12-11 NOTE — PROGRESS NOTES
Physical Therapy Daily Treatment Note  [facility] [place]  [address], [address]  [place] [address]      Patient: Luh Horner   : 1955  Referring practitioner: Alexy Coy PA-C  Date of Initial Visit: Type: THERAPY  Noted: 2023  Today's Date: 2023  Patient seen for 5 sessions       Visit Diagnoses:    ICD-10-CM ICD-9-CM   1. Acute bilateral low back pain without sciatica  M54.50 724.2     338.19   2. Muscle weakness  M62.81 728.87   3. Decreased range of motion of spine  M53.80 724.9       Subjective Evaluation    History of Present Illness  Mechanism of injury: Patient states that she rested from other activity over the weekend, but still performed her PT exercises. She did an exercise class at the [facility] this morning, and she is slowly tolerating more of the exercises each time she goes. She feels a little better today than her last visit, and she reports that she feels like the pain is spreading out.         Pain  Current pain rating: 3  Location: Low back           Objective   See Exercise, Manual, and Modality Logs for complete treatment.       Assessment & Plan       Assessment  Assessment details: Patient tolerates the flexion and rotation activities from last session well today, but did not respond very well to attempted progressions. SB dumps were attempted to work on trunk rotation and extension, and she was only able to perform 5 reps per side before stopping due to pain. Bridges were also performed today, and she was able to do 2 sets of 10 before needing to stop. She continues to get cramps in her back while lying supine, and she was unable to tolerate this position for more than 10 mins today. She was instructed to begin a walking program and was given a printout w/ time progressions and soreness rules.     Plan  Plan details: Continue w/ LE strengthening and progress as tolerated. Trial PPT in sitting and other core strengthening activities in sitting  or standing to avoid supine positioning. Progress trunk flexion and rotation activities.           Timed:         Manual Therapy:    15     mins  64029;     Therapeutic Exercise:    30     mins  01295;     Neuromuscular Brennan:    0    mins  82426;    Therapeutic Activity:     10     mins  91701;     Gait Trainin     mins  17674;     Ultrasound:     0     mins  71220;    Ionto                               0    mins   27862  Self Care                       0     mins   09141  Canalith Repos    0     mins 87637      Un-Timed:  Electrical Stimulation:    0     mins  71836 ( );  Dry Needling     0     mins self-pay  Traction     0     mins 05545      Timed Treatment:   55   mins   Total Treatment:     55   mins    Thierry Vega, PT  [place] License: [id number]

## 2023-12-14 ENCOUNTER — TELEPHONE (OUTPATIENT)
Dept: PAIN MEDICINE | Facility: CLINIC | Age: 68
End: 2023-12-14
Payer: MEDICARE

## 2023-12-14 DIAGNOSIS — M96.1 LUMBAR POSTLAMINECTOMY SYNDROME: ICD-10-CM

## 2023-12-14 DIAGNOSIS — Z01.818 PREOPERATIVE TESTING: ICD-10-CM

## 2023-12-14 DIAGNOSIS — M48.062 LUMBAR STENOSIS WITH NEUROGENIC CLAUDICATION: Primary | ICD-10-CM

## 2023-12-14 RX ORDER — METOPROLOL SUCCINATE 50 MG/1
50 TABLET, EXTENDED RELEASE ORAL DAILY
Qty: 4 TABLET | Refills: 0 | Status: SHIPPED | OUTPATIENT
Start: 2023-12-14

## 2023-12-14 NOTE — TELEPHONE ENCOUNTER
"Spoke with Luh  RE: Same pain prior to lumbar surgery = Severe lower back pain with neurogenic claudication. Denies lower extremity pain although walking is quite limited  No MRI since surgery    Prior to surgery:  1st set MBBs = complete relief for several hours   2nd set MBBs = complete relief that lasted less than the duration of  action of local anesthetics. Discussed repeating but she opted for changing procedure:   Diagnostic and therapeutic bilateral L4-L5 transforaminal epidural steroid injections: reports relief for a week or so. She was supposed to follow up with me in October, but instead she had surgery     Patient had surgery with Dr. Browning: 10/18/2023 LAMINECTOMY BILATERAL DECOMPRESSION     PLAN: Patient in agreement  Pt understands that she is no longer a candidate for MILD or Vertiflex at the surgical levels  Patient is interested in \"injections\". I explained that I need new images before inserting a needle since we don't know what is causing her symptoms (scar tissue, residual stenosis, etc)  We discussed SCS trial due to the extensive degenerative changes present in her MRI prior to surgery  We could also consider [employer] PNS    - I ordered MRI lumbar w/wo +creatine   - I ordered MRI thoracic  Prior to SCS: She will need psych clearance with Dr Yousif for SCS    Dandy Mcrae MD    "

## 2023-12-18 ENCOUNTER — TREATMENT (OUTPATIENT)
Dept: PHYSICAL THERAPY | Facility: CLINIC | Age: 68
End: 2023-12-18
Payer: MEDICARE

## 2023-12-18 DIAGNOSIS — M53.80 DECREASED RANGE OF MOTION OF SPINE: ICD-10-CM

## 2023-12-18 DIAGNOSIS — M54.50 ACUTE BILATERAL LOW BACK PAIN WITHOUT SCIATICA: Primary | ICD-10-CM

## 2023-12-18 DIAGNOSIS — M62.81 MUSCLE WEAKNESS: ICD-10-CM

## 2023-12-18 PROCEDURE — 97140 MANUAL THERAPY 1/> REGIONS: CPT | Performed by: PHYSICAL THERAPIST

## 2023-12-18 PROCEDURE — 97110 THERAPEUTIC EXERCISES: CPT | Performed by: PHYSICAL THERAPIST

## 2023-12-18 NOTE — PROGRESS NOTES
Physical Therapy Re Certification Of Plan of Care  [facility] [place] [place]  [address] [facility], [address] [address]    Patient: Luh Horner   : 1955  Diagnosis/ICD-10 Code:  Acute bilateral low back pain without sciatica [M54.50]  Referring practitioner: Alexy Coy PA-C  Date of Initial Visit: Type: THERAPY  Noted: 2023  Today's Date: 2023  Patient seen for 6 sessions         Visit Diagnoses:    ICD-10-CM ICD-9-CM   1. Acute bilateral low back pain without sciatica  M54.50 724.2     338.19   2. Muscle weakness  M62.81 728.87   3. Decreased range of motion of spine  M53.80 724.9         Subjective Questionnaire: Oswestry:   Clinical Progress: improved  Home Program Compliance: Yes  Treatment has included: therapeutic exercise, neuromuscular re-education, manual therapy, and therapeutic activity      Subjective Evaluation    History of Present Illness  Mechanism of injury: The pt stated that her back pain feels the same today that it did at the time of her IE. She has returned to exercise classes and stated she has some fear of larger movements but is ultimately tolerating exercises fairly well. She had pain with exercise class today but reported she was in pain before she went. She feels that her tolerance to standing is improving. She feels she is back to pre-surgery baseline in regards to activity tolerance. She would like to continue working on strength, activity tolerance, and lumbar mobility with PT.    Pain  Current pain ratin  Location: LBP           Objective          Postural Observations    Additional Postural Observation Details  Neutral lumbar posture in standing    Palpation   Left   Hypertonic in the lumbar paraspinals and quadratus lumborum.   Tenderness of the lumbar paraspinals and quadratus lumborum.     Right   Hypertonic in the lumbar paraspinals and quadratus lumborum. Tenderness of the lumbar paraspinals and quadratus lumborum.     Tenderness      Lumbar Spine  No tenderness in the spinous process.     Active Range of Motion     Additional Active Range of Motion Details  Lumbar AROM:  Flex: 2 cm to feet  Ext: 30%, Simon LBP  RSB: 5 cm to knee joint line, R sided LBP  LSB: 10 cm to knee joint line, L sided LBP  RR: 35 deg, contralateral LBP  LR: 35 deg, contralateral LBP    Strength/Myotome Testing     Left Hip   Planes of Motion   Flexion: 5  Abduction: 4  External rotation: 4+    Right Hip   Planes of Motion   Flexion: 5  Abduction: 4  External rotation: 4+    Left Knee   Flexion: 5  Extension: 5    Right Knee   Flexion: 5  Extension: 5    Tests     Lumbar     Left   Positive passive SLR.     Right   Positive passive SLR.     Additional Tests Details  (+) Slump test on L  SLR (+) for LBP          Assessment & Plan       Assessment  Impairments: abnormal coordination, abnormal muscle firing, abnormal or restricted ROM, activity intolerance, impaired physical strength, lacks appropriate home exercise program and pain with function   Functional limitations: carrying objects, lifting, walking, pulling, pushing, uncomfortable because of pain, moving in bed, standing, stooping and unable to perform repetitive tasks   Assessment details: The patient is now 2 months s/p lumbar laminectomy and continues to deny lower extremity pain.  Her back pain has been largely unchanged since surgery, even with PT interventions.  She continues to report relatively constant pain in the low back and into the hips that is acutely worsened with increased activity.  She has ongoing tenderness to palpation in the lumbar spinous processes and associated hypertonicity of the lumbar paraspinals.  Soft tissue mobilizations were repeated again today and were tolerated well, though she has chronic cramping through the low back that interferes with tolerance to static positioning.  She was encouraged to discuss this with her physician.  Compared to her initial evaluation, lower extremity  strength was improved and she also reported increased tolerance to standing, though this remains an issue with functional tasks such as standing to cook and participating in exercise classes.  She would continue to benefit from core stabilization exercises, which were progressed in the clinic and added to an HEP today.  Lumbar extensor strengthening was also attempted but was not tolerated well and was ultimately discontinued.  She will be following up with pain management in the near future to discuss spinal stimulator placement.  She would benefit from multimodal management with PT, pain management, and neurosurgery.  Prognosis: fair    Goals  Plan Goals: Short-term goals (4 weeks)  1. Patient will be independent and compliant w/ initial HEP. Met  2. Patient will report pain at rest 0/10 and at worst 4/10. Ongoing   3. Patient will demo improved forward flexion AROM by touching fingertips to mid-tibia. Met  4. Oswestry score to improve by at least 6 points. Ongoing   5. Patient will display decreased TTP and muscle tension of lumbar paraspinal mm. Ongoing   6. Patient will have negative Slump test bilaterally. Met    Long-term goals (8 weeks)  1. Patient will be appropriate for independent management and compliant w/ progressed HEP. Ongoing  2. Patient will reports pain at rest 0/10 and at worst 1/10. Ongoing  3. Patient will demo improved hip strength by the following MMT grades: flex 5/5, abd 4+/5, and ER 5/5. Ongoing - partially met  4. Patient will be able to walk for at least 30 minutes with no increase in LBP. Ongoing  5. Patient will return to all housework activities of cooking, cleaning, and laundry with no limitation due to back pain or dysfunction. Ongoing  6. Patient will return to lifting weights and low impact aerobics with no limitation due to back pain or dysfunction. Ongoing    Plan  Therapy options: will be seen for skilled therapy services  Planned modality interventions: dry needling, TENS,  thermotherapy (hydrocollator packs) and cryotherapy  Planned therapy interventions: abdominal trunk stabilization, manual therapy, ADL retraining, motor coordination training, balance/weight-bearing training, neuromuscular re-education, body mechanics training, soft tissue mobilization, flexibility, spinal/joint mobilization, functional ROM exercises, strengthening, gait training, stretching, home exercise program and therapeutic activities  Frequency: 1x week  Duration in visits: 8  Duration in weeks: 8  Treatment plan discussed with: patient  Plan details: Continue core stabilization exercises and functional strengthening of the lower extremities.  Manual therapy as needed for pain relief of the low back.           Recommendations: Continue as planned  Timeframe: 2 months  Prognosis to achieve goals: fair      Timed:         Manual Therapy:    10     mins  11713;     Therapeutic Exercise:    30     mins  58423;     Neuromuscular Brennan:    0    mins  34231;    Therapeutic Activity:     0     mins  19347;     Gait Trainin     mins  66352;     Ultrasound:     0     mins  29129;    Ionto                               0    mins   54562  Self Care                       0     mins   07606  Canalith Repos    0     mins 36960      Un-Timed:  Electrical Stimulation:    0     mins  07956 ( );  Dry Needling     0     mins self-pay  Traction     0     mins 95580  Re-Eval                           0    mins  04790      Timed Treatment:   40   mins   Total Treatment:     40   mins          PT: Thierry Vega PT     [place] License:  [id number]    Electronically signed by Thierry Vega PT, 23, 1:38 PM EST    Certification Period: 2023 thru 3/16/2024  I certify that the therapy services are furnished while this patient is under my care.  The services outlined above are required by this patient, and will be reviewed every 90 days.         Physician  Signature:__________________________________________________    PHYSICIAN: Alexy Coy PA-C  NPI: [id number]                                      DATE:  :     Please sign and return via fax to [email or web address] . Thank you, [facility]

## 2023-12-27 DIAGNOSIS — K21.9 GASTROESOPHAGEAL REFLUX DISEASE WITHOUT ESOPHAGITIS: ICD-10-CM

## 2023-12-27 RX ORDER — OMEPRAZOLE 20 MG/1
CAPSULE, DELAYED RELEASE ORAL
Qty: 90 CAPSULE | Refills: 3 | Status: SHIPPED | OUTPATIENT
Start: 2023-12-27

## 2023-12-27 NOTE — TELEPHONE ENCOUNTER
Adult General


Chief Complaint


Chief Complaint:  NAUSEA/VOMITING/DIARRHEA





HPI


HPI





Patient is a 1-year-old male who presents with report of nausea with vomiting 

and loose stools for the last couple days. Patient has been feeling warm but 

parents are not sure whether or not child has had a fever. Patient has had a 

total of 4 episodes of vomiting today.[]





Review of Systems


Review of Systems





Constitutional: Subjective fever[]


Respiratory: Denies cough or shortness of breath []


Cardiovascular: No additional information not addressed in HPI []


GI: Positive vomiting and diarrhea []


Integument: Denies rash or skin lesions []





Current Medications


Current Medications





Current Medications








 Medications


  (Trade)  Dose


 Ordered  Sig/Sharri  Start Time


 Stop Time Status Last Admin


Dose Admin


 


 Ondansetron HCl


  (Zofran Odt)  2 mg  1X  ONCE  10/28/19 14:15


 10/28/19 14:16 DC 10/28/19 14:41


2 MG











Allergies


Allergies





Allergies








Coded Allergies Type Severity Reaction Last Updated Verified


 


  No Known Drug Allergies    10/28/19 No











Physical Exam


Physical Exam





Constitutional: Well developed, well nourished, no acute distress, non-toxic 

appearance. []


HENT: Normocephalic, atraumatic, left TM is dull and erythematous. Right TM is 

normal-appearing, oropharynx moist, no oral exudates, nose normal. []


Cardiovascular: Regular rate and rhythm[]


Lungs & Thorax:  Bilateral breath sounds clear to auscultation []


Abdomen: Bowel sounds normal, soft, no tenderness. [] 


Skin: Warm, dry, no erythema, no rash. []





Current Patient Data


Lab Results





                                Laboratory Tests








Test


 10/28/19


14:25


 


Group A Streptococcus Rapid


 Negative


(NEGATIVE)











EKG


EKG


[]





Radiology/Procedures


Radiology/Procedures


[]





Course & Med Decision Making


Course & Med Decision Making


Pertinent Labs and Imaging studies reviewed. (See chart for details)





[]





Dragon Disclaimer


Dragon Disclaimer


This electronic medical record was generated, in whole or in part, using a voice

 recognition dictation system.





Departure


Departure:


Impression:  


   Primary Impression:  


   Left otitis media


   Additional Impression:  


   Nausea & vomiting


Disposition:  01 HOME, SELF-CARE


Condition:  STABLE


Referrals:  


MACI GIL (PCP)


Patient Instructions:  Otitis Media, Child


Scripts


Ondansetron Hcl (ZOFRAN) 4 Mg Tablet


2 MG PO Q8HRS PRN for NAUSEA, #6 TAB


   Prov: MEGHANA TAY Jr. DO         10/28/19 


Amoxicillin (AMOXICILLIN) 250 Mg/5 Ml Susp.recon


5 ML PO TID for infection for 10 Days, #150 ML


   Prov: MEGHANA TAY Jr. DO         10/28/19





Problem Qualifiers








   Primary Impression:  


   Left otitis media


   Otitis media type:  unspecified  Qualified Codes:  H66.92 - Otitis media, 

   unspecified, left ear


   Additional Impression:  


   Nausea & vomiting


   Vomiting type:  unspecified  Vomiting Intractability:  non-intractable  

   Qualified Codes:  R11.2 - Nausea with vomiting, unspecified








MEGHANA TAY Jr. DO          Oct 28, 2019 15:11 Rx Refill Note  Requested Prescriptions     Pending Prescriptions Disp Refills    omeprazole (priLOSEC) 20 MG capsule [Pharmacy Med Name: OMEPRAZOLE DR CAPS 20MG] 90 capsule 3     Sig: TAKE 1 CAPSULE DAILY      Last office visit with prescribing clinician: Visit date not found   Last telemedicine visit with prescribing clinician: Visit date not found   Next office visit with prescribing clinician: Visit date not found                         Would you like a call back once the refill request has been completed: [] Yes [] No    If the office needs to give you a call back, can they leave a voicemail: [] Yes [] No    Katey Dozier MA  12/27/23, 09:02 EST

## 2023-12-28 ENCOUNTER — TELEPHONE (OUTPATIENT)
Dept: PHYSICAL THERAPY | Facility: CLINIC | Age: 68
End: 2023-12-28

## 2024-01-04 ENCOUNTER — TREATMENT (OUTPATIENT)
Dept: PHYSICAL THERAPY | Facility: CLINIC | Age: 69
End: 2024-01-04
Payer: MEDICARE

## 2024-01-04 DIAGNOSIS — M53.80 DECREASED RANGE OF MOTION OF SPINE: ICD-10-CM

## 2024-01-04 DIAGNOSIS — M54.50 ACUTE BILATERAL LOW BACK PAIN WITHOUT SCIATICA: Primary | ICD-10-CM

## 2024-01-04 DIAGNOSIS — M62.81 MUSCLE WEAKNESS: ICD-10-CM

## 2024-01-04 PROCEDURE — 97140 MANUAL THERAPY 1/> REGIONS: CPT | Performed by: PHYSICAL THERAPIST

## 2024-01-04 PROCEDURE — 97110 THERAPEUTIC EXERCISES: CPT | Performed by: PHYSICAL THERAPIST

## 2024-01-04 NOTE — PROGRESS NOTES
Physical Therapy Daily Treatment Note  [facility] [place] [facility] [place]  [address] [facility] [address] [facility] [address]  [place] [address]      Patient: Luh Horner   : 1955  Referring practitioner: Alexy Coy PA-C  Date of Initial Visit: Type: THERAPY  Noted: 2023  Today's Date: 2024  Patient seen for 7 sessions       Visit Diagnoses:    ICD-10-CM ICD-9-CM   1. Acute bilateral low back pain without sciatica  M54.50 724.2     338.19   2. Muscle weakness  M62.81 728.87   3. Decreased range of motion of spine  M53.80 724.9       Subjective Evaluation    History of Present Illness  Mechanism of injury: The patient stated that she has returned to exercise classes and has tolerated them moderately well.  She continues to experience back pain with prolonged standing and activity but would like to continue building strength.  She requested review of her Swiss ball exercises from her last visit.    Pain  Current pain rating: 3  Location: LBP           Objective   See Exercise, Manual, and Modality Logs for complete treatment.       Assessment & Plan       Assessment  Assessment details: Back pain has been relatively unchanged since surgery but the patient has returned to a relatively active lifestyle and is doing well with independent exercise.  Swiss ball exercises were reviewed today and she was encouraged to add air to her ball at home for increased stability.  She has access to additional strengthening equipment at the gym and was encouraged to begin hamstring strengthening and mini squats with a leg press machine within her tolerance.    Plan  Plan details: Continue core strengthening exercises and mobility activities for the lumbar spine.          Timed:         Manual Therapy:    15     mins  44346;     Therapeutic Exercise:    23     mins  28501;     Neuromuscular Brennan:    0    mins  08405;    Therapeutic Activity:     0     mins  25407;     Gait Trainin     mins  16883;     Ultrasound:     0     mins   38022;    Ionto                               0    mins   70656  Self Care                       0     mins   77403  Canalith Repos    0     mins 84190      Un-Timed:  Electrical Stimulation:    0     mins  41194 ( );  Dry Needling     0     mins self-pay  Traction     0     mins 38321      Timed Treatment:   38   mins   Total Treatment:     60   mins    Thierry Vega, PT  [place] License: [id number]

## 2024-01-08 ENCOUNTER — TELEPHONE (OUTPATIENT)
Dept: CARDIOLOGY | Facility: CLINIC | Age: 69
End: 2024-01-08

## 2024-01-08 RX ORDER — METOPROLOL SUCCINATE 50 MG/1
50 TABLET, EXTENDED RELEASE ORAL DAILY
Qty: 2 TABLET | Refills: 0 | Status: SHIPPED | OUTPATIENT
Start: 2024-01-08

## 2024-01-08 NOTE — TELEPHONE ENCOUNTER
Received VM from patient stating she has Covid, but has taken 2 of her metoprolol as prescribed by DC for her CT calcium and now will not be able to make it to her scan.    Will send in 2 more tablets, patient is agreeable to plan and all questions answered at this time.

## 2024-01-09 ENCOUNTER — OFFICE VISIT (OUTPATIENT)
Dept: FAMILY MEDICINE CLINIC | Facility: CLINIC | Age: 69
End: 2024-01-09
Payer: MEDICARE

## 2024-01-09 VITALS
OXYGEN SATURATION: 97 % | DIASTOLIC BLOOD PRESSURE: 78 MMHG | BODY MASS INDEX: 31.78 KG/M2 | HEART RATE: 60 BPM | SYSTOLIC BLOOD PRESSURE: 132 MMHG | HEIGHT: 61 IN | TEMPERATURE: 97.9 F | WEIGHT: 168.3 LBS

## 2024-01-09 DIAGNOSIS — U07.1 POSITIVE SELF-ADMINISTERED ANTIGEN TEST FOR COVID-19: ICD-10-CM

## 2024-01-09 DIAGNOSIS — R06.2 WHEEZING: Primary | ICD-10-CM

## 2024-01-09 RX ORDER — ALBUTEROL SULFATE 90 UG/1
2 AEROSOL, METERED RESPIRATORY (INHALATION) EVERY 4 HOURS PRN
Qty: 18 G | Refills: 0 | Status: SHIPPED | OUTPATIENT
Start: 2024-01-09

## 2024-01-09 RX ORDER — PREDNISONE 20 MG/1
20 TABLET ORAL DAILY
Qty: 5 TABLET | Refills: 0 | Status: SHIPPED | OUTPATIENT
Start: 2024-01-09

## 2024-01-09 NOTE — PROGRESS NOTES
Form to Dr. Chadwick's in box for signature.    Subjective   Luh Horner is a 68 y.o. female.   Chief Complaint   Patient presents with    covid positive       History of Present Illness   Patient of Dr. Shafer is here with complaint of positive home covid test this morning.  Symptoms started last Friday, 4 days ago. She does not want an antiviral, but wanted to be sure her lungs sounded ok    The following portions of the patient's history were reviewed and updated as appropriate: allergies, current medications, past family history, past medical history, past social history, past surgical history, and problem list.    Review of Systems   Constitutional:  Positive for fatigue. Negative for chills and fever.   HENT:  Positive for congestion and sore throat. Negative for ear pain.    Respiratory:  Positive for cough and wheezing. Negative for shortness of breath.    Cardiovascular:  Negative for chest pain and palpitations.   Gastrointestinal:  Negative for diarrhea and nausea.   Genitourinary:  Negative for difficulty urinating and dysuria.   Neurological:  Positive for dizziness (with coughing hard) and headaches.       Objective   Physical Exam  Vitals reviewed.   Constitutional:       General: She is not in acute distress.     Appearance: Normal appearance. She is not ill-appearing, toxic-appearing or diaphoretic.   HENT:      Head: Normocephalic and atraumatic.      Right Ear: Tympanic membrane normal.      Left Ear: Tympanic membrane normal.   Cardiovascular:      Rate and Rhythm: Normal rate and regular rhythm.      Heart sounds: Normal heart sounds.   Pulmonary:      Effort: Pulmonary effort is normal. No respiratory distress.      Breath sounds: Normal breath sounds.   Neurological:      Mental Status: She is alert and oriented to person, place, and time.   Psychiatric:         Mood and Affect: Mood normal.         Thought Content: Thought content normal.         Judgment: Judgment normal.       /78   Pulse 60   Temp 97.9 °F (36.6 °C)   Ht  "154.9 cm (60.98\")   Wt 76.3 kg (168 lb 4.8 oz)   SpO2 97%   BMI 31.82 kg/m²     Assessment & Plan   Diagnoses and all orders for this visit:    1. Wheezing (Primary)  -     albuterol sulfate  (90 Base) MCG/ACT inhaler; Inhale 2 puffs Every 4 (Four) Hours As Needed for Wheezing.  Dispense: 18 g; Refill: 0  -     predniSONE (DELTASONE) 20 MG tablet; Take 1 tablet by mouth Daily.  Dispense: 5 tablet; Refill: 0    2. Positive self-administered antigen test for COVID-19  -     albuterol sulfate  (90 Base) MCG/ACT inhaler; Inhale 2 puffs Every 4 (Four) Hours As Needed for Wheezing.  Dispense: 18 g; Refill: 0        Mucinex 600 mg twice a day    Stay well hydrated    Start prednisone if wheezing still present tomorrow    Albuterol as directed  Patient was encouraged to keep me informed of any acute changes, lack of improvement, or any new concerning symptoms.         "

## 2024-01-09 NOTE — PATIENT INSTRUCTIONS
Mucinex 600 mg twice a day    Stay well hydrated    Start prednisone if wheezing still present tomorrow    Albuterol as directed

## 2024-01-29 ENCOUNTER — TELEPHONE (OUTPATIENT)
Dept: CARDIOLOGY | Facility: CLINIC | Age: 69
End: 2024-01-29
Payer: MEDICARE

## 2024-01-29 DIAGNOSIS — E78.00 PURE HYPERCHOLESTEROLEMIA: Primary | Chronic | ICD-10-CM

## 2024-01-29 RX ORDER — EZETIMIBE 10 MG/1
10 TABLET ORAL DAILY
Qty: 90 TABLET | Refills: 3 | Status: SHIPPED | OUTPATIENT
Start: 2024-01-29

## 2024-01-29 RX ORDER — ATORVASTATIN CALCIUM 40 MG/1
40 TABLET, FILM COATED ORAL
Qty: 90 TABLET | Refills: 3 | Status: SHIPPED | OUTPATIENT
Start: 2024-01-29

## 2024-01-29 NOTE — TELEPHONE ENCOUNTER
----- Message from Damián Devi MD sent at 1/29/2024  2:24 PM EST -----  Coronary artery calcium score of 106.  Indicates we need to get better control of cholesterol and start daily 81mg of aspirin.  Would suggesting increasting liptior to 40mg and adding zetia 10mg.  Would repeat lipid panel, hscrp, lipoprotein (a) level and cmp in 3 months.  Please assure follow up

## 2024-02-01 ENCOUNTER — OFFICE VISIT (OUTPATIENT)
Dept: CARDIOLOGY | Facility: CLINIC | Age: 69
End: 2024-02-01
Payer: MEDICARE

## 2024-02-01 VITALS
OXYGEN SATURATION: 97 % | HEART RATE: 79 BPM | HEIGHT: 60 IN | WEIGHT: 168 LBS | SYSTOLIC BLOOD PRESSURE: 122 MMHG | DIASTOLIC BLOOD PRESSURE: 74 MMHG | BODY MASS INDEX: 32.98 KG/M2

## 2024-02-01 DIAGNOSIS — E66.2 CLASS 1 OBESITY WITH ALVEOLAR HYPOVENTILATION, SERIOUS COMORBIDITY, AND BODY MASS INDEX (BMI) OF 32.0 TO 32.9 IN ADULT: Primary | ICD-10-CM

## 2024-02-01 RX ORDER — ASPIRIN 81 MG/1
81 TABLET ORAL DAILY
COMMUNITY

## 2024-02-01 NOTE — PROGRESS NOTES
"Chief Complaint  Hyperlipidemia      Subjective   History of Present Illness    Problem List  -atherosclerosis of aorta  -MVP, recent echo normal for age  -HLD, LDL not at goal.    -BMI 32, obesity  -EKG non specific changes    Ms. Horner is a 67 year old lady with above hx.  Occasional dyspnea when doing stairs but has been way for years and non limiting.  Able to adls and her exercise class.  Limiting factor is back pain.  No chest pain or syncope.  No prominent murmur but hx of MVP.  Brother  in 50s of MI.  ROS negative except for the above.      Update 24  Went over testing.  No CV complaints       Objective   Vital Signs:  Vitals:    24 1508   BP: 122/74   Pulse: 79   SpO2: 97%     Estimated body mass index is 32.81 kg/m² as calculated from the following:    Height as of this encounter: 152.4 cm (60\").    Weight as of this encounter: 76.2 kg (168 lb).       Physical Exam  HENT:      Head: Normocephalic.   Eyes:      Extraocular Movements: Extraocular movements intact.   Cardiovascular:      Rate and Rhythm: Normal rate and regular rhythm.      Heart sounds: No murmur heard.     No gallop.   Pulmonary:      Breath sounds: Normal breath sounds.   Abdominal:      Palpations: Abdomen is soft.   Musculoskeletal:      Right lower leg: No edema.      Left lower leg: No edema.   Skin:     General: Skin is warm and dry.   Neurological:      General: No focal deficit present.      Mental Status: She is alert.   Psychiatric:         Mood and Affect: Mood normal.               Assessment   -atherosclerosis of aorta  -MVP, recent echo normal for age  -HLD, LDL not at goal.    -BMI 32, obesity  -EKG non specific changes    Plan   -taking aspirin, statin and zetia.  Getting repeat blood work in few months  -discussed weight loss, would like to try wegovy, will see if helps  -6 week follow up    Return in about 6 weeks (around 3/14/2024).  Damián Devi MD  10/02/2023 15:57 EDT     "

## 2024-02-02 ENCOUNTER — TELEPHONE (OUTPATIENT)
Dept: CARDIOLOGY | Facility: CLINIC | Age: 69
End: 2024-02-02
Payer: MEDICARE

## 2024-02-02 NOTE — TELEPHONE ENCOUNTER
Patient called regarding Wegovy prescription.    Called patient to inform her that since she has Medicare coverage, they will not cover this medication. Patient understands and all questions answered at this time.

## 2024-02-05 ENCOUNTER — TREATMENT (OUTPATIENT)
Dept: PHYSICAL THERAPY | Facility: CLINIC | Age: 69
End: 2024-02-05
Payer: MEDICARE

## 2024-02-05 DIAGNOSIS — G89.29 CHRONIC BILATERAL LOW BACK PAIN WITHOUT SCIATICA: Primary | ICD-10-CM

## 2024-02-05 DIAGNOSIS — M54.50 CHRONIC BILATERAL LOW BACK PAIN WITHOUT SCIATICA: Primary | ICD-10-CM

## 2024-02-05 DIAGNOSIS — R53.1 WEAKNESS: ICD-10-CM

## 2024-02-05 PROCEDURE — G0283 ELEC STIM OTHER THAN WOUND: HCPCS | Performed by: PHYSICAL THERAPIST

## 2024-02-05 PROCEDURE — 97110 THERAPEUTIC EXERCISES: CPT | Performed by: PHYSICAL THERAPIST

## 2024-02-05 PROCEDURE — 97140 MANUAL THERAPY 1/> REGIONS: CPT | Performed by: PHYSICAL THERAPIST

## 2024-02-05 NOTE — PROGRESS NOTES
Physical Therapy Re Certification Of Plan of Care  [facility] [place]  [address] [facility], [address] [address]    Patient: Luh Horner   : 1955  Diagnosis/ICD-10 Code:  Chronic bilateral low back pain without sciatica [M54.50, G89.29]  Referring practitioner: Alexy Coy PA-C  Date of Initial Visit: Type: THERAPY  Noted: 2023  Today's Date: 2024  Patient seen for 8 sessions         Visit Diagnoses:    ICD-10-CM ICD-9-CM   1. Chronic bilateral low back pain without sciatica  M54.50 724.2    G89.29 338.29   2. Weakness  R53.1 780.79       Subjective Questionnaire: Oswestry:   Clinical Progress: improved  Home Program Compliance: Yes  Treatment has included: therapeutic exercise, neuromuscular re-education, manual therapy, therapeutic activity, electrical stimulation, dry needling, moist heat, and cryotherapy      Subjective Evaluation    History of Present Illness  Mechanism of injury: The pt reported that her back pain has been largely unchanged in the last month. She has returned to recreational exercise at the [employer] and goes to [facility] 2x/week for weight training.  She has also been compliant with her home exercise program, but stated that Swiss ball seated exercises have been difficult due to low pressure in the ball.  She continues to report pain in the low back with 2 to 3 minutes of standing and needs to sit to relieve symptoms.  She has a pain management referral and is scheduled to have an MRI of the thoracic and lumbar spine on Thursday.      Pain  Current pain ratin  Location: LBP             Objective          Postural Observations    Additional Postural Observation Details  Neutral lumbar posture in standing     Palpation   Left   Hypertonic in the lumbar paraspinals and quadratus lumborum.   Tenderness of the lumbar paraspinals and quadratus lumborum.     Right   Hypertonic in the lumbar paraspinals and quadratus lumborum. Tenderness of the lumbar  paraspinals and quadratus lumborum.     Tenderness     Lumbar Spine  No tenderness in the spinous process.     Active Range of Motion     Additional Active Range of Motion Details  Lumbar AROM:  Flex: 2 cm to feet  Ext: 30%, Simon LBP  RSB: 5 cm to knee joint line  LSB: 7 cm to knee joint line, L sided LBP  RR: 35 deg, contralateral LBP  LR: 35 deg, contralateral LBP    Strength/Myotome Testing     Left Hip   Planes of Motion   Flexion: 5  Abduction: 4-  External rotation: 4+    Right Hip   Planes of Motion   Flexion: 5  Abduction: 4  External rotation: 5    Left Knee   Flexion: 5  Extension: 5    Right Knee   Flexion: 5  Extension: 5    Tests     Lumbar     Left   Positive passive SLR.     Right   Positive passive SLR.     Additional Tests Details  (+) Slump test on L  SLR (+) for LBP          Assessment & Plan       Assessment  Impairments: abnormal coordination, abnormal muscle firing, abnormal or restricted ROM, activity intolerance, impaired physical strength, lacks appropriate home exercise program and pain with function   Functional limitations: carrying objects, lifting, walking, pulling, pushing, uncomfortable because of pain, moving in bed, standing, stooping and unable to perform repetitive tasks   Assessment details: The patient has not seen improvement in low back pain in the last month of independent management with her HEP.  She has been exercising frequently with her HEP, independent strengthening, and exercise classes at the [facility], but she continues to demonstrate weakness of the glutes and core musculature.  She was prescribed a new HEP for isolated glute medius and glute mel strengthening.  Core strengthening exercises were attempted in supine but were not tolerated well and removed to a seated position.  Manual interventions have not been tolerated well due to cramping in the thoracic or lumbar musculature when in a prone position.  She was encouraged to discuss the recurrent cramping  with her primary care physician.  Due to the lack of change in the chronicity of symptoms, I am concerned with her ability to see the significant improvement with PT alone.  She will be seeing pain management for the first time and will benefit from multi modal management moving forward.  Electric stimulation was attempted for pain modulation today but she did not feel it was helping while it was on.  Prognosis: fair    Goals  Plan Goals: Short-term goals (4 weeks)  1. Patient will be independent and compliant w/ initial HEP. Met  2. Patient will report pain at rest 0/10 and at worst 4/10. Ongoing   3. Patient will demo improved forward flexion AROM by touching fingertips to mid-tibia. Met  4. Oswestry score to improve by at least 6 points. Ongoing   5. Patient will display decreased TTP and muscle tension of lumbar paraspinal mm. Ongoing   6. Patient will have negative Slump test bilaterally. Met    Long-term goals (8 weeks)  1. Patient will be appropriate for independent management and compliant w/ progressed HEP. Ongoing  2. Patient will reports pain at rest 0/10 and at worst 1/10. Ongoing  3. Patient will demo improved hip strength by the following MMT grades: flex 5/5, abd 4+/5, and ER 5/5. Ongoing - partially met  4. Patient will be able to walk for at least 30 minutes with no increase in LBP. Ongoing  5. Patient will return to all housework activities of cooking, cleaning, and laundry with no limitation due to back pain or dysfunction. Ongoing  6. Patient will return to lifting weights and low impact aerobics with no limitation due to back pain or dysfunction. Ongoing    Plan  Therapy options: will be seen for skilled therapy services  Planned modality interventions: dry needling, TENS, thermotherapy (hydrocollator packs) and cryotherapy  Planned therapy interventions: abdominal trunk stabilization, manual therapy, ADL retraining, motor coordination training, balance/weight-bearing training, neuromuscular  re-education, body mechanics training, soft tissue mobilization, flexibility, spinal/joint mobilization, functional ROM exercises, strengthening, gait training, stretching, home exercise program and therapeutic activities  Frequency: 2x month  Duration in visits: 4  Duration in weeks: 8  Treatment plan discussed with: patient  Plan details: Continue core stabilization exercises and functional strengthening of the lower extremities.  Manual therapy as needed for pain relief of the low back.  Assess response to electric stimulation and recommend TENS unit if improvement was noted.           Recommendations: Continue as planned  Timeframe: 2 months  Prognosis to achieve goals: fair      Timed:         Manual Therapy:    20     mins  89645;     Therapeutic Exercise:    25     mins  80998;     Neuromuscular Brennan:    0    mins  73828;    Therapeutic Activity:     0     mins  27643;     Gait Trainin     mins  80533;     Ultrasound:     0     mins  76866;    Ionto                               0    mins   82286  Self Care                       0     mins   21148  Canalith Repos    0     mins 31425      Un-Timed:  Electrical Stimulation:    10     mins  94117 (MC );  Dry Needling     0     mins self-pay  Traction     0     mins 66282  Re-Eval                           0    mins  28062      Timed Treatment:   45   mins   Total Treatment:     55   mins          PT: Thierry Vega PT     [place] License:  [id number]    Electronically signed by Thierry Vega PT, 24, 3:35 PM EST    Certification Period: 2024 thru 2024  I certify that the therapy services are furnished while this patient is under my care.  The services outlined above are required by this patient, and will be reviewed every 90 days.         Physician Signature:__________________________________________________    PHYSICIAN: Alexy Coy PA-C  NPI: [id number]                                      DATE:  :     Please sign and return via fax to  [email or web address] . Thank you, [facility]

## 2024-02-08 ENCOUNTER — HOSPITAL ENCOUNTER (OUTPATIENT)
Dept: MRI IMAGING | Facility: HOSPITAL | Age: 69
Discharge: HOME | End: 2024-02-08
Payer: MEDICARE

## 2024-02-08 LAB — CREAT BLDA-MCNC: 0.8 MG/DL (ref 0.6–1.3)

## 2024-02-08 PROCEDURE — 82565 ASSAY OF CREATININE: CPT

## 2024-02-08 PROCEDURE — 0 GADOBENATE DIMEGLUMINE 529 MG/ML SOLUTION: Performed by: ANESTHESIOLOGY

## 2024-02-08 PROCEDURE — 72146 MRI CHEST SPINE W/O DYE: CPT

## 2024-02-08 PROCEDURE — A9577 INJ MULTIHANCE: HCPCS | Performed by: ANESTHESIOLOGY

## 2024-02-08 PROCEDURE — 72158 MRI LUMBAR SPINE W/O & W/DYE: CPT

## 2024-02-08 RX ADMIN — GADOBENATE DIMEGLUMINE 15 ML: 529 INJECTION, SOLUTION INTRAVENOUS at 13:38

## 2024-02-14 ENCOUNTER — TELEPHONE (OUTPATIENT)
Dept: PAIN MEDICINE | Facility: CLINIC | Age: 69
End: 2024-02-14
Payer: MEDICARE

## 2024-02-14 DIAGNOSIS — M48.062 LUMBAR STENOSIS WITH NEUROGENIC CLAUDICATION: Primary | ICD-10-CM

## 2024-02-22 ENCOUNTER — TREATMENT (OUTPATIENT)
Dept: PHYSICAL THERAPY | Facility: CLINIC | Age: 69
End: 2024-02-22
Payer: MEDICARE

## 2024-02-22 DIAGNOSIS — R53.1 WEAKNESS: ICD-10-CM

## 2024-02-22 DIAGNOSIS — M54.50 CHRONIC BILATERAL LOW BACK PAIN WITHOUT SCIATICA: Primary | ICD-10-CM

## 2024-02-22 DIAGNOSIS — G89.29 CHRONIC BILATERAL LOW BACK PAIN WITHOUT SCIATICA: Primary | ICD-10-CM

## 2024-02-22 PROCEDURE — 97140 MANUAL THERAPY 1/> REGIONS: CPT | Performed by: PHYSICAL THERAPIST

## 2024-02-22 PROCEDURE — 97110 THERAPEUTIC EXERCISES: CPT | Performed by: PHYSICAL THERAPIST

## 2024-02-22 NOTE — PROGRESS NOTES
Physical Therapy Daily Treatment Note  [facility] [place]  [address], [address]  [place] [address]      Patient: Luh Horner   : 1955  Referring practitioner: Alexy Coy PA-C  Date of Initial Visit: Type: THERAPY  Noted: 2023  Today's Date: 2024  Patient seen for 9 sessions       Visit Diagnoses:    ICD-10-CM ICD-9-CM   1. Chronic bilateral low back pain without sciatica  M54.50 724.2    G89.29 338.29   2. Weakness  R53.1 780.79       Subjective Evaluation    History of Present Illness  Mechanism of injury: The patient had an MRI of her thoracic and lumbar spine since her last PT visit and plans to have injections to L3/4 with pain management.  She would like to avoid a spinal stimulator.  She feels that her exercises are getting a little easier and would like to talk about progressions.  She has been faithful with exercise classes and return to water aerobics this week.  She reported her back felt much better during the water aerobics class.  She continues to feel that she responds well to massage.    Pain  Current pain ratin  Location: LBP           Objective   See Exercise, Manual, and Modality Logs for complete treatment.       Assessment & Plan       Assessment  Assessment details: The patient continues to respond well in the short-term to soft tissue mobilization, so a massage cane was recommended for independent use.  She has not seen a significant change in back pain with exercises and stretches, and was encouraged to discuss additional intervention with pain management.  She plans to have injections with them in the next couple of weeks.  Her home exercise program was progressed slightly today and she was shown additional exercises that she could perform independently at the gym.  She was advised to continue with her water aerobics class.  At this time, I feel the patient will do well with a period of independent management with PT follow-ups as needed.  She is  agreeable to this plan of care.    Plan  Plan details: Pt to attempt independent management and call for f/u as needed. If no additional visits are scheduled in 30 days this will serve as a d/c note.            Timed:         Manual Therapy:    15     mins  59497;     Therapeutic Exercise:    25     mins  46003;     Neuromuscular Brennan:    0    mins  44228;    Therapeutic Activity:     0     mins  00603;     Gait Trainin     mins  29967;     Ultrasound:     0     mins  91552;    Ionto                               0    mins   14472  Self Care                       0     mins   25417  Canalith Repos    0     mins 59435      Un-Timed:  Electrical Stimulation:    0     mins  92294 ( );  Dry Needling     0     mins self-pay  Traction     0     mins 71570      Timed Treatment:   40   mins   Total Treatment:     60   mins    Thierry Vega, PT  [place] License: [id number]

## 2024-03-04 ENCOUNTER — HOSPITAL ENCOUNTER (OUTPATIENT)
Dept: CT IMAGING | Facility: HOSPITAL | Age: 69
Discharge: HOME | End: 2024-03-04
Admitting: PHYSICIAN ASSISTANT
Payer: MEDICARE

## 2024-03-04 DIAGNOSIS — M48.062 LUMBAR STENOSIS WITH NEUROGENIC CLAUDICATION: Primary | ICD-10-CM

## 2024-03-04 DIAGNOSIS — M48.062 SPINAL STENOSIS OF LUMBAR REGION WITH NEUROGENIC CLAUDICATION: ICD-10-CM

## 2024-03-04 PROCEDURE — 72131 CT LUMBAR SPINE W/O DYE: CPT

## 2024-03-06 ENCOUNTER — OUTSIDE FACILITY SERVICE (OUTPATIENT)
Dept: PAIN MEDICINE | Facility: CLINIC | Age: 69
End: 2024-03-06
Payer: MEDICARE

## 2024-03-06 PROCEDURE — 64483 NJX AA&/STRD TFRM EPI L/S 1: CPT | Performed by: ANESTHESIOLOGY

## 2024-03-06 PROCEDURE — 99152 MOD SED SAME PHYS/QHP 5/>YRS: CPT | Performed by: ANESTHESIOLOGY

## 2024-03-07 ENCOUNTER — TELEPHONE (OUTPATIENT)
Dept: PAIN MEDICINE | Facility: CLINIC | Age: 69
End: 2024-03-07
Payer: MEDICARE

## 2024-03-07 NOTE — TELEPHONE ENCOUNTER
FOLLOW-UP CALL AFTER PROCEDURE  I spoke with Luh Horner regarding how they're feeling after yesterday's procedure with Dr. Mcrae. Patient reports that she is doing well.   pt underwent a diagnostic procedure (see procedure note for details) on 03/06/2024 . Patient reported 80% pain relief at the time of after procedure exam with Dr. Mcrae. In addition, patient experienced significant functional improvement (performing activities without experiencing pain in comparison with examination prior to the procedure that reproduced pain with those activities).   Pain level before procedure: 8/10   Pain level after procedure: 2/10  Patient reports that the pain relief lasted for 48 hours. Today, Luh Horner reports 80 % ongoing pain relief pain (pain level 2/10) OR pain has returned to baseline after  hours   Patient denies side effects or complications  Patient does not have any questions or concerns at this time.  Advised pt of next follow up with [facility] [place]

## 2024-03-08 ENCOUNTER — OFFICE VISIT (OUTPATIENT)
Dept: NEUROSURGERY | Facility: CLINIC | Age: 69
End: 2024-03-08
Payer: MEDICARE

## 2024-03-08 VITALS — HEIGHT: 61 IN | BODY MASS INDEX: 31.87 KG/M2 | TEMPERATURE: 96.9 F | WEIGHT: 168.8 LBS

## 2024-03-08 DIAGNOSIS — G89.29 CHRONIC MIDLINE LOW BACK PAIN WITHOUT SCIATICA: Primary | ICD-10-CM

## 2024-03-08 DIAGNOSIS — M48.062 LUMBAR STENOSIS WITH NEUROGENIC CLAUDICATION: ICD-10-CM

## 2024-03-08 DIAGNOSIS — M54.50 CHRONIC MIDLINE LOW BACK PAIN WITHOUT SCIATICA: Primary | ICD-10-CM

## 2024-03-08 NOTE — PROGRESS NOTES
Patient: Luh Horner  : 1955    Primary Care Provider: Daniel Shafer DO      Chief Complaint: Low back pain    History of Present Illness:       Patient is a very sweet 68-year-old female known to the neurosurgical practice for undergoing a lumbar laminectomy at L4-5.  Procedure without event and her leg pain was much better.  Unfortunately she continues to struggle with some low back pains.    We sent her to pain management got some injections which actually is really helped her low back pain and she is in a good place currently.    She asked me Shantel questions regarding her activities and limitations what she can do to try to preserve this improvement and basically it was avoidance of exacerbating things.    Patient has a CT of the lumbar spine today showing levoscoliosis with some early facet fusion on the left.     Review of Systems   HENT:  Positive for congestion, postnasal drip and rhinorrhea.    Musculoskeletal:  Positive for arthralgias, back pain and myalgias.   Hematological:  Bruises/bleeds easily.   All other systems reviewed and are negative.      Past Medical History:     Past Medical History:   Diagnosis Date    Allergic rhinitis     Bone pain     foot    Cancer 2005    Skin cancer squamous    Cataract 2010    Surgery recently    Cervical disc disorder     Chronic pain disorder 2012    Coronary artery disease     Mitral valve    CTS (carpal tunnel syndrome)     Depression     1981 - Hospitalized    Fibrocystic breast disease 2000    Current benign breast biopsies    GERD (gastroesophageal reflux disease)     Glaucoma Surgery recently    Headache     None this year    Hiatal hernia     Hyperlipidemia     Hypertension 1997    Insomnia 2013    Irritable bowel syndrome (IBS) 2002    Recurrent abdominal cramps    Joint pain 2012    Low back pain 2008    Lumbar scoliosis 2017    Lumbar spondylosis 2000    Chronic low back pain    Lumbosacral disc disease      Migraine     Mitral valve insufficiency     Mitral valve prolapse     Neck pain     Obesity 2000    Osteoarthritis     Osteopenia     Peripheral neuropathy     Post menopausal syndrome 2004    Refractory hot flashes    Sleep disorder 2016    Spinal stenosis     Thoracic disc disorder     Wears reading eyeglasses        Family History:     Family History   Problem Relation Age of Onset    Pulmonary fibrosis Mother          age 82    Hypertension Mother     Lumbar disc disease Mother     Prostate cancer Father          age 76    Hypertension Sister             Goiter Sister     Hypothyroidism Sister     Anxiety disorder Sister     Miscarriages / Stillbirths Sister     Thyroid disease Sister     Heart disease Brother          Age 48 heart attack was cigarette smoker    Hiatal hernia Brother     Early death Brother         Heart attack at age 48    Heart attack Brother     Breast cancer Other         Maternal and paternal aunts    Hypertension Brother     Obesity Maternal Uncle     Diabetes Maternal Uncle     Arthritis Paternal Aunt     Hypertension Brother     Hyperlipidemia Brother        Social History:    reports that she quit smoking about 43 years ago. Her smoking use included cigarettes. She started smoking about 53 years ago. She has a 20 pack-year smoking history. She has never used smokeless tobacco. She reports current alcohol use of about 2.0 standard drinks of alcohol per week. She reports that she does not use drugs.   SMOKING STATUS: Non-smoker    Surgical History:     Past Surgical History:   Procedure Laterality Date    BREAST BIOPSY Right remote    benign disease    CATARACT EXTRACTION, BILATERAL Bilateral     CHOLECYSTECTOMY      COLONOSCOPY  ?    ENDOSCOPY      HYSTERECTOMY  1998    LUMBAR LAMINECTOMY Bilateral 10/18/2023    Procedure: LAMINECTOMY BILATERAL DECOMPRESSION L4-5 RIGHT;  Surgeon: Jonel Browning MD;  Location: [facility];  Service:  "Neurosurgery;  Laterality: Bilateral;    REFRACTIVE SURGERY  1994    RK    SKIN BIOPSY      TRIGGER POINT INJECTION  2021    Knee    URETHRAL SUSPENSION  2011    laparoscopic for stress incontinence       Allergies:   Shellfish allergy, Atenolol, Buspirone, Codeine, Pseudoephedrine, Rosuvastatin, Westley-e.p.a. [dha-epa-vitamin e], Simvastatin, and Trazodone    Physical Exam:    Vital Signs:Temp 96.9 °F (36.1 °C) (Temporal)   Ht 154.9 cm (61\")   Wt 76.6 kg (168 lb 12.8 oz)   BMI 31.89 kg/m²    BMI: Body mass index is 31.89 kg/m².       Incision:   The incision is well-healed and well approximated.  No signs of infection, bleeding, or erythema.    Musculoskeletal:                                            Dorsiflexion is 5/5 Bilaterally                    Plantarflexion is 5/5 bilaterally                    Hip Flexion 5/5 bilaterally.                        Neurologic:                                         The patient is alert and oriented by 3.                       Sensation is equal bilaterally with no deficit.                        Reflexes:  2+ throughout       Medical Decision Making    Data Review:   CT of the lumbar spine reviewed showing no malalignment or fracture.  Good decompression at L4-5 area noticed    Diagnosis:   Chronic low back pain  Status post lumbar laminectomy  Obesity    Treatment Options:   Patient is going to continue working on her fitness and mobility with weight training.  I have encouraged her to try to avoid any free weights and she could use mainly machines and cardiovascular exercise.    Patient is doing well after her most recent injection which was done 2 days ago.    Hopefully this is durable for her.  If not consideration of spinal cord stimulation is not unreasonable versus extension of fusion      Diagnosis Plan   1. Chronic midline low back pain without sciatica        2. Lumbar stenosis with neurogenic claudication          "

## 2024-03-27 ENCOUNTER — OFFICE VISIT (OUTPATIENT)
Dept: FAMILY MEDICINE CLINIC | Facility: CLINIC | Age: 69
End: 2024-03-27
Payer: MEDICARE

## 2024-03-27 VITALS
OXYGEN SATURATION: 96 % | TEMPERATURE: 97.5 F | HEART RATE: 89 BPM | BODY MASS INDEX: 31.53 KG/M2 | WEIGHT: 167 LBS | DIASTOLIC BLOOD PRESSURE: 70 MMHG | SYSTOLIC BLOOD PRESSURE: 126 MMHG | HEIGHT: 61 IN

## 2024-03-27 DIAGNOSIS — I10 PRIMARY HYPERTENSION: Chronic | ICD-10-CM

## 2024-03-27 DIAGNOSIS — E66.09 CLASS 1 OBESITY DUE TO EXCESS CALORIES WITH SERIOUS COMORBIDITY AND BODY MASS INDEX (BMI) OF 32.0 TO 32.9 IN ADULT: ICD-10-CM

## 2024-03-27 DIAGNOSIS — Z00.00 MEDICARE ANNUAL WELLNESS VISIT, SUBSEQUENT: Primary | ICD-10-CM

## 2024-03-27 DIAGNOSIS — M48.062 NEUROGENIC CLAUDICATION DUE TO LUMBAR SPINAL STENOSIS: ICD-10-CM

## 2024-03-27 DIAGNOSIS — E78.00 PURE HYPERCHOLESTEROLEMIA: Chronic | ICD-10-CM

## 2024-03-27 RX ORDER — EZETIMIBE 10 MG/1
10 TABLET ORAL DAILY
Qty: 90 TABLET | Refills: 3 | Status: SHIPPED | OUTPATIENT
Start: 2024-03-27

## 2024-03-27 RX ORDER — HYDROCODONE BITARTRATE AND ACETAMINOPHEN 5; 325 MG/1; MG/1
1-2 TABLET ORAL EVERY 6 HOURS PRN
Qty: 12 TABLET | Refills: 0 | Status: SHIPPED | OUTPATIENT
Start: 2024-03-27

## 2024-03-27 NOTE — PROGRESS NOTES
The ABCs of the Annual Wellness Visit  Subsequent Medicare Wellness Visit    Subjective    Luh Horner is a 68 y.o. female who presents for a Subsequent Medicare Wellness Visit.    The following portions of the patient's history were reviewed and   updated as appropriate: allergies, current medications, past family history, past medical history, past social history, past surgical history, and problem list.    Compared to one year ago, the patient feels her physical   health is the same. Back is still in a lot of pain, had another AYAN. Considering stimulator.    Compared to one year ago, the patient feels her mental   health is the same.    Recent Hospitalizations:  She was not admitted to the hospital during the last year.       Current Medical Providers:  Patient Care Team:  Daniel Shafer DO as PCP - General (Family Medicine)  Felice Lopez MD as Consulting Physician (Gastroenterology)  Dandy Mcrae MD as Consulting Physician (Pain Medicine)  Damián Devi MD as Consulting Physician (Cardiology)  Jonel Browning MD as Surgeon (Neurosurgery)  Alexy Coy PA-C as Physician Assistant (Physician Assistant)  Daniel Shafer DO as Emergency Attending (Family Medicine)    Outpatient Medications Prior to Visit   Medication Sig Dispense Refill    amLODIPine (NORVASC) 10 MG tablet TAKE 1 TABLET EVERY NIGHT AT BEDTIME (Patient taking differently: Take 1 tablet by mouth Daily.) 90 tablet 3    aspirin (SB Low Dose ASA EC) 81 MG EC tablet Take 1 tablet by mouth Daily.      atorvastatin (LIPITOR) 40 MG tablet Take 1 tablet by mouth every night at bedtime. 90 tablet 3    citalopram (CeleXA) 20 MG tablet TAKE 1 TABLET DAILY (Patient taking differently: Take 1 tablet by mouth Daily.) 90 tablet 3    Coenzyme Q10 (CO Q-10) 200 MG capsule Take 1 capsule by mouth.      dicyclomine (Bentyl) 10 MG capsule Take 1 capsule by mouth 4 (Four) Times a Day Before Meals & at Bedtime. (Patient  taking differently: Take 1 capsule by mouth As Needed.) 120 capsule 1    estradiol (MINIVELLE, VIVELLE-DOT) 0.05 MG/24HR patch 1 patch 2 (Two) Times a Week. twice      fluticasone (FLONASE) 50 MCG/ACT nasal spray USE 2 SPRAYS IN EACH NOSTRIL DAILY (Patient taking differently: 2 sprays into the nostril(s) as directed by provider Daily.) 16 g 11    hydroCHLOROthiazide (HYDRODIURIL) 25 MG tablet Take 1 tablet by mouth Every Morning. 30 tablet 11    lisinopril (PRINIVIL,ZESTRIL) 40 MG tablet TAKE 1 TABLET EVERY MORNING (Patient taking differently: Take 1 tablet by mouth Daily.) 30 tablet 11    Magnesium Citrate 200 MG tablet Take 2 tablets by mouth Every Night.      melatonin 5 MG tablet tablet Take 1 tablet by mouth Every Night.      methocarbamol (Robaxin) 500 MG tablet Take 1 tablet by mouth 3 (Three) Times a Day. (Patient taking differently: Take 1 tablet by mouth As Needed.) 90 tablet 0    Omega-3 Fatty Acids (FISH OIL) 1000 MG capsule capsule Take 1 capsule by mouth Daily With Breakfast.      omeprazole (priLOSEC) 20 MG capsule TAKE 1 CAPSULE DAILY 90 capsule 3    sucralfate (CARAFATE) 1 g tablet TAKE 1 TABLET EVERY 12 HOURS (Patient taking differently: Take 1 tablet by mouth 2 (Two) Times a Day. Usually only takes one a day) 60 tablet 11    valACYclovir (VALTREX) 500 MG tablet Take 1 tablet by mouth Daily.      vitamin C (ASCORBIC ACID) 500 MG tablet Take 1 tablet by mouth Daily.      ezetimibe (ZETIA) 10 MG tablet Take 1 tablet by mouth Daily. 90 tablet 3    HYDROcodone-acetaminophen (NORCO) 5-325 MG per tablet Take 1-2 tablet(s) by mouth Every 4 Hours As Needed for Pain. 30 tablet 0    albuterol sulfate  (90 Base) MCG/ACT inhaler Inhale 2 puffs Every 4 (Four) Hours As Needed for Wheezing. (Patient not taking: Reported on 3/8/2024) 18 g 0     No facility-administered medications prior to visit.       Opioid medication/s are on active medication list.  and I have evaluated her active treatment plan and  "pain score trends (see table).  There were no vitals filed for this visit.  I have reviewed the chart for potential of high risk medication and harmful drug interactions in the elderly.          Aspirin is on active medication list. Aspirin use is indicated based on review of current medical condition/s. Pros and cons of this therapy have been discussed today. Benefits of this medication outweigh potential harm.  Patient has been encouraged to continue taking this medication.  .      Patient Active Problem List   Diagnosis    Atopic rhinitis    Anxiety    Carpal tunnel syndrome    Clenching of teeth    Depression    Sleep disorder    Gastroesophageal reflux disease    Hyperlipidemia    Hypertension    Low back pain    Mitral valve insufficiency    Class 1 obesity due to excess calories with body mass index (BMI) of 32.0 to 32.9 in adult    Gluteal tendinitis of right buttock    Postmenopausal disorder    Preventative health care    Other chronic pain    Spondylosis of lumbar region without myelopathy or radiculopathy    Lumbar scoliosis    Irritable bowel syndrome    Migraine without aura and without status migrainosus, not intractable    Lumbar stenosis with neurogenic claudication    Ligamentum flavum hypertrophy    Degeneration of lumbar or lumbosacral intervertebral disc    Epidural lipomatosis    Lumbar paraspinal muscle spasm    Neurogenic claudication due to lumbar spinal stenosis    Sciatica associated with disorder of lumbar spine     Advance Care Planning   Advance Care Planning     Advance Directive is not on file.  ACP discussion was held with the patient during this visit. Patient does not have an advance directive, information provided.     Objective    Vitals:    03/27/24 1122   BP: 126/70   Pulse: 89   Temp: 97.5 °F (36.4 °C)   TempSrc: Temporal   SpO2: 96%   Weight: 75.8 kg (167 lb)   Height: 154.9 cm (61\")     Estimated body mass index is 31.55 kg/m² as calculated from the following:    Height as " "of this encounter: 154.9 cm (61\").    Weight as of this encounter: 75.8 kg (167 lb).    BMI is >= 30 and <35. (Class 1 Obesity). The following options were offered after discussion;: weight loss educational material (shared in after visit summary), exercise counseling/recommendations, nutrition counseling/recommendations, and pharmacological intervention options      Does the patient have evidence of cognitive impairment? No            HEALTH RISK ASSESSMENT    Smoking Status:  Social History     Tobacco Use   Smoking Status Former    Current packs/day: 0.00    Average packs/day: 2.0 packs/day for 10.0 years (20.0 ttl pk-yrs)    Types: Cigarettes    Start date: 1971    Quit date: 1981    Years since quittin.2   Smokeless Tobacco Never     Alcohol Consumption:  Social History     Substance and Sexual Activity   Alcohol Use Yes    Alcohol/week: 2.0 standard drinks of alcohol    Types: 2 Glasses of wine per week    Comment: 2 per night     Fall Risk Screen:    KRYSTA Fall Risk Assessment was completed, and patient is at LOW risk for falls.Assessment completed on:3/27/2024    Depression Screening:      3/27/2024    11:25 AM   PHQ-2/PHQ-9 Depression Screening   Little Interest or Pleasure in Doing Things 0-->not at all   Feeling Down, Depressed or Hopeless 0-->not at all   PHQ-9: Brief Depression Severity Measure Score 0       Health Habits and Functional and Cognitive Screening:      3/27/2024    11:00 AM   Functional & Cognitive Status   Do you have difficulty preparing food and eating? No   Do you have difficulty bathing yourself, getting dressed or grooming yourself? No   Do you have difficulty using the toilet? No   Do you have difficulty moving around from place to place? No   Do you have trouble with steps or getting out of a bed or a chair? No   Current Diet Limited Junk Food   Dental Exam Up to date   Eye Exam Up to date   Exercise (times per week) 5 times per week   Current Exercises Include " Aerobics   Do you need help using the phone?  No   Are you deaf or do you have serious difficulty hearing?  Yes   Do you need help to go to places out of walking distance? No   Do you need help shopping? No   Do you need help preparing meals?  No   Do you need help with housework?  No   Do you need help with laundry? No   Do you need help taking your medications? No   Do you need help managing money? No   Do you ever drive or ride in a car without wearing a seat belt? No   Have you felt unusual stress, anger or loneliness in the last month? No   Who do you live with? Spouse   If you need help, do you have trouble finding someone available to you? No   Have you been bothered in the last four weeks by sexual problems? No   Do you have difficulty concentrating, remembering or making decisions? No       Age-appropriate Screening Schedule:  Refer to the list below for future screening recommendations based on patient's age, sex and/or medical conditions. Orders for these recommended tests are listed in the plan section. The patient has been provided with a written plan.    Health Maintenance   Topic Date Due    ZOSTER VACCINE (1 of 2) Never done    PAP SMEAR  07/01/2019    LIPID PANEL  10/09/2024    ANNUAL WELLNESS VISIT  03/27/2025    BMI FOLLOWUP  03/27/2025    MAMMOGRAM  06/01/2025    COLORECTAL CANCER SCREENING  10/19/2027    TDAP/TD VACCINES (4 - Td or Tdap) 07/10/2031    DXA SCAN  10/13/2033    HEPATITIS C SCREENING  Completed    COVID-19 Vaccine  Completed    RSV Vaccine - Adults  Completed    INFLUENZA VACCINE  Completed    Pneumococcal Vaccine 65+  Completed                  CMS Preventative Services Quick Reference  Risk Factors Identified During Encounter  None Identified  The above risks/problems have been discussed with the patient.  Pertinent information has been shared with the patient in the After Visit Summary.  An After Visit Summary and PPPS were made available to the patient.    Follow Up:   Next  "Medicare Wellness visit to be scheduled in 1 year.       Additional E&M Note during same encounter follows:  Patient has multiple medical problems which are significant and separately identifiable that require additional work above and beyond the Medicare Wellness Visit.      Chief Complaint  Medicare Wellness-subsequent    Subjective        HPI  Luh Horner is also being seen today for annual exam. She is interested in GLP-1 agonists for weight loss. Discussed options for OOP.         Objective   Vital Signs:  /70   Pulse 89   Temp 97.5 °F (36.4 °C) (Temporal)   Ht 154.9 cm (61\")   Wt 75.8 kg (167 lb)   SpO2 96%   BMI 31.55 kg/m²     Physical Exam   Const: NAD, A&Ox4, Pleasant, Cooperative  Eyes: EOMI, no conjunctivitis  ENT: No nasal discharge present, neck supple  Cardiac: Regular rate and rhythm, no cyanosis  Resp: Respiratory rate within normal limits, no increased work of breathing, no audible wheezing or retractions noted  GI: No distention or ascites  MSK: Motor and sensation grossly intact in bilateral upper extremities  Neurologic: CN II-XII grossly intact  Psych: Appropriate mood and behavior.  Skin: Pink, warm, dry. Small 1cm area of dry skin right lower shin.                 Assessment and Plan   Diagnoses and all orders for this visit:    1. Medicare annual wellness visit, subsequent (Primary)    2. Pure hypercholesterolemia  Assessment & Plan:  Continue zetia, atorvastatin 40mg, continue with cardiology    Orders:  -     ezetimibe (ZETIA) 10 MG tablet; Take 1 tablet by mouth Daily.  Dispense: 90 tablet; Refill: 3    3. Neurogenic claudication due to lumbar spinal stenosis  -     HYDROcodone-acetaminophen (NORCO) 5-325 MG per tablet; Take 1-2 tablets by mouth Every 6 (Six) Hours As Needed for Severe Pain (Pain).  Dispense: 12 tablet; Refill: 0    4. Primary hypertension  Assessment & Plan:  Continue lisinopril 40mg, amlodipine 10mg, HCTZ 25mg. No side effects noted.      5. Class 1 " obesity due to excess calories with serious comorbidity and body mass index (BMI) of 32.0 to 32.9 in adult      Problem List Items Addressed This Visit          Cardiac and Vasculature    Hyperlipidemia (Chronic)    Overview     The 10-year ASCVD risk score (Frandy SHANNON, et al., 2019) is: 11.9%    Values used to calculate the score:      Age: 68 years      Sex: Female      Is Non- : No      Diabetic: No      Tobacco smoker: No      Systolic Blood Pressure: 126 mmHg      Is BP treated: Yes      HDL Cholesterol: 77 mg/dL      Total Cholesterol: 224 mg/dL.         Current Assessment & Plan     Continue zetia, atorvastatin 40mg, continue with cardiology         Relevant Medications    ezetimibe (ZETIA) 10 MG tablet    Hypertension (Chronic)    Current Assessment & Plan     Continue lisinopril 40mg, amlodipine 10mg, HCTZ 25mg. No side effects noted.            Endocrine and Metabolic    Class 1 obesity due to excess calories with body mass index (BMI) of 32.0 to 32.9 in adult       Musculoskeletal and Injuries    Neurogenic claudication due to lumbar spinal stenosis    Relevant Medications    HYDROcodone-acetaminophen (NORCO) 5-325 MG per tablet     Other Visit Diagnoses       Medicare annual wellness visit, subsequent    -  Primary          Sample of Ozempic provided, she wants to consider paying OOP for compounded semaglutide.    Limited # of norco provided for ongoing back pain with travel.    The wellness exam has been reviewed in detail.  The patient has been fully counseled on preventative guidelines for vaccines, cancer screenings, and other health maintenance needs.  Functional testing has been performed to assess capacity for independent living and need for other medical interventions.   The patient was counseled on maintaining a lifestyle to promote good health and to minimize chronic diseases.  The patient has been assisted with scheduling healthcare procedures for the coming year and  given a written document outlining these recommendations.    Return in about 3 months (around 6/27/2024) for with A1c;.           Follow Up   Return in about 3 months (around 6/27/2024) for with A1c;.  Patient was given instructions and counseling regarding her condition or for health maintenance advice. Please see specific information pulled into the AVS if appropriate.

## 2024-03-27 NOTE — PATIENT INSTRUCTIONS
If you would like to try the compounded semaglutide or tirzepatide, just send a message on MyChart and I can have it sent to the pharmacy    Advance Care Planning and Advance Directives     You make decisions on a daily basis - decisions about where you want to live, your career, your home, your life. Perhaps one of the most important decisions you face is your choice for future medical care. Take time to talk with your family and your healthcare team and start planning today.  Advance Care Planning is a process that can help you:  Understand possible future healthcare decisions in light of your own experiences  Reflect on those decision in light of your goals and values  Discuss your decisions with those closest to you and the healthcare professionals that care for you  Make a plan by creating a document that reflects your wishes    Surrogate Decision Maker  In the event of a medical emergency, which has left you unable to communicate or to make your own decisions, you would need someone to make decisions for you.  It is important to discuss your preferences for medical treatment with this person while you are in good health.     Qualities of a surrogate decision maker:  Willing to take on this role and responsibility  Knows what you want for future medical care  Willing to follow your wishes even if they don't agree with them  Able to make difficult medical decisions under stressful circumstances    Advance Directives  These are legal documents you can create that will guide your healthcare team and decision maker(s) when needed. These documents can be stored in the electronic medical record.    Living Will - a legal document to guide your care if you have a terminal condition or a serious illness and are unable to communicate. States vary by statute in document names/types, but most forms may include one or more of the following:        -  Directions regarding life-prolonging treatments        -  Directions  regarding artificially provided nutrition/hydration        -  Choosing a healthcare decision maker        -  Direction regarding organ/tissue donation    Durable Power of  for Healthcare - this document names an -in-fact to make medical decisions for you, but it may also allow this person to make personal and financial decisions for you. Please seek the advice of an  if you need this type of document.    **Advance Directives are not required and no one may discriminate against you if you do not sign one.    Medical Orders  Many states allow specific forms/orders signed by your physician to record your wishes for medical treatment in your current state of health. This form, signed in personal communication with your physician, addresses resuscitation and other medical interventions that you may or may not want.      For more information or to schedule a time with a [facility] Advance Care Planning Facilitator contact: [facility] or call [phone] and someone will contact you directly.  You are due for Shingrix vaccination series ( the newest shingles vaccine).  It is a two shot series spaced 2-6 months apart. Please get this vaccine series started at your earliest convenience at your local pharmacy to help avoid shingles outbreak. It is more effective than the old Zostavax vaccine and is recommended even if you have had the Zostavax vaccine in the past.  Once the Shingrix series is completed, it does not need to be repeated.   For more information, please look at the website below:  [facility] Shingrix Vaccine Information      Medicare Wellness  Personal Prevention Plan of Service     Date of Office Visit:    Encounter Provider:  Daniel Shafer DO  Place of Service:  [facility]  Patient Name: Luh Horner  :  1955    As part of the Medicare Wellness portion of your visit today, we are providing you with this personalized preventive  plan of services (PPPS). This plan is based upon recommendations of the [place] Preventive Services Task Force (USPSTF) and the Advisory Committee on Immunization Practices (ACIP).    This lists the preventive care services that should be considered, and provides dates of when you are due. Items listed as completed are up-to-date and do not require any further intervention.    Health Maintenance   Topic Date Due    ZOSTER VACCINE (1 of 2) Never done    PAP SMEAR  07/01/2019    ANNUAL WELLNESS VISIT  03/16/2024    BMI FOLLOWUP  03/16/2024    LIPID PANEL  10/09/2024    MAMMOGRAM  06/01/2025    COLORECTAL CANCER SCREENING  10/19/2027    TDAP/TD VACCINES (4 - Td or Tdap) 07/10/2031    DXA SCAN  10/13/2033    HEPATITIS C SCREENING  Completed    COVID-19 Vaccine  Completed    RSV Vaccine - Adults  Completed    INFLUENZA VACCINE  Completed    Pneumococcal Vaccine 65+  Completed       No orders of the defined types were placed in this encounter.      No follow-ups on file.

## 2024-04-02 DIAGNOSIS — I10 ESSENTIAL HYPERTENSION: ICD-10-CM

## 2024-04-03 RX ORDER — LISINOPRIL 40 MG/1
TABLET ORAL
Qty: 90 TABLET | Refills: 3 | Status: SHIPPED | OUTPATIENT
Start: 2024-04-03

## 2024-04-03 NOTE — TELEPHONE ENCOUNTER
Rx Refill Note  Requested Prescriptions     Pending Prescriptions Disp Refills    lisinopril (PRINIVIL,ZESTRIL) 40 MG tablet [Pharmacy Med Name: LISINOPRIL TABS 40MG] 30 tablet 11     Sig: TAKE 1 TABLET EVERY MORNING      Last office visit with prescribing clinician: 3/27/2024   Last telemedicine visit with prescribing clinician: Visit date not found   Next office visit with prescribing clinician: 6/27/2024                         Would you like a call back once the refill request has been completed: [] Yes [] No    If the office needs to give you a call back, can they leave a voicemail: [] Yes [] No    Og Marcelino MA  04/03/24, 09:04 EDT

## 2024-04-15 ENCOUNTER — TREATMENT (OUTPATIENT)
Dept: PHYSICAL THERAPY | Facility: CLINIC | Age: 69
End: 2024-04-15
Payer: MEDICARE

## 2024-04-15 DIAGNOSIS — G89.29 CHRONIC BILATERAL LOW BACK PAIN WITHOUT SCIATICA: Primary | ICD-10-CM

## 2024-04-15 DIAGNOSIS — R53.1 WEAKNESS: ICD-10-CM

## 2024-04-15 DIAGNOSIS — M25.60 RANGE OF MOTION DEFICIT: ICD-10-CM

## 2024-04-15 DIAGNOSIS — M54.50 CHRONIC BILATERAL LOW BACK PAIN WITHOUT SCIATICA: Primary | ICD-10-CM

## 2024-04-15 PROCEDURE — 97162 PT EVAL MOD COMPLEX 30 MIN: CPT | Performed by: PHYSICAL THERAPIST

## 2024-04-15 PROCEDURE — 97110 THERAPEUTIC EXERCISES: CPT | Performed by: PHYSICAL THERAPIST

## 2024-04-15 NOTE — PROGRESS NOTES
Physical Therapy Initial Evaluation and Plan of Care  [facility] [place]  [address] [facility], [address] [address]    Patient: Luh Horner   : 1955  Diagnosis/ICD-10 Code:  No primary diagnosis found.  Referring practitioner: Dandy Mcrae MD  Date of Initial Visit: 4/15/2024  Today's Date: 4/15/2024  Patient seen for 1 session         Visit Diagnoses:  No diagnosis found.      Subjective Questionnaire: Oswestry:       Subjective Evaluation    History of Present Illness  Mechanism of injury: The pt had an injection to the lumbar spine on 3/6/24 and reported a couple of weeks of improvement in pain. Unfortunately, pain has returned and she feels it is getting worse. Following the injection, she was able to stand for about 15 minutes before pain caused her to sit down but this has since decreased to 5 minutes.  She will return to pain management in July to discuss response to the injection and possible spinal cord stimulator trial.  She would like to avoid a spinal cord stimulator and hopes to lose weight and continue exercise for conservative care. She was prescribed Ozempic and hopes to lose 30 pounds.     Additionally, the pt reported 1 month onset of L hip pain. She has been having pain with standing and walking and feels better with sitting and stretching.      Patient Occupation: Retired Quality of life: excellent    Pain  Current pain ratin  At best pain rating: 3  At worst pain ratin  Location: LBP  Quality: dull ache  Relieving factors: change in position, relaxation, rest, support, heat and medications  Aggravating factors: ambulation, squatting, lifting, stairs, prolonged positioning, movement, standing and repetitive movement  Progression: no change    Social Support  Lives in: one-story house  Lives with: spouse    Treatments  Previous treatment: medication  Patient Goals  Patient goals for therapy: increased strength, decreased pain, independence with  ADLs/IADLs, return to sport/leisure activities and increased motion             Objective          Postural Observations    Additional Postural Observation Details  Neutral lumbar posture in standing     Palpation   Left   Hypertonic in the lumbar paraspinals and quadratus lumborum.   Tenderness of the lumbar paraspinals and quadratus lumborum.     Right   Hypertonic in the lumbar paraspinals and quadratus lumborum. Tenderness of the lumbar paraspinals and quadratus lumborum.     Tenderness     Lumbar Spine  No tenderness in the spinous process.     Active Range of Motion     Additional Active Range of Motion Details  Lumbar AROM:  Flex: 2 cm to feet  Ext: 30%  RSB: 4 cm to knee joint line  LSB: 5 cm to knee joint line  RR: 35 deg  LR: 30 deg    Passive Range of Motion   Left Hip   Flexion: 130 degrees   External rotation (90/90): 55 degrees   Internal rotation (90/90): 40 degrees     Right Hip   Flexion: 130 degrees   External rotation (90/90): 60 degrees   Internal rotation (90/90): 40 degrees     Additional Passive Range of Motion Details  Mod tightness into L VIET and FADIR - mod tightness of ITB    Strength/Myotome Testing     Left Hip   Planes of Motion   Flexion: 5  Abduction: 3+  External rotation: 4-    Right Hip   Planes of Motion   Flexion: 5  Abduction: 4-  External rotation: 5    Left Knee   Flexion: 5  Extension: 5    Right Knee   Flexion: 5  Extension: 5    Tests     Lumbar     Left   Positive passive SLR.     Right   Positive passive SLR.     Additional Tests Details  (+) Slump test on L  SLR (+) for LBP          Assessment & Plan       Assessment  Impairments: abnormal coordination, abnormal muscle firing, abnormal or restricted ROM, activity intolerance, impaired physical strength, lacks appropriate home exercise program and pain with function   Functional limitations: carrying objects, lifting, walking, pulling, pushing, uncomfortable because of pain, moving in bed, standing, stooping and unable  to perform repetitive tasks   Assessment details: The patient is a 68-year-old female with evolving characteristics of chronic low back pain with mobility deficits with moderate complexity.  She had a laminectomy at the end of last year and has had recent lumbar injections without significant improvement in low back pain.  Unfortunately, symptoms seem to be progressively worsening.  Her biggest functional limitation is poor tolerance to standing, and she is not able to stand for more than 5 minutes at a time before needing to sit due to pain.  She had similar tenderness to palpation in the lower lumbar spine today and weakness of the core and glutes muscles.  She should see improvement in pain and activity tolerance with strengthening of these muscles and consistent stretching of the low back and hips.  However, pain has been refractory to treatment in the past.  She has recently developed left-sided hip pain consistent with bursitis, and was prescribed stretches and isometric exercises to address this.  She was recently prescribed Ozempic and plans to lose 30 pounds.  This may improve her back pain as well, and I agree with her plan to be as conservative as possible before discussing spinal cord stimulator or additional intervention.  Prognosis: fair    Goals  Plan Goals: Short-term goals (4 weeks)  1. Patient will be independent and compliant w/ initial HEP.  2. Patient will report pain at rest 0/10 and at worst 6/10.  3. Oswestry score to improve by at least 6 points.   4. Patient will display decreased TTP and muscle tension of lumbar paraspinal mm.   5.  The patient will report ability to stand for 10 minutes before needing to sit due to back pain.    Long-term goals (8 weeks)  1. Patient will be appropriate for independent management and compliant w/ progressed HEP.  2. Patient will reports pain at rest 0/10 and at worst 4/10.  3. Patient will demo improved hip strength by the following MMT grades: flex 5/5,  abd 4+/5, and ER 5/5.  4. Patient will be able to walk for at least 15 minutes with no increase in LBP.  5. Patient will return to all housework activities of cooking, cleaning, and laundry with no limitation due to back pain or dysfunction.  6. Patient will return to lifting weights and low impact aerobics with no limitation due to back pain or dysfunction.     Plan  Therapy options: will be seen for skilled therapy services  Planned modality interventions: dry needling, TENS, thermotherapy (hydrocollator packs) and cryotherapy  Planned therapy interventions: abdominal trunk stabilization, manual therapy, ADL retraining, motor coordination training, balance/weight-bearing training, neuromuscular re-education, body mechanics training, soft tissue mobilization, flexibility, spinal/joint mobilization, functional ROM exercises, strengthening, gait training, stretching, home exercise program and therapeutic activities  Frequency: 1x week  Duration in visits: 8  Duration in weeks: 8  Treatment plan discussed with: patient  Plan details: Patient will likely benefit from TE/NMED focused on lumbar mobility, LE stretching, and hip/core strengthening. MT for joint mobility and symptom management. Modalities and dry needling as needed for pain modulation.         History # of Personal Factors and/or Comorbidities: MODERATE (1-2)  Examination of Body System(s): # of elements: MODERATE (3)  Clinical Presentation: EVOLVING  Clinical Decision Making: MODERATE      Timed:         Manual Therapy:    0     mins  90531;     Therapeutic Exercise:    10     mins  87106;     Neuromuscular Brennan:    0    mins  49680;    Therapeutic Activity:     0     mins  95970;     Gait Trainin     mins  92968;     Ultrasound:     0     mins  14347;    Ionto                               0    mins   96396  Self Care                       0     mins   50763  Canalith Repos    0     mins 41127      Un-Timed:  Electrical Stimulation:    0     mins   38138 ( );  Dry Needling     0     mins self-pay  Traction     0     mins 78725  Low Eval     0     Mins  87288  Mod Eval     30     Mins  21889  High Eval                       0     Mins  80045        Timed Treatment:   10   mins   Total Treatment:     40   mins          PT: Thierry Vega PT     License Number: [id number]  Electronically signed by Thierry Vega PT, 04/15/24, 2:01 PM EDT    Certification Period: 4/15/2024 thru 7/13/2024  I certify that the therapy services are furnished while this patient is under my care.  The services outlined above are required by this patient, and will be reviewed every 90 days.         Physician Signature:__________________________________________________    PHYSICIAN: Dandy Mcrae MD  NPI: [id number]                                      DATE:      Please sign and return via fax to [email or web address] . Thank you, [facility].

## 2024-05-02 DIAGNOSIS — M48.062 NEUROGENIC CLAUDICATION DUE TO LUMBAR SPINAL STENOSIS: ICD-10-CM

## 2024-05-02 RX ORDER — CITALOPRAM 20 MG/1
TABLET ORAL
Qty: 90 TABLET | Refills: 0 | Status: SHIPPED | OUTPATIENT
Start: 2024-05-02

## 2024-05-02 NOTE — TELEPHONE ENCOUNTER
Rx Refill Note  Requested Prescriptions     Pending Prescriptions Disp Refills    HYDROcodone-acetaminophen (NORCO) 5-325 MG per tablet 12 tablet 0     Sig: Take 1-2 tablets by mouth Every 6 (Six) Hours As Needed for Severe Pain (Pain).      Last office visit with prescribing clinician: 3/27/2024   Last telemedicine visit with prescribing clinician: Visit date not found   Next office visit with prescribing clinician: 6/27/2024                         Would you like a call back once the refill request has been completed: [] Yes [] No    If the office needs to give you a call back, can they leave a voicemail: [] Yes [] No    Estephania Lloyd MA  05/02/24, 14:33 EDT

## 2024-05-03 RX ORDER — HYDROCODONE BITARTRATE AND ACETAMINOPHEN 5; 325 MG/1; MG/1
1-2 TABLET ORAL EVERY 6 HOURS PRN
Qty: 12 TABLET | Refills: 0 | Status: SHIPPED | OUTPATIENT
Start: 2024-05-03

## 2024-05-06 ENCOUNTER — TREATMENT (OUTPATIENT)
Dept: PHYSICAL THERAPY | Facility: CLINIC | Age: 69
End: 2024-05-06
Payer: MEDICARE

## 2024-05-06 ENCOUNTER — LAB (OUTPATIENT)
Dept: LAB | Facility: HOSPITAL | Age: 69
End: 2024-05-06
Payer: MEDICARE

## 2024-05-06 DIAGNOSIS — M54.50 CHRONIC BILATERAL LOW BACK PAIN WITHOUT SCIATICA: Primary | ICD-10-CM

## 2024-05-06 DIAGNOSIS — G89.29 CHRONIC BILATERAL LOW BACK PAIN WITHOUT SCIATICA: Primary | ICD-10-CM

## 2024-05-06 DIAGNOSIS — R53.1 WEAKNESS: ICD-10-CM

## 2024-05-06 DIAGNOSIS — M25.60 RANGE OF MOTION DEFICIT: ICD-10-CM

## 2024-05-06 DIAGNOSIS — E78.00 PURE HYPERCHOLESTEROLEMIA: Chronic | ICD-10-CM

## 2024-05-06 LAB
ALBUMIN SERPL-MCNC: 4.6 G/DL (ref 3.5–5.2)
ALBUMIN/GLOB SERPL: 1.8 G/DL
ALP SERPL-CCNC: 85 U/L (ref 39–117)
ALT SERPL W P-5'-P-CCNC: 25 U/L (ref 1–33)
ANION GAP SERPL CALCULATED.3IONS-SCNC: 15.1 MMOL/L (ref 5–15)
AST SERPL-CCNC: 22 U/L (ref 1–32)
BILIRUB SERPL-MCNC: 0.4 MG/DL (ref 0–1.2)
BUN SERPL-MCNC: 9 MG/DL (ref 8–23)
BUN/CREAT SERPL: 12.7 (ref 7–25)
CALCIUM SPEC-SCNC: 9.4 MG/DL (ref 8.6–10.5)
CHLORIDE SERPL-SCNC: 95 MMOL/L (ref 98–107)
CHOLEST SERPL-MCNC: 133 MG/DL (ref 0–200)
CO2 SERPL-SCNC: 25.9 MMOL/L (ref 22–29)
CREAT SERPL-MCNC: 0.71 MG/DL (ref 0.57–1)
CRP SERPL-MCNC: 0.15 MG/DL (ref 0.01–0.5)
EGFRCR SERPLBLD CKD-EPI 2021: 91.6 ML/MIN/1.73
GLOBULIN UR ELPH-MCNC: 2.6 GM/DL
GLUCOSE SERPL-MCNC: 83 MG/DL (ref 65–99)
HDLC SERPL-MCNC: 54 MG/DL (ref 40–60)
LDLC SERPL CALC-MCNC: 65 MG/DL (ref 0–100)
LDLC/HDLC SERPL: 1.21 {RATIO}
POTASSIUM SERPL-SCNC: 3.9 MMOL/L (ref 3.5–5.2)
PROT SERPL-MCNC: 7.2 G/DL (ref 6–8.5)
SODIUM SERPL-SCNC: 136 MMOL/L (ref 136–145)
TRIGL SERPL-MCNC: 68 MG/DL (ref 0–150)
VLDLC SERPL-MCNC: 14 MG/DL (ref 5–40)

## 2024-05-06 PROCEDURE — 86141 C-REACTIVE PROTEIN HS: CPT

## 2024-05-06 PROCEDURE — 83695 ASSAY OF LIPOPROTEIN(A): CPT

## 2024-05-06 PROCEDURE — 80061 LIPID PANEL: CPT

## 2024-05-06 PROCEDURE — 97110 THERAPEUTIC EXERCISES: CPT | Performed by: PHYSICAL THERAPIST

## 2024-05-06 PROCEDURE — 36415 COLL VENOUS BLD VENIPUNCTURE: CPT

## 2024-05-06 PROCEDURE — 80053 COMPREHEN METABOLIC PANEL: CPT

## 2024-05-07 ENCOUNTER — TELEPHONE (OUTPATIENT)
Dept: CARDIOLOGY | Facility: CLINIC | Age: 69
End: 2024-05-07
Payer: MEDICARE

## 2024-05-07 LAB — LPA SERPL-SCNC: 37.5 NMOL/L

## 2024-05-16 RX ORDER — ATORVASTATIN CALCIUM 40 MG/1
40 TABLET, FILM COATED ORAL
Qty: 90 TABLET | Refills: 3 | Status: SHIPPED | OUTPATIENT
Start: 2024-05-16

## 2024-05-16 RX ORDER — ATORVASTATIN CALCIUM 40 MG/1
40 TABLET, FILM COATED ORAL
Qty: 90 TABLET | Refills: 3 | Status: CANCELLED | OUTPATIENT
Start: 2024-05-16

## 2024-05-23 ENCOUNTER — TELEPHONE (OUTPATIENT)
Dept: FAMILY MEDICINE CLINIC | Facility: CLINIC | Age: 69
End: 2024-05-23
Payer: MEDICARE

## 2024-05-23 NOTE — TELEPHONE ENCOUNTER
[facility] has actually disallowed providers from writing for compounded semaglutide for now unfortunately. This is fairly recent. There are a couple places in town that do it, two that I have experience with and trust are [facility] and [facility] (Raven is the RN, Virginie is the APRN). Both price out similar to the mail order. I apologize for the inconvenience, but this was a recent development from administration.

## 2024-05-23 NOTE — TELEPHONE ENCOUNTER
The patient sent a [employer] message on 05/20/24 regarding this. She is using a sample of Ozempic that Dr. Shafer gave her. She only has two more injections left. He had mentioned getting a Compounded formula for her. She thought from [place]. She has not heard anything.

## 2024-06-03 DIAGNOSIS — M48.062 NEUROGENIC CLAUDICATION DUE TO LUMBAR SPINAL STENOSIS: ICD-10-CM

## 2024-06-06 NOTE — TELEPHONE ENCOUNTER
Rx Refill Note  Requested Prescriptions     Pending Prescriptions Disp Refills    HYDROcodone-acetaminophen (NORCO) 5-325 MG per tablet 12 tablet 0     Sig: Take 1-2 tablets by mouth Every 6 (Six) Hours As Needed for Severe Pain (Pain).      Last office visit with prescribing clinician: 3/27/2024   Last telemedicine visit with prescribing clinician: Visit date not found   Next office visit with prescribing clinician: 6/27/2024                         Would you like a call back once the refill request has been completed: [] Yes [] No    If the office needs to give you a call back, can they leave a voicemail: [] Yes [] No    Estephania Lloyd MA  06/06/24, 14:20 EDT

## 2024-06-07 RX ORDER — HYDROCODONE BITARTRATE AND ACETAMINOPHEN 5; 325 MG/1; MG/1
1-2 TABLET ORAL EVERY 6 HOURS PRN
Qty: 12 TABLET | Refills: 0 | Status: SHIPPED | OUTPATIENT
Start: 2024-06-07 | End: 2024-06-07 | Stop reason: SDUPTHER

## 2024-06-22 RX ORDER — ATORVASTATIN CALCIUM 20 MG/1
TABLET, FILM COATED ORAL
Qty: 30 TABLET | Refills: 11 | OUTPATIENT
Start: 2024-06-22

## 2024-06-22 RX ORDER — FLUTICASONE PROPIONATE 50 MCG
SPRAY, SUSPENSION (ML) NASAL
Qty: 16 G | Refills: 2 | Status: SHIPPED | OUTPATIENT
Start: 2024-06-22

## 2024-06-27 ENCOUNTER — OFFICE VISIT (OUTPATIENT)
Dept: FAMILY MEDICINE CLINIC | Facility: CLINIC | Age: 69
End: 2024-06-27
Payer: MEDICARE

## 2024-06-27 VITALS
WEIGHT: 157 LBS | HEART RATE: 96 BPM | HEIGHT: 61 IN | RESPIRATION RATE: 16 BRPM | SYSTOLIC BLOOD PRESSURE: 130 MMHG | OXYGEN SATURATION: 96 % | BODY MASS INDEX: 29.64 KG/M2 | DIASTOLIC BLOOD PRESSURE: 78 MMHG

## 2024-06-27 DIAGNOSIS — Z51.81 ENCOUNTER FOR THERAPEUTIC DRUG LEVEL MONITORING: ICD-10-CM

## 2024-06-27 DIAGNOSIS — R73.9 NONDIABETIC HYPERGLYCEMIA: Primary | ICD-10-CM

## 2024-06-27 DIAGNOSIS — M48.062 NEUROGENIC CLAUDICATION DUE TO LUMBAR SPINAL STENOSIS: ICD-10-CM

## 2024-06-27 DIAGNOSIS — M54.50 LUMBAR PAIN: ICD-10-CM

## 2024-06-27 LAB
EXPIRATION DATE: NORMAL
HBA1C MFR BLD: 5.3 % (ref 4.5–5.7)
Lab: NORMAL

## 2024-06-27 PROCEDURE — 3078F DIAST BP <80 MM HG: CPT | Performed by: FAMILY MEDICINE

## 2024-06-27 PROCEDURE — 1125F AMNT PAIN NOTED PAIN PRSNT: CPT | Performed by: FAMILY MEDICINE

## 2024-06-27 PROCEDURE — 3075F SYST BP GE 130 - 139MM HG: CPT | Performed by: FAMILY MEDICINE

## 2024-06-27 PROCEDURE — 3044F HG A1C LEVEL LT 7.0%: CPT | Performed by: FAMILY MEDICINE

## 2024-06-27 PROCEDURE — 83036 HEMOGLOBIN GLYCOSYLATED A1C: CPT | Performed by: FAMILY MEDICINE

## 2024-06-27 PROCEDURE — 99214 OFFICE O/P EST MOD 30 MIN: CPT | Performed by: FAMILY MEDICINE

## 2024-06-27 RX ORDER — HYDROCODONE BITARTRATE AND ACETAMINOPHEN 5; 325 MG/1; MG/1
1-2 TABLET ORAL EVERY 6 HOURS PRN
Qty: 32 TABLET | Refills: 0 | Status: SHIPPED | OUTPATIENT
Start: 2024-06-27

## 2024-06-27 RX ORDER — CITALOPRAM 20 MG/1
20 TABLET ORAL DAILY
Qty: 90 TABLET | Refills: 3 | Status: SHIPPED | OUTPATIENT
Start: 2024-06-27

## 2024-06-27 NOTE — PROGRESS NOTES
Established Patient Office Visit      Patient Name: Luh Horner  : 1955   MRN: [id number]   Care Team: Patient Care Team:  Daniel Shafer DO as PCP - General (Family Medicine)  Felice Lopez MD as Consulting Physician (Gastroenterology)  Dandy Mcrae MD as Consulting Physician (Pain Medicine)  Damián Devi MD as Consulting Physician (Cardiology)  Jonel Browning MD as Surgeon (Neurosurgery)  Alexy Coy PA-C as Physician Assistant (Physician Assistant)  Daniel Shafer DO as Emergency Attending (Family Medicine)    Chief Complaint:    Chief Complaint   Patient presents with    Diabetes    Fall     But pain, back pain, shoulder pain.       History of Present Illness: Luh Horner is a 68 y.o. female who is here today for chief complaint.    Back Pain  This is a chronic problem. The current episode started more than 1 year ago. The problem occurs constantly. The problem has been worsening since onset. The pain is present in the gluteal, lumbar spine and sacro-iliac. The quality of the pain is described as aching and stabbing. The pain does not radiate. The pain is at a severity of 7/10. The pain is Worse during the day. The symptoms are aggravated by bending, sitting and standing. Stiffness is present In the morning. Associated symptoms include weakness. Pertinent negatives include no abdominal pain, bladder incontinence, bowel incontinence, chest pain, dysuria, fever, leg pain, numbness, paresthesias, pelvic pain, perianal numbness, tingling or weight loss. Risk factors include history of cancer, history of osteoporosis and obesity.       This patient is accompanied by their self who contributes to the history of their care.    The following portions of the patient's history were reviewed and updated as appropriate: allergies, current medications, past family history, past medical history, past social history, past surgical history and problem  list.    Subjective      Review of Systems:   Review of Systems   Constitutional:  Negative for fever and unexpected weight loss.   Cardiovascular:  Negative for chest pain.   Gastrointestinal:  Negative for abdominal pain and bowel incontinence.   Genitourinary:  Negative for dysuria, pelvic pain and urinary incontinence.   Musculoskeletal:  Positive for back pain.   Neurological:  Positive for weakness. Negative for tingling, numbness and paresthesias.    - See HPI    Past Medical History:   Past Medical History:   Diagnosis Date    Allergic 2000    Allergic rhinitis     Anxiety 2004    Bone pain     foot    Cancer 2005    Skin cancer squamous    Cataract 2010    Surgery recently    Cervical disc disorder 2022    Chronic pain disorder 2012    Coronary artery disease 2005    Mitral valve    CTS (carpal tunnel syndrome)     Depression 1981 1981 - Hospitalized    Fibrocystic breast disease 2000    Current benign breast biopsies    GERD (gastroesophageal reflux disease) 2001    Glaucoma Surgery recently    Headache 2021    None this year    Hiatal hernia     Hyperlipidemia     Hypertension 1997    Insomnia 2013    Irritable bowel syndrome (IBS) 2002    Recurrent abdominal cramps    Joint pain 2012    Low back pain 2008    Lumbar scoliosis 2017    Lumbar spondylosis 2000    Chronic low back pain    Lumbosacral disc disease     Migraine 2021    Mitral valve insufficiency     Mitral valve prolapse 2000    Neck pain 2022    Obesity 2000    Osteoarthritis     Osteopenia     Peripheral neuropathy     Post menopausal syndrome 2004    Refractory hot flashes    Sleep disorder 06/14/2016    Spinal stenosis 2022    Thoracic disc disorder     Wears reading eyeglasses        Past Surgical History:   Past Surgical History:   Procedure Laterality Date    BREAST BIOPSY Right remote    benign disease    CATARACT EXTRACTION, BILATERAL Bilateral 2021    CHOLECYSTECTOMY  1998    COLONOSCOPY  ?    ENDOSCOPY      HYSTERECTOMY  1998     LUMBAR LAMINECTOMY Bilateral 10/18/2023    Procedure: LAMINECTOMY BILATERAL DECOMPRESSION L4-5 RIGHT;  Surgeon: Jonel Browning MD;  Location: [facility];  Service: Neurosurgery;  Laterality: Bilateral;    REFRACTIVE SURGERY      RK    SKIN BIOPSY      TRIGGER POINT INJECTION      Knee    URETHRAL SUSPENSION      laparoscopic for stress incontinence       Family History:   Family History   Problem Relation Age of Onset    Pulmonary fibrosis Mother          age 82    Hypertension Mother     Lumbar disc disease Mother     Prostate cancer Father          age 76    Cancer Father         Prostate    Hypertension Sister             Goiter Sister     Hypothyroidism Sister     Anxiety disorder Sister     Miscarriages / Stillbirths Sister     Thyroid disease Sister     Heart disease Brother          Age 48 heart attack was cigarette smoker    Hiatal hernia Brother     Early death Brother         Heart attack at age 48    Heart attack Brother     Breast cancer Other         Maternal and paternal aunts    Hypertension Brother     Obesity Maternal Uncle     Diabetes Maternal Uncle     Arthritis Paternal Aunt     Hypertension Brother     Hyperlipidemia Brother        Social History:   Social History     Socioeconomic History    Marital status:    Tobacco Use    Smoking status: Former     Current packs/day: 0.00     Average packs/day: 2.0 packs/day for 10.0 years (20.0 ttl pk-yrs)     Types: Cigarettes     Start date: 1971     Quit date: 1981     Years since quittin.5    Smokeless tobacco: Never   Vaping Use    Vaping status: Never Used   Substance and Sexual Activity    Alcohol use: Yes     Alcohol/week: 3.0 standard drinks of alcohol     Types: 3 Glasses of wine per week     Comment: 2 per night    Drug use: No    Sexual activity: Yes     Partners: Male     Birth control/protection: Hysterectomy       Tobacco History:   Social History     Tobacco Use   Smoking Status  Former    Current packs/day: 0.00    Average packs/day: 2.0 packs/day for 10.0 years (20.0 ttl pk-yrs)    Types: Cigarettes    Start date: 1971    Quit date: 1981    Years since quittin.5   Smokeless Tobacco Never       Medications:     Current Outpatient Medications:     amLODIPine (NORVASC) 10 MG tablet, TAKE 1 TABLET EVERY NIGHT AT BEDTIME (Patient taking differently: Take 1 tablet by mouth Daily.), Disp: 90 tablet, Rfl: 3    aspirin (SB Low Dose ASA EC) 81 MG EC tablet, Take 1 tablet by mouth Daily., Disp: , Rfl:     atorvastatin (LIPITOR) 40 MG tablet, Take 1 tablet by mouth every night at bedtime., Disp: 90 tablet, Rfl: 3    citalopram (CeleXA) 20 MG tablet, Take 1 tablet by mouth Daily., Disp: 90 tablet, Rfl: 3    Coenzyme Q10 (CO Q-10) 200 MG capsule, Take 1 capsule by mouth., Disp: , Rfl:     dicyclomine (Bentyl) 10 MG capsule, Take 1 capsule by mouth 4 (Four) Times a Day Before Meals & at Bedtime. (Patient taking differently: Take 1 capsule by mouth As Needed.), Disp: 120 capsule, Rfl: 1    estradiol (MINIVELLE, VIVELLE-DOT) 0.05 MG/24HR patch, 1 patch 2 (Two) Times a Week. twice, Disp: , Rfl:     ezetimibe (ZETIA) 10 MG tablet, Take 1 tablet by mouth Daily., Disp: 90 tablet, Rfl: 3    fluticasone (FLONASE) 50 MCG/ACT nasal spray, USE 2 SPRAYS IN EACH NOSTRIL DAILY, Disp: 16 g, Rfl: 2    hydroCHLOROthiazide (HYDRODIURIL) 25 MG tablet, Take 1 tablet by mouth Every Morning., Disp: 30 tablet, Rfl: 11    HYDROcodone-acetaminophen (NORCO) 5-325 MG per tablet, Take 1-2 tablets by mouth Every 6 (Six) Hours As Needed for Severe Pain (Pain)., Disp: 32 tablet, Rfl: 0    lisinopril (PRINIVIL,ZESTRIL) 40 MG tablet, TAKE 1 TABLET EVERY MORNING, Disp: 90 tablet, Rfl: 3    Magnesium Citrate 200 MG tablet, Take 2 tablets by mouth Every Night., Disp: , Rfl:     melatonin 5 MG tablet tablet, Take 1 tablet by mouth Every Night., Disp: , Rfl:     methocarbamol (Robaxin) 500 MG tablet, Take 1 tablet by mouth 3  "(Three) Times a Day. (Patient taking differently: Take 1 tablet by mouth As Needed.), Disp: 90 tablet, Rfl: 0    Omega-3 Fatty Acids (FISH OIL) 1000 MG capsule capsule, Take 1 capsule by mouth Daily With Breakfast., Disp: , Rfl:     omeprazole (priLOSEC) 20 MG capsule, TAKE 1 CAPSULE DAILY, Disp: 90 capsule, Rfl: 3    sucralfate (CARAFATE) 1 g tablet, TAKE 1 TABLET EVERY 12 HOURS (Patient taking differently: Take 1 tablet by mouth 2 (Two) Times a Day. Usually only takes one a day), Disp: 60 tablet, Rfl: 11    valACYclovir (VALTREX) 500 MG tablet, Take 1 tablet by mouth Daily., Disp: , Rfl:     vitamin C (ASCORBIC ACID) 500 MG tablet, Take 1 tablet by mouth Daily., Disp: , Rfl:     Allergies:   Allergies   Allergen Reactions    Shellfish Allergy Other (See Comments)     Head congestion    Atenolol Other (See Comments)     Fatigue    Buspirone Headache      Headache    Codeine Other (See Comments)     Depression    Pseudoephedrine Anxiety    Rosuvastatin Other (See Comments)     Fatigue    Westley-E.P.A. [Dha-Epa-Vitamin E] Anxiety    Simvastatin Other (See Comments)     Facial numbness    Trazodone Other (See Comments)     Nightmares       Objective   Objective     Physical Exam:  Vital Signs:   Vitals:    06/27/24 1339   BP: 130/78   Pulse: 96   Resp: 16   SpO2: 96%   Weight: 71.2 kg (157 lb)   Height: 154.9 cm (60.98\")  Comment: Reported by patient     Body mass index is 29.68 kg/m².     Physical Exam  Nursing note reviewed  Const: NAD, A&Ox4, Pleasant, Cooperative  Eyes: EOMI, no conjunctivitis  ENT: No nasal discharge present, neck supple  Cardiac: Regular rate and rhythm, no cyanosis  Resp: Respiratory rate within normal limits, no increased work of breathing, no audible wheezing or retractions noted  GI: No distention or ascites  MSK: Motor and sensation grossly intact in bilateral upper extremities  Neurologic: CN II-XII grossly intact  Psych: Appropriate mood and behavior.  Skin: Warm, dry  Procedures/Radiology "     Procedures  No radiology results for the last 7 days     Assessment & Plan   Assessment / Plan      Assessment/Plan:   Problems Addressed This Visit  Diagnoses and all orders for this visit:    1. Nondiabetic hyperglycemia (Primary)  -     POC Glycosylated Hemoglobin (Hb A1C)    2. Lumbar pain    3. Neurogenic claudication due to lumbar spinal stenosis  -     HYDROcodone-acetaminophen (NORCO) 5-325 MG per tablet; Take 1-2 tablets by mouth Every 6 (Six) Hours As Needed for Severe Pain (Pain).  Dispense: 32 tablet; Refill: 0    Other orders  -     citalopram (CeleXA) 20 MG tablet; Take 1 tablet by mouth Daily.  Dispense: 90 tablet; Refill: 3      Problem List Items Addressed This Visit          Musculoskeletal and Injuries    Neurogenic claudication due to lumbar spinal stenosis    Relevant Medications    HYDROcodone-acetaminophen (NORCO) 5-325 MG per tablet     Other Visit Diagnoses       Nondiabetic hyperglycemia    -  Primary    Relevant Orders    POC Glycosylated Hemoglobin (Hb A1C) (Completed)    Lumbar pain                Patient Instructions   TENS 7000    Follow Up:   Return in about 3 months (around 9/27/2024) for Controlled substance 3-month.        DO FARZANA Jiang RD  [facility]  [address]  [facility] [address]  Fax [phone]  Phone [phone]

## 2024-07-09 ENCOUNTER — OFFICE VISIT (OUTPATIENT)
Dept: PAIN MEDICINE | Facility: CLINIC | Age: 69
End: 2024-07-09
Payer: MEDICARE

## 2024-07-09 VITALS — HEIGHT: 61 IN | BODY MASS INDEX: 29.07 KG/M2 | WEIGHT: 154 LBS

## 2024-07-09 DIAGNOSIS — M24.28 LIGAMENTUM FLAVUM HYPERTROPHY: ICD-10-CM

## 2024-07-09 DIAGNOSIS — D17.79 EPIDURAL LIPOMATOSIS: ICD-10-CM

## 2024-07-09 DIAGNOSIS — M96.1 LUMBAR POSTLAMINECTOMY SYNDROME: ICD-10-CM

## 2024-07-09 DIAGNOSIS — F32.89 OTHER DEPRESSION: ICD-10-CM

## 2024-07-09 DIAGNOSIS — F41.9 ANXIETY: ICD-10-CM

## 2024-07-09 DIAGNOSIS — M47.816 SPONDYLOSIS OF LUMBAR REGION WITHOUT MYELOPATHY OR RADICULOPATHY: ICD-10-CM

## 2024-07-09 DIAGNOSIS — M51.37 DEGENERATION OF LUMBAR OR LUMBOSACRAL INTERVERTEBRAL DISC: ICD-10-CM

## 2024-07-09 PROCEDURE — 1125F AMNT PAIN NOTED PAIN PRSNT: CPT | Performed by: NURSE PRACTITIONER

## 2024-07-09 PROCEDURE — 1160F RVW MEDS BY RX/DR IN RCRD: CPT | Performed by: NURSE PRACTITIONER

## 2024-07-09 PROCEDURE — 1159F MED LIST DOCD IN RCRD: CPT | Performed by: NURSE PRACTITIONER

## 2024-07-09 PROCEDURE — 99214 OFFICE O/P EST MOD 30 MIN: CPT | Performed by: NURSE PRACTITIONER

## 2024-07-09 NOTE — PROGRESS NOTES
"Chief Complaint:  \"Lower back pain\"      History of Present Illness:  Ms. Luh Horner, 68 y.o. female originally referred by Dr. Jonel Browning in consultation for chronic intractable lower back and bilateral hip pain.  She presents with a longstanding history of lower back, bilateral hip pain, and neurogenic claudication.  She underwent a minimally invasive L4-L5 laminectomy on the right side with Dr. Jonel Browning on 10/18/2023.  This helped with her lower extremity symptoms, and at her follow-up visit on 12/4/2023 with neurosurgery, she continued to complain of lower back difficulties, and it was recommended she pursue pain management.  There were no diagnostics obtained at that visit.  She then contacted our office, and further diagnostics were ordered.  A recent MRI of the lumbar spine with and without contrast on 2/8/2024 demonstrates significant degenerative disc disease throughout the lumbar spine, with diffuse facet joint arthritis, as well as ligamentum flavum hypertrophy with various degrees of spinal canal stenosis and neuroforaminal stenosis.  There are Modic type I degenerative endplate changes at T12-L1.  At L2-L3 there is a broad-based disc bulge with facet hypertrophy which contributes to mild to moderate left and mild right neuroforaminal stenosis with mild to moderate central spinal stenosis.  At L3-L4 there is broad-based disc bulge with facet hypertrophy with ligamentum flavum thickening which contributes to moderate to severe central spinal stenosis and mild to moderate bilateral neuroforaminal stenosis.  At L4-L5 there is postsurgical changes from prior left hemoglobin ectomy, with a broad-based disc bulge and facet hypertrophy with ligamentum flavum thickening which contributes to moderate central spinal stenosis and moderate to severe bilateral neuroforaminal stenosis.  At L5-S1 there is a broad-based disc bulge with facet hypertrophy with ligamentum flavum thickening which " contributes to mild central spinal stenosis with moderate severe bilateral neuroforaminal stenosis.  When she was last seen, she was presented with several different therapeutic options, for treatment of her chronic pain.  Eventually on March 6, 2024 she underwent diagnostic and therapeutic L3-L4 transforaminal epidural steroid injections, from which she reports experiencing a few days of pain relief.  She saw Alexy Coy PA-C on March 8, 2024, and reportedly was doing well with most recent injection which was only performed a few days prior.  Alexy discussed the possibility of consideration of a spinal cord stimulator, versus additional lumbar spine surgery.  She is not quite interested in spinal cord stimulation.  Patient has failed to obtain pain relief with conservative measures for more than including oral analgesics, opioids, topical analgesics, ice, heat, several rounds of physical therapy, chiropractic therapy, HEP physical therapist directed home exercise program (ongoing), independent exercise program (ongoing at the [facility]), water aerobics, to name a few.  Pain Description: Constant lower back and bilateral hip pain with intermittent exacerbation, described as aching, burning, dull, sharp, shooting, stabbing, throbbing, tight band and tingling sensation.   Radiation of Pain: The pain radiates into the hips   Pain intensity today: 7/10   Average pain intensity last week: 6/10  Pain intensity ranges from: 4/10 to 7/10  Aggravating factors: Pain increases with bending, twisting, standing, walking. Patient describes neurogenic claudication. Patient does not use a cane or walker  Alleviating factors: Pain decreases with sitting down, lying down  Associated Symptoms:   Patient denies pain, numbness, or weakness in the lower extremities.   Patient denies any new bladder or bowel problems.   Patient denies difficulties with her balance or recent falls.   Pain interferes with ADLs, general activities  (ability to walk, stand, transition from different positions), and affects patient's quality of life  Pain does not interfere with sleep   Muscle spasms in the right paravertebral region RT>LT  Stiffness     Review of previous therapies and additional medical records:  Luh Horner has already failed the following measures, including:   Conservative Measures: Oral analgesics, opioids, topical analgesics, ice, heat, several rounds of physical therapy, chiropractic therapy, HEP physical therapist directed home exercise program (ongoing), independent exercise program (ongo[facility] at the [facility]), water aerobics  Interventional Measures:   Two sets of diagnostic bilateral lumbar medial branch blocks; with no significant relief/equivocal results  03/06/2024: DxTx L3-L4 transforaminal epidural steroid injection- no relief  Surgical Measures: No history of previous cervical spine.  10/18/2023: Minimally invasive lumbar laminectomy at L4-L5 on the right with Dr. Jonel Browning  Luh Horner underwent neurosurgical consultation with Alexy Coy PA-C who discussed the possibility of spinal cord stimulation versus additional lumbar spine surgery.    Luh Horner presents with significant comorbidities including anxiety and depression, GERD, headache, hyperlipidemia, hypertension, insomnia IBS, mitral valve insufficiency, obesity, osteopenia, migraine without aura and without status migrainosus, not intractable  In terms of current analgesics, Luh Horner takes: Diclofenac gel, methocarbamol, Norco. Patient also takes citalopram, melatonin. Patient failed trials with gabapentin in the past (SE)  I have reviewed Joshua Report consistent with medication reconciliation.  SOAPP: Low Risk       Global Pain Scale 01-17 2023 07-09 2024         Pain 10 15         Feelings 8 2         Clinical outcomes 10 10         Activities 13 6         GPS Total: 41 33           The [place] Back Pain Disability  Scale   DATE 01-17 2023 07-09 2024         Sleep through the night 3 0         Turn over in bed 0 1         Get out of bed 0 1         Make your bed 0 0         Put on socks (pantyhose) 0 0         Ride in a car 2 1         Sit in a chair for several hours 3 4         Stand up for 20-30 minutes 4 4         Climb one flight of stairs 1 1         Walk a few blocks (200-300 yards)  2 2         Walk several miles 5 4         Run one block (about 50 yards) 5 5         Take food out of the refrigerator 0 0         Reach up to high shelves 1 1         Move a chair 2 1         Pull or push heavy doors 2 1         Bend over to clean the bathtub 4 4         Throw a ball 1 2         Carry two bags of groceries 0 2         Lift and carry a heavy suitcase 3 3         Total score 43 41           Review of New Diagnostic Studies:    MRI of the lumbar spine with/without contrast on 2/8/2024 demonstrates significant degenerative disc disease throughout the lumbar spine, with diffuse facet joint arthritis, as well as ligamentum flavum hypertrophy with various degrees of spinal canal stenosis and neuroforaminal stenosis.  There are Modic type I degenerative endplate changes at T12-L1.  At L2-L3 there is a broad-based disc bulge with facet hypertrophy which contributes to mild to moderate left and mild right neuroforaminal stenosis with mild to moderate central spinal stenosis.  At L3-L4 there is broad-based disc bulge with facet hypertrophy with ligamentum flavum thickening which contributes to moderate to severe central spinal stenosis and mild to moderate bilateral neuroforaminal stenosis.  At L4-L5 there is postsurgical changes from prior left hemoglobin ectomy, with a broad-based disc bulge and facet hypertrophy with ligamentum flavum thickening which contributes to moderate central spinal stenosis and moderate to severe bilateral neuroforaminal stenosis.  At L5-S1 there is a broad-based disc bulge with facet hypertrophy with  ligamentum flavum thickening which contributes to mild central spinal stenosis with moderate severe bilateral neuroforaminal stenosis.  MRI of the thoracic spine without contrast 2/8/2024: Edematous Modic endplate changes at T1-T2 and T12-L1.  Mild exaggeration of the normal thoracic kyphosis.  No evidence of high-grade spinal stenosis.    Review of Previous Diagnostic Studies:   Flexion-extension x-rays of the lumbar spine on 1/17/2023 revealed multilevel spondylosis.  Alignment is stable during flexion and extension  Bilateral hip x-rays on 1/17/2023 revealed mild degenerative changes of the hip joints.  MRI of the cervical spine without contrast on 2/3/2023 revealed cervical spondylosis.  There is preservation of vertebral body heights and alignment.  The cervical spinal cord has normal caliber and signal.  Axial imaging:  C2-C3: No significant canal or foraminal stenosis  C3-C4: Disc osteophyte complex, facet arthropathy.  Moderate canal stenosis and bilateral foraminal stenosis  C4-C5: Disc osteophyte complex, facet arthropathy.  Mild to moderate spinal canal stenosis.  Mild foraminal stenosis  C5-C6: Disc osteophyte complex and facet arthropathy.  Moderate canal stenosis  and bilateral foraminal stenosis  C6-7: Disc osteophyte complex, facet arthropathy.  Moderate spinal canal stenosis and bilateral foraminal stenosis        The following portions of the patient's history were reviewed and updated as appropriate: problem list, past medical history, past surgery history, social history, family history, medication reconciliation, and allergies    Review of Systems   Musculoskeletal:  Positive for arthralgias, back pain, myalgias, neck pain and neck stiffness.   Neurological:  Positive for numbness.   All other systems reviewed and are negative.          Patient Active Problem List   Diagnosis    Atopic rhinitis    Anxiety    Carpal tunnel syndrome    Clenching of teeth    Depression    Sleep disorder     Gastroesophageal reflux disease    Hyperlipidemia    Hypertension    Low back pain    Mitral valve insufficiency    Class 1 obesity due to excess calories with body mass index (BMI) of 32.0 to 32.9 in adult    Gluteal tendinitis of right buttock    Postmenopausal disorder    Preventative health care    Other chronic pain    Spondylosis of lumbar region without myelopathy or radiculopathy    Lumbar scoliosis    Irritable bowel syndrome    Migraine without aura and without status migrainosus, not intractable    Lumbar stenosis with neurogenic claudication    Ligamentum flavum hypertrophy    Degeneration of lumbar or lumbosacral intervertebral disc    Epidural lipomatosis    Lumbar paraspinal muscle spasm    Neurogenic claudication due to lumbar spinal stenosis    Sciatica associated with disorder of lumbar spine       Past Medical History:   Diagnosis Date    Allergic 2000    Allergic rhinitis     Anxiety 2004    Bone pain     foot    Cancer 2005    Skin cancer squamous    Cataract 2010    Surgery recently    Cervical disc disorder 2022    Chronic pain disorder 2012    Coronary artery disease 2005    Mitral valve    CTS (carpal tunnel syndrome)     Depression 1981    1981 - Hospitalized    Fibrocystic breast disease 2000    Current benign breast biopsies    GERD (gastroesophageal reflux disease) 2001    Glaucoma Surgery recently    Headache 2021    None this year    Hiatal hernia     Hyperlipidemia     Hypertension 1997    Insomnia 2013    Irritable bowel syndrome (IBS) 2002    Recurrent abdominal cramps    Joint pain 2012    Low back pain 2008    Lumbar scoliosis 2017    Lumbar spondylosis 2000    Chronic low back pain    Lumbosacral disc disease     Migraine 2021    Mitral valve insufficiency     Mitral valve prolapse 2000    Neck pain 2022    Obesity 2000    Osteoarthritis     Osteopenia     Peripheral neuropathy     Post menopausal syndrome 2004    Refractory hot flashes    Sleep disorder 06/14/2016    Spinal  stenosis     Thoracic disc disorder     Wears reading eyeglasses          Past Surgical History:   Procedure Laterality Date    BACK SURGERY  2023    BREAST BIOPSY Right remote    benign disease    CATARACT EXTRACTION, BILATERAL Bilateral     CHOLECYSTECTOMY      COLONOSCOPY  ?    ENDOSCOPY      HYSTERECTOMY  1998    LAMINECTOMY  2023    LUMBAR LAMINECTOMY Bilateral 10/18/2023    Procedure: LAMINECTOMY BILATERAL DECOMPRESSION L4-5 RIGHT;  Surgeon: Jonel Browning MD;  Location: [facility];  Service: Neurosurgery;  Laterality: Bilateral;    REFRACTIVE SURGERY      RK    SKIN BIOPSY      TRIGGER POINT INJECTION      Knee    URETHRAL SUSPENSION      laparoscopic for stress incontinence         Family History   Problem Relation Age of Onset    Pulmonary fibrosis Mother          age 82    Hypertension Mother     Lumbar disc disease Mother     Prostate cancer Father          age 76    Cancer Father         Prostate    Hypertension Sister             Goiter Sister     Hypothyroidism Sister     Anxiety disorder Sister     Miscarriages / Stillbirths Sister     Thyroid disease Sister     Heart disease Brother          Age 48 heart attack was cigarette smoker    Hiatal hernia Brother     Early death Brother         Heart attack at age 48    Heart attack Brother     Breast cancer Other         Maternal and paternal aunts    Hypertension Brother     Obesity Maternal Uncle     Diabetes Maternal Uncle     Arthritis Paternal Aunt     Hypertension Brother     Hyperlipidemia Brother          Social History     Socioeconomic History    Marital status:    Tobacco Use    Smoking status: Former     Current packs/day: 0.00     Average packs/day: 2.0 packs/day for 10.0 years (20.0 ttl pk-yrs)     Types: Cigarettes     Start date: 1971     Quit date: 1981     Years since quittin.5    Smokeless tobacco: Never   Vaping Use    Vaping status: Never Used   Substance and  Sexual Activity    Alcohol use: Yes     Alcohol/week: 3.0 standard drinks of alcohol     Types: 3 Glasses of wine per week     Comment: 2 per night    Drug use: No    Sexual activity: Yes     Partners: Male     Birth control/protection: Hysterectomy           Current Outpatient Medications:     amLODIPine (NORVASC) 10 MG tablet, TAKE 1 TABLET EVERY NIGHT AT BEDTIME (Patient taking differently: Take 1 tablet by mouth Daily.), Disp: 90 tablet, Rfl: 3    aspirin (SB Low Dose ASA EC) 81 MG EC tablet, Take 1 tablet by mouth Daily., Disp: , Rfl:     atorvastatin (LIPITOR) 40 MG tablet, Take 1 tablet by mouth every night at bedtime., Disp: 90 tablet, Rfl: 3    citalopram (CeleXA) 20 MG tablet, Take 1 tablet by mouth Daily., Disp: 90 tablet, Rfl: 3    Coenzyme Q10 (CO Q-10) 200 MG capsule, Take 1 capsule by mouth., Disp: , Rfl:     dicyclomine (Bentyl) 10 MG capsule, Take 1 capsule by mouth 4 (Four) Times a Day Before Meals & at Bedtime. (Patient taking differently: Take 1 capsule by mouth As Needed.), Disp: 120 capsule, Rfl: 1    estradiol (MINIVELLE, VIVELLE-DOT) 0.05 MG/24HR patch, 1 patch 2 (Two) Times a Week. twice, Disp: , Rfl:     ezetimibe (ZETIA) 10 MG tablet, Take 1 tablet by mouth Daily., Disp: 90 tablet, Rfl: 3    fluticasone (FLONASE) 50 MCG/ACT nasal spray, USE 2 SPRAYS IN EACH NOSTRIL DAILY, Disp: 16 g, Rfl: 2    hydroCHLOROthiazide (HYDRODIURIL) 25 MG tablet, Take 1 tablet by mouth Every Morning., Disp: 30 tablet, Rfl: 11    HYDROcodone-acetaminophen (NORCO) 5-325 MG per tablet, Take 1-2 tablets by mouth Every 6 (Six) Hours As Needed for Severe Pain (Pain)., Disp: 32 tablet, Rfl: 0    lisinopril (PRINIVIL,ZESTRIL) 40 MG tablet, TAKE 1 TABLET EVERY MORNING, Disp: 90 tablet, Rfl: 3    Magnesium Citrate 200 MG tablet, Take 2 tablets by mouth Every Night., Disp: , Rfl:     melatonin 5 MG tablet tablet, Take 1 tablet by mouth Every Night., Disp: , Rfl:     methocarbamol (Robaxin) 500 MG tablet, Take 1 tablet by  "mouth 3 (Three) Times a Day. (Patient taking differently: Take 1 tablet by mouth As Needed.), Disp: 90 tablet, Rfl: 0    Omega-3 Fatty Acids (FISH OIL) 1000 MG capsule capsule, Take 1 capsule by mouth Daily With Breakfast., Disp: , Rfl:     omeprazole (priLOSEC) 20 MG capsule, TAKE 1 CAPSULE DAILY, Disp: 90 capsule, Rfl: 3    Semaglutide, 1 MG/DOSE, (OZEMPIC) 2 MG/1.5ML solution pen-injector, Inject  under the skin into the appropriate area as directed 1 (One) Time Per Week., Disp: , Rfl:     sucralfate (CARAFATE) 1 g tablet, TAKE 1 TABLET EVERY 12 HOURS (Patient taking differently: Take 1 tablet by mouth 2 (Two) Times a Day. Usually only takes one a day), Disp: 60 tablet, Rfl: 11    valACYclovir (VALTREX) 500 MG tablet, Take 1 tablet by mouth Daily., Disp: , Rfl:     vitamin C (ASCORBIC ACID) 500 MG tablet, Take 1 tablet by mouth Daily., Disp: , Rfl:       Allergies   Allergen Reactions    Shellfish Allergy Other (See Comments)     Head congestion    Atenolol Other (See Comments)     Fatigue    Buspirone Headache      Headache    Codeine Other (See Comments)     Depression    Pseudoephedrine Anxiety    Rosuvastatin Other (See Comments)     Fatigue    Westley-E.P.A. [Dha-Epa-Vitamin E] Anxiety    Simvastatin Other (See Comments)     Facial numbness    Trazodone Other (See Comments)     Nightmares         Ht 154.9 cm (61\")   Wt 69.9 kg (154 lb)   BMI 29.10 kg/m²       Physical Exam:   Constitutional: Patient appears well-developed, well-nourished, well-hydrated, appears younger than stated age  HEENT: Head: Normocephalic and atraumatic  Eyes: Conjunctivae and lids are normal  Pupils: Equal, round, reactive to light  Peripheral vascular exam: Femoral: right 2+, left 2+. Posterior tibialis: right 2+ and left 2+. Dorsalis pedis: right 2+ and left 2+. No edema.   Musculoskeletal   Gait and station: Gait evaluation demonstrated a normal gait.   Lumbar spine: Passive and active range of motion are limited secondary to pain. " Extension, rotation of the lumbar spine increased and reproduced pain. Lumbar facet joint loading maneuvers are positive.  Don test and Gaenslen's test are negative   Piriformis maneuvers are negative   Hip joints: The range of motion of the hip joints is almost full and without pain   Palpation of the bilateral greater trochanter, unrevealing   Examination of the Iliotibial band: unrevealing   Neurological:   Patient is alert and oriented to person, place, and time.   Speech: Normal.   Cortical function: Normal mental status.   Reflex Scores:  Right patellar: 2+  Left patellar: 2+  Right Achilles: 2+  Left Achilles: 2+  Motor strength: 5/5  Motor Tone: Normal  Involuntary movements: None.   Superficial/Primitive Reflexes: Primitive reflexes were absent.   Right Carter: Absent  Left Carter: Absent  Right ankle clonus: Absent  Left ankle clonus: Absent   Babinsky: Absent  Spurling sign: Negative. Neck tornado test: Negative. Lhermitte sign: Negative. Long tract signs: Negative. Straight leg raising test: Negative. Femoral stretch sign: Negative.   Sensory exam: Intact to light touch, intact pain and temperature sensation, intact vibration sensation and normal proprioception.   Coordination: Normal finger to nose. Normal balance and negative Romberg's sign   Skin and subcutaneous tissue: Skin is warm and intact. No rash noted. No cyanosis.   Psychiatric: Judgment and insight: Normal. Recent and remote memory: Intact. Mood and affect: Normal.     ASSESSMENT:   1. Lumbar postlaminectomy syndrome    2. Spondylosis of lumbar region without myelopathy or radiculopathy    3. Ligamentum flavum hypertrophy    4. Degeneration of lumbar or lumbosacral intervertebral disc    5. Epidural lipomatosis    6. Anxiety    7. Other depression            PLAN/MEDICAL DECISION MAKING:  Ms. Luh Horner, 68 y.o. female originally referred by Dr. Jonel Browning in consultation for chronic intractable lower back and bilateral  hip pain. Patient reports a longstanding history of lower back pain, bilateral hip pain, associated with neurogenic claudication refractory to conservative measures. She underwent a minimally invasive L4-L5 laminectomy on the right side with Dr. Jonel Browning on 10/18/2023.  This helped with her lower extremity symptoms, and at her follow-up visit on 12/4/2023 with neurosurgery, she continued to complain of lower back difficulties, and it was recommended she pursue pain management.  There were no diagnostics obtained at that visit.  She then contacted our office, and further diagnostics were ordered. A recent MRI of the lumbar spine with and without contrast on 2/8/2024 demonstrates significant degenerative disc disease throughout the lumbar spine, with diffuse facet joint arthritis, as well as ligamentum flavum hypertrophy with various degrees of spinal canal stenosis and neuroforaminal stenosis.  There are Modic type I degenerative endplate changes at T12-L1.  At L2-L3 there is a broad-based disc bulge with facet hypertrophy which contributes to mild to moderate left and mild right neuroforaminal stenosis with mild to moderate central spinal stenosis.  At L3-L4 there is broad-based disc bulge with facet hypertrophy with ligamentum flavum thickening which contributes to moderate to severe central spinal stenosis and mild to moderate bilateral neuroforaminal stenosis.  At L4-L5 there is postsurgical changes from prior left hemoglobin ectomy, with a broad-based disc bulge and facet hypertrophy with ligamentum flavum thickening which contributes to moderate central spinal stenosis and moderate to severe bilateral neuroforaminal stenosis.  At L5-S1 there is a broad-based disc bulge with facet hypertrophy with ligamentum flavum thickening which contributes to mild central spinal stenosis with moderate severe bilateral neuroforaminal stenosis.  When she was last seen, she was presented with several different  therapeutic options, for treatment of her chronic pain.  Eventually on March 6, 2024 she underwent diagnostic and therapeutic L3-L4 transforaminal epidural steroid injections, from which she reports experiencing no significant pain relief.  She saw Alexy Coy PA-C on March 8, 2024, and reportedly was doing well with most recent injection, which was only performed a few days prior.  Alexy discussed the possibility of consideration of a spinal cord stimulator, versus additional lumbar spine surgery. Patient has failed to obtain pain relief with conservative measures for more than including oral analgesics, opioids, topical analgesics, ice, heat, several rounds of physical therapy, chiropractic therapy, HEP physical therapist directed home exercise program (ongoing), independent exercise program (ongoing at the [facility]), water aerobics, to name a few.  She also underwent 2 sets of diagnostic bilateral lumbar medial branch blocks that were inconclusive, the first 1 provided relief the patient received mild IV sedation and patient did not recall, and the second one which was done under local anesthesia only, when seen in the recovery room she reported complete relief but then reported to one of the nurses later that she was experiencing the same pain, therefore, we will call the MBBs failed blocks.  These blocks were performed prior to surgical intervention.  After failed procedure, she did proceed with undergoing surgical intervention.  She continues to have some elements of mechanical lower back pain, although majority of her pain is while standing and walking.  She does not wish to proceed with spinal cord stimulator at this time.  She would like to continue working conservatively with exercise and weight loss.  She will follow-up with me about 4 months, to evaluate her progress.  I have reviewed all available patient's medical records as well as previous therapies as referenced above. I had a lengthy  conversation with Ms. Luh Horner regarding her chronic pain condition and potential therapeutic options including risks, benefits, alternative therapies, to name a few. We have discussed using a stepwise approach starting with the shortest or least intense level of treatment, care, or service as determined by the extent required to diagnose and or treat patient's condition. The treatments proposed are consistent with the patient's medical condition and are known to be as safe and effective by current guidelines and standard of care. There is no evidence of absolute contraindications for the proposed procedures under the current circumstances. These treatments are not considered experimental or investigational. The duration and frequency proposed are considered appropriate for the service in accordance with accepted standards of medical practice for the diagnosis and or treatment of the patient's condition and or intended to improve the patient's level of function. These services will be furnished in a setting appropriate to the patient's medical needs and condition. Therefore, I have proposed the following plan:  1. Interventional pain management measures: None indicated this time.  She will follow-up with me in 4 months to assess her ongoing progress.  I have again discussed other options such as MILD L3-L4 and or a spinal cord stimulator trial.  We may also consider revisiting diagnostic lumbar medial branch blocks since these were performed prior to her surgery.  2. Diagnostic studies: None indicated at this time.    A. CBC, PT, PTT prior to MILD or SCS trial  3. Pharmacological measures: Reviewed and discussed; Patient uses diclofenac gel, and takes methocarbamol, Norco. Patient also takes citalopram, melatonin 5 mg nightly. Patient has declined additional pharmacological measures.    4. Long-term rehabilitation efforts:  A. The patient does not have a history of falls. I did complete a risk assessment  for falls.   B. Patient will start a comprehensive physical therapy program for Alter-G, core strengthening, gait and balance training, neurodynamics, ASTYM, E-STIM, myofascial release, cupping, dry needling, home exercise program  C. Continue [facility] program at the [facility].  I have also recommended aqua therapy   D. Contrast therapy: Apply ice-packs for 15-20 minutes, followed by heating pads for 15-20 minutes to affected area   E. Patient will need a referral to Dr. Felipe Yousif for psychological screening for spinal cord stimulation and intrathecal therapies prior to a spinal cord stimulator trial.  F. Patient's Body mass index is 29 kg/m². Patient counseled on the importance of weight loss to help with overall health and pain control.   5. The patient has been instructed to contact my office with any questions or difficulties. The patient understands the plan and agrees to proceed accordingly.        Pain Medications               aspirin (SB Low Dose ASA EC) 81 MG EC tablet Take 1 tablet by mouth Daily.    citalopram (CeleXA) 20 MG tablet Take 1 tablet by mouth Daily.    HYDROcodone-acetaminophen (NORCO) 5-325 MG per tablet Take 1-2 tablets by mouth Every 6 (Six) Hours As Needed for Severe Pain (Pain).    methocarbamol (Robaxin) 500 MG tablet Take 1 tablet by mouth 3 (Three) Times a Day.             No orders of the defined types were placed in this encounter.     Patient verbalizes understanding of medication dosage, comfort measures, instructions for treatment, and follow-up.    Please note that portions of this note were completed with a voice recognition program.   Any copied data in any portion of my note has been reviewed by myself and accurate.     FLO Martin    Patient Care Team:  Daniel Shafer DO as PCP - General (Family Medicine)  Felice Lopez MD as Consulting Physician (Gastroenterology)  Dandy Mcrae MD as Consulting Physician (Pain Medicine)  Damián Devi  MD Jaswant as Consulting Physician (Cardiology)  Jonel Browning MD as Surgeon (Neurosurgery)  Alexy Coy PA-C as Physician Assistant (Physician Assistant)  Daniel Shafer DO as Emergency Attending (Family Medicine)     No orders of the defined types were placed in this encounter.        Future Appointments   Date Time Provider Department Center   9/26/2024  1:45 PM Daniel Shafer DO [facility]   11/7/2024  1:30 PM Kimberly Ariza APRN [facility]

## 2024-07-10 LAB — DRUGS UR: NORMAL

## 2024-07-23 DIAGNOSIS — I10 PRIMARY HYPERTENSION: ICD-10-CM

## 2024-07-23 RX ORDER — AMLODIPINE BESYLATE 10 MG/1
TABLET ORAL
Qty: 90 TABLET | Refills: 0 | Status: SHIPPED | OUTPATIENT
Start: 2024-07-23

## 2024-07-24 ENCOUNTER — PATIENT MESSAGE (OUTPATIENT)
Dept: FAMILY MEDICINE CLINIC | Facility: CLINIC | Age: 69
End: 2024-07-24
Payer: MEDICARE

## 2024-07-24 DIAGNOSIS — M48.062 NEUROGENIC CLAUDICATION DUE TO LUMBAR SPINAL STENOSIS: ICD-10-CM

## 2024-07-26 RX ORDER — HYDROCODONE BITARTRATE AND ACETAMINOPHEN 5; 325 MG/1; MG/1
1-2 TABLET ORAL EVERY 6 HOURS PRN
Qty: 32 TABLET | Refills: 0 | Status: SHIPPED | OUTPATIENT
Start: 2024-07-26

## 2024-07-26 NOTE — TELEPHONE ENCOUNTER
From: Luh Horner  To: Daniel Shafer  Sent: 7/24/2024 7:09 PM EDT  Subject: Hydrocodone    Would you send a refill of hydrocodone to [facility] pharmacy?  Thanks, Luh Horner   1955

## 2024-09-05 DIAGNOSIS — M48.062 NEUROGENIC CLAUDICATION DUE TO LUMBAR SPINAL STENOSIS: ICD-10-CM

## 2024-09-05 NOTE — TELEPHONE ENCOUNTER
Caller: Luh Horner    Relationship: Self    Best call back number: [phone]    Requested Prescriptions:   Requested Prescriptions     Pending Prescriptions Disp Refills    HYDROcodone-acetaminophen (NORCO) 5-325 MG per tablet 32 tablet 0     Sig: Take 1-2 tablets by mouth Every 6 (Six) Hours As Needed for Severe Pain (Pain).        Pharmacy where request should be sent: [id number] - [place], [place] - [address] [address] - [phone] [facility] [phone]      Last office visit with prescribing clinician: 6/27/2024   Last telemedicine visit with prescribing clinician: Visit date not found   Next office visit with prescribing clinician: 9/26/2024     Additional details provided by patient: PT IS OUT AND HAS BEEN OUT FOR A FEW DAYS    Does the patient have less than a 3 day supply:  [x] Yes  [] No    Would you like a call back once the refill request has been completed: [x] Yes [] No    If the office needs to give you a call back, can they leave a voicemail: [x] Yes [] No    Salomon Adam Rep   09/05/24 16:05 EDT

## 2024-09-06 RX ORDER — HYDROCODONE BITARTRATE AND ACETAMINOPHEN 5; 325 MG/1; MG/1
1-2 TABLET ORAL EVERY 6 HOURS PRN
Qty: 32 TABLET | Refills: 0 | Status: SHIPPED | OUTPATIENT
Start: 2024-09-06

## 2024-09-19 RX ORDER — FLUTICASONE PROPIONATE 50 MCG
SPRAY, SUSPENSION (ML) NASAL
Qty: 16 G | Refills: 11 | OUTPATIENT
Start: 2024-09-19

## 2024-09-26 ENCOUNTER — LAB (OUTPATIENT)
Dept: LAB | Facility: HOSPITAL | Age: 69
End: 2024-09-26
Payer: MEDICARE

## 2024-09-26 ENCOUNTER — OFFICE VISIT (OUTPATIENT)
Dept: FAMILY MEDICINE CLINIC | Facility: CLINIC | Age: 69
End: 2024-09-26
Payer: MEDICARE

## 2024-09-26 VITALS
BODY MASS INDEX: 27.23 KG/M2 | HEIGHT: 61 IN | WEIGHT: 144.2 LBS | DIASTOLIC BLOOD PRESSURE: 70 MMHG | SYSTOLIC BLOOD PRESSURE: 108 MMHG | OXYGEN SATURATION: 98 % | HEART RATE: 84 BPM

## 2024-09-26 DIAGNOSIS — M48.062 NEUROGENIC CLAUDICATION DUE TO LUMBAR SPINAL STENOSIS: Primary | ICD-10-CM

## 2024-09-26 DIAGNOSIS — I10 PRIMARY HYPERTENSION: ICD-10-CM

## 2024-09-26 LAB
BASOPHILS # BLD AUTO: 0.08 10*3/MM3 (ref 0–0.2)
BASOPHILS NFR BLD AUTO: 0.9 % (ref 0–1.5)
DEPRECATED RDW RBC AUTO: 43.6 FL (ref 37–54)
EOSINOPHIL # BLD AUTO: 0.09 10*3/MM3 (ref 0–0.4)
EOSINOPHIL NFR BLD AUTO: 1 % (ref 0.3–6.2)
ERYTHROCYTE [DISTWIDTH] IN BLOOD BY AUTOMATED COUNT: 12 % (ref 12.3–15.4)
HCT VFR BLD AUTO: 43.7 % (ref 34–46.6)
HGB BLD-MCNC: 15.3 G/DL (ref 12–15.9)
IMM GRANULOCYTES # BLD AUTO: 0.03 10*3/MM3 (ref 0–0.05)
IMM GRANULOCYTES NFR BLD AUTO: 0.3 % (ref 0–0.5)
LYMPHOCYTES # BLD AUTO: 1.27 10*3/MM3 (ref 0.7–3.1)
LYMPHOCYTES NFR BLD AUTO: 13.6 % (ref 19.6–45.3)
MCH RBC QN AUTO: 34.8 PG (ref 26.6–33)
MCHC RBC AUTO-ENTMCNC: 35 G/DL (ref 31.5–35.7)
MCV RBC AUTO: 99.3 FL (ref 79–97)
MONOCYTES # BLD AUTO: 0.84 10*3/MM3 (ref 0.1–0.9)
MONOCYTES NFR BLD AUTO: 9 % (ref 5–12)
NEUTROPHILS NFR BLD AUTO: 7.03 10*3/MM3 (ref 1.7–7)
NEUTROPHILS NFR BLD AUTO: 75.2 % (ref 42.7–76)
NRBC BLD AUTO-RTO: 0 /100 WBC (ref 0–0.2)
PLATELET # BLD AUTO: 297 10*3/MM3 (ref 140–450)
PMV BLD AUTO: 9.1 FL (ref 6–12)
RBC # BLD AUTO: 4.4 10*6/MM3 (ref 3.77–5.28)
WBC NRBC COR # BLD AUTO: 9.34 10*3/MM3 (ref 3.4–10.8)

## 2024-09-26 PROCEDURE — 3078F DIAST BP <80 MM HG: CPT | Performed by: FAMILY MEDICINE

## 2024-09-26 PROCEDURE — 85025 COMPLETE CBC W/AUTO DIFF WBC: CPT | Performed by: FAMILY MEDICINE

## 2024-09-26 PROCEDURE — 80053 COMPREHEN METABOLIC PANEL: CPT | Performed by: FAMILY MEDICINE

## 2024-09-26 PROCEDURE — 3074F SYST BP LT 130 MM HG: CPT | Performed by: FAMILY MEDICINE

## 2024-09-26 PROCEDURE — 99214 OFFICE O/P EST MOD 30 MIN: CPT | Performed by: FAMILY MEDICINE

## 2024-09-26 PROCEDURE — 84443 ASSAY THYROID STIM HORMONE: CPT | Performed by: FAMILY MEDICINE

## 2024-09-26 PROCEDURE — 82607 VITAMIN B-12: CPT | Performed by: FAMILY MEDICINE

## 2024-09-26 PROCEDURE — 1125F AMNT PAIN NOTED PAIN PRSNT: CPT | Performed by: FAMILY MEDICINE

## 2024-09-26 RX ORDER — HYDROCODONE BITARTRATE AND ACETAMINOPHEN 5; 325 MG/1; MG/1
1-2 TABLET ORAL EVERY 12 HOURS PRN
Qty: 60 TABLET | Refills: 0 | Status: SHIPPED | OUTPATIENT
Start: 2024-09-26

## 2024-09-26 RX ORDER — FLUTICASONE PROPIONATE 50 UG/1
2 SPRAY, METERED NASAL DAILY
Qty: 48 G | Refills: 3 | Status: SHIPPED | OUTPATIENT
Start: 2024-09-26

## 2024-09-26 NOTE — ASSESSMENT & PLAN NOTE
Stop HCTZ due to low BP and lightheadedness  Continue lisinopril, may need to come off amlodipine as well

## 2024-09-26 NOTE — PROGRESS NOTES
Established Patient Office Visit      Patient Name: Luh Horner  : 1955   MRN: [id number]   Care Team: Patient Care Team:  Daniel Shafer DO as PCP - General (Family Medicine)  Felice Lopez MD as Consulting Physician (Gastroenterology)  Dandy Mcrae MD as Consulting Physician (Pain Medicine)  Damián Devi MD as Consulting Physician (Cardiology)  Jonel Browning MD as Surgeon (Neurosurgery)  Alexy Coy PA-C as Physician Assistant (Physician Assistant)  Daniel Shafer DO as Emergency Attending (Family Medicine)    Chief Complaint:    Chief Complaint   Patient presents with    Lumbar Pain       History of Present Illness: Luh Horner is a 68 y.o. female who is here today for chief complaint.    HPI    Having constipation  TSH in May 1.96  B12 >2000  Down 20lbs since starting semaglutide in April    This patient is accompanied by their self who contributes to the history of their care.    The following portions of the patient's history were reviewed and updated as appropriate: allergies, current medications, past family history, past medical history, past social history, past surgical history and problem list.    Subjective      Review of Systems:   Review of Systems - See HPI    Past Medical History:   Past Medical History:   Diagnosis Date    Allergic 2000    Allergic rhinitis     Anxiety 2004    Bone pain     foot    Cancer 2005    Skin cancer squamous    Cataract 2010    Surgery recently    Cervical disc disorder     Chronic pain disorder     Coronary artery disease     Mitral valve    CTS (carpal tunnel syndrome)     Depression 1981 - Hospitalized    Fibrocystic breast disease 2000    Current benign breast biopsies    GERD (gastroesophageal reflux disease) 2001    Glaucoma Surgery recently    Headache     None this year    Hiatal hernia     Hyperlipidemia     Hypertension     Insomnia     Irritable bowel syndrome  (IBS) 2002    Recurrent abdominal cramps    Joint pain 2012    Low back pain 2008    Lumbar scoliosis 2017    Lumbar spondylosis 2000    Chronic low back pain    Lumbosacral disc disease     Migraine     Mitral valve insufficiency     Mitral valve prolapse 2000    Neck pain     Obesity 2000    Osteoarthritis     Osteopenia     Peripheral neuropathy     Post menopausal syndrome 2004    Refractory hot flashes    Sleep disorder 2016    Spinal stenosis     Thoracic disc disorder     Wears reading eyeglasses        Past Surgical History:   Past Surgical History:   Procedure Laterality Date    BACK SURGERY  2023    BREAST BIOPSY Right remote    benign disease    CATARACT EXTRACTION, BILATERAL Bilateral     CHOLECYSTECTOMY  1998    COLONOSCOPY  ?    ENDOSCOPY      HYSTERECTOMY  1998    LAMINECTOMY  2023    LUMBAR LAMINECTOMY Bilateral 10/18/2023    Procedure: LAMINECTOMY BILATERAL DECOMPRESSION L4-5 RIGHT;  Surgeon: Jonel rBowning MD;  Location: [facility];  Service: Neurosurgery;  Laterality: Bilateral;    REFRACTIVE SURGERY      RK    SKIN BIOPSY      SPINE SURGERY      TRIGGER POINT INJECTION      Knee    URETHRAL SUSPENSION      laparoscopic for stress incontinence       Family History:   Family History   Problem Relation Age of Onset    Pulmonary fibrosis Mother          age 82    Hypertension Mother     Lumbar disc disease Mother     Prostate cancer Father          age 76    Cancer Father         Prostate    Hypertension Sister             Goiter Sister     Hypothyroidism Sister     Anxiety disorder Sister     Miscarriages / Stillbirths Sister     Thyroid disease Sister     Heart disease Brother          Age 48 heart attack was cigarette smoker    Hiatal hernia Brother     Early death Brother         Heart attack at age 48    Heart attack Brother     Breast cancer Other         Maternal and paternal aunts    Hypertension Brother     Obesity Maternal  Uncle     Diabetes Maternal Uncle     Arthritis Paternal Aunt     Hypertension Brother     Hyperlipidemia Brother        Social History:   Social History     Socioeconomic History    Marital status:    Tobacco Use    Smoking status: Former     Current packs/day: 0.00     Average packs/day: 2.0 packs/day for 10.0 years (20.0 ttl pk-yrs)     Types: Cigarettes     Start date: 1971     Quit date: 1981     Years since quittin.8     Passive exposure: Never    Smokeless tobacco: Never   Vaping Use    Vaping status: Never Used   Substance and Sexual Activity    Alcohol use: Not Currently     Alcohol/week: 3.0 standard drinks of alcohol     Types: 3 Glasses of wine per week     Comment: 2 per night    Drug use: Never    Sexual activity: Yes     Partners: Male     Birth control/protection: Hysterectomy       Tobacco History:   Social History     Tobacco Use   Smoking Status Former    Current packs/day: 0.00    Average packs/day: 2.0 packs/day for 10.0 years (20.0 ttl pk-yrs)    Types: Cigarettes    Start date: 1971    Quit date: 1981    Years since quittin.8    Passive exposure: Never   Smokeless Tobacco Never       Medications:   Outpatient Medications Prior to Visit   Medication Sig Dispense Refill    aspirin (SB Low Dose ASA EC) 81 MG EC tablet Take 1 tablet by mouth Daily.      atorvastatin (LIPITOR) 40 MG tablet Take 1 tablet by mouth every night at bedtime. 90 tablet 3    citalopram (CeleXA) 20 MG tablet Take 1 tablet by mouth Daily. 90 tablet 3    Coenzyme Q10 (CO Q-10) 200 MG capsule Take 1 capsule by mouth.      dicyclomine (Bentyl) 10 MG capsule Take 1 capsule by mouth 4 (Four) Times a Day Before Meals & at Bedtime. (Patient taking differently: Take 1 capsule by mouth As Needed.) 120 capsule 1    ezetimibe (ZETIA) 10 MG tablet Take 1 tablet by mouth Daily. 90 tablet 3    lisinopril (PRINIVIL,ZESTRIL) 40 MG tablet TAKE 1 TABLET EVERY MORNING 90 tablet 3    Magnesium Citrate 200 MG  tablet Take 2 tablets by mouth Every Night.      melatonin 5 MG tablet tablet Take 1 tablet by mouth Every Night.      methocarbamol (Robaxin) 500 MG tablet Take 1 tablet by mouth 3 (Three) Times a Day. (Patient taking differently: Take 1 tablet by mouth As Needed.) 90 tablet 0    Omega-3 Fatty Acids (FISH OIL) 1000 MG capsule capsule Take 1 capsule by mouth Daily With Breakfast.      omeprazole (priLOSEC) 20 MG capsule TAKE 1 CAPSULE DAILY 90 capsule 3    Semaglutide, 1 MG/DOSE, (OZEMPIC) 2 MG/1.5ML solution pen-injector Inject  under the skin into the appropriate area as directed 1 (One) Time Per Week.      sucralfate (CARAFATE) 1 g tablet TAKE 1 TABLET EVERY 12 HOURS (Patient taking differently: Take 1 tablet by mouth 2 (Two) Times a Day. Usually only takes one a day) 60 tablet 11    valACYclovir (VALTREX) 500 MG tablet Take 1 tablet by mouth Daily.      vitamin C (ASCORBIC ACID) 500 MG tablet Take 1 tablet by mouth Daily.      amLODIPine (NORVASC) 10 MG tablet TAKE 1 TABLET EVERY NIGHT AT BEDTIME 90 tablet 0    estradiol (MINIVELLE, VIVELLE-DOT) 0.05 MG/24HR patch 1 patch 2 (Two) Times a Week. twice      fluticasone (FLONASE) 50 MCG/ACT nasal spray USE 2 SPRAYS IN EACH NOSTRIL DAILY 16 g 2    hydroCHLOROthiazide (HYDRODIURIL) 25 MG tablet Take 1 tablet by mouth Every Morning. 30 tablet 11    HYDROcodone-acetaminophen (NORCO) 5-325 MG per tablet Take 1-2 tablets by mouth Every 6 (Six) Hours As Needed for Severe Pain (Pain). 32 tablet 0     No facility-administered medications prior to visit.        Allergies:   Allergies   Allergen Reactions    Shellfish Allergy Other (See Comments)     Head congestion    Atenolol Other (See Comments)     Fatigue    Buspirone Headache      Headache    Codeine Other (See Comments)     Depression    Pseudoephedrine Anxiety    Rosuvastatin Other (See Comments)     Fatigue    Westley-E.P.A. [Dha-Epa-Vitamin E] Anxiety    Simvastatin Other (See Comments)     Facial numbness    Trazodone  "Other (See Comments)     Nightmares       Objective   Objective     Physical Exam:  Vital Signs:   Vitals:    09/26/24 1337   BP: 108/70   Pulse: 84   SpO2: 98%   Weight: 65.4 kg (144 lb 3.2 oz)   Height: 154.9 cm (60.98\")   PainSc: 0-No pain     Body mass index is 27.26 kg/m².     Physical Exam  Nursing note reviewed  Const: NAD, A&Ox4, Pleasant, Cooperative  Eyes: EOMI, no conjunctivitis  ENT: No nasal discharge present, neck supple  Cardiac: Regular rate and rhythm, no cyanosis  Resp: Respiratory rate within normal limits, no increased work of breathing, no audible wheezing or retractions noted  GI: No distention or ascites  MSK: Motor and sensation grossly intact in bilateral upper extremities  Neurologic: CN II-XII grossly intact  Psych: Appropriate mood and behavior.  Skin: Warm, dry  Procedures/Radiology     Procedures  No radiology results for the last 7 days     Assessment & Plan   Assessment / Plan      Assessment/Plan:   Problems Addressed This Visit  Diagnoses and all orders for this visit:    1. Neurogenic claudication due to lumbar spinal stenosis (Primary)  -     HYDROcodone-acetaminophen (NORCO) 5-325 MG per tablet; Take 1-2 tablets by mouth Every 12 (Twelve) Hours As Needed for Severe Pain (Pain).  Dispense: 60 tablet; Refill: 0    2. Primary hypertension  Assessment & Plan:  Stop HCTZ due to low BP and lightheadedness  Continue lisinopril, may need to come off amlodipine as well    Orders:  -     Vitamin B12; Future  -     TSH Rfx On Abnormal To Free T4; Future  -     Comprehensive Metabolic Panel; Future  -     CBC & Differential; Future  -     Vitamin B12  -     TSH Rfx On Abnormal To Free T4  -     Comprehensive Metabolic Panel  -     CBC & Differential    Other orders  -     fluticasone (FLONASE) 50 MCG/ACT nasal spray; 2 sprays by Each Nare route Daily.  Dispense: 48 g; Refill: 3      Problem List Items Addressed This Visit          Cardiac and Vasculature    Hypertension (Chronic)    Current " Assessment & Plan     Stop HCTZ due to low BP and lightheadedness  Continue lisinopril, may need to come off amlodipine as well         Relevant Orders    Vitamin B12 (Completed)    TSH Rfx On Abnormal To Free T4 (Completed)    Comprehensive Metabolic Panel (Completed)    CBC & Differential (Completed)       Musculoskeletal and Injuries    Neurogenic claudication due to lumbar spinal stenosis - Primary    Relevant Medications    HYDROcodone-acetaminophen (NORCO) 5-325 MG per tablet     Orders Placed This Encounter   Procedures    Vitamin B12     Standing Status:   Future     Number of Occurrences:   1     Standing Expiration Date:   9/26/2025     Order Specific Question:   Release to patient     Answer:   Routine Release [[id number]]    TSH Rfx On Abnormal To Free T4     Standing Status:   Future     Number of Occurrences:   1     Standing Expiration Date:   9/26/2025     Order Specific Question:   Release to patient     Answer:   Routine Release [[id number]]    Comprehensive Metabolic Panel     Standing Status:   Future     Number of Occurrences:   1     Standing Expiration Date:   9/26/2025     Order Specific Question:   Release to patient     Answer:   Routine Release [[id number]]    CBC Auto Differential     Order Specific Question:   Release to patient     Answer:   Routine Release [[id number]]    CBC & Differential     Standing Status:   Future     Number of Occurrences:   1     Standing Expiration Date:   9/26/2025     Order Specific Question:   Manual Differential     Answer:   No     Order Specific Question:   Release to patient     Answer:   Routine Release [[id number]]       Stop HCTZ  Check labs  Decrease semaglutide to 0.5mg weekly    There are no Patient Instructions on file for this visit.    Follow Up:   Return in about 3 months (around 12/26/2024) for Controlled substance 3-month.      DO FARZANA Jiang RD  [facility]  [address] [facility] [phone]  Fax [phone]  Phone [phone]    Disclaimer to patients: The 21st Century Cares Act makes medical notes like these available to patients in the interest of transparency. However, please be advised that this is still a medical document. It is intended as ljay-fw-ozhi communication. Many sections may include medical language or jargon, abbreviations, and additional verbiage that are unfamiliar or confusing. In some ways it may come across as blunt, direct, or may be summarized in order to clearly and concisely communicate the most crucial information to medical professionals. It may also include mentions of conditions that are unlikely but considered as part of the differential diagnosis, including serious disorders. These are not always discussed at length at the time of appointment because their likelihood is so low, but may be included in a medical note to make it clear what has been considered and/or ruled out as part of a work-up. Medical documents are intended to carry relevant information, facts as evident, and the personal clinical opinion of the physician. If you have any questions regarding this medical document, please bring them to the attention of the physician at your next scheduled appointment.

## 2024-09-27 LAB
ALBUMIN SERPL-MCNC: 4.4 G/DL (ref 3.5–5.2)
ALBUMIN/GLOB SERPL: 1.5 G/DL
ALP SERPL-CCNC: 78 U/L (ref 39–117)
ALT SERPL W P-5'-P-CCNC: 27 U/L (ref 1–33)
ANION GAP SERPL CALCULATED.3IONS-SCNC: 15.2 MMOL/L (ref 5–15)
AST SERPL-CCNC: 23 U/L (ref 1–32)
BILIRUB SERPL-MCNC: 0.4 MG/DL (ref 0–1.2)
BUN SERPL-MCNC: 13 MG/DL (ref 8–23)
BUN/CREAT SERPL: 14.9 (ref 7–25)
CALCIUM SPEC-SCNC: 9 MG/DL (ref 8.6–10.5)
CHLORIDE SERPL-SCNC: 94 MMOL/L (ref 98–107)
CO2 SERPL-SCNC: 25.8 MMOL/L (ref 22–29)
CREAT SERPL-MCNC: 0.87 MG/DL (ref 0.57–1)
EGFRCR SERPLBLD CKD-EPI 2021: 71.8 ML/MIN/1.73
GLOBULIN UR ELPH-MCNC: 2.9 GM/DL
GLUCOSE SERPL-MCNC: 89 MG/DL (ref 65–99)
POTASSIUM SERPL-SCNC: 3.8 MMOL/L (ref 3.5–5.2)
PROT SERPL-MCNC: 7.3 G/DL (ref 6–8.5)
SODIUM SERPL-SCNC: 135 MMOL/L (ref 136–145)
TSH SERPL DL<=0.05 MIU/L-ACNC: 1.47 UIU/ML (ref 0.27–4.2)
VIT B12 BLD-MCNC: 1425 PG/ML (ref 211–946)

## 2024-10-02 ENCOUNTER — OFFICE VISIT (OUTPATIENT)
Dept: PAIN MEDICINE | Facility: CLINIC | Age: 69
End: 2024-10-02
Payer: MEDICARE

## 2024-10-02 VITALS
SYSTOLIC BLOOD PRESSURE: 120 MMHG | HEIGHT: 60 IN | DIASTOLIC BLOOD PRESSURE: 70 MMHG | WEIGHT: 144.7 LBS | BODY MASS INDEX: 28.41 KG/M2

## 2024-10-02 DIAGNOSIS — M96.1 LUMBAR POSTLAMINECTOMY SYNDROME: ICD-10-CM

## 2024-10-02 DIAGNOSIS — M48.062 LUMBAR STENOSIS WITH NEUROGENIC CLAUDICATION: ICD-10-CM

## 2024-10-02 DIAGNOSIS — Z01.818 PREOP TESTING: Primary | ICD-10-CM

## 2024-10-02 DIAGNOSIS — M47.816 SPONDYLOSIS OF LUMBAR REGION WITHOUT MYELOPATHY OR RADICULOPATHY: ICD-10-CM

## 2024-10-02 DIAGNOSIS — F41.9 ANXIETY: ICD-10-CM

## 2024-10-02 DIAGNOSIS — M24.28 LIGAMENTUM FLAVUM HYPERTROPHY: ICD-10-CM

## 2024-10-02 PROCEDURE — 3074F SYST BP LT 130 MM HG: CPT | Performed by: NURSE PRACTITIONER

## 2024-10-02 PROCEDURE — 1160F RVW MEDS BY RX/DR IN RCRD: CPT | Performed by: NURSE PRACTITIONER

## 2024-10-02 PROCEDURE — 99214 OFFICE O/P EST MOD 30 MIN: CPT | Performed by: NURSE PRACTITIONER

## 2024-10-02 PROCEDURE — 3078F DIAST BP <80 MM HG: CPT | Performed by: NURSE PRACTITIONER

## 2024-10-02 PROCEDURE — 1159F MED LIST DOCD IN RCRD: CPT | Performed by: NURSE PRACTITIONER

## 2024-10-02 PROCEDURE — 1125F AMNT PAIN NOTED PAIN PRSNT: CPT | Performed by: NURSE PRACTITIONER

## 2024-10-02 RX ORDER — ESTRADIOL 0.07 MG/D
1 FILM, EXTENDED RELEASE TRANSDERMAL 2 TIMES WEEKLY
COMMUNITY

## 2024-10-02 RX ORDER — AMLODIPINE BESYLATE 5 MG/1
5 TABLET ORAL DAILY
COMMUNITY

## 2024-10-02 NOTE — PROGRESS NOTES
"Chief Complaint:  \"Lower back pain\"      History of Present Illness:  Ms. Luh Horner, 68 y.o. female originally referred by Dr. Jonel Browning in consultation for chronic intractable lower back and bilateral hip pain.  She presents with a longstanding history of lower back, bilateral hip pain, and neurogenic claudication.  She underwent a minimally invasive L4-L5 laminectomy on the right side with Dr. Jonel Browning on 10/18/2023.  This helped with her lower extremity symptoms, and at her follow-up visit on 12/4/2023 with neurosurgery, she continued to complain of lower back difficulties, and it was recommended she pursue pain management.  There were no diagnostics obtained at that visit.  She then contacted our office, and further diagnostics were ordered.  A recent MRI of the lumbar spine with and without contrast on 2/8/2024 demonstrates significant degenerative disc disease throughout the lumbar spine, with diffuse facet joint arthritis, as well as ligamentum flavum hypertrophy with various degrees of spinal canal stenosis and neuroforaminal stenosis.  There are Modic type I degenerative endplate changes at T12-L1.  At L2-L3 there is a broad-based disc bulge with facet hypertrophy which contributes to mild to moderate left and mild right neuroforaminal stenosis with mild to moderate central spinal stenosis.  At L3-L4 there is broad-based disc bulge with facet hypertrophy with ligamentum flavum thickening which contributes to moderate to severe central spinal stenosis and mild to moderate bilateral neuroforaminal stenosis.  At L4-L5 there is postsurgical changes from prior left hemoglobin ectomy, with a broad-based disc bulge and facet hypertrophy with ligamentum flavum thickening which contributes to moderate central spinal stenosis and moderate to severe bilateral neuroforaminal stenosis.  At L5-S1 there is a broad-based disc bulge with facet hypertrophy with ligamentum flavum thickening which " contributes to mild central spinal stenosis with moderate severe bilateral neuroforaminal stenosis.  When she was last seen, she was presented with several different therapeutic options, for treatment of her chronic pain.  Eventually on March 6, 2024 she underwent diagnostic and therapeutic L3-L4 transforaminal epidural steroid injections, from which she reports experiencing a few days of pain relief.  She saw Alexy Coy PA-C on March 8, 2024, and reportedly was doing well with most recent injection which was only performed a few days prior.  Alexy discussed the possibility of consideration of a spinal cord stimulator, versus additional lumbar spine surgery.  Patient has failed to obtain pain relief with conservative measures for more than including oral analgesics, opioids, topical analgesics, ice, heat, several rounds of physical therapy, chiropractic therapy, HEP physical therapist directed home exercise program (ongoing), independent exercise program (ongoing at the [facility]), water aerobics, to name a few.  I last saw her several months ago, to discuss further options in treating her pain.  She opted to continue conservatively with exercise and weight loss.  She is back today, as she has decided to move forward with neuromodulation.  Pain Description: Constant lower back and bilateral hip pain with intermittent exacerbation, described as aching, burning, dull, sharp, shooting, stabbing, throbbing, tight band and tingling sensation.   Radiation of Pain: The pain radiates into the hips   Pain intensity today: 4/10   Average pain intensity last week: 6/10  Pain intensity ranges from: 2/10 to 8/10  Aggravating factors: Pain increases with bending, twisting, standing, walking. Patient describes neurogenic claudication. Patient does not use a cane or walker  Alleviating factors: Pain decreases with sitting down, lying down  Associated Symptoms:   Patient denies pain, numbness, or weakness in the lower  extremities.   Patient denies any new bladder or bowel problems.   Patient denies difficulties with her balance or recent falls.   Pain interferes with ADLs, general activities (ability to walk, stand, transition from different positions), and affects patient's quality of life  Pain does not interfere with sleep   Muscle spasms in the right paravertebral region RT>LT  Stiffness     Review of previous therapies and additional medical records:  Luh Horner has already failed the following measures, including:   Conservative Measures: Oral analgesics, opioids, topical analgesics, ice, heat, several rounds of physical therapy, chiropractic therapy, HEP physical therapist directed home exercise program (ongoing), independent exercise program (ongo[facility] at the [facility]), water aerobics  Interventional Measures:   Two sets of diagnostic bilateral lumbar medial branch blocks; with no significant relief/equivocal results  03/06/2024: DxTx L3-L4 transforaminal epidural steroid injection- no relief  Surgical Measures: No history of previous cervical spine.  10/18/2023: Minimally invasive lumbar laminectomy at L4-L5 on the right with Dr. Jonel Browning  Luh Horner underwent neurosurgical consultation with Alexy Coy PA-C who discussed the possibility of spinal cord stimulation versus additional lumbar spine surgery.    Luh Horner presents with significant comorbidities including anxiety and depression, GERD, headache, hyperlipidemia, hypertension, insomnia IBS, mitral valve insufficiency, obesity, osteopenia, migraine without aura and without status migrainosus, not intractable  In terms of current analgesics, Luh Horner takes: Diclofenac gel, methocarbamol, Norco. Patient also takes citalopram, melatonin. Patient failed trials with gabapentin in the past (SE)  I have reviewed Joshua Report consistent with medication reconciliation.  SOAPP: Low Risk       Global Pain Scale 01-17 2023  07-09  2024 10-02  2024        Pain 10 15 13        Feelings 8 2 2        Clinical outcomes 10 10 13        Activities 13 6 5        GPS Total: 41 33 33          The [place] Back Pain Disability Scale   DATE 01-17  2023 07-09  2024 10-02  2024        Sleep through the night 3 0 0        Turn over in bed 0 1 2        Get out of bed 0 1 3        Make your bed 0 0 1        Put on socks (pantyhose) 0 0 0        Ride in a car 2 1 2        Sit in a chair for several hours 3 4 4        Stand up for 20-30 minutes 4 4 4        Climb one flight of stairs 1 1 1        Walk a few blocks (200-300 yards)  2 2 2        Walk several miles 5 4 5        Run one block (about 50 yards) 5 5 5        Take food out of the refrigerator 0 0 0        Reach up to high shelves 1 1 1        Move a chair 2 1 1        Pull or push heavy doors 2 1 1        Bend over to clean the bathtub 4 4 4        Throw a ball 1 2 1        Carry two bags of groceries 0 2 1        Lift and carry a heavy suitcase 3 3 41        Total score 43 41           Review of Diagnostic Studies:    MRI of the lumbar spine with/without contrast on 2/8/2024 demonstrates significant degenerative disc disease throughout the lumbar spine, with diffuse facet joint arthritis, as well as ligamentum flavum hypertrophy with various degrees of spinal canal stenosis and neuroforaminal stenosis.  There are Modic type I degenerative endplate changes at T12-L1.  At L2-L3 there is a broad-based disc bulge with facet hypertrophy which contributes to mild to moderate left and mild right neuroforaminal stenosis with mild to moderate central spinal stenosis.  At L3-L4 there is broad-based disc bulge with facet hypertrophy with ligamentum flavum thickening which contributes to moderate to severe central spinal stenosis and mild to moderate bilateral neuroforaminal stenosis.  At L4-L5 there is postsurgical changes from prior left hemoglobin ectomy, with a broad-based disc bulge and facet  hypertrophy with ligamentum flavum thickening which contributes to moderate central spinal stenosis and moderate to severe bilateral neuroforaminal stenosis.  At L5-S1 there is a broad-based disc bulge with facet hypertrophy with ligamentum flavum thickening which contributes to mild central spinal stenosis with moderate severe bilateral neuroforaminal stenosis.  MRI of the thoracic spine without contrast 2/8/2024: Edematous Modic endplate changes at T1-T2 and T12-L1.  Mild exaggeration of the normal thoracic kyphosis.  No evidence of high-grade spinal stenosis.  Flexion-extension x-rays of the lumbar spine on 1/17/2023 revealed multilevel spondylosis.  Alignment is stable during flexion and extension  Bilateral hip x-rays on 1/17/2023 revealed mild degenerative changes of the hip joints.  MRI of the cervical spine without contrast on 2/3/2023 revealed cervical spondylosis.  There is preservation of vertebral body heights and alignment.  The cervical spinal cord has normal caliber and signal.  Axial imaging:  C2-C3: No significant canal or foraminal stenosis  C3-C4: Disc osteophyte complex, facet arthropathy.  Moderate canal stenosis and bilateral foraminal stenosis  C4-C5: Disc osteophyte complex, facet arthropathy.  Mild to moderate spinal canal stenosis.  Mild foraminal stenosis  C5-C6: Disc osteophyte complex and facet arthropathy.  Moderate canal stenosis  and bilateral foraminal stenosis  C6-7: Disc osteophyte complex, facet arthropathy.  Moderate spinal canal stenosis and bilateral foraminal stenosis        The following portions of the patient's history were reviewed and updated as appropriate: problem list, past medical history, past surgery history, social history, family history, medication reconciliation, and allergies    Review of Systems   Musculoskeletal:  Positive for arthralgias, back pain, myalgias, neck pain and neck stiffness.   Neurological:  Positive for numbness.   Psychiatric/Behavioral:  The  patient is nervous/anxious.    All other systems reviewed and are negative.          Patient Active Problem List   Diagnosis    Atopic rhinitis    Anxiety    Carpal tunnel syndrome    Clenching of teeth    Depression    Sleep disorder    Gastroesophageal reflux disease    Hyperlipidemia    Hypertension    Low back pain    Mitral valve insufficiency    BMI 27.0-27.9,adult    Gluteal tendinitis of right buttock    Postmenopausal disorder    Preventative health care    Other chronic pain    Spondylosis of lumbar region without myelopathy or radiculopathy    Lumbar scoliosis    Irritable bowel syndrome    Migraine without aura and without status migrainosus, not intractable    Lumbar stenosis with neurogenic claudication    Ligamentum flavum hypertrophy    Degeneration of lumbar or lumbosacral intervertebral disc    Epidural lipomatosis    Lumbar paraspinal muscle spasm    Neurogenic claudication due to lumbar spinal stenosis    Sciatica associated with disorder of lumbar spine       Past Medical History:   Diagnosis Date    Allergic 2000    Allergic rhinitis     Anxiety 2004    Bone pain     foot    Cancer 2005    Skin cancer squamous    Cataract 2010    Surgery recently    Cervical disc disorder 2022    Chronic pain disorder 2012    Coronary artery disease 2005    Mitral valve    CTS (carpal tunnel syndrome)     Depression 1981    1981 - Hospitalized    Fibrocystic breast disease 2000    Current benign breast biopsies    GERD (gastroesophageal reflux disease) 2001    Glaucoma Surgery recently    Headache 2021    None this year    Hiatal hernia     Hyperlipidemia     Hypertension 1997    Insomnia 2013    Irritable bowel syndrome (IBS) 2002    Recurrent abdominal cramps    Joint pain 2012    Low back pain 2008    Lumbar scoliosis 2017    Lumbar spondylosis 2000    Chronic low back pain    Lumbosacral disc disease     Migraine 2021    Mitral valve insufficiency     Mitral valve prolapse 2000    Neck pain 2022    Obesity      Osteoarthritis     Osteopenia     Peripheral neuropathy     Post menopausal syndrome 2004    Refractory hot flashes    Sleep disorder 2016    Spinal stenosis     Thoracic disc disorder     Wears reading eyeglasses          Past Surgical History:   Procedure Laterality Date    BACK SURGERY  2023    BREAST BIOPSY Right remote    benign disease    CATARACT EXTRACTION, BILATERAL Bilateral     CHOLECYSTECTOMY      COLONOSCOPY  ?    ENDOSCOPY      HYSTERECTOMY  1998    LAMINECTOMY  2023    LUMBAR LAMINECTOMY Bilateral 10/18/2023    Procedure: LAMINECTOMY BILATERAL DECOMPRESSION L4-5 RIGHT;  Surgeon: Jonel Browning MD;  Location: [facility];  Service: Neurosurgery;  Laterality: Bilateral;    REFRACTIVE SURGERY      RK    SKIN BIOPSY      SPINE SURGERY      TRIGGER POINT INJECTION      Knee    URETHRAL SUSPENSION      laparoscopic for stress incontinence         Family History   Problem Relation Age of Onset    Pulmonary fibrosis Mother          age 82    Hypertension Mother     Lumbar disc disease Mother     Prostate cancer Father          age 76    Cancer Father         Prostate    Hypertension Sister             Goiter Sister     Hypothyroidism Sister     Anxiety disorder Sister     Miscarriages / Stillbirths Sister     Thyroid disease Sister     Heart disease Brother          Age 48 heart attack was cigarette smoker    Hiatal hernia Brother     Early death Brother         Heart attack at age 48    Heart attack Brother     Breast cancer Other         Maternal and paternal aunts    Hypertension Brother     Obesity Maternal Uncle     Diabetes Maternal Uncle     Arthritis Paternal Aunt     Hypertension Brother     Hyperlipidemia Brother          Social History     Socioeconomic History    Marital status:    Tobacco Use    Smoking status: Former     Current packs/day: 0.00     Average packs/day: 2.0 packs/day for 10.0 years (20.0 ttl pk-yrs)      Types: Cigarettes     Start date: 1971     Quit date: 1981     Years since quittin.7     Passive exposure: Never    Smokeless tobacco: Never   Vaping Use    Vaping status: Never Used   Substance and Sexual Activity    Alcohol use: Not Currently     Alcohol/week: 3.0 standard drinks of alcohol     Types: 3 Glasses of wine per week     Comment: 2 per night    Drug use: Never    Sexual activity: Yes     Partners: Male     Birth control/protection: Hysterectomy           Current Outpatient Medications:     amLODIPine (NORVASC) 5 MG tablet, Take 1 tablet by mouth Daily., Disp: , Rfl:     aspirin (SB Low Dose ASA EC) 81 MG EC tablet, Take 1 tablet by mouth Daily., Disp: , Rfl:     atorvastatin (LIPITOR) 40 MG tablet, Take 1 tablet by mouth every night at bedtime., Disp: 90 tablet, Rfl: 3    citalopram (CeleXA) 20 MG tablet, Take 1 tablet by mouth Daily., Disp: 90 tablet, Rfl: 3    Coenzyme Q10 (CO Q-10) 200 MG capsule, Take 1 capsule by mouth., Disp: , Rfl:     dicyclomine (Bentyl) 10 MG capsule, Take 1 capsule by mouth 4 (Four) Times a Day Before Meals & at Bedtime. (Patient taking differently: Take 1 capsule by mouth As Needed.), Disp: 120 capsule, Rfl: 1    estradiol (MINIVELLE, VIVELLE-DOT) 0.075 MG/24HR patch, Place 1 patch on the skin as directed by provider 2 (Two) Times a Week., Disp: , Rfl:     ezetimibe (ZETIA) 10 MG tablet, Take 1 tablet by mouth Daily., Disp: 90 tablet, Rfl: 3    fluticasone (FLONASE) 50 MCG/ACT nasal spray, 2 sprays by Each Nare route Daily., Disp: 48 g, Rfl: 3    HYDROcodone-acetaminophen (NORCO) 5-325 MG per tablet, Take 1-2 tablets by mouth Every 12 (Twelve) Hours As Needed for Severe Pain (Pain)., Disp: 60 tablet, Rfl: 0    lisinopril (PRINIVIL,ZESTRIL) 40 MG tablet, TAKE 1 TABLET EVERY MORNING, Disp: 90 tablet, Rfl: 3    Magnesium Citrate 200 MG tablet, Take 2 tablets by mouth Every Night., Disp: , Rfl:     melatonin 5 MG tablet tablet, Take 1 tablet by mouth Every Night.,  "Disp: , Rfl:     methocarbamol (Robaxin) 500 MG tablet, Take 1 tablet by mouth 3 (Three) Times a Day. (Patient taking differently: Take 1 tablet by mouth As Needed.), Disp: 90 tablet, Rfl: 0    Omega-3 Fatty Acids (FISH OIL) 1000 MG capsule capsule, Take 1 capsule by mouth Daily With Breakfast., Disp: , Rfl:     omeprazole (priLOSEC) 20 MG capsule, TAKE 1 CAPSULE DAILY, Disp: 90 capsule, Rfl: 3    Semaglutide, 1 MG/DOSE, (OZEMPIC) 2 MG/1.5ML solution pen-injector, Inject  under the skin into the appropriate area as directed 1 (One) Time Per Week., Disp: , Rfl:     sucralfate (CARAFATE) 1 g tablet, TAKE 1 TABLET EVERY 12 HOURS (Patient taking differently: Take 1 tablet by mouth 2 (Two) Times a Day. Usually only takes one a day), Disp: 60 tablet, Rfl: 11    valACYclovir (VALTREX) 500 MG tablet, Take 1 tablet by mouth Daily., Disp: , Rfl:     vitamin C (ASCORBIC ACID) 500 MG tablet, Take 1 tablet by mouth Daily., Disp: , Rfl:       Allergies   Allergen Reactions    Shellfish Allergy Other (See Comments)     Head congestion    Atenolol Other (See Comments)     Fatigue    Buspirone Headache      Headache    Codeine Other (See Comments)     Depression    Pseudoephedrine Anxiety    Rosuvastatin Other (See Comments)     Fatigue    Westley-E.P.A. [Dha-Epa-Vitamin E] Anxiety    Simvastatin Other (See Comments)     Facial numbness    Trazodone Other (See Comments)     Nightmares         /70   Ht 152.4 cm (60\")   Wt 65.6 kg (144 lb 11.2 oz)   BMI 28.26 kg/m²       Physical Exam:   Constitutional: Patient appears well-developed, well-nourished, well-hydrated, appears younger than stated age  HEENT: Head: Normocephalic and atraumatic  Eyes: Conjunctivae and lids are normal  Pupils: Equal, round, reactive to light  Peripheral vascular exam: Femoral: right 2+, left 2+. Posterior tibialis: right 2+ and left 2+. Dorsalis pedis: right 2+ and left 2+. No edema.   Musculoskeletal   Gait and station: Gait evaluation demonstrated a " normal gait.   Lumbar spine: Passive and active range of motion are limited secondary to pain. Extension, rotation of the lumbar spine increased and reproduced pain. Lumbar facet joint loading maneuvers are positive.  Don test and Gaenslen's test are negative   Piriformis maneuvers are negative   Hip joints: The range of motion of the hip joints is almost full and without pain   Palpation of the bilateral greater trochanter, unrevealing   Examination of the Iliotibial band: unrevealing   Neurological:   Patient is alert and oriented to person, place, and time.   Speech: Normal.   Cortical function: Normal mental status.   Reflex Scores:  Right patellar: 2+  Left patellar: 2+  Right Achilles: 2+  Left Achilles: 2+  Motor strength: 5/5  Motor Tone: Normal  Involuntary movements: None.   Superficial/Primitive Reflexes: Primitive reflexes were absent.   Right Carter: Absent  Left Carter: Absent  Right ankle clonus: Absent  Left ankle clonus: Absent   Babinsky: Absent  Spurling sign: Negative. Neck tornado test: Negative. Lhermitte sign: Negative. Long tract signs: Negative. Straight leg raising test: Negative. Femoral stretch sign: Negative.   Sensory exam: Intact to light touch, intact pain and temperature sensation, intact vibration sensation and normal proprioception.   Coordination: Normal finger to nose. Normal balance and negative Romberg's sign   Skin and subcutaneous tissue: Skin is warm and intact. No rash noted. No cyanosis.   Psychiatric: Judgment and insight: Normal. Recent and remote memory: Intact. Mood and affect: Normal.     ASSESSMENT:   1. Lumbar postlaminectomy syndrome    2. Ligamentum flavum hypertrophy    3. Lumbar stenosis with neurogenic claudication    4. Spondylosis of lumbar region without myelopathy or radiculopathy    5. Anxiety              PLAN/MEDICAL DECISION MAKING:  Ms. Luh Horner, 68 y.o. female originally referred by Dr. Jonel Browning in consultation for chronic  intractable lower back and bilateral hip pain. Patient reports a longstanding history of lower back pain, bilateral hip pain, associated with neurogenic claudication refractory to conservative measures. She underwent a minimally invasive L4-L5 laminectomy on the right side with Dr. Jonel Browning on 10/18/2023.  This helped with her lower extremity symptoms, and at her follow-up visit on 12/4/2023 with neurosurgery, she continued to complain of lower back difficulties, and it was recommended she pursue pain management.  There were no diagnostics obtained at that visit.  She then contacted our office, and further diagnostics were ordered. A recent MRI of the lumbar spine with and without contrast on 2/8/2024 demonstrates significant degenerative disc disease throughout the lumbar spine, with diffuse facet joint arthritis, as well as ligamentum flavum hypertrophy with various degrees of spinal canal stenosis and neuroforaminal stenosis.  There are Modic type I degenerative endplate changes at T12-L1.  At L2-L3 there is a broad-based disc bulge with facet hypertrophy which contributes to mild to moderate left and mild right neuroforaminal stenosis with mild to moderate central spinal stenosis.  At L3-L4 there is broad-based disc bulge with facet hypertrophy with ligamentum flavum thickening which contributes to moderate to severe central spinal stenosis and mild to moderate bilateral neuroforaminal stenosis.  At L4-L5 there is postsurgical changes from prior left hemoglobin ectomy, with a broad-based disc bulge and facet hypertrophy with ligamentum flavum thickening which contributes to moderate central spinal stenosis and moderate to severe bilateral neuroforaminal stenosis.  At L5-S1 there is a broad-based disc bulge with facet hypertrophy with ligamentum flavum thickening which contributes to mild central spinal stenosis with moderate severe bilateral neuroforaminal stenosis.  When she was last seen, she was  presented with several different therapeutic options, for treatment of her chronic pain.  Eventually on March 6, 2024 she underwent diagnostic and therapeutic L3-L4 transforaminal epidural steroid injections, from which she reports experiencing no significant pain relief.  She saw Alexy Coy PA-C on March 8, 2024, and reportedly was doing well with most recent injection, which was only performed a few days prior.  Alexy discussed the possibility of consideration of a spinal cord stimulator, versus additional lumbar spine surgery. Patient has failed to obtain pain relief with conservative measures for more than including oral analgesics, opioids, topical analgesics, ice, heat, several rounds of physical therapy, chiropractic therapy, HEP physical therapist directed home exercise program (ongoing), independent exercise program (ongoing at the [facility]), water aerobics, to name a few.  She also underwent 2 sets of diagnostic bilateral lumbar medial branch blocks that were inconclusive, the first 1 provided relief the patient received mild IV sedation and patient did not recall, and the second one which was done under local anesthesia only, when seen in the recovery room she reported complete relief but then reported to one of the nurses later that she was experiencing the same pain, therefore, we will call the MBBs failed blocks.  These blocks were performed prior to surgical intervention.  After failed procedure, she did proceed with undergoing surgical intervention.  She continues to have some elements of mechanical lower back pain, although majority of her pain is while standing and walking.  She is here today as she would like to proceed with a spinal cord stimulator trial.  I have reviewed all available patient's medical records as well as previous therapies as referenced above. I had a lengthy conversation with Ms. Luhcarey Horenr regarding her chronic pain condition and potential therapeutic options  including risks, benefits, alternative therapies, to name a few. We have discussed using a stepwise approach starting with the shortest or least intense level of treatment, care, or service as determined by the extent required to diagnose and or treat patient's condition. The treatments proposed are consistent with the patient's medical condition and are known to be as safe and effective by current guidelines and standard of care. There is no evidence of absolute contraindications for the proposed procedures under the current circumstances. These treatments are not considered experimental or investigational. The duration and frequency proposed are considered appropriate for the service in accordance with accepted standards of medical practice for the diagnosis and or treatment of the patient's condition and or intended to improve the patient's level of function. These services will be furnished in a setting appropriate to the patient's medical needs and condition. Therefore, I have proposed the following plan:  1. Interventional pain management measures: After psychological updated clearance is obtained, patient will be scheduled for a spinal cord stimulator trial with [employer]. Patient has received formal education from me including risks, benefits, and alternative treatments, as well as detailed information regarding the procedure and specific goals for the trial. Patient has also received didactic materials including educational booklets and DVDs on spinal cord stimulation therapies. I have again discussed other options such as MILD L3-L4.    2. Diagnostic studies: None indicated at this time.    A. CBC, PT, PTT prior to MILD or SCS trial  3. Pharmacological measures: Reviewed and discussed; Patient uses diclofenac gel, and takes methocarbamol, Norco. Patient also takes citalopram, melatonin 5 mg nightly. Patient has declined additional pharmacological measures.    4. Long-term rehabilitation efforts:  A. The  patient does not have a history of falls. I did complete a risk assessment for falls.   B. Patient will start a comprehensive physical therapy program for Alter-G, core strengthening, gait and balance training, neurodynamics, ASTYM, E-STIM, myofascial release, cupping, dry needling, home exercise program  C. Continue exercise program at the [facility].  I have also recommended aqua therapy   D. Contrast therapy: Apply ice-packs for 15-20 minutes, followed by heating pads for 15-20 minutes to affected area   E. Referral to Dr. Felipe Yousif for updated psychological screening for spinal cord stimulation and intrathecal therapies prior to a spinal cord stimulator trial.  F. Patient's Body mass index is 29 kg/m². Patient counseled on the importance of weight loss to help with overall health and pain control.   5. The patient has been instructed to contact my office with any questions or difficulties. The patient understands the plan and agrees to proceed accordingly.        Pain Medications               aspirin (SB Low Dose ASA EC) 81 MG EC tablet Take 1 tablet by mouth Daily.    citalopram (CeleXA) 20 MG tablet Take 1 tablet by mouth Daily.    HYDROcodone-acetaminophen (NORCO) 5-325 MG per tablet Take 1-2 tablets by mouth Every 12 (Twelve) Hours As Needed for Severe Pain (Pain).    methocarbamol (Robaxin) 500 MG tablet Take 1 tablet by mouth 3 (Three) Times a Day.             No orders of the defined types were placed in this encounter.     Patient verbalizes understanding of medication dosage, comfort measures, instructions for treatment, and follow-up.    Please note that portions of this note were completed with a voice recognition program.   Any copied data in any portion of my note has been reviewed by myself and accurate.     FLO Martin    Patient Care Team:  Daniel Shafer DO as PCP - General (Family Medicine)  Felice Lopez MD as Consulting Physician (Gastroenterology)  Thor  Dandy MORGAN MD as Consulting Physician (Pain Medicine)  Damián Devi MD as Consulting Physician (Cardiology)  Jonel Browning MD as Surgeon (Neurosurgery)  Alexy Coy PA-C as Physician Assistant (Physician Assistant)  Daniel Shafer DO as Emergency Attending (Family Medicine)     No orders of the defined types were placed in this encounter.        Future Appointments   Date Time Provider Department Center   12/6/2024 11:30 AM Daniel Shafer DO [facility]

## 2024-10-16 ENCOUNTER — PATIENT ROUNDING (BHMG ONLY) (OUTPATIENT)
Dept: URGENT CARE | Facility: CLINIC | Age: 69
End: 2024-10-16
Payer: MEDICARE

## 2024-10-16 ENCOUNTER — TELEPHONE (OUTPATIENT)
Dept: PAIN MEDICINE | Facility: CLINIC | Age: 69
End: 2024-10-16
Payer: MEDICARE

## 2024-10-16 NOTE — TELEPHONE ENCOUNTER
Caller: RIMMA CASTILLO    Relationship to patient: SELF    Best call back number: [phone]    Chief complaint:  PATIENT SAYS SHE SAW DR. GONZALEZ TODAY (WITH DR. LONDON) AND THEY TOLD HER TO GO AHEAD AND CALL US FOR APPT. REGARDING SPINAL CORD STIMULATOR.

## 2024-10-16 NOTE — ED NOTES
Thank you for letting us care for you in your recent visit to our urgent care center. We would love to hear about your experience with us. Was this the first time you have visited our location?    We’re always looking for ways to make our patients’ experiences even better. Do you have any recommendations on ways we may improve?     I appreciate you taking the time to respond. Please be on the lookout for a survey about your recent visit from [employer] via text or email. We would greatly appreciate if you could fill that out and turn it back in. We want your voice to be heard and we value your feedback.   Thank you for choosing [facility] for your healthcare needs.

## 2024-10-18 NOTE — TELEPHONE ENCOUNTER
Called patient back and informed her that once we receive that note we will get her scheduled. Patient had no further questions.

## 2024-10-21 ENCOUNTER — TELEPHONE (OUTPATIENT)
Dept: PAIN MEDICINE | Facility: CLINIC | Age: 69
End: 2024-10-21
Payer: MEDICARE

## 2024-10-21 NOTE — TELEPHONE ENCOUNTER
Caller:RIMMA   Relationship to Patient: SELF  Phone Number: [phone]  Reason for Call: CALLLING STATING SHE SAW DR LONDON ON 10/16/24 ASKING HOW SHE IS TO PROCEED FROM HERE

## 2024-10-22 ENCOUNTER — PRIOR AUTHORIZATION (OUTPATIENT)
Dept: FAMILY MEDICINE CLINIC | Facility: CLINIC | Age: 69
End: 2024-10-22

## 2024-10-22 ENCOUNTER — OFFICE VISIT (OUTPATIENT)
Dept: FAMILY MEDICINE CLINIC | Facility: CLINIC | Age: 69
End: 2024-10-22
Payer: MEDICARE

## 2024-10-22 VITALS
OXYGEN SATURATION: 97 % | HEART RATE: 62 BPM | SYSTOLIC BLOOD PRESSURE: 118 MMHG | BODY MASS INDEX: 26.32 KG/M2 | HEIGHT: 61 IN | DIASTOLIC BLOOD PRESSURE: 74 MMHG | WEIGHT: 139.4 LBS

## 2024-10-22 DIAGNOSIS — M54.50 LUMBAR PAIN: Primary | ICD-10-CM

## 2024-10-22 DIAGNOSIS — M48.062 NEUROGENIC CLAUDICATION DUE TO LUMBAR SPINAL STENOSIS: ICD-10-CM

## 2024-10-22 RX ORDER — METAXALONE 800 MG/1
800 TABLET ORAL 3 TIMES DAILY PRN
Qty: 42 TABLET | Refills: 0 | Status: SHIPPED | OUTPATIENT
Start: 2024-10-22 | End: 2024-11-05

## 2024-10-22 RX ORDER — HYDROCODONE BITARTRATE AND ACETAMINOPHEN 5; 325 MG/1; MG/1
1-2 TABLET ORAL EVERY 12 HOURS PRN
Qty: 60 TABLET | Refills: 0 | Status: SHIPPED | OUTPATIENT
Start: 2024-10-22

## 2024-10-22 RX ORDER — METHYLPREDNISOLONE ACETATE 40 MG/ML
40 INJECTION, SUSPENSION INTRA-ARTICULAR; INTRALESIONAL; INTRAMUSCULAR; SOFT TISSUE ONCE
Status: COMPLETED | OUTPATIENT
Start: 2024-10-22 | End: 2024-10-22

## 2024-10-22 RX ADMIN — METHYLPREDNISOLONE ACETATE 40 MG: 40 INJECTION, SUSPENSION INTRA-ARTICULAR; INTRALESIONAL; INTRAMUSCULAR; SOFT TISSUE at 14:54

## 2024-10-22 NOTE — PROGRESS NOTES
Established Patient Office Visit      Patient Name: Luh Horner  : 1955   MRN: [id number]   Care Team: Patient Care Team:  Daniel Shafer DO as PCP - General (Family Medicine)  Felice Lopez MD as Consulting Physician (Gastroenterology)  Dandy Mcrae MD as Consulting Physician (Pain Medicine)  Damián Devi MD as Consulting Physician (Cardiology)  Jonel Browning MD as Surgeon (Neurosurgery)  Alexy Coy PA-C as Physician Assistant (Physician Assistant)  Daniel Shafer DO as Emergency Attending (Family Medicine)    Chief Complaint:    Chief Complaint   Patient presents with    Hip Pain     Pt. States she noticed about 2 weeks ago Right Hip Pain. Pt. States since Urgent Care Visit 10/13/24 Hip Pain is worse.       History of Present Illness: Luh Horner is a 69 y.o. female who is here today for chief complaint.    HPI    Back pain has been worse this week  Taking baclofen 3 times per day including at night (previously took robaxin but that made her sleepy), 3 hydrocodone during the day. Prednisone from [facility] helped some but now is back to previous level.    Answers submitted by the patient for this visit:  Other (Submitted on 10/21/2024)  Please describe your symptoms.: Hip pain, especially after sleeping  Have you had these symptoms before?: No  How long have you been having these symptoms?: Greater than 2 weeks  Please list any medications you are currently taking for this condition.: Baclofen and hydrocodone  Please describe any probable cause for these symptoms. : Lifting boxes and standing in my feet all day during a yard sake  Primary Reason for Visit (Submitted on 10/21/2024)  What is the primary reason for your visit?: Problem Not Listed      This patient is accompanied by their self who contributes to the history of their care.    The following portions of the patient's history were reviewed and updated as appropriate: allergies, current  medications, past family history, past medical history, past social history, past surgical history and problem list.    Subjective      Review of Systems:   Review of Systems - See HPI    Past Medical History:   Past Medical History:   Diagnosis Date    Allergic 2000    Allergic rhinitis     Anxiety 2004    Bone pain     foot    Cancer 2005    Skin cancer squamous    Cataract 2010    Surgery recently    Cervical disc disorder 2022    Chronic pain disorder 2012    Coronary artery disease 2005    Mitral valve    CTS (carpal tunnel syndrome)     Depression 1981    1981 - Hospitalized    Fibrocystic breast disease 2000    Current benign breast biopsies    GERD (gastroesophageal reflux disease) 2001    Glaucoma Surgery recently    Headache 2021    None this year    Hiatal hernia     Hyperlipidemia     Hypertension 1997    Insomnia 2013    Irritable bowel syndrome (IBS) 2002    Recurrent abdominal cramps    Joint pain 2012    Low back pain 2008    Lumbar scoliosis 2017    Lumbar spondylosis 2000    Chronic low back pain    Lumbosacral disc disease     Migraine 2021    Mitral valve insufficiency     Mitral valve prolapse 2000    Neck pain 2022    Obesity 2000    Osteoarthritis     Osteopenia     Peripheral neuropathy     Post menopausal syndrome 2004    Refractory hot flashes    Sleep disorder 06/14/2016    Spinal stenosis 2022    Thoracic disc disorder     Wears reading eyeglasses        Past Surgical History:   Past Surgical History:   Procedure Laterality Date    BACK SURGERY  09/2023    BREAST BIOPSY Right remote    benign disease    CATARACT EXTRACTION, BILATERAL Bilateral 2021    CHOLECYSTECTOMY  1998    COLONOSCOPY  ?    ENDOSCOPY      HYSTERECTOMY  1998    LAMINECTOMY  09/2023    LUMBAR LAMINECTOMY Bilateral 10/18/2023    Procedure: LAMINECTOMY BILATERAL DECOMPRESSION L4-5 RIGHT;  Surgeon: Jonel Browning MD;  Location: [facility];  Service: Neurosurgery;  Laterality: Bilateral;    REFRACTIVE SURGERY  1994     RK    SKIN BIOPSY      SPINE SURGERY      TRIGGER POINT INJECTION      Knee    URETHRAL SUSPENSION      laparoscopic for stress incontinence       Family History:   Family History   Problem Relation Age of Onset    Pulmonary fibrosis Mother          age 82    Hypertension Mother     Lumbar disc disease Mother     Prostate cancer Father          age 76    Cancer Father         Prostate    Hypertension Sister             Goiter Sister     Hypothyroidism Sister     Anxiety disorder Sister     Miscarriages / Stillbirths Sister     Thyroid disease Sister     Heart disease Brother          Age 48 heart attack was cigarette smoker    Hiatal hernia Brother     Early death Brother         Heart attack at age 48    Heart attack Brother     Breast cancer Other         Maternal and paternal aunts    Hypertension Brother     Obesity Maternal Uncle     Diabetes Maternal Uncle     Arthritis Paternal Aunt     Hypertension Brother     Hyperlipidemia Brother        Social History:   Social History     Socioeconomic History    Marital status:    Tobacco Use    Smoking status: Former     Current packs/day: 0.00     Average packs/day: 2.0 packs/day for 10.0 years (20.0 ttl pk-yrs)     Types: Cigarettes     Start date: 1971     Quit date: 1981     Years since quittin.8     Passive exposure: Never    Smokeless tobacco: Never   Vaping Use    Vaping status: Never Used   Substance and Sexual Activity    Alcohol use: Not Currently     Alcohol/week: 3.0 standard drinks of alcohol     Types: 3 Glasses of wine per week     Comment: 2 per night    Drug use: Never    Sexual activity: Yes     Partners: Male     Birth control/protection: Hysterectomy       Tobacco History:   Social History     Tobacco Use   Smoking Status Former    Current packs/day: 0.00    Average packs/day: 2.0 packs/day for 10.0 years (20.0 ttl pk-yrs)    Types: Cigarettes    Start date: 1971    Quit date: 1981    Years  since quittin.8    Passive exposure: Never   Smokeless Tobacco Never       Medications:   Outpatient Medications Prior to Visit   Medication Sig Dispense Refill    aspirin (SB Low Dose ASA EC) 81 MG EC tablet Take 1 tablet by mouth Daily.      atorvastatin (LIPITOR) 40 MG tablet Take 1 tablet by mouth every night at bedtime. 90 tablet 3    citalopram (CeleXA) 20 MG tablet Take 1 tablet by mouth Daily. 90 tablet 3    Coenzyme Q10 (CO Q-10) 200 MG capsule Take 1 capsule by mouth.      dicyclomine (Bentyl) 10 MG capsule Take 1 capsule by mouth 4 (Four) Times a Day Before Meals & at Bedtime. (Patient taking differently: Take 1 capsule by mouth As Needed.) 120 capsule 1    estradiol (MINIVELLE, VIVELLE-DOT) 0.075 MG/24HR patch Place 1 patch on the skin as directed by provider 2 (Two) Times a Week.      ezetimibe (ZETIA) 10 MG tablet Take 1 tablet by mouth Daily. 90 tablet 3    fluticasone (FLONASE) 50 MCG/ACT nasal spray 2 sprays by Each Nare route Daily. 48 g 3    lidocaine (LIDODERM) 5 % Place 1 patch on the skin as directed by provider Daily for 30 days. Remove & Discard patch within 12 hours or as directed by healthcare provider 30 patch 0    lisinopril (PRINIVIL,ZESTRIL) 40 MG tablet TAKE 1 TABLET EVERY MORNING 90 tablet 3    Magnesium Citrate 200 MG tablet Take 2 tablets by mouth Every Night.      melatonin 5 MG tablet tablet Take 1 tablet by mouth Every Night.      methocarbamol (Robaxin) 500 MG tablet Take 1 tablet by mouth 3 (Three) Times a Day. (Patient taking differently: Take 1 tablet by mouth As Needed.) 90 tablet 0    Omega-3 Fatty Acids (FISH OIL) 1000 MG capsule capsule Take 1 capsule by mouth Daily With Breakfast.      omeprazole (priLOSEC) 20 MG capsule TAKE 1 CAPSULE DAILY 90 capsule 3    Semaglutide, 1 MG/DOSE, (OZEMPIC) 2 MG/1.5ML solution pen-injector Inject  under the skin into the appropriate area as directed 1 (One) Time Per Week.      sucralfate (CARAFATE) 1 g tablet TAKE 1 TABLET EVERY 12  "HOURS (Patient taking differently: Take 1 tablet by mouth 2 (Two) Times a Day. Usually only takes one a day) 60 tablet 11    valACYclovir (VALTREX) 500 MG tablet Take 1 tablet by mouth Daily.      vitamin C (ASCORBIC ACID) 500 MG tablet Take 1 tablet by mouth Daily.      amLODIPine (NORVASC) 5 MG tablet Take 1 tablet by mouth Daily.      baclofen (LIORESAL) 10 MG tablet Take 0.5 tablets by mouth 3 (Three) Times a Day for 3 days, THEN 1 tablet 3 (Three) Times a Day As Needed for Muscle Spasms for up to 15 days. 30 tablet 0    HYDROcodone-acetaminophen (NORCO) 5-325 MG per tablet Take 1-2 tablets by mouth Every 12 (Twelve) Hours As Needed for Severe Pain (Pain). 60 tablet 0     No facility-administered medications prior to visit.        Allergies:   Allergies   Allergen Reactions    Shellfish Allergy Other (See Comments)     Head congestion    Atenolol Other (See Comments)     Fatigue    Buspirone Headache      Headache    Codeine Other (See Comments)     Depression    Pseudoephedrine Anxiety    Rosuvastatin Other (See Comments)     Fatigue    Westley-E.P.A. [Dha-Epa-Vitamin E] Anxiety    Simvastatin Other (See Comments)     Facial numbness    Trazodone Other (See Comments)     Nightmares       Objective   Objective     Physical Exam:  Vital Signs:   Vitals:    10/22/24 1425   BP: 118/74   Pulse: 62   SpO2: 97%   Weight: 63.2 kg (139 lb 6.4 oz)   Height: 154.9 cm (60.98\")   PainSc:   2   PainLoc: Hip     Body mass index is 26.35 kg/m².     Physical Exam  Nursing note reviewed  Const: NAD, A&Ox4, Pleasant, Cooperative  Eyes: EOMI, no conjunctivitis  ENT: No nasal discharge present, neck supple  Cardiac: Regular rate and rhythm, no cyanosis  Resp: Respiratory rate within normal limits, no increased work of breathing, no audible wheezing or retractions noted  GI: No distention or ascites  MSK: Motor and sensation grossly intact in bilateral upper extremities  Neurologic: CN II-XII grossly intact  Psych: Appropriate mood and " behavior.  Skin: Warm, dry  Procedures/Radiology     Procedures  No radiology results for the last 7 days     Assessment & Plan   Assessment / Plan      Assessment/Plan:   Problems Addressed This Visit  Diagnoses and all orders for this visit:    1. Lumbar pain (Primary)  -     metaxalone (Skelaxin) 800 MG tablet; Take 1 tablet by mouth 3 (Three) Times a Day As Needed for Muscle Spasms for up to 14 days.  Dispense: 42 tablet; Refill: 0  -     methylPREDNISolone acetate (DEPO-medrol) injection 40 mg    2. Neurogenic claudication due to lumbar spinal stenosis  -     HYDROcodone-acetaminophen (NORCO) 5-325 MG per tablet; Take 1-2 tablets by mouth Every 12 (Twelve) Hours As Needed for Severe Pain (Pain).  Dispense: 60 tablet; Refill: 0  -     metaxalone (Skelaxin) 800 MG tablet; Take 1 tablet by mouth 3 (Three) Times a Day As Needed for Muscle Spasms for up to 14 days.  Dispense: 42 tablet; Refill: 0  -     methylPREDNISolone acetate (DEPO-medrol) injection 40 mg      Problem List Items Addressed This Visit          Musculoskeletal and Injuries    Neurogenic claudication due to lumbar spinal stenosis    Relevant Medications    HYDROcodone-acetaminophen (NORCO) 5-325 MG per tablet     Other Visit Diagnoses       Lumbar pain    -  Primary    Relevant Medications    methylPREDNISolone acetate (DEPO-medrol) injection 40 mg (Completed)          No orders of the defined types were placed in this encounter.        There are no Patient Instructions on file for this visit.    Follow Up:   No follow-ups on file.      DO FARZANA Jiang RD  [facility]  [address]  [facility] [address]  Fax [phone]  Phone [phone]    Disclaimer to patients: The 21st Century Cares Act makes medical notes like these available to patients in the interest of transparency. However, please be advised that this is still a medical document. It is intended as  [id number] communication. Many sections may include medical language or jargon, abbreviations, and additional verbiage that are unfamiliar or confusing. In some ways it may come across as blunt, direct, or may be summarized in order to clearly and concisely communicate the most crucial information to medical professionals. It may also include mentions of conditions that are unlikely but considered as part of the differential diagnosis, including serious disorders. These are not always discussed at length at the time of appointment because their likelihood is so low, but may be included in a medical note to make it clear what has been considered and/or ruled out as part of a work-up. Medical documents are intended to carry relevant information, facts as evident, and the personal clinical opinion of the physician. If you have any questions regarding this medical document, please bring them to the attention of the physician at your next scheduled appointment.

## 2024-10-22 NOTE — TELEPHONE ENCOUNTER
(Key: [id number]) - [id number]  Metaxalone 800MG tablets  status: PA Request  Created: October 22nd, 2024 [id number]  Sent: October 22nd, 2024    Denied on October 22 by [employer] 2017 NCPDP  Request Reference Number: [id number]. METAXALONE TAB 800MG is denied for not meeting the prior authorization requirement(s).   METAXALONE TAB 800MG is denied under your Medicare Advantage (MA) plan, which does not  cover outpatient prescription drugs that may be eligible for coverage under Part D. Drug coverage  provided by your plan is limited to drugs that would be covered under Part B of Original Medicare.  Please refer to your Evidence of Coverage (EOC) for further information on the types of drugs that are  covered. You may have a separate commercial pharmacy benefit or Medicare Part D benefit managed  by another carrier. If you have prescription drug coverage with another carrier, please contact them  directly for information about how to access your prescription drug benefit      For a Standard Appeal: Address: [facility]  [address], [place] [address]  [place] [address]  Fax: [phone]  For a Fast Appeal: Phone: [phone] TTY Users Call: 638  Fax: [phone]

## 2024-10-23 DIAGNOSIS — I10 PRIMARY HYPERTENSION: ICD-10-CM

## 2024-10-23 DIAGNOSIS — M96.1 LUMBAR POSTLAMINECTOMY SYNDROME: Primary | ICD-10-CM

## 2024-10-23 DIAGNOSIS — Z01.812 PRE-PROCEDURE LAB EXAM: ICD-10-CM

## 2024-10-25 RX ORDER — AMLODIPINE BESYLATE 10 MG/1
TABLET ORAL
Qty: 90 TABLET | Refills: 3 | OUTPATIENT
Start: 2024-10-25

## 2024-10-28 ENCOUNTER — TELEPHONE (OUTPATIENT)
Dept: PAIN MEDICINE | Facility: CLINIC | Age: 69
End: 2024-10-28
Payer: MEDICARE

## 2024-10-28 RX ORDER — AMLODIPINE BESYLATE 5 MG/1
5 TABLET ORAL DAILY
Qty: 30 TABLET | Refills: 1 | Status: SHIPPED | OUTPATIENT
Start: 2024-10-28

## 2024-10-28 NOTE — TELEPHONE ENCOUNTER
Provider: SAM    Caller: RIMMA    Relationship to Patient: SELF    Pharmacy:     Phone Number: [phone]    Reason for Call: PT CALLED REQUESTING TO SPEAK TO KELVIN, FEELS THAT SHE NEEDS TO SEE DR VARGAS BEFORE HER PROCEDURE - PLEASE CALL BACK

## 2024-10-28 NOTE — TELEPHONE ENCOUNTER
Rx Refill Note  Requested Prescriptions     Pending Prescriptions Disp Refills    amLODIPine (NORVASC) 5 MG tablet       Sig: Take 1 tablet by mouth Daily.      Last office visit with prescribing clinician: 10/22/2024   Last telemedicine visit with prescribing clinician: Visit date not found   Next office visit with prescribing clinician: 12/6/2024       Elizabeth Boateng MA  10/28/24, 14:40 EDT    Historical Provider for this Medication.

## 2024-11-11 ENCOUNTER — LAB (OUTPATIENT)
Dept: LAB | Facility: HOSPITAL | Age: 69
End: 2024-11-11
Payer: MEDICARE

## 2024-11-11 DIAGNOSIS — Z01.812 PRE-PROCEDURE LAB EXAM: ICD-10-CM

## 2024-11-11 LAB
APTT PPP: 30.1 SECONDS (ref 22–39)
BASOPHILS # BLD AUTO: 0.06 10*3/MM3 (ref 0–0.2)
BASOPHILS NFR BLD AUTO: 0.9 % (ref 0–1.5)
DEPRECATED RDW RBC AUTO: 44 FL (ref 37–54)
EOSINOPHIL # BLD AUTO: 0.05 10*3/MM3 (ref 0–0.4)
EOSINOPHIL NFR BLD AUTO: 0.8 % (ref 0.3–6.2)
ERYTHROCYTE [DISTWIDTH] IN BLOOD BY AUTOMATED COUNT: 12.1 % (ref 12.3–15.4)
HCT VFR BLD AUTO: 42.3 % (ref 34–46.6)
HGB BLD-MCNC: 14.7 G/DL (ref 12–15.9)
IMM GRANULOCYTES # BLD AUTO: 0.03 10*3/MM3 (ref 0–0.05)
IMM GRANULOCYTES NFR BLD AUTO: 0.5 % (ref 0–0.5)
INR PPP: 0.92 (ref 0.89–1.12)
LYMPHOCYTES # BLD AUTO: 1.14 10*3/MM3 (ref 0.7–3.1)
LYMPHOCYTES NFR BLD AUTO: 17.1 % (ref 19.6–45.3)
MCH RBC QN AUTO: 34.6 PG (ref 26.6–33)
MCHC RBC AUTO-ENTMCNC: 34.8 G/DL (ref 31.5–35.7)
MCV RBC AUTO: 99.5 FL (ref 79–97)
MONOCYTES # BLD AUTO: 0.72 10*3/MM3 (ref 0.1–0.9)
MONOCYTES NFR BLD AUTO: 10.8 % (ref 5–12)
NEUTROPHILS NFR BLD AUTO: 4.66 10*3/MM3 (ref 1.7–7)
NEUTROPHILS NFR BLD AUTO: 69.9 % (ref 42.7–76)
NRBC BLD AUTO-RTO: 0 /100 WBC (ref 0–0.2)
PLATELET # BLD AUTO: 283 10*3/MM3 (ref 140–450)
PMV BLD AUTO: 9 FL (ref 6–12)
PROTHROMBIN TIME: 12.5 SECONDS (ref 12.2–14.5)
RBC # BLD AUTO: 4.25 10*6/MM3 (ref 3.77–5.28)
WBC NRBC COR # BLD AUTO: 6.66 10*3/MM3 (ref 3.4–10.8)

## 2024-11-11 PROCEDURE — 85610 PROTHROMBIN TIME: CPT | Performed by: NURSE PRACTITIONER

## 2024-11-11 PROCEDURE — 85025 COMPLETE CBC W/AUTO DIFF WBC: CPT | Performed by: NURSE PRACTITIONER

## 2024-11-11 PROCEDURE — 85730 THROMBOPLASTIN TIME PARTIAL: CPT | Performed by: NURSE PRACTITIONER

## 2024-11-18 ENCOUNTER — TELEPHONE (OUTPATIENT)
Dept: PAIN MEDICINE | Facility: CLINIC | Age: 69
End: 2024-11-18

## 2024-11-18 ENCOUNTER — OUTSIDE FACILITY SERVICE (OUTPATIENT)
Dept: PAIN MEDICINE | Facility: CLINIC | Age: 69
End: 2024-11-18
Payer: MEDICARE

## 2024-11-18 PROCEDURE — 63685 INS/RPLC SPI NPG/RCVR POCKET: CPT | Performed by: ANESTHESIOLOGY

## 2024-11-18 PROCEDURE — 63650 IMPLANT NEUROELECTRODES: CPT | Performed by: ANESTHESIOLOGY

## 2024-11-18 NOTE — TELEPHONE ENCOUNTER
Caller: Luh Horner    Relationship to patient: Self    Best call back number: [phone]      Type of visit: FOLLOW UP      Additional notes:PT WOULD LIKE TO KNOW WHAT IS THE DATE AND THE TIME FOR HER FOLLOW UP VISIT WITH DR. VARGAS. SHE THOUGHT IT WAS FOR THURSDAY- 11.21.24 - BUT SHE WOULD LIKE TO CONFIRM THE DATE AND THE TIME.    PLEASE CONTACT PT AND ADVISE

## 2024-11-19 ENCOUNTER — TELEPHONE (OUTPATIENT)
Dept: PAIN MEDICINE | Facility: CLINIC | Age: 69
End: 2024-11-19
Payer: MEDICARE

## 2024-11-19 NOTE — TELEPHONE ENCOUNTER
Spoke with pt regarding how they're doing after their SCS Trial procedure on 11/18/2024. Pt advised they are having no complications, or side effects. Pt advised that when she tries to lean back she is experiencing 9/10 pain. Pain getting out of bed. Pt reports that she can't tell any improvement with her chronic pain either.    Pt had 7-8/10 prior procedure, 0/10 post procedure. Pt rates pain 9/10 today.    Advised pt of follow up Thursday with FLO [place] at 0930.     Pt verbalized understanding, no further needs expressed.

## 2024-11-21 ENCOUNTER — OFFICE VISIT (OUTPATIENT)
Dept: PAIN MEDICINE | Facility: CLINIC | Age: 69
End: 2024-11-21
Payer: MEDICARE

## 2024-11-21 VITALS — BODY MASS INDEX: 26.58 KG/M2 | WEIGHT: 140.8 LBS | HEIGHT: 61 IN

## 2024-11-21 DIAGNOSIS — M96.1 LUMBAR POSTLAMINECTOMY SYNDROME: ICD-10-CM

## 2024-11-21 DIAGNOSIS — F32.89 OTHER DEPRESSION: ICD-10-CM

## 2024-11-21 DIAGNOSIS — M24.28 LIGAMENTUM FLAVUM HYPERTROPHY: ICD-10-CM

## 2024-11-21 DIAGNOSIS — M48.062 LUMBAR STENOSIS WITH NEUROGENIC CLAUDICATION: ICD-10-CM

## 2024-11-21 DIAGNOSIS — M47.816 SPONDYLOSIS OF LUMBAR REGION WITHOUT MYELOPATHY OR RADICULOPATHY: ICD-10-CM

## 2024-11-21 DIAGNOSIS — F41.9 ANXIETY: ICD-10-CM

## 2024-11-21 NOTE — PROGRESS NOTES
"Chief Complaint: \"I feel the trial did not provide much relief\"      Brief History: Mrs. Luh Horner is a 69 y.o. female, who returns to the clinic for possible spinal cord stimulator reprogramming and removal of spinal cord stimulator trial leads placed on 11/18/2024. Ms. Luh Horner reports the morning of the second day of the trial as well as the third day of the trial she was experiencing about 50% relief for chronic lower back and bilateral hip pain.  She then tells me, she began to do so activities such as go to the store and stand and walk, and her pain progressively returned.  Luh Horner required SCS reprogramming during the trial. Patient was able to operate her stimulator device without difficulties.  Patient did not take additional analgesics throughout her spinal cord stimulator trial. She has remained afebrile throughout the trial. Dressings are dry and intact. Patient denies any complications related to the procedure.   of a spinal cord stimulator device.   Pain level is rated as 4/10 with the stimulator \"turned on.”   Patient level ranges from 1/10 to 8/10 with the use of the spinal cord stimulator.    Patient denies  pain, numbness, and weakness in the lower extremities.  Patient denies  any new bladder or bowel problems.     Pain History:  Ms. Luh Horner, originally referred by Dr. Jonel Browning in consultation for chronic intractable lower back and bilateral hip pain.  She presents with a longstanding history of lower back, bilateral hip pain, and neurogenic claudication.  She underwent a minimally invasive L4-L5 laminectomy on the right side with Dr. Jonel Browning on 10/18/2023.  This helped with her lower extremity symptoms, and at her follow-up visit on 12/4/2023 with neurosurgery, she continued to complain of lower back difficulties, and it was recommended she pursue pain management.  There were no diagnostics obtained at that visit.  She then contacted our " office, and further diagnostics were ordered.  A recent MRI of the lumbar spine with and without contrast on 2/8/2024 demonstrates significant degenerative disc disease throughout the lumbar spine, with diffuse facet joint arthritis, as well as ligamentum flavum hypertrophy with various degrees of spinal canal stenosis and neuroforaminal stenosis.  There are Modic type I degenerative endplate changes at T12-L1.  At L2-L3 there is a broad-based disc bulge with facet hypertrophy which contributes to mild to moderate left and mild right neuroforaminal stenosis with mild to moderate central spinal stenosis.  At L3-L4 there is broad-based disc bulge with facet hypertrophy with ligamentum flavum thickening which contributes to moderate to severe central spinal stenosis and mild to moderate bilateral neuroforaminal stenosis.  At L4-L5 there is postsurgical changes from prior left hemoglobin ectomy, with a broad-based disc bulge and facet hypertrophy with ligamentum flavum thickening which contributes to moderate central spinal stenosis and moderate to severe bilateral neuroforaminal stenosis.  At L5-S1 there is a broad-based disc bulge with facet hypertrophy with ligamentum flavum thickening which contributes to mild central spinal stenosis with moderate severe bilateral neuroforaminal stenosis.  When she was last seen, she was presented with several different therapeutic options, for treatment of her chronic pain.  Eventually on March 6, 2024 she underwent diagnostic and therapeutic L3-L4 transforaminal epidural steroid injections, from which she reports experiencing a few days of pain relief.  She saw Alexy Coy PA-C on March 8, 2024, and reportedly was doing well with most recent injection which was only performed a few days prior.  Alexy discussed the possibility of consideration of a spinal cord stimulator, versus additional lumbar spine surgery.  Patient has failed to obtain pain relief with conservative measures  for more than including oral analgesics, opioids, topical analgesics, ice, heat, several rounds of physical therapy, chiropractic therapy, [facility] physical therapist directed home exercise program (ongoing), independent exercise program (ongoing at the [facility]), water aerobics, to name a few.  I last saw her several months ago, to discuss further options in treating her pain.  She opted to continue conservatively with exercise and weight loss.    Pain Description: Constant lower back and bilateral hip pain with intermittent exacerbation, described as aching, burning, dull, sharp, shooting, stabbing, throbbing, tight band and tingling sensation.   Radiation of Pain: The pain radiates into the hips   Pain intensity today: 4/10   Average pain intensity last week: 6/10  Pain intensity ranges from: 2/10 to 8/10  Aggravating factors: Pain increases with bending, twisting, standing, walking. Patient describes neurogenic claudication. Patient does not use a cane or walker  Alleviating factors: Pain decreases with sitting down, lying down  Associated Symptoms:   Patient denies pain, numbness, or weakness in the lower extremities.   Patient denies any new bladder or bowel problems.   Patient denies difficulties with her balance or recent falls.   Pain interferes with ADLs, general activities (ability to walk, stand, transition from different positions), and affects patient's quality of life  Pain does not interfere with sleep   Muscle spasms in the right paravertebral region RT>LT  Stiffness      Review of previous therapies and additional medical records:  Luh Horner has already failed the following measures, including:   Conservative Measures: Oral analgesics, opioids, topical analgesics, ice, heat, several rounds of physical therapy, chiropractic therapy, [facility] physical therapist directed home exercise program (ongoing), independent exercise program (ongoing at the [facility]), water aerobics  Interventional  Measures:   Two sets of diagnostic bilateral lumbar medial branch blocks; with no significant relief/equivocal results  03/06/2024: DxTx L3-L4 transforaminal epidural steroid injection- no relief  Surgical Measures: No history of previous cervical spine.  10/18/2023: Minimally invasive lumbar laminectomy at L4-L5 on the right with Dr. Jonel Browning  Luh Horner underwent neurosurgical consultation with Alexy Coy PA-C who discussed the possibility of spinal cord stimulation versus additional lumbar spine surgery.    Luh Horner presents with significant comorbidities including anxiety and depression, GERD, headache, hyperlipidemia, hypertension, insomnia IBS, mitral valve insufficiency, obesity, osteopenia, migraine without aura and without status migrainosus, not intractable  In terms of current analgesics, Luh Horner takes: Diclofenac gel, methocarbamol, Norco. Patient also takes citalopram, melatonin. Patient failed trials with gabapentin in the past (SE)  I have reviewed [facility] Report consistent with medication reconciliation.  SOAPP: Low Risk              Global Pain Scale 01-17  2023 07-09  2024 10-02 2024             Pain 10 15 13             Feelings 8 2 2             Clinical outcomes 10 10 13             Activities 13 6 5             GPS Total: 41 33 33                 Review of New Diagnostic Studies:  There are no new diagnostic studies for review  MRI of the lumbar spine with/without contrast on 2/8/2024 demonstrates significant degenerative disc disease throughout the lumbar spine, with diffuse facet joint arthritis, as well as ligamentum flavum hypertrophy with various degrees of spinal canal stenosis and neuroforaminal stenosis.  There are Modic type I degenerative endplate changes at T12-L1.  At L2-L3 there is a broad-based disc bulge with facet hypertrophy which contributes to mild to moderate left and mild right neuroforaminal stenosis with mild to moderate central  spinal stenosis.  At L3-L4 there is broad-based disc bulge with facet hypertrophy with ligamentum flavum thickening which contributes to moderate to severe central spinal stenosis and mild to moderate bilateral neuroforaminal stenosis.  At L4-L5 there is postsurgical changes from prior left hemoglobin ectomy, with a broad-based disc bulge and facet hypertrophy with ligamentum flavum thickening which contributes to moderate central spinal stenosis and moderate to severe bilateral neuroforaminal stenosis.  At L5-S1 there is a broad-based disc bulge with facet hypertrophy with ligamentum flavum thickening which contributes to mild central spinal stenosis with moderate severe bilateral neuroforaminal stenosis.  MRI of the thoracic spine without contrast 2/8/2024: Edematous Modic endplate changes at T1-T2 and T12-L1.  Mild exaggeration of the normal thoracic kyphosis.  No evidence of high-grade spinal stenosis.  Flexion-extension x-rays of the lumbar spine on 1/17/2023 revealed multilevel spondylosis.  Alignment is stable during flexion and extension  Bilateral hip x-rays on 1/17/2023 revealed mild degenerative changes of the hip joints.  MRI of the cervical spine without contrast on 2/3/2023 revealed cervical spondylosis.  There is preservation of vertebral body heights and alignment.  The cervical spinal cord has normal caliber and signal.  Axial imaging:  C2-C3: No significant canal or foraminal stenosis  C3-C4: Disc osteophyte complex, facet arthropathy.  Moderate canal stenosis and bilateral foraminal stenosis  C4-C5: Disc osteophyte complex, facet arthropathy.  Mild to moderate spinal canal stenosis.  Mild foraminal stenosis  C5-C6: Disc osteophyte complex and facet arthropathy.  Moderate canal stenosis  and bilateral foraminal stenosis  C6-7: Disc osteophyte complex, facet arthropathy.  Moderate spinal canal stenosis and bilateral foraminal stenosis           The following portions of the patient's history were  reviewed and updated as appropriate: problem list, past medical history, past surgery history, social history, family history, medications, and allergies    Review of Systems   Musculoskeletal:  Positive for arthralgias and back pain.   All other systems reviewed and are negative.        Patient Active Problem List   Diagnosis    Atopic rhinitis    Anxiety    Carpal tunnel syndrome    Clenching of teeth    Depression    Sleep disorder    Gastroesophageal reflux disease    Hyperlipidemia    Hypertension    Low back pain    Mitral valve insufficiency    BMI 27.0-27.9,adult    Gluteal tendinitis of right buttock    Postmenopausal disorder    Preventative health care    Other chronic pain    Spondylosis of lumbar region without myelopathy or radiculopathy    Lumbar scoliosis    Irritable bowel syndrome    Migraine without aura and without status migrainosus, not intractable    Lumbar stenosis with neurogenic claudication    Ligamentum flavum hypertrophy    Degeneration of lumbar or lumbosacral intervertebral disc    Epidural lipomatosis    Lumbar paraspinal muscle spasm    Neurogenic claudication due to lumbar spinal stenosis    Sciatica associated with disorder of lumbar spine       Past Medical History:   Diagnosis Date    Allergic 2000    Allergic rhinitis     Anxiety 2004    Bone pain     foot    Cancer 2005    Skin cancer squamous    Cataract 2010    Surgery recently    Cervical disc disorder 2022    Chronic pain disorder 2012    Coronary artery disease 2005    Mitral valve    CTS (carpal tunnel syndrome)     Depression 1981 1981 - Hospitalized    Fibrocystic breast disease 2000    Current benign breast biopsies    GERD (gastroesophageal reflux disease) 2001    Glaucoma Surgery recently    Headache 2021    None this year    Hiatal hernia     Hyperlipidemia     Hypertension 1997    Insomnia 2013    Irritable bowel syndrome (IBS) 2002    Recurrent abdominal cramps    Joint pain 2012    Low back pain 2008    Lumbar  scoliosis 2017    Lumbar spondylosis 2000    Chronic low back pain    Lumbosacral disc disease     Migraine     Mitral valve insufficiency     Mitral valve prolapse 2000    Neck pain     Obesity 2000    Osteoarthritis     Osteopenia     Peripheral neuropathy     Post menopausal syndrome 2004    Refractory hot flashes    Sleep disorder 2016    Spinal stenosis     Thoracic disc disorder     Wears reading eyeglasses          Past Surgical History:   Procedure Laterality Date    BACK SURGERY  2023    BREAST BIOPSY Right remote    benign disease    CATARACT EXTRACTION, BILATERAL Bilateral     CHOLECYSTECTOMY      COLONOSCOPY  ?    ENDOSCOPY      HYSTERECTOMY  1998    LAMINECTOMY  2023    LUMBAR LAMINECTOMY Bilateral 10/18/2023    Procedure: LAMINECTOMY BILATERAL DECOMPRESSION L4-5 RIGHT;  Surgeon: Jonel Browning MD;  Location: [facility];  Service: Neurosurgery;  Laterality: Bilateral;    REFRACTIVE SURGERY      RK    SKIN BIOPSY      SPINE SURGERY      TRIGGER POINT INJECTION      Knee    URETHRAL SUSPENSION      laparoscopic for stress incontinence         Family History   Problem Relation Age of Onset    Pulmonary fibrosis Mother          age 82    Hypertension Mother     Lumbar disc disease Mother     Prostate cancer Father          age 76    Cancer Father         Prostate    Hypertension Sister             Goiter Sister     Hypothyroidism Sister     Anxiety disorder Sister     Miscarriages / Stillbirths Sister     Thyroid disease Sister     Heart disease Brother          Age 48 heart attack was cigarette smoker    Hiatal hernia Brother     Early death Brother         Heart attack at age 48    Heart attack Brother     Breast cancer Other         Maternal and paternal aunts    Hypertension Brother     Obesity Maternal Uncle     Diabetes Maternal Uncle     Arthritis Paternal Aunt     Hypertension Brother     Hyperlipidemia Brother          Social  History     Socioeconomic History    Marital status:    Tobacco Use    Smoking status: Former     Current packs/day: 0.00     Average packs/day: 2.0 packs/day for 10.0 years (20.0 ttl pk-yrs)     Types: Cigarettes     Start date: 1971     Quit date: 1981     Years since quittin.9     Passive exposure: Never    Smokeless tobacco: Never   Vaping Use    Vaping status: Never Used   Substance and Sexual Activity    Alcohol use: Not Currently     Alcohol/week: 3.0 standard drinks of alcohol     Types: 3 Glasses of wine per week     Comment: 2 per night    Drug use: Never    Sexual activity: Yes     Partners: Male     Birth control/protection: Hysterectomy           Current Outpatient Medications:     amLODIPine (NORVASC) 5 MG tablet, Take 1 tablet by mouth Daily., Disp: 30 tablet, Rfl: 1    aspirin (SB Low Dose ASA EC) 81 MG EC tablet, Take 1 tablet by mouth Daily., Disp: , Rfl:     atorvastatin (LIPITOR) 40 MG tablet, Take 1 tablet by mouth every night at bedtime., Disp: 90 tablet, Rfl: 3    citalopram (CeleXA) 20 MG tablet, Take 1 tablet by mouth Daily., Disp: 90 tablet, Rfl: 3    Coenzyme Q10 (CO Q-10) 200 MG capsule, Take 1 capsule by mouth., Disp: , Rfl:     dicyclomine (Bentyl) 10 MG capsule, Take 1 capsule by mouth 4 (Four) Times a Day Before Meals & at Bedtime. (Patient taking differently: Take 1 capsule by mouth As Needed.), Disp: 120 capsule, Rfl: 1    estradiol (MINIVELLE, VIVELLE-DOT) 0.075 MG/24HR patch, Place 1 patch on the skin as directed by provider 2 (Two) Times a Week., Disp: , Rfl:     ezetimibe (ZETIA) 10 MG tablet, Take 1 tablet by mouth Daily., Disp: 90 tablet, Rfl: 3    fluticasone (FLONASE) 50 MCG/ACT nasal spray, 2 sprays by Each Nare route Daily., Disp: 48 g, Rfl: 3    HYDROcodone-acetaminophen (NORCO) 5-325 MG per tablet, Take 1-2 tablets by mouth Every 12 (Twelve) Hours As Needed for Severe Pain (Pain)., Disp: 60 tablet, Rfl: 0    lisinopril (PRINIVIL,ZESTRIL) 40 MG tablet,  "TAKE 1 TABLET EVERY MORNING, Disp: 90 tablet, Rfl: 3    Magnesium Citrate 200 MG tablet, Take 2 tablets by mouth Every Night., Disp: , Rfl:     melatonin 5 MG tablet tablet, Take 1 tablet by mouth Every Night., Disp: , Rfl:     methocarbamol (Robaxin) 500 MG tablet, Take 1 tablet by mouth 3 (Three) Times a Day. (Patient taking differently: Take 1 tablet by mouth As Needed.), Disp: 90 tablet, Rfl: 0    Omega-3 Fatty Acids (FISH OIL) 1000 MG capsule capsule, Take 1 capsule by mouth Daily With Breakfast., Disp: , Rfl:     omeprazole (priLOSEC) 20 MG capsule, TAKE 1 CAPSULE DAILY, Disp: 90 capsule, Rfl: 3    Semaglutide, 1 MG/DOSE, (OZEMPIC) 2 MG/1.5ML solution pen-injector, Inject  under the skin into the appropriate area as directed 1 (One) Time Per Week., Disp: , Rfl:     sucralfate (CARAFATE) 1 g tablet, TAKE 1 TABLET EVERY 12 HOURS (Patient taking differently: Take 1 tablet by mouth 2 (Two) Times a Day. Usually only takes one a day), Disp: 60 tablet, Rfl: 11    valACYclovir (VALTREX) 500 MG tablet, Take 1 tablet by mouth Daily., Disp: , Rfl:     vitamin C (ASCORBIC ACID) 500 MG tablet, Take 1 tablet by mouth Daily., Disp: , Rfl:       Allergies   Allergen Reactions    Shellfish Allergy Other (See Comments)     Head congestion    Atenolol Other (See Comments)     Fatigue    Buspirone Headache      Headache    Codeine Other (See Comments)     Depression    Pseudoephedrine Anxiety    Rosuvastatin Other (See Comments)     Fatigue    Westley-E.P.A. [Dha-Epa-Vitamin E] Anxiety    Simvastatin Other (See Comments)     Facial numbness    Trazodone Other (See Comments)     Nightmares         Ht 154.9 cm (60.98\")   Wt 63.9 kg (140 lb 12.8 oz)   BMI 26.62 kg/m²       Physical Exam   Constitutional: Patient appears well-developed, well-nourished, well-hydrated  HEENT: Head: Normocephalic and atraumatic  Eyes: Conjunctivae and lids are normal  Pupils: Equal, round, reactive to light  Musculoskeletal   Gait and station: Gait " evaluation demonstrated a normal gait   Neurological:   Patient is alert and oriented to person, place, and time.   Speech: Normal.   Cortical function: Normal mental status.   Motor strength:  5/5  Motor Tone: Normal .   Involuntary movements: None.   Skin and subcutaneous tissue: Skin is warm and intact. No rash noted. No cyanosis.  The stimulator placement site looks unremarkable without erythema, drainage or fluid accumulation  Psychiatric: Judgment and insight: Normal. Recent and remote memory: Intact. Mood and affect: Normal.     PROCEDURES:   Procedure #1: Analysis of the spinal cord stimulator device without spinal cord   Patient used the Medtronic spinal cord stimulator device 100% of the time since initiation of the trial phase.       Program A1 (Preferred Program):    Electrode polarities: 3-, 5+   Amplitude: 2.2 mA     Pulse width: 200 mcs  Rate: 50 Hz    A copy of the telemetry report will be scanned in the patient's chart.    Procedure #2: Removal of spinal cord stimulator trial leads.   Two leads were removed with tips intact. There is no erythema, drainage, or fluid accumulation at the site. The area was cleansed with chlorhexidine.  A small Covaderm was applied.     ASSESSMENT:   1. Lumbar postlaminectomy syndrome    2. Ligamentum flavum hypertrophy    3. Lumbar stenosis with neurogenic claudication    4. Spondylosis of lumbar region without myelopathy or radiculopathy    5. Anxiety    6. Other depression        PLAN/MEDICAL DECISION MAKING:  Ms. Luh Horner, 69 y.o. female  presents with a longstanding history of lower back pain, bilateral hip pain, associated with neurogenic claudication refractory to conservative measures. She underwent a minimally invasive L4-L5 laminectomy on the right side with Dr. Jonel Browning on 10/18/2023. This helped with her lower extremity symptoms, and at her follow-up visit on 12/4/2023 with neurosurgery, she continued to complain of lower back difficulties,  and it was recommended she pursue pain management. There were no diagnostics obtained at that visit. She then contacted our office, and further diagnostics were ordered. A recent MRI of the lumbar spine with and without contrast on 2/8/2024 demonstrates significant degenerative disc disease throughout the lumbar spine, with diffuse facet joint arthritis, as well as ligamentum flavum hypertrophy with various degrees of spinal canal stenosis and neuroforaminal stenosis. There are Modic type I degenerative endplate changes at T12-L1. At L2-L3 there is a broad-based disc bulge with facet hypertrophy which contributes to mild to moderate left and mild right neuroforaminal stenosis with mild to moderate central spinal stenosis. At L3-L4 there is broad-based disc bulge with facet hypertrophy with ligamentum flavum thickening which contributes to moderate to severe central spinal stenosis and mild to moderate bilateral neuroforaminal stenosis. At L4-L5 there is postsurgical changes from prior left hemoglobin ectomy, with a broad-based disc bulge and facet hypertrophy with ligamentum flavum thickening which contributes to moderate central spinal stenosis and moderate to severe bilateral neuroforaminal stenosis. At L5-S1 there is a broad-based disc bulge with facet hypertrophy with ligamentum flavum thickening which contributes to mild central spinal stenosis with moderate severe bilateral neuroforaminal stenosis. When she was last seen, she was presented with several different therapeutic options, for treatment of her chronic pain. Eventually on March 6, 2024 she underwent diagnostic and therapeutic L3-L4 transforaminal epidural steroid injections, from which she reports experiencing no significant pain relief. She saw Alexy Coy PA-C on March 8, 2024, and reportedly was doing well with most recent injection, which was only performed a few days prior. Alexy discussed the possibility of consideration of a spinal cord  stimulator, versus additional lumbar spine surgery. Patient has failed to obtain pain relief with conservative measures for more than including oral analgesics, opioids, topical analgesics, ice, heat, several rounds of physical therapy, chiropractic therapy, [facility] physical therapist directed home exercise program (ongoing), independent exercise program (ongoing at the [facility]), water aerobics, to name a few.  She recently underwent a trial of a spinal cord stimulator with [employer] KOOLtronic.  Today she reports the morning of the second day of the trial as well as the third day of the trial she was experiencing about 50% relief for chronic lower back and bilateral hip pain.  She then tells me, she began to do so activities such as go to the store and stand and walk, and her pain progressively returned.  I did discuss with her extending the trial, and reprogramming.  She opted to remove leads today, they would like to observe her pain and tolerance to activities without the device.  She did report some improvements overall, I did go over with her expectations.  She will see how she fares over the next week or so, and let me know.  I had a lengthy conversation with Ms. Luh Horner regarding her chronic pain condition and potential therapeutic options including risks, benefits, alternative therapies, to name a few. Therefore, I have proposed the following plan:  1.  Patient will call the office with an update in a week or so.  In the case of implant, the top electrodes will need to project at the level of the superior endplate vertebral level.  We would plan for Inceptiv IPG rechargeable full body MRI compatible.  2. The patient has been instructed to contact my office with any questions or difficulties. The patient understands the plan and agrees to proceed accordingly.    Pain Medications               aspirin (SB Low Dose ASA EC) 81 MG EC tablet Take 1 tablet by mouth Daily.    citalopram (CeleXA) 20 MG tablet  Take 1 tablet by mouth Daily.    HYDROcodone-acetaminophen (NORCO) 5-325 MG per tablet Take 1-2 tablets by mouth Every 12 (Twelve) Hours As Needed for Severe Pain (Pain).    methocarbamol (Robaxin) 500 MG tablet Take 1 tablet by mouth 3 (Three) Times a Day.             No orders of the defined types were placed in this encounter.       Please note that portions of this note were completed with a voice recognition program.     Any copied data in any portion of my note from previous notes included in the HPI, PE, MDM and/or assessment and plan has been reviewed by myself and accurate as of this date.     The 21st Century Cures Act makes medical notes like this available to patients in the interest of transparency. This is a medical document intended as peer to peer communication. It is written in medical language and may contain abbreviations or verbiage that are unfamiliar. It may appear blunt or direct. Medical documents are intended to carry relevant information, facts as evident, and the clinical opinion of the practitioner.     FLO Martin    Patient Care Team:  Daniel Shafer DO as PCP - General (Family Medicine)  Felice Lopez MD as Consulting Physician (Gastroenterology)  Dandy Mcrae MD as Consulting Physician (Pain Medicine)  Damián Devi MD as Consulting Physician (Cardiology)  Jonel Browning MD as Surgeon (Neurosurgery)  Alexy Coy PA-C as Physician Assistant (Physician Assistant)  Daniel Shafer DO as Emergency Attending (Family Medicine)     No orders of the defined types were placed in this encounter.        Future Appointments   Date Time Provider Department Center   12/6/2024 11:30 AM Daniel Shafer DO [facility]

## 2024-11-22 DIAGNOSIS — M48.062 NEUROGENIC CLAUDICATION DUE TO LUMBAR SPINAL STENOSIS: ICD-10-CM

## 2024-11-22 RX ORDER — HYDROCODONE BITARTRATE AND ACETAMINOPHEN 5; 325 MG/1; MG/1
1-2 TABLET ORAL EVERY 12 HOURS PRN
Qty: 60 TABLET | Refills: 0 | Status: CANCELLED | OUTPATIENT
Start: 2024-11-22

## 2024-11-26 DIAGNOSIS — M48.062 NEUROGENIC CLAUDICATION DUE TO LUMBAR SPINAL STENOSIS: ICD-10-CM

## 2024-11-26 RX ORDER — HYDROCODONE BITARTRATE AND ACETAMINOPHEN 5; 325 MG/1; MG/1
1-2 TABLET ORAL EVERY 12 HOURS PRN
Qty: 60 TABLET | Refills: 0 | Status: SHIPPED | OUTPATIENT
Start: 2024-11-26

## 2024-11-26 NOTE — TELEPHONE ENCOUNTER
Caller: Luh Horner RAJWINDER    Relationship: Self    Best call back number:   Telephone Information:   Mobile [phone]       Requested Prescriptions:   Requested Prescriptions     Pending Prescriptions Disp Refills    HYDROcodone-acetaminophen (NORCO) 5-325 MG per tablet 60 tablet 0     Sig: Take 1-2 tablets by mouth Every 12 (Twelve) Hours As Needed for Severe Pain (Pain).        Pharmacy where request should be sent: [id number] - [place], [place] - [address] [place] [address] - [phone] [facility] [phone]      Last office visit with prescribing clinician: 10/22/2024   Last telemedicine visit with prescribing clinician: Visit date not found   Next office visit with prescribing clinician: 12/6/2024     Additional details provided by patient: PATIENT SENT THE REQUEST THROUGH [facility] ON FRIDAY AND IT HAS NOT BEEN ADDRESSED    Does the patient have less than a 3 day supply:  [x] Yes  [] No      Salomon Thomson Rep   11/26/24 09:16 EST

## 2024-11-26 NOTE — TELEPHONE ENCOUNTER
Rx Refill Note  Requested Prescriptions     Pending Prescriptions Disp Refills    HYDROcodone-acetaminophen (NORCO) 5-325 MG per tablet 60 tablet 0     Sig: Take 1-2 tablets by mouth Every 12 (Twelve) Hours As Needed for Severe Pain (Pain).      Last office visit with prescribing clinician: 10/22/2024   Last telemedicine visit with prescribing clinician: Visit date not found   Next office visit with prescribing clinician: 12/6/2024       Leatha Roberts MA  11/26/24, 11:04 EST

## 2024-11-27 DIAGNOSIS — K21.9 GASTROESOPHAGEAL REFLUX DISEASE WITHOUT ESOPHAGITIS: ICD-10-CM

## 2024-12-02 DIAGNOSIS — K21.9 GASTROESOPHAGEAL REFLUX DISEASE WITHOUT ESOPHAGITIS: ICD-10-CM

## 2024-12-04 DIAGNOSIS — K21.9 GASTROESOPHAGEAL REFLUX DISEASE WITHOUT ESOPHAGITIS: ICD-10-CM

## 2024-12-05 RX ORDER — SUCRALFATE 1 G/1
TABLET ORAL
Qty: 180 TABLET | Refills: 0 | Status: SHIPPED | OUTPATIENT
Start: 2024-12-05

## 2024-12-06 ENCOUNTER — TELEMEDICINE (OUTPATIENT)
Dept: FAMILY MEDICINE CLINIC | Facility: CLINIC | Age: 69
End: 2024-12-06
Payer: MEDICARE

## 2024-12-06 DIAGNOSIS — M48.062 NEUROGENIC CLAUDICATION DUE TO LUMBAR SPINAL STENOSIS: Primary | ICD-10-CM

## 2024-12-06 DIAGNOSIS — R73.9 NONDIABETIC HYPERGLYCEMIA: ICD-10-CM

## 2024-12-06 DIAGNOSIS — E78.00 PURE HYPERCHOLESTEROLEMIA: Chronic | ICD-10-CM

## 2024-12-06 DIAGNOSIS — M79.18 MYALGIA, LOWER LEG: ICD-10-CM

## 2024-12-06 DIAGNOSIS — I10 PRIMARY HYPERTENSION: Chronic | ICD-10-CM

## 2024-12-06 PROCEDURE — 1125F AMNT PAIN NOTED PAIN PRSNT: CPT | Performed by: FAMILY MEDICINE

## 2024-12-06 PROCEDURE — 99214 OFFICE O/P EST MOD 30 MIN: CPT | Performed by: FAMILY MEDICINE

## 2024-12-06 RX ORDER — HYDROCODONE BITARTRATE AND ACETAMINOPHEN 5; 325 MG/1; MG/1
1-2 TABLET ORAL EVERY 12 HOURS PRN
Qty: 60 TABLET | Refills: 0 | Status: SHIPPED | OUTPATIENT
Start: 2024-12-24

## 2024-12-06 NOTE — PROGRESS NOTES
Subjective   Luh Horner is a 69 y.o. female.     No chief complaint on file.      History of Present Illness     Luh Horner presents today for No chief complaint on file.    Went down to 0.5mg semaglutide due to constipation at higher dose. Will plan to go down to 0.25 after dulce.    You have chosen to receive care through a telehealth visit.  Do you consent to use a video/audio connection for your medical care today? Yes    Patient location: [address] [place] [address]   Provider Location: [address] [place] [address]    The following portions of the patient's history were reviewed and updated as appropriate: allergies, current medications, past family history, past medical history, past social history, past surgical history and problem list.    Active Ambulatory Problems     Diagnosis Date Noted    Atopic rhinitis 06/14/2016    Anxiety 06/14/2016    Carpal tunnel syndrome 06/14/2016    Clenching of teeth 06/14/2016    Depression 06/14/2016    Sleep disorder 06/14/2016    Gastroesophageal reflux disease 06/14/2016    Hyperlipidemia 06/14/2016    Hypertension 06/14/2016    Low back pain 06/14/2016    Mitral valve insufficiency 06/14/2016    BMI 27.0-27.9,adult 06/14/2016    Gluteal tendinitis of right buttock 06/14/2016    Postmenopausal disorder 06/14/2016    Preventative health care 09/29/2016    Other chronic pain 09/29/2016    Spondylosis of lumbar region without myelopathy or radiculopathy 06/08/2017    Lumbar scoliosis 06/10/2017    Irritable bowel syndrome 08/17/2017    Migraine without aura and without status migrainosus, not intractable 12/06/2021    Lumbar stenosis with neurogenic claudication 01/14/2023    Ligamentum flavum hypertrophy 01/14/2023    Degeneration of lumbar or lumbosacral intervertebral disc 01/14/2023    Epidural lipomatosis 01/14/2023    Lumbar paraspinal muscle spasm 01/17/2023    Neurogenic claudication due to lumbar spinal stenosis 09/27/2023     Sciatica associated with disorder of lumbar spine 10/03/2023     Resolved Ambulatory Problems     Diagnosis Date Noted    Dizziness 2016    Insect bites 2016     Past Medical History:   Diagnosis Date    Allergic 2000    Allergic rhinitis     Bone pain     Cancer 2005    Cataract 2010    Cervical disc disorder     Chronic pain disorder     Coronary artery disease 2005    CTS (carpal tunnel syndrome)     Fibrocystic breast disease 2000    GERD (gastroesophageal reflux disease)     Glaucoma Surgery recently    Headache     Hiatal hernia     Insomnia 2013    Irritable bowel syndrome (IBS) 2002    Joint pain     Lumbar spondylosis     Lumbosacral disc disease     Migraine     Mitral valve prolapse 2000    Neck pain     Obesity 2000    Osteoarthritis     Osteopenia     Peripheral neuropathy     Post menopausal syndrome 2004    Spinal stenosis     Thoracic disc disorder     Wears reading eyeglasses      Past Surgical History:   Procedure Laterality Date    BACK SURGERY  2023    BREAST BIOPSY Right remote    benign disease    CATARACT EXTRACTION, BILATERAL Bilateral     CHOLECYSTECTOMY      COLONOSCOPY  ?    ENDOSCOPY      HYSTERECTOMY  1998    LAMINECTOMY  2023    LUMBAR LAMINECTOMY Bilateral 10/18/2023    Procedure: LAMINECTOMY BILATERAL DECOMPRESSION L4-5 RIGHT;  Surgeon: Jonel Browning MD;  Location: [facility];  Service: Neurosurgery;  Laterality: Bilateral;    REFRACTIVE SURGERY      RK    SKIN BIOPSY      SPINE SURGERY      TRIGGER POINT INJECTION      Knee    URETHRAL SUSPENSION      laparoscopic for stress incontinence     Family History   Problem Relation Age of Onset    Pulmonary fibrosis Mother          age 82    Hypertension Mother     Lumbar disc disease Mother     Prostate cancer Father          age 76    Cancer Father         Prostate    Hypertension Sister             Goiter Sister     Hypothyroidism  Sister     Anxiety disorder Sister     Miscarriages / Stillbirths Sister     Thyroid disease Sister     Heart disease Brother          Age 48 heart attack was cigarette smoker    Hiatal hernia Brother     Early death Brother         Heart attack at age 48    Heart attack Brother     Breast cancer Other         Maternal and paternal aunts    Hypertension Brother     Obesity Maternal Uncle     Diabetes Maternal Uncle     Arthritis Paternal Aunt     Hypertension Brother     Hyperlipidemia Brother      Social History     Socioeconomic History    Marital status:    Tobacco Use    Smoking status: Former     Current packs/day: 0.00     Average packs/day: 2.0 packs/day for 10.0 years (20.0 ttl pk-yrs)     Types: Cigarettes     Start date: 1971     Quit date: 1981     Years since quittin.9     Passive exposure: Never    Smokeless tobacco: Never   Vaping Use    Vaping status: Never Used   Substance and Sexual Activity    Alcohol use: Not Currently     Alcohol/week: 3.0 standard drinks of alcohol     Types: 3 Glasses of wine per week     Comment: 2 per night    Drug use: Never    Sexual activity: Yes     Partners: Male     Birth control/protection: Hysterectomy       Review of Systems  Review of Systems -  General ROS: negative for - chills, fever or night sweats  Cardiovascular ROS: no chest pain or dyspnea on exertion  Gastrointestinal ROS: no abdominal pain, change in bowel habits, or black or bloody stools  Genito-Urinary ROS: no dysuria, trouble voiding, or hematuria    Objective   not currently breastfeeding.  Vitals obtained from patient if available  Physical Exam  Const: Non-toxic appearing, NAD, A&Ox4, Pleasant, Cooperative  Eyes: EOMI, no conjunctivitis  ENT: No copious nasal drainage noted  Cardiac: Regular rate by pulse  Resp: Respiratory rate observed to be within normal limits, no increased work of breathing observed, no audible wheezing or cough noted  Psych: Appropriate mood and  behavior.  Procedures  Assessment & Plan   Problem List Items Addressed This Visit          Cardiac and Vasculature    Hyperlipidemia (Chronic)    Overview     The 10-year ASCVD risk score (Fradny SHANNON, et al., 2019) is: 10.9%    Values used to calculate the score:      Age: 69 years      Sex: Female      Is Non- : No      Diabetic: No      Tobacco smoker: No      Systolic Blood Pressure: 118 mmHg      Is BP treated: Yes      HDL Cholesterol: 54 mg/dL      Total Cholesterol: 133 mg/dL.         Current Assessment & Plan     Continue zetia, atorvastatin 40mg, continue with cardiology         Relevant Orders    Lipid Panel    Hypertension (Chronic)    Current Assessment & Plan     Stopped HCTZ due to low BP and lightheadedness  Continue lisinopril, may need to come off amlodipine as well in future depending on weight and dizziness symptoms         Relevant Orders    TSH Rfx On Abnormal To Free T4    Urinalysis With Microscopic If Indicated (No Culture) - Urine, Clean Catch       Musculoskeletal and Injuries    Neurogenic claudication due to lumbar spinal stenosis - Primary    Relevant Medications    HYDROcodone-acetaminophen (NORCO) 5-325 MG per tablet (Start on 12/24/2024)     Other Visit Diagnoses       Myalgia, lower leg        Having left hip pain, she is concerned for zetia (had rxn to statins. Less likely given injection in hip helped, but will check CK    Relevant Orders    CK    Nondiabetic hyperglycemia        Relevant Orders    Hemoglobin A1c    Comprehensive Metabolic Panel    TSH Rfx On Abnormal To Free T4    Urinalysis With Microscopic If Indicated (No Culture) - Urine, Clean Catch            See patient diagnoses and orders along with patient instructions for assessment, plan, and changes to care for patient.    This visit was conducted via telemedicine with live video and audio provided through [employer] at the point of care.    Patient Instructions   If leg pain worsens have labs/CK  completed  Otherwise just have done (fasting) about a week before your wellness visit    Return in about 4 months (around 4/8/2025) for Medicare Wellness.    Ambulatory progress note signed and attested to by Daniel Shafer D.O.

## 2024-12-06 NOTE — ASSESSMENT & PLAN NOTE
Stopped HCTZ due to low BP and lightheadedness  Continue lisinopril, may need to come off amlodipine as well in future depending on weight and dizziness symptoms

## 2024-12-06 NOTE — PATIENT INSTRUCTIONS
If leg pain worsens have labs/CK completed  Otherwise just have done (fasting) about a week before your wellness visit

## 2024-12-09 ENCOUNTER — TELEPHONE (OUTPATIENT)
Dept: PAIN MEDICINE | Facility: CLINIC | Age: 69
End: 2024-12-09
Payer: MEDICARE

## 2024-12-09 NOTE — TELEPHONE ENCOUNTER
Spoke with pt who was calling to advise how she's feeling after her trial. She doesn't feel that the trial was helpful enough to implant. Pt is inquiring what her next steps are, if there is any, and what she can do at this point. I advised I will send a message to Kimberly and see what she recommends.

## 2024-12-11 NOTE — TELEPHONE ENCOUNTER
"LVM for pt advising that she needs to follow up with [facility].    Relay     \"Kimberly has advised to [facility] with [facility].\"    "

## 2024-12-27 DIAGNOSIS — M48.062 NEUROGENIC CLAUDICATION DUE TO LUMBAR SPINAL STENOSIS: ICD-10-CM

## 2024-12-27 RX ORDER — HYDROCODONE BITARTRATE AND ACETAMINOPHEN 5; 325 MG/1; MG/1
1-2 TABLET ORAL EVERY 12 HOURS PRN
Qty: 60 TABLET | Refills: 0 | Status: SHIPPED | OUTPATIENT
Start: 2024-12-27

## 2024-12-27 NOTE — TELEPHONE ENCOUNTER
Caller: Luh Horner RAJWINDER    Relationship: Self    Best call back number:   Telephone Information:   Mobile [phone]     Requested Prescriptions:   Requested Prescriptions     Pending Prescriptions Disp Refills    HYDROcodone-acetaminophen (NORCO) 5-325 MG per tablet 60 tablet 0     Sig: Take 1-2 tablets by mouth Every 12 (Twelve) Hours As Needed for Severe Pain (Pain).        Pharmacy where request should be sent: [id number] - [place], [place] - [address] [address]  [phone] [facility] [phone] FX     Last office visit with prescribing clinician: 10/22/2024   Last telemedicine visit with prescribing clinician: 12/6/2024   Next office visit with prescribing clinician: 4/2/2025     Additional details provided by patient:     Does the patient have less than a 3 day supply:  [x] Yes  [] No    Would you like a call back once the refill request has been completed: [x] Yes [] No    If the office needs to give you a call back, can they leave a voicemail: [x] Yes [] No    Salomon Glynn Rep   12/27/24 11:34 EST

## 2024-12-30 DIAGNOSIS — U07.1 COVID-19 VIRUS DETECTED: Primary | ICD-10-CM

## 2024-12-30 RX ORDER — GUAIFENESIN AND DEXTROMETHORPHAN HYDROBROMIDE 600; 30 MG/1; MG/1
1 TABLET, EXTENDED RELEASE ORAL 2 TIMES DAILY PRN
Qty: 20 TABLET | Refills: 0 | Status: SHIPPED | OUTPATIENT
Start: 2024-12-30 | End: 2025-01-09

## 2025-01-22 DIAGNOSIS — E78.00 PURE HYPERCHOLESTEROLEMIA: Chronic | ICD-10-CM

## 2025-01-22 DIAGNOSIS — M48.062 NEUROGENIC CLAUDICATION DUE TO LUMBAR SPINAL STENOSIS: ICD-10-CM

## 2025-01-22 RX ORDER — AMLODIPINE BESYLATE 5 MG/1
5 TABLET ORAL DAILY
Qty: 90 TABLET | Refills: 0 | Status: SHIPPED | OUTPATIENT
Start: 2025-01-22

## 2025-01-22 RX ORDER — EZETIMIBE 10 MG/1
10 TABLET ORAL DAILY
Qty: 90 TABLET | Refills: 3 | Status: SHIPPED | OUTPATIENT
Start: 2025-01-22

## 2025-01-22 NOTE — TELEPHONE ENCOUNTER
Rx Refill Note  Requested Prescriptions     Pending Prescriptions Disp Refills    ezetimibe (ZETIA) 10 MG tablet 90 tablet 3     Sig: Take 1 tablet by mouth Daily.      Last office visit with prescribing clinician: 10/22/2024   Last telemedicine visit with prescribing clinician: 12/6/2024   Next office visit with prescribing clinician: 4/2/2025     APPROVED     Genny Garrido MA  01/22/25, 14:53 EST

## 2025-01-23 ENCOUNTER — OFFICE VISIT (OUTPATIENT)
Age: 70
End: 2025-01-23
Payer: MEDICARE

## 2025-01-23 VITALS
BODY MASS INDEX: 27.35 KG/M2 | SYSTOLIC BLOOD PRESSURE: 128 MMHG | DIASTOLIC BLOOD PRESSURE: 50 MMHG | HEIGHT: 60 IN | WEIGHT: 139.3 LBS

## 2025-01-23 DIAGNOSIS — M18.12 ARTHRITIS OF CARPOMETACARPAL (CMC) JOINT OF LEFT THUMB: ICD-10-CM

## 2025-01-23 DIAGNOSIS — M79.641 BILATERAL HAND PAIN: ICD-10-CM

## 2025-01-23 DIAGNOSIS — S63.659A SAGITTAL BAND RUPTURE AT METACARPOPHALANGEAL JOINT, INITIAL ENCOUNTER: Primary | ICD-10-CM

## 2025-01-23 DIAGNOSIS — M79.642 BILATERAL HAND PAIN: ICD-10-CM

## 2025-01-23 RX ORDER — LIDOCAINE HYDROCHLORIDE 10 MG/ML
0.5 INJECTION, SOLUTION EPIDURAL; INFILTRATION; INTRACAUDAL; PERINEURAL
Status: COMPLETED | OUTPATIENT
Start: 2025-01-23 | End: 2025-01-23

## 2025-01-23 RX ORDER — ESTRADIOL 0.05 MG/D
PATCH, EXTENDED RELEASE TRANSDERMAL
COMMUNITY
Start: 2024-12-05

## 2025-01-23 RX ORDER — TRIAMCINOLONE ACETONIDE 40 MG/ML
20 INJECTION, SUSPENSION INTRA-ARTICULAR; INTRAMUSCULAR
Status: COMPLETED | OUTPATIENT
Start: 2025-01-23 | End: 2025-01-23

## 2025-01-23 RX ORDER — HYDROCODONE BITARTRATE AND ACETAMINOPHEN 5; 325 MG/1; MG/1
1-2 TABLET ORAL EVERY 12 HOURS PRN
Qty: 60 TABLET | Refills: 0 | Status: SHIPPED | OUTPATIENT
Start: 2025-01-23

## 2025-01-23 RX ADMIN — LIDOCAINE HYDROCHLORIDE 0.5 ML: 10 INJECTION, SOLUTION EPIDURAL; INFILTRATION; INTRACAUDAL; PERINEURAL at 15:43

## 2025-01-23 RX ADMIN — TRIAMCINOLONE ACETONIDE 20 MG: 40 INJECTION, SUSPENSION INTRA-ARTICULAR; INTRAMUSCULAR at 15:43

## 2025-01-23 NOTE — PROGRESS NOTES
[facility]     Office Visit       Date: 01/23/2025   Patient Name: Luh Horner  MRN: [id number]  YOB: 1955    Referring Physician: Jv Wei Jr., *     Chief Complaint:   Chief Complaint   Patient presents with    Left Hand - Pain    Right Hand - Pain       History of Present Illness:   Luh Horner is a 69 y.o. female right-hand-dominant presents with multiple complaints regarding her bilateral hands.  She reports 1 month history of popping and locking of her right middle finger with extensor subluxation.  She reports that she hit her right hand while running in the airport the [place] after [place].  Reports since then she has noticed popping and catching of her right middle finger at the MCP joint with extension.  Reports pain over the MCP as well as associated swelling.  She has tried anti-inflammatories.  Has not tried bracing.  She also reports left basilar thumb pain of several years duration.  She has previously had 2 corticosteroid injections with only moderate relief.  She has no other relevant medical history.  She is retired.  She denies smoking.      Subjective   Review of Systems:   Review of Systems   Constitutional:  Negative for chills, fever, unexpected weight gain and unexpected weight loss.   HENT:  Negative for congestion, postnasal drip and rhinorrhea.    Eyes:  Negative for blurred vision.   Respiratory:  Negative for shortness of breath.    Cardiovascular:  Negative for leg swelling.   Gastrointestinal:  Negative for abdominal pain, nausea and vomiting.   Genitourinary:  Negative for difficulty urinating.   Musculoskeletal:  Positive for arthralgias. Negative for gait problem, joint swelling and myalgias.   Skin:  Negative for skin lesions and wound.   Neurological:  Negative for dizziness, weakness, light-headedness and numbness.   Hematological:  Does not bruise/bleed easily.  "  Psychiatric/Behavioral:  Negative for depressed mood.         Pertinent review of systems per HPI.     I reviewed the patient's chief complaint, history of present illness, review of systems, past medical history, surgical history, family history, social history, medications and allergy list in the EMR on 01/23/2025 and agree with the findings above.    Objective    Vital Signs:   Vitals:    01/23/25 1519   BP: 128/50   Weight: 63.2 kg (139 lb 4.8 oz)   Height: 152.4 cm (60\")     BMI: Body mass index is 27.21 kg/m².    General Appearance: No acute distress. Alert and oriented.     Chest:  Non-labored breathing on room air. Regular rate and rhythm.    Upper Extremity Exam:    Right middle finger ulnar extensor subluxation with flexion at the MCP with painful popping.  Tender palpation of the radial aspect of the MCP.  Patient has full active flexion extension of the right hand.    Tender palpation over the left thumb CMC with associated palpable ganglion cyst.  Positive CMC shuck test.  Negative grind test.    Fingers are warm, well-perfused with appropriate capillary refill.  Palpable radial pulse.    Sensation intact to light touch in median, radial and ulnar nerve distributions.    Motor- Fires [employer], ulnar intrinsics, EPL/EDC w/ full active and passive range of motion. Strength intact.    Non-tender except for in the areas highlighted    Imaging/Studies:   Imaging Results (Last 24 Hours)       Procedure Component Value Units Date/Time    XR Hand 3+ View Bilateral [[id number]] Resulted: 01/23/25 1535     Updated: 01/23/25 1535    Narrative:      Bilateral Hand X-Ray    Indication: Pain    Views:  AP, Lateral, and Oblique     Comparison:  None    Findings:  No fracture  No bony lesion  Normal soft tissues  Mild degenerative changes of the bilateral thumb CMC joints as well as the   bilateral index middle finger MCPs.  Anterior subluxation of the left index middle finger MCPs.    Impression:   Mild degenerative " changes of the bilateral thumb CMC's as well as the   bilateral index and middle finger MCPs.  There is anterior subluxation of   the left index and middle finger MCPs                  Procedures:  Procedures    Quality Measures:   ACP:   ACP discussion was deferred.    Tobacco:   Luh Horner  reports that she quit smoking about 44 years ago. Her smoking use included cigarettes. She started smoking about 54 years ago. She has a 20 pack-year smoking history. She has never been exposed to tobacco smoke. She has never used smokeless tobacco.      Assessment / Plan    Assessment/Plan:     There are no diagnoses linked to this encounter.     Luh Horneris a 69 y.o. female who presents with:      ICD-10-CM ICD-9-CM   1. Sagittal band rupture at metacarpophalangeal joint, initial encounter  S63.659A 842.12   2. Bilateral hand pain  M79.641 729.5    M79.642    3. Arthritis of carpometacarpal (CMC) joint of left thumb  M18.12 716.94         Patient presents with right middle finger radial sagittal band rupture and associated extensor subluxation that occurred 4 weeks ago.  Discussed her diagnosis including treatment options including bracing and a yoke splint, physical therapy versus surgical extensor tendon centralization.  Recommend trial of nonoperative treatment with referral to physical therapy for custom yoke splint as well as physical therapy for controlled range of motion.  Recommend follow-up with me in 6 weeks for repeat check.    She also has evidence of left thumb CMC arthritis.  We discussed her diagnosis as well as treatment options including corticosteroid injection, bracing, anti-inflammatories and CMC arthroplasty.  Patient like to trial left thumb CMC injection today.    Procedure Note:    I discussed with the patient the potential benefits of performing therapeutic aspiration and injections as well as potential risks including but not limited to infection, swelling, pain, bleeding, bruising,  nerve/vessel damage, skin color changes, transient elevation in blood glucose levels, and fat atrophy. After informed consent and after the areas were prepped with chlorhexadine soap, ethyl chloride was used to numb the skin. Via the dorsal approach, 0.5 mL of 1% lidocaine was injected followed by injection of 20mg of Kenalog into the left thumb CMC joint.  The patient tolerated the procedure well. There were no complications. A sterile dressing was placed over the injection sites.      Follow Up:   Return in about 6 weeks (around 3/6/2025).        Nicolas Arguello MD  [facility] Hand and Upper Extremity Surgeon   no rub/regular rate and rhythm

## 2025-02-21 DIAGNOSIS — M48.062 NEUROGENIC CLAUDICATION DUE TO LUMBAR SPINAL STENOSIS: ICD-10-CM

## 2025-02-21 RX ORDER — HYDROCODONE BITARTRATE AND ACETAMINOPHEN 5; 325 MG/1; MG/1
1-2 TABLET ORAL EVERY 12 HOURS PRN
Qty: 60 TABLET | Refills: 0 | Status: SHIPPED | OUTPATIENT
Start: 2025-02-21

## 2025-02-21 NOTE — TELEPHONE ENCOUNTER
Rx Refill Note  Requested Prescriptions     Pending Prescriptions Disp Refills    HYDROcodone-acetaminophen (NORCO) 5-325 MG per tablet 60 tablet 0     Sig: Take 1-2 tablets by mouth Every 12 (Twelve) Hours As Needed for Severe Pain (Pain).      Last office visit with prescribing clinician: 10/22/2024   Last telemedicine visit with prescribing clinician: 12/6/2024   Next office visit with prescribing clinician: 4/2/2025                         Would you like a call back once the refill request has been completed: [] Yes [] No    If the office needs to give you a call back, can they leave a voicemail: [] Yes [] No    Estephania Lloyd MA  02/21/25, 10:18 EST

## 2025-03-12 ENCOUNTER — OFFICE VISIT (OUTPATIENT)
Dept: ORTHOPEDIC SURGERY | Facility: CLINIC | Age: 70
End: 2025-03-12
Payer: MEDICARE

## 2025-03-12 VITALS
HEIGHT: 60 IN | BODY MASS INDEX: 27.41 KG/M2 | DIASTOLIC BLOOD PRESSURE: 72 MMHG | WEIGHT: 139.6 LBS | SYSTOLIC BLOOD PRESSURE: 130 MMHG

## 2025-03-12 DIAGNOSIS — S63.659A SAGITTAL BAND RUPTURE AT METACARPOPHALANGEAL JOINT, INITIAL ENCOUNTER: Primary | ICD-10-CM

## 2025-03-12 DIAGNOSIS — I10 ESSENTIAL HYPERTENSION: ICD-10-CM

## 2025-03-12 NOTE — PROGRESS NOTES
[facility]     Follow-up Office Visit       Date: 03/12/2025   Patient Name: Luh Horner  MRN: [id number]  YOB: 1955    Chief Complaint:   Chief Complaint   Patient presents with    Follow-up     6 wk f/u-Bilateral hand pain        History of Present Illness:   Luh Horner is a 69 y.o. female presents for follow-up of right sagittal band rupture and left thumb CMC arthritis.  She reports her left thumb has been doing well since her last visit.  Reports she responds well to the injection.  Regarding her right middle finger, she has been working with therapy.  Reports that her right middle finger extensor tendon continues to sublux.  Reports pain is a 3 out of 10 at its worst.  No other concerns.      Subjective   Review of Systems:   Review of Systems   Constitutional:  Negative for chills, fever, unexpected weight gain and unexpected weight loss.   HENT:  Negative for congestion, postnasal drip and rhinorrhea.    Eyes:  Negative for blurred vision.   Respiratory:  Negative for shortness of breath.    Cardiovascular:  Negative for leg swelling.   Gastrointestinal:  Negative for abdominal pain, nausea and vomiting.   Genitourinary:  Negative for difficulty urinating.   Musculoskeletal:  Positive for arthralgias. Negative for gait problem, joint swelling and myalgias.   Skin:  Negative for skin lesions and wound.   Neurological:  Negative for dizziness, weakness, light-headedness and numbness.   Hematological:  Does not bruise/bleed easily.   Psychiatric/Behavioral:  Negative for depressed mood.         Pertinent review of systems per HPI    I reviewed the patient's chief complaint, history of present illness, review of systems, past medical history, surgical history, family history, social history, medications and allergy list in the EMR on 03/12/2025 and agree with the findings above.    Objective    Vital  "Signs:   Vitals:    03/12/25 1256   BP: 130/72   Weight: 63.3 kg (139 lb 9.6 oz)   Height: 152.4 cm (60\")     BMI: Body mass index is 27.26 kg/m².     General Appearance: No acute distress. Alert and oriented.     Chest:  Non-labored breathing on room air      Ortho Exam:  Nontender palpation left thumb CMC  Right middle finger with mild swelling at the MCP joint.  There is clear ulnar subluxation of the tendon with flexion of the middle finger MCP.  Fingers warm and well-perfused distally  Sensation intact to light touch in the median, radial and ulnar nerve distributions    Imaging/Studies:   Imaging Results (Last 24 Hours)       ** No results found for the last 24 hours. **              Procedures:  Procedures    Quality Measures:   ACP:   ACP discussion was deferred.    Tobacco:   Luh Horner  reports that she quit smoking about 44 years ago. Her smoking use included cigarettes. She started smoking about 54 years ago. She has a 20 pack-year smoking history. She has never been exposed to tobacco smoke. She has never used smokeless tobacco.    Assessment / Plan    Assessment/Plan:      Diagnosis Plan   1. Sagittal band rupture at metacarpophalangeal joint, initial encounter  External Facility Surgical/Procedural Request          Patient presents for follow-up of right middle finger MCP extensor tendon subluxation left thumb CMC arthritis.  Her left thumb CMC is doing well since her last injection.  Recommend continued observation.  Regarding her right middle finger extensor tendon subluxation.  She continues to have significant extensor tendon subluxation despite compliance with therapy and bracing.  Discussed further treatment options including observation, continued therapy versus surgery.  The patient continues to be symptomatic and would like to proceed with right middle finger extensor tendon centralization with tendon transfer.  Will plan to book her for surgery at the patient's " convenience.    Consent-right middle finger extensor tendon centralization with tendon transfer    The risks and benefits of the procedure were discussed with the patient and or appropriate guardian, which include but are not limited to the risk of bleeding, infection, neurovascular damage, post-operative stiffness, recurrence, tendon and/or ligament retears, recurrent instability, continued pain, arthritic pain, need for further revision surgeries in the future, deep venous thrombosis, and general risks from anesthesia. We also discussed the post-operative rehabilitation, the need for physical therapy, and the overall expected outcomes from the procedure. We also discussed the possible use of biologics including allograft. We allowed proper time and answered the patient's questions regarding the procedure. The patient expressed understanding. Knowing what the risks are and what the conservative treatment is, the patient elected to forgo any further conservative treatment options and proceed with the surgical intervention.      Follow Up:   No follow-ups on file.        Nicolas Arguello MD  [facility] Upper Extremity Surgeon

## 2025-03-14 RX ORDER — LISINOPRIL 40 MG/1
40 TABLET ORAL EVERY MORNING
Qty: 90 TABLET | Refills: 3 | Status: SHIPPED | OUTPATIENT
Start: 2025-03-14

## 2025-03-14 NOTE — TELEPHONE ENCOUNTER
Rx Refill Note  Requested Prescriptions     Pending Prescriptions Disp Refills    lisinopril (PRINIVIL,ZESTRIL) 40 MG tablet 90 tablet 3     Sig: Take 1 tablet by mouth Every Morning.      Last office visit with prescribing clinician: 10/22/2024   Last telemedicine visit with prescribing clinician: 12/6/2024   Next office visit with prescribing clinician: 4/2/2025   {Antonia Godoy MA  03/14/25, 09:13 EDT

## 2025-03-19 DIAGNOSIS — M48.062 NEUROGENIC CLAUDICATION DUE TO LUMBAR SPINAL STENOSIS: ICD-10-CM

## 2025-03-20 RX ORDER — HYDROCODONE BITARTRATE AND ACETAMINOPHEN 5; 325 MG/1; MG/1
1-2 TABLET ORAL EVERY 12 HOURS PRN
Qty: 60 TABLET | Refills: 0 | Status: SHIPPED | OUTPATIENT
Start: 2025-03-20

## 2025-03-20 NOTE — TELEPHONE ENCOUNTER
Rx Refill Note  Requested Prescriptions     Pending Prescriptions Disp Refills    HYDROcodone-acetaminophen (NORCO) 5-325 MG per tablet 60 tablet 0     Sig: Take 1-2 tablets by mouth Every 12 (Twelve) Hours As Needed for Severe Pain (Pain).      Last office visit with prescribing clinician: 10/22/2024   Last telemedicine visit with prescribing clinician: 12/6/2024   Next office visit with prescribing clinician: 4/2/2025                         Would you like a call back once the refill request has been completed: [] Yes [] No    If the office needs to give you a call back, can they leave a voicemail: [] Yes [] No    Estephania Lloyd MA  03/20/25, 14:33 EDT

## 2025-03-28 ENCOUNTER — TELEPHONE (OUTPATIENT)
Age: 70
End: 2025-03-28
Payer: MEDICARE

## 2025-03-28 NOTE — TELEPHONE ENCOUNTER
Patient called and left a voicemail that she was needing to reschedule her surgery that she has scheduled with Dr. Arguello on 4/29/25.     I called patient back but did not get an answer at hid time. I left a voicemail and let her know she could give me a call back at her convenience. Meme Smart CMA

## 2025-04-02 ENCOUNTER — OFFICE VISIT (OUTPATIENT)
Dept: FAMILY MEDICINE CLINIC | Facility: CLINIC | Age: 70
End: 2025-04-02
Payer: MEDICARE

## 2025-04-02 VITALS
OXYGEN SATURATION: 97 % | HEART RATE: 92 BPM | HEIGHT: 60 IN | BODY MASS INDEX: 27.13 KG/M2 | SYSTOLIC BLOOD PRESSURE: 124 MMHG | DIASTOLIC BLOOD PRESSURE: 68 MMHG | WEIGHT: 138.2 LBS

## 2025-04-02 DIAGNOSIS — Z00.00 MEDICARE ANNUAL WELLNESS VISIT, SUBSEQUENT: ICD-10-CM

## 2025-04-02 DIAGNOSIS — I10 PRIMARY HYPERTENSION: Chronic | ICD-10-CM

## 2025-04-02 DIAGNOSIS — E78.00 PURE HYPERCHOLESTEROLEMIA: Chronic | ICD-10-CM

## 2025-04-02 DIAGNOSIS — R35.0 URINARY FREQUENCY: Primary | ICD-10-CM

## 2025-04-02 LAB
BILIRUB BLD-MCNC: NEGATIVE MG/DL
CLARITY, POC: CLEAR
COLOR UR: YELLOW
EXPIRATION DATE: NORMAL
GLUCOSE UR STRIP-MCNC: NEGATIVE MG/DL
KETONES UR QL: NEGATIVE
LEUKOCYTE EST, POC: NEGATIVE
Lab: NORMAL
NITRITE UR-MCNC: NEGATIVE MG/ML
PH UR: 6 [PH] (ref 5–8)
PROT UR STRIP-MCNC: NEGATIVE MG/DL
RBC # UR STRIP: NEGATIVE /UL
SP GR UR: 1.01 (ref 1–1.03)
UROBILINOGEN UR QL: NORMAL

## 2025-04-02 RX ORDER — ESTRADIOL 0.1 MG/G
CREAM VAGINAL
Qty: 42.5 G | Refills: 0 | Status: SHIPPED | OUTPATIENT
Start: 2025-04-02 | End: 2025-04-02 | Stop reason: SDUPTHER

## 2025-04-02 RX ORDER — ESTRADIOL 0.1 MG/G
CREAM VAGINAL
Qty: 42.5 G | Refills: 0 | Status: SHIPPED | OUTPATIENT
Start: 2025-04-02 | End: 2025-05-09

## 2025-04-02 RX ORDER — SUCRALFATE 1 G/1
1 TABLET ORAL EVERY 12 HOURS
Qty: 180 TABLET | Refills: 0 | Status: SHIPPED | OUTPATIENT
Start: 2025-04-02

## 2025-04-02 RX ORDER — SUCRALFATE 1 G/1
1 TABLET ORAL EVERY 12 HOURS
Qty: 180 TABLET | Refills: 0 | Status: SHIPPED | OUTPATIENT
Start: 2025-04-02 | End: 2025-04-02 | Stop reason: SDUPTHER

## 2025-04-02 NOTE — PROGRESS NOTES
Subjective   The ABCs of the Annual Wellness Visit  Medicare Wellness Visit      Luh Horner is a 69 y.o. patient who presents for a Medicare Wellness Visit. She had labs ordered in December for her to do a week before today, but has not had these copleted yet. She is fasting and will do these today. Having some increased urinary frequency, has been taking cranberry capsules. Would like urine testing today.    Has 2 more doses of semaglutide 0.25mg  left and then will try to come off it.    The following portions of the patient's history were reviewed and   updated as appropriate: allergies, current medications, past family history, past medical history, past social history, past surgical history, and problem list.    Compared to one year ago, the patient's physical   health is better.  Compared to one year ago, the patient's mental   health is the same.    Recent Hospitalizations:  She was not admitted to the hospital during the last year.     Current Medical Providers:  Patient Care Team:  Daniel Shafer DO as PCP - General (Family Medicine)  Felice Lopez MD as Consulting Physician (Gastroenterology)  Dandy Mcrae MD as Consulting Physician (Pain Medicine)  Damián Devi MD as Consulting Physician (Cardiology)  Jonel Browning MD as Surgeon (Neurosurgery)  Alexy Coy PA-C as Physician Assistant (Physician Assistant)  Daniel Shafer DO as Emergency Attending (Family Medicine)  Nicolas Arguello MD as Consulting Physician (Orthopedic Surgery)    Outpatient Medications Prior to Visit   Medication Sig Dispense Refill    aspirin (SB Low Dose ASA EC) 81 MG EC tablet Take 1 tablet by mouth Daily.      atorvastatin (LIPITOR) 40 MG tablet Take 1 tablet by mouth every night at bedtime. 90 tablet 3    citalopram (CeleXA) 20 MG tablet Take 1 tablet by mouth Daily. 90 tablet 3    Coenzyme Q10 (CO Q-10) 200 MG capsule Take 1 capsule by mouth.      estradiol (MINIVELLE,  VIVELLE-DOT) 0.05 MG/24HR patch       ezetimibe (ZETIA) 10 MG tablet Take 1 tablet by mouth Daily. 90 tablet 3    fluticasone (FLONASE) 50 MCG/ACT nasal spray 2 sprays by Each Nare route Daily. 48 g 3    HYDROcodone-acetaminophen (NORCO) 5-325 MG per tablet Take 1-2 tablets by mouth Every 12 (Twelve) Hours As Needed for Severe Pain (Pain). 60 tablet 0    lisinopril (PRINIVIL,ZESTRIL) 40 MG tablet Take 1 tablet by mouth Every Morning. 90 tablet 3    Magnesium Citrate 200 MG tablet Take 2 tablets by mouth Every Night.      melatonin 5 MG tablet tablet Take 1 tablet by mouth Every Night.      Omega-3 Fatty Acids (FISH OIL) 1000 MG capsule capsule Take 1 capsule by mouth Daily With Breakfast.      omeprazole (priLOSEC) 20 MG capsule Take 1 capsule by mouth Daily. 90 capsule 3    Semaglutide, 1 MG/DOSE, (OZEMPIC) 2 MG/1.5ML solution pen-injector Inject  under the skin into the appropriate area as directed 1 (One) Time Per Week.      valACYclovir (VALTREX) 500 MG tablet Take 1 tablet by mouth Daily.      vitamin C (ASCORBIC ACID) 500 MG tablet Take 1 tablet by mouth Daily.      amLODIPine (NORVASC) 5 MG tablet TAKE 1 TABLET DAILY 90 tablet 0    sucralfate (CARAFATE) 1 g tablet TAKE 1 TABLET EVERY 12 HOURS 180 tablet 0    dicyclomine (Bentyl) 10 MG capsule Take 1 capsule by mouth 4 (Four) Times a Day Before Meals & at Bedtime. (Patient taking differently: Take 1 capsule by mouth As Needed.) 120 capsule 1     No facility-administered medications prior to visit.     Opioid medication/s are on active medication list.  and I have evaluated her active treatment plan and pain score trends (see table).  Vitals:    04/02/25 1129   PainSc: 3    PainLoc: Generalized     I have reviewed the chart for potential of high risk medication and harmful drug interactions in the elderly.        Aspirin is on active medication list. Aspirin use is indicated based on review of current medical condition/s. Pros and cons of this therapy have been  "discussed today. Benefits of this medication outweigh potential harm.  Patient has been encouraged to continue taking this medication.  .      Patient Active Problem List   Diagnosis    Atopic rhinitis    Anxiety    Carpal tunnel syndrome    Clenching of teeth    Depression    Sleep disorder    Gastroesophageal reflux disease    Hyperlipidemia    Hypertension    Low back pain    Mitral valve insufficiency    BMI 27.0-27.9,adult    Gluteal tendinitis of right buttock    Postmenopausal disorder    Preventative health care    Other chronic pain    Spondylosis of lumbar region without myelopathy or radiculopathy    Lumbar scoliosis    Irritable bowel syndrome    Migraine without aura and without status migrainosus, not intractable    Lumbar stenosis with neurogenic claudication    Ligamentum flavum hypertrophy    Degeneration of lumbar or lumbosacral intervertebral disc    Epidural lipomatosis    Lumbar paraspinal muscle spasm    Neurogenic claudication due to lumbar spinal stenosis    Sciatica associated with disorder of lumbar spine     Advance Care Planning Advance Directive is not on file.  ACP discussion was held with the patient during this visit. Patient does not have an advance directive, information provided.            Objective   Vitals:    04/02/25 1129   BP: 124/68   BP Location: Left arm   Patient Position: Sitting   Cuff Size: Adult   Pulse: 92   SpO2: 97%   Weight: 62.7 kg (138 lb 3.2 oz)   Height: 152.4 cm (60\")   PainSc: 3    PainLoc: Generalized       Estimated body mass index is 26.99 kg/m² as calculated from the following:    Height as of this encounter: 152.4 cm (60\").    Weight as of this encounter: 62.7 kg (138 lb 3.2 oz).                Does the patient have evidence of cognitive impairment? No                                                                                                    Health  Risk Assessment    Smoking Status:  Social History     Tobacco Use   Smoking Status Former    " Current packs/day: 0.00    Average packs/day: 2.0 packs/day for 10.0 years (20.0 ttl pk-yrs)    Types: Cigarettes    Start date: 1971    Quit date: 1981    Years since quittin.2    Passive exposure: Never   Smokeless Tobacco Never     Alcohol Consumption:  Social History     Substance and Sexual Activity   Alcohol Use Not Currently    Alcohol/week: 3.0 standard drinks of alcohol    Types: 3 Glasses of wine per week    Comment: 2 per night       Fall Risk Screen  STEADI Fall Risk Assessment was completed, and patient is at LOW risk for falls.Assessment completed on:2025    Depression Screening   Little interest or pleasure in doing things? Not at all   Feeling down, depressed, or hopeless? Not at all   PHQ-2 Total Score 0      Health Habits and Functional and Cognitive Screening:      3/26/2025     7:44 AM   Functional & Cognitive Status   Do you have difficulty preparing food and eating? No   Do you have difficulty bathing yourself, getting dressed or grooming yourself? No   Do you have difficulty using the toilet? No   Do you have difficulty moving around from place to place? No   Do you have trouble with steps or getting out of a bed or a chair? No   Current Diet Well Balanced Diet   Dental Exam Up to date   Eye Exam Up to date   Exercise (times per week) 5 times per week   Current Exercises Include Aerobics;Cardiovascular Workout;House Cleaning;Light Weights   Do you need help using the phone?  No   Are you deaf or do you have serious difficulty hearing?  No   Do you need help to go to places out of walking distance? No   Do you need help shopping? No   Do you need help preparing meals?  No   Do you need help with housework?  No   Do you need help with laundry? No   Do you need help taking your medications? No   Do you need help managing money? No   Do you ever drive or ride in a car without wearing a seat belt? No   Have you felt unusual stress, anger or loneliness in the last month? No   Who do  you live with? Spouse   If you need help, do you have trouble finding someone available to you? No   Have you been bothered in the last four weeks by sexual problems? No   Do you have difficulty concentrating, remembering or making decisions? No           Age-appropriate Screening Schedule:  Refer to the list below for future screening recommendations based on patient's age, sex and/or medical conditions. Orders for these recommended tests are listed in the plan section. The patient has been provided with a written plan.    Health Maintenance List  Health Maintenance   Topic Date Due    ZOSTER VACCINE (1 of 2) Never done    LIPID PANEL  05/06/2025    COVID-19 Vaccine (8 - 2024-25 season) 06/16/2025    INFLUENZA VACCINE  07/01/2025    ANNUAL WELLNESS VISIT  04/02/2026    MAMMOGRAM  06/06/2026    COLORECTAL CANCER SCREENING  10/19/2027    TDAP/TD VACCINES (4 - Td or Tdap) 07/10/2031    DXA SCAN  10/13/2033    HEPATITIS C SCREENING  Completed    Pneumococcal Vaccine 50+  Completed                                                                                                                                                CMS Preventative Services Quick Reference  Risk Factors Identified During Encounter  Urinary Incontinence: New medication begun     The above risks/problems have been discussed with the patient.  Pertinent information has been shared with the patient in the After Visit Summary.  An After Visit Summary and PPPS were made available to the patient.    Follow Up:   Next Medicare Wellness visit to be scheduled in 1 year.         Assessment and Plan         Medicare annual wellness visit, subsequent    Urinary frequency    Primary hypertension  Rcommend stopping amlodipine 5mg, continue lisinopril 40mg. Had already come off HCTZ due   Pure hypercholesterolemia  Continue zetia, atorvastatin 40mg, continue with cardiology.  Needs repeat fasting lipid panel, ordered in December she will return for fasting  labs.  BMI 27.0-27.9,adult  She has done strongly well on semaglutide, she has been scaling back over the last few months and has 2 doses left of 0.25 mg.  After that she will plan to discontinue entirely.  Diagnoses and all orders for this visit:    1. Urinary frequency (Primary)  Comments:  [facility] UA today, given chronic vaginal dryness as well, recommended vaginal estrogen trial  Orders:  -     POCT urinalysis dipstick, automated  -     Cancel: Urine Culture - Urine, Urine, Clean Catch; Future  -     Cancel: Urine Culture - Urine, Urine, Clean Catch  -     Urine Culture - Urine, Urine, Clean Catch; Future  -     Urine Culture - Urine, Urine, Clean Catch  -     Discontinue: estradiol (ESTRACE VAGINAL) 0.1 MG/GM vaginal cream; Insert 2 g into the vagina Daily for 7 days, THEN 1 g 3 (Three) Times a Week for 30 days.  Dispense: 42.5 g; Refill: 0  -     estradiol (ESTRACE VAGINAL) 0.1 MG/GM vaginal cream; Insert 2 g into the vagina Daily for 7 days, THEN 1 g 3 (Three) Times a Week for 30 days.  Dispense: 42.5 g; Refill: 0    2. Medicare annual wellness visit, subsequent    3. Primary hypertension  Assessment & Plan:  Rcommend stopping amlodipine 5mg, continue lisinopril 40mg. Had already come off HCTZ due       4. Pure hypercholesterolemia  Overview:  The 10-year ASCVD risk score (Frandy SHANNON, et al., 2019) is: 10.9%    Values used to calculate the score:      Age: 69 years      Sex: Female      Is Non- : No      Diabetic: No      Tobacco smoker: No      Systolic Blood Pressure: 118 mmHg      Is BP treated: Yes      HDL Cholesterol: 54 mg/dL      Total Cholesterol: 133 mg/dL.    Assessment & Plan:  Continue zetia, atorvastatin 40mg, continue with cardiology.  Needs repeat fasting lipid panel, ordered in December she will return for fasting labs.      5. BMI 27.0-27.9,adult  Assessment & Plan:  She has done strongly well on semaglutide, she has been scaling back over the last few months and has 2  doses left of 0.25 mg.  After that she will plan to discontinue entirely.      Other orders  -     Discontinue: sucralfate (CARAFATE) 1 g tablet; Take 1 tablet by mouth Every 12 (Twelve) Hours.  Dispense: 180 tablet; Refill: 0  -     sucralfate (CARAFATE) 1 g tablet; Take 1 tablet by mouth Every 12 (Twelve) Hours.  Dispense: 180 tablet; Refill: 0       Problem List Items Addressed This Visit          Cardiac and Vasculature    Hyperlipidemia (Chronic)    Overview   The 10-year ASCVD risk score (Frandy SHANNON, et al., 2019) is: 10.9%    Values used to calculate the score:      Age: 69 years      Sex: Female      Is Non- : No      Diabetic: No      Tobacco smoker: No      Systolic Blood Pressure: 118 mmHg      Is BP treated: Yes      HDL Cholesterol: 54 mg/dL      Total Cholesterol: 133 mg/dL.         Current Assessment & Plan   Continue zetia, atorvastatin 40mg, continue with cardiology.  Needs repeat fasting lipid panel, ordered in December she will return for fasting labs.         Hypertension (Chronic)    Current Assessment & Plan   Rcommend stopping amlodipine 5mg, continue lisinopril 40mg. Had already come off HCTZ due             Endocrine and Metabolic    BMI 27.0-27.9,adult    Current Assessment & Plan   She has done strongly well on semaglutide, she has been scaling back over the last few months and has 2 doses left of 0.25 mg.  After that she will plan to discontinue entirely.          Other Visit Diagnoses         Urinary frequency    -  Primary    [facility] UA today, given chronic vaginal dryness as well, recommended vaginal estrogen trial    Relevant Medications    estradiol (ESTRACE VAGINAL) 0.1 MG/GM vaginal cream    Other Relevant Orders    POCT urinalysis dipstick, automated (Completed)    Urine Culture - Urine, Urine, Clean Catch      Medicare annual wellness visit, subsequent                The wellness exam has been reviewed in detail.  The patient has been fully counseled on  preventative guidelines for vaccines, cancer screenings, and other health maintenance needs.  Functional testing has been performed to assess capacity for independent living and need for other medical interventions.   The patient was counseled on maintaining a lifestyle to promote good health and to minimize chronic diseases.  The patient has been assisted with scheduling healthcare procedures for the coming year and given a written document outlining these recommendations.    Return in about 6 months (around 10/2/2025) for prediabetes with A1c;.    New Medications Ordered This Visit   Medications    estradiol (ESTRACE VAGINAL) 0.1 MG/GM vaginal cream     Sig: Insert 2 g into the vagina Daily for 7 days, THEN 1 g 3 (Three) Times a Week for 30 days.     Dispense:  42.5 g     Refill:  0    sucralfate (CARAFATE) 1 g tablet     Sig: Take 1 tablet by mouth Every 12 (Twelve) Hours.     Dispense:  180 tablet     Refill:  0          Follow Up   Return in about 6 months (around 10/2/2025) for prediabetes with A1c;.  Patient was given instructions and counseling regarding her condition or for health maintenance advice. Please see specific information pulled into the AVS if appropriate.

## 2025-04-02 NOTE — ASSESSMENT & PLAN NOTE
Continue zetia, atorvastatin 40mg, continue with cardiology.  Needs repeat fasting lipid panel, ordered in December she will return for fasting labs.

## 2025-04-02 NOTE — ASSESSMENT & PLAN NOTE
She has done strongly well on semaglutide, she has been scaling back over the last few months and has 2 doses left of 0.25 mg.  After that she will plan to discontinue entirely.

## 2025-04-02 NOTE — PATIENT INSTRUCTIONS
Advance Care Planning and Advance Directives     You make decisions on a daily basis - decisions about where you want to live, your career, your home, your life. Perhaps one of the most important decisions you face is your choice for future medical care. Take time to talk with your family and your healthcare team and start planning today.  Advance Care Planning is a process that can help you:  Understand possible future healthcare decisions in light of your own experiences  Reflect on those decision in light of your goals and values  Discuss your decisions with those closest to you and the healthcare professionals that care for you  Make a plan by creating a document that reflects your wishes    Surrogate Decision Maker  In the event of a medical emergency, which has left you unable to communicate or to make your own decisions, you would need someone to make decisions for you.  It is important to discuss your preferences for medical treatment with this person while you are in good health.     Qualities of a surrogate decision maker:  Willing to take on this role and responsibility  Knows what you want for future medical care  Willing to follow your wishes even if they don't agree with them  Able to make difficult medical decisions under stressful circumstances    Advance Directives  These are legal documents you can create that will guide your healthcare team and decision maker(s) when needed. These documents can be stored in the electronic medical record.    Living Will - a legal document to guide your care if you have a terminal condition or a serious illness and are unable to communicate. States vary by statute in document names/types, but most forms may include one or more of the following:        -  Directions regarding life-prolonging treatments        -  Directions regarding artificially provided nutrition/hydration        -  Choosing a healthcare decision maker        -  Direction regarding organ/tissue  donation    Durable Power of  for Healthcare - this document names an -in-fact to make medical decisions for you, but it may also allow this person to make personal and financial decisions for you. Please seek the advice of an  if you need this type of document.    **Advance Directives are not required and no one may discriminate against you if you do not sign one.    Medical Orders  Many states allow specific forms/orders signed by your physician to record your wishes for medical treatment in your current state of health. This form, signed in personal communication with your physician, addresses resuscitation and other medical interventions that you may or may not want.      For more information or to schedule a time with a [facility] Advance Care Planning Facilitator contact: [email or web address] or call [phone] and someone will contact you directly.  You are due for Shingrix vaccination series ( the newest shingles vaccine).  It is a two shot series spaced 2-6 months apart. Please get this vaccine series started at your earliest convenience at your local pharmacy to help avoid shingles outbreak. It is more effective than the old Zostavax vaccine and is recommended even if you have had the Zostavax vaccine in the past.  Once the Shingrix series is completed, it does not need to be repeated.   For more information, please look at the website below:  https://www.cdc.gov/vaccines/vpd/shingles/public/shingrix/index.html      Medicare Wellness  Personal Prevention Plan of Service     Date of Office Visit:    Encounter Provider:  Daniel Shafer DO  Place of Service:  [facility]  Patient Name: Luh Horner  :  1955    As part of the Medicare Wellness portion of your visit today, we are providing you with this personalized preventive plan of services (PPPS). This plan is based upon recommendations of the [place] Preventive Services  Task Force (USPSTF) and the Advisory Committee on Immunization Practices (ACIP).    This lists the preventive care services that should be considered, and provides dates of when you are due. Items listed as completed are up-to-date and do not require any further intervention.    Health Maintenance   Topic Date Due   • ZOSTER VACCINE (1 of 2) Never done   • ANNUAL WELLNESS VISIT  03/27/2025   • LIPID PANEL  05/06/2025   • COVID-19 Vaccine (8 - 2024-25 season) 06/16/2025   • INFLUENZA VACCINE  07/01/2025   • MAMMOGRAM  06/06/2026   • COLORECTAL CANCER SCREENING  10/19/2027   • TDAP/TD VACCINES (4 - Td or Tdap) 07/10/2031   • DXA SCAN  10/13/2033   • HEPATITIS C SCREENING  Completed   • Pneumococcal Vaccine 50+  Completed       Orders Placed This Encounter   Procedures   • Urine Culture - Urine, Urine, Clean Catch     Standing Status:   Future     Number of Occurrences:   1     Expected Date:   4/2/2025     Expiration Date:   7/2/2026     Release to patient:   Routine Release [[id number]]   • POCT urinalysis dipstick, automated     Release to patient:   Routine Release [[id number]]       No follow-ups on file.

## 2025-04-04 LAB
BACTERIA UR CULT: NO GROWTH
BACTERIA UR CULT: NORMAL

## 2025-04-18 DIAGNOSIS — E78.00 PURE HYPERCHOLESTEROLEMIA: Primary | Chronic | ICD-10-CM

## 2025-04-18 DIAGNOSIS — M48.062 NEUROGENIC CLAUDICATION DUE TO LUMBAR SPINAL STENOSIS: ICD-10-CM

## 2025-04-21 RX ORDER — ATORVASTATIN CALCIUM 40 MG/1
40 TABLET, FILM COATED ORAL
Qty: 30 TABLET | Refills: 1 | Status: SHIPPED | OUTPATIENT
Start: 2025-04-21

## 2025-04-21 NOTE — TELEPHONE ENCOUNTER
Per Dr. Devi atorvastatin 40 mg daily sent in. LVM informing pt she needs appt with Dr. Devi and labs for future refills.

## 2025-04-21 NOTE — TELEPHONE ENCOUNTER
Rx Refill Note  Requested Prescriptions     Pending Prescriptions Disp Refills    HYDROcodone-acetaminophen (NORCO) 5-325 MG per tablet 60 tablet 0     Sig: Take 1-2 tablets by mouth Every 12 (Twelve) Hours As Needed for Severe Pain (Pain).      Last office visit with prescribing clinician: 4/2/2025   Last telemedicine visit with prescribing clinician: 12/6/2024   Next office visit with prescribing clinician: 10/2/2025       Antonia Godoy MA  04/21/25, 08:40 EDT

## 2025-04-22 ENCOUNTER — PRE-ADMISSION TESTING (OUTPATIENT)
Dept: PREADMISSION TESTING | Facility: HOSPITAL | Age: 70
End: 2025-04-22
Payer: MEDICARE

## 2025-04-22 ENCOUNTER — LAB (OUTPATIENT)
Dept: LAB | Facility: HOSPITAL | Age: 70
End: 2025-04-22
Payer: MEDICARE

## 2025-04-22 ENCOUNTER — HOSPITAL ENCOUNTER (OUTPATIENT)
Dept: GENERAL RADIOLOGY | Facility: HOSPITAL | Age: 70
Discharge: HOME | End: 2025-04-22
Payer: MEDICARE

## 2025-04-22 VITALS — BODY MASS INDEX: 27.74 KG/M2 | WEIGHT: 141.31 LBS | HEIGHT: 60 IN

## 2025-04-22 DIAGNOSIS — I10 PRIMARY HYPERTENSION: Chronic | ICD-10-CM

## 2025-04-22 DIAGNOSIS — E78.00 PURE HYPERCHOLESTEROLEMIA: Chronic | ICD-10-CM

## 2025-04-22 DIAGNOSIS — R73.9 NONDIABETIC HYPERGLYCEMIA: ICD-10-CM

## 2025-04-22 LAB
ALBUMIN SERPL-MCNC: 4.5 G/DL (ref 3.5–5.2)
ALBUMIN/GLOB SERPL: 1.5 G/DL
ALP SERPL-CCNC: 80 U/L (ref 39–117)
ALT SERPL W P-5'-P-CCNC: 37 U/L (ref 1–33)
ANION GAP SERPL CALCULATED.3IONS-SCNC: 11.4 MMOL/L (ref 5–15)
AST SERPL-CCNC: 32 U/L (ref 1–32)
BILIRUB SERPL-MCNC: 0.5 MG/DL (ref 0–1.2)
BUN SERPL-MCNC: 9 MG/DL (ref 8–23)
BUN/CREAT SERPL: 11.4 (ref 7–25)
CALCIUM SPEC-SCNC: 9.4 MG/DL (ref 8.6–10.5)
CHLORIDE SERPL-SCNC: 99 MMOL/L (ref 98–107)
CHOLEST SERPL-MCNC: 170 MG/DL (ref 0–200)
CO2 SERPL-SCNC: 26.6 MMOL/L (ref 22–29)
CREAT SERPL-MCNC: 0.79 MG/DL (ref 0.57–1)
CRP SERPL-MCNC: 0.05 MG/DL (ref 0.01–0.5)
DEPRECATED RDW RBC AUTO: 44.6 FL (ref 37–54)
EGFRCR SERPLBLD CKD-EPI 2021: 80.1 ML/MIN/1.73
ERYTHROCYTE [DISTWIDTH] IN BLOOD BY AUTOMATED COUNT: 12 % (ref 12.3–15.4)
GLOBULIN UR ELPH-MCNC: 3 GM/DL
GLUCOSE SERPL-MCNC: 84 MG/DL (ref 65–99)
HBA1C MFR BLD: 5.3 % (ref 4.8–5.6)
HCT VFR BLD AUTO: 48.1 % (ref 34–46.6)
HDLC SERPL-MCNC: 69 MG/DL (ref 40–60)
HGB BLD-MCNC: 15.8 G/DL (ref 12–15.9)
LDLC SERPL CALC-MCNC: 81 MG/DL (ref 0–100)
LDLC/HDLC SERPL: 1.13 {RATIO}
MCH RBC QN AUTO: 32.9 PG (ref 26.6–33)
MCHC RBC AUTO-ENTMCNC: 32.8 G/DL (ref 31.5–35.7)
MCV RBC AUTO: 100.2 FL (ref 79–97)
PLATELET # BLD AUTO: 307 10*3/MM3 (ref 140–450)
PMV BLD AUTO: 8.7 FL (ref 6–12)
POTASSIUM SERPL-SCNC: 3.7 MMOL/L (ref 3.5–5.2)
PROT SERPL-MCNC: 7.5 G/DL (ref 6–8.5)
RBC # BLD AUTO: 4.8 10*6/MM3 (ref 3.77–5.28)
SODIUM SERPL-SCNC: 137 MMOL/L (ref 136–145)
TRIGL SERPL-MCNC: 116 MG/DL (ref 0–150)
TSH SERPL DL<=0.05 MIU/L-ACNC: 2.53 UIU/ML (ref 0.27–4.2)
VLDLC SERPL-MCNC: 20 MG/DL (ref 5–40)
WBC NRBC COR # BLD AUTO: 6.65 10*3/MM3 (ref 3.4–10.8)

## 2025-04-22 PROCEDURE — 84443 ASSAY THYROID STIM HORMONE: CPT

## 2025-04-22 PROCEDURE — 80061 LIPID PANEL: CPT

## 2025-04-22 PROCEDURE — 36415 COLL VENOUS BLD VENIPUNCTURE: CPT

## 2025-04-22 PROCEDURE — 80053 COMPREHEN METABOLIC PANEL: CPT

## 2025-04-22 PROCEDURE — 93005 ELECTROCARDIOGRAM TRACING: CPT

## 2025-04-22 PROCEDURE — 85027 COMPLETE CBC AUTOMATED: CPT

## 2025-04-22 PROCEDURE — 71046 X-RAY EXAM CHEST 2 VIEWS: CPT

## 2025-04-22 PROCEDURE — 83036 HEMOGLOBIN GLYCOSYLATED A1C: CPT

## 2025-04-22 PROCEDURE — 86141 C-REACTIVE PROTEIN HS: CPT

## 2025-04-22 RX ORDER — HYDROCODONE BITARTRATE AND ACETAMINOPHEN 5; 325 MG/1; MG/1
1-2 TABLET ORAL EVERY 12 HOURS PRN
Qty: 60 TABLET | Refills: 0 | Status: SHIPPED | OUTPATIENT
Start: 2025-04-22

## 2025-04-22 NOTE — PAT
An arrival time for procedure was not provided during PAT visit. If patient had any questions or concerns about their arrival time, they were instructed to contact their surgeon/physician.  Additionally, if the patient referred to an arrival time that was acquired from their my chart account, patient was encouraged to verify that time with their surgeon/physician. Arrival times are NOT provided in Pre Admission Testing Department.    Per Anesthesia Request, patient instructed not to take their ACE/ARB medications on the AM of surgery.    Patient instructed to drink 20 ounces of Gatorade or Gatorlyte (if diabetic) and it needs to be completed 1 hour (for Main OR patients) or 2 hours (scheduled  section & [facility] patients) before given arrival time for procedure (NO RED Gatorade and NO Gatorade Zero).    Patient verbalized understanding.    Patient's surgeon called in a prescription for Benzol Peroxide 5% wash to [facility].  Patient instructed to  from [facility] pharmacy that was submitted electronically.  If prescription not available, instructed to purchase the wash over the counter since a prescription is not required. Verbal and written instructions given regarding proper use of the Benzoyl Peroxide wash were provided to patient and/or manjeetlily during PAT visit. Patient/family also instructed to complete Benzol Peroxide checklist and return it to Pre-op on the day of surgery.  Patient and/or family verbalized understanding.      Additionally, reinforced with patient to acquire this prescription from the [facility] pharmacy before leaving the hospital after PAT visit due to the potential unavailability at local pharmacies.

## 2025-04-23 ENCOUNTER — RESULTS FOLLOW-UP (OUTPATIENT)
Dept: CARDIOLOGY | Facility: CLINIC | Age: 70
End: 2025-04-23
Payer: MEDICARE

## 2025-04-23 DIAGNOSIS — E78.00 PURE HYPERCHOLESTEROLEMIA: Primary | Chronic | ICD-10-CM

## 2025-04-23 LAB
QT INTERVAL: 414 MS
QTC INTERVAL: 443 MS

## 2025-04-23 NOTE — PROGRESS NOTES
Cholesterol not at goal.  Would suggest increasing atorvastatin to 80 mg.  Cmp, lipid panel, hscrp in 2 months.

## 2025-04-29 RX ORDER — ATORVASTATIN CALCIUM 40 MG/1
60 TABLET, FILM COATED ORAL
Qty: 135 TABLET | Refills: 0 | Status: SHIPPED | OUTPATIENT
Start: 2025-04-29 | End: 2025-05-01 | Stop reason: SDUPTHER

## 2025-04-29 NOTE — TELEPHONE ENCOUNTER
Relayed Dr. Devi's comments and recommendation to increase atorvastatin to 80 mg daily then repeat labs in 2 months. Pt verbalized understanding. Stated she wanted to try 60 mg daily to see if that would be effective before increasing to 80 mg daily.

## 2025-05-01 RX ORDER — ATORVASTATIN CALCIUM 80 MG/1
80 TABLET, FILM COATED ORAL DAILY
Qty: 90 TABLET | Refills: 3 | Status: SHIPPED | OUTPATIENT
Start: 2025-05-01

## 2025-05-01 RX ORDER — ATORVASTATIN CALCIUM 40 MG/1
60 TABLET, FILM COATED ORAL
Qty: 135 TABLET | Refills: 0 | Status: SHIPPED | OUTPATIENT
Start: 2025-05-01 | End: 2025-05-01

## 2025-05-01 NOTE — TELEPHONE ENCOUNTER
Per pt, insurance will not cover atorvastatin 60 mg daily dose. Pt agreeable to Dr. Devi's original recommendation of atorvastatin 80 mg daily. [facility].

## 2025-05-05 ENCOUNTER — ANESTHESIA EVENT (OUTPATIENT)
Dept: PERIOP | Facility: HOSPITAL | Age: 70
End: 2025-05-05
Payer: MEDICARE

## 2025-05-05 RX ORDER — FAMOTIDINE 10 MG/ML
20 INJECTION, SOLUTION INTRAVENOUS ONCE
Status: CANCELLED | OUTPATIENT
Start: 2025-05-05 | End: 2025-05-05

## 2025-05-06 ENCOUNTER — ANESTHESIA (OUTPATIENT)
Dept: PERIOP | Facility: HOSPITAL | Age: 70
End: 2025-05-06
Payer: MEDICARE

## 2025-05-06 ENCOUNTER — HOSPITAL ENCOUNTER (OUTPATIENT)
Facility: HOSPITAL | Age: 70
Setting detail: HOSPITAL OUTPATIENT SURGERY
Discharge: HOME | End: 2025-05-06
Attending: ORTHOPAEDIC SURGERY | Admitting: STUDENT IN AN ORGANIZED HEALTH CARE EDUCATION/TRAINING PROGRAM
Payer: MEDICARE

## 2025-05-06 ENCOUNTER — ANESTHESIA EVENT CONVERTED (OUTPATIENT)
Dept: ANESTHESIOLOGY | Facility: HOSPITAL | Age: 70
End: 2025-05-06
Payer: MEDICARE

## 2025-05-06 ENCOUNTER — APPOINTMENT (OUTPATIENT)
Dept: GENERAL RADIOLOGY | Facility: HOSPITAL | Age: 70
End: 2025-05-06
Payer: MEDICARE

## 2025-05-06 VITALS
RESPIRATION RATE: 16 BRPM | HEIGHT: 60 IN | WEIGHT: 142 LBS | HEART RATE: 85 BPM | SYSTOLIC BLOOD PRESSURE: 135 MMHG | DIASTOLIC BLOOD PRESSURE: 68 MMHG | TEMPERATURE: 98 F | OXYGEN SATURATION: 96 % | BODY MASS INDEX: 27.88 KG/M2

## 2025-05-06 DIAGNOSIS — M19.011 PRIMARY OSTEOARTHRITIS OF RIGHT SHOULDER: Primary | ICD-10-CM

## 2025-05-06 PROCEDURE — C1713 ANCHOR/SCREW BN/BN,TIS/BN: HCPCS | Performed by: ORTHOPAEDIC SURGERY

## 2025-05-06 PROCEDURE — C1776 JOINT DEVICE (IMPLANTABLE): HCPCS | Performed by: ORTHOPAEDIC SURGERY

## 2025-05-06 PROCEDURE — 25010000002 CEFAZOLIN PER 500 MG: Performed by: ORTHOPAEDIC SURGERY

## 2025-05-06 PROCEDURE — 25010000002 LIDOCAINE PF 1% 1 % SOLUTION: Performed by: STUDENT IN AN ORGANIZED HEALTH CARE EDUCATION/TRAINING PROGRAM

## 2025-05-06 PROCEDURE — 23472 RECONSTRUCT SHOULDER JOINT: CPT | Performed by: PHYSICIAN ASSISTANT

## 2025-05-06 PROCEDURE — 25010000002 BUPIVACAINE (PF) 0.25 % SOLUTION: Performed by: STUDENT IN AN ORGANIZED HEALTH CARE EDUCATION/TRAINING PROGRAM

## 2025-05-06 PROCEDURE — C1755 CATHETER, INTRASPINAL: HCPCS | Performed by: ORTHOPAEDIC SURGERY

## 2025-05-06 PROCEDURE — 25010000002 SUGAMMADEX 200 MG/2ML SOLUTION

## 2025-05-06 PROCEDURE — 25010000002 FENTANYL CITRATE (PF) 50 MCG/ML SOLUTION: Performed by: STUDENT IN AN ORGANIZED HEALTH CARE EDUCATION/TRAINING PROGRAM

## 2025-05-06 PROCEDURE — 25010000002 LIDOCAINE PF 1% 1 % SOLUTION

## 2025-05-06 PROCEDURE — 25010000002 CEFTRIAXONE PER 250 MG: Performed by: ORTHOPAEDIC SURGERY

## 2025-05-06 PROCEDURE — 25010000002 PROPOFOL 10 MG/ML EMULSION

## 2025-05-06 PROCEDURE — 97530 THERAPEUTIC ACTIVITIES: CPT | Performed by: OCCUPATIONAL THERAPIST

## 2025-05-06 PROCEDURE — 97535 SELF CARE MNGMENT TRAINING: CPT | Performed by: OCCUPATIONAL THERAPIST

## 2025-05-06 PROCEDURE — 97165 OT EVAL LOW COMPLEX 30 MIN: CPT | Performed by: OCCUPATIONAL THERAPIST

## 2025-05-06 PROCEDURE — 25010000002 HYDROMORPHONE 1 MG/ML SOLUTION

## 2025-05-06 PROCEDURE — 97110 THERAPEUTIC EXERCISES: CPT | Performed by: OCCUPATIONAL THERAPIST

## 2025-05-06 PROCEDURE — 25010000002 ONDANSETRON PER 1 MG

## 2025-05-06 PROCEDURE — 25010000002 DEXAMETHASONE PER 1 MG

## 2025-05-06 PROCEDURE — 25810000003 LACTATED RINGERS PER 1000 ML: Performed by: STUDENT IN AN ORGANIZED HEALTH CARE EDUCATION/TRAINING PROGRAM

## 2025-05-06 PROCEDURE — 97550 CAREGIVER TRAING 1ST 30 MIN: CPT | Performed by: OCCUPATIONAL THERAPIST

## 2025-05-06 PROCEDURE — 73030 X-RAY EXAM OF SHOULDER: CPT

## 2025-05-06 PROCEDURE — 25010000002 ROPIVACAINE HCL-NACL 0.2-0.9 % SOLUTION

## 2025-05-06 RX ORDER — BUPIVACAINE HYDROCHLORIDE 2.5 MG/ML
INJECTION, SOLUTION EPIDURAL; INFILTRATION; INTRACAUDAL; PERINEURAL
Status: DISCONTINUED | OUTPATIENT
Start: 2025-05-06 | End: 2025-05-06 | Stop reason: SURG

## 2025-05-06 RX ORDER — ONDANSETRON 2 MG/ML
INJECTION INTRAMUSCULAR; INTRAVENOUS AS NEEDED
Status: DISCONTINUED | OUTPATIENT
Start: 2025-05-06 | End: 2025-05-06 | Stop reason: SURG

## 2025-05-06 RX ORDER — PROPOFOL 10 MG/ML
VIAL (ML) INTRAVENOUS AS NEEDED
Status: DISCONTINUED | OUTPATIENT
Start: 2025-05-06 | End: 2025-05-06 | Stop reason: SURG

## 2025-05-06 RX ORDER — EPHEDRINE SULFATE 50 MG/ML
INJECTION INTRAVENOUS AS NEEDED
Status: DISCONTINUED | OUTPATIENT
Start: 2025-05-06 | End: 2025-05-06 | Stop reason: SURG

## 2025-05-06 RX ORDER — TRANEXAMIC ACID 10 MG/ML
1000 INJECTION, SOLUTION INTRAVENOUS ONCE
Status: DISCONTINUED | OUTPATIENT
Start: 2025-05-06 | End: 2025-05-06 | Stop reason: HOSPADM

## 2025-05-06 RX ORDER — SODIUM CHLORIDE 0.9 % (FLUSH) 0.9 %
10 SYRINGE (ML) INJECTION AS NEEDED
Status: DISCONTINUED | OUTPATIENT
Start: 2025-05-06 | End: 2025-05-06 | Stop reason: HOSPADM

## 2025-05-06 RX ORDER — MELOXICAM 15 MG/1
15 TABLET ORAL ONCE
Status: COMPLETED | OUTPATIENT
Start: 2025-05-06 | End: 2025-05-06

## 2025-05-06 RX ORDER — FENTANYL CITRATE 50 UG/ML
INJECTION, SOLUTION INTRAMUSCULAR; INTRAVENOUS
Status: DISCONTINUED | OUTPATIENT
Start: 2025-05-06 | End: 2025-05-06 | Stop reason: SURG

## 2025-05-06 RX ORDER — MAGNESIUM HYDROXIDE 1200 MG/15ML
LIQUID ORAL AS NEEDED
Status: DISCONTINUED | OUTPATIENT
Start: 2025-05-06 | End: 2025-05-06 | Stop reason: HOSPADM

## 2025-05-06 RX ORDER — ONDANSETRON 4 MG/1
4 TABLET, FILM COATED ORAL EVERY 8 HOURS PRN
Qty: 12 TABLET | Refills: 0 | Status: SHIPPED | OUTPATIENT
Start: 2025-05-06

## 2025-05-06 RX ORDER — HYDROMORPHONE HYDROCHLORIDE 1 MG/ML
0.5 INJECTION, SOLUTION INTRAMUSCULAR; INTRAVENOUS; SUBCUTANEOUS
Status: DISCONTINUED | OUTPATIENT
Start: 2025-05-06 | End: 2025-05-06 | Stop reason: HOSPADM

## 2025-05-06 RX ORDER — SODIUM CHLORIDE, SODIUM LACTATE, POTASSIUM CHLORIDE, CALCIUM CHLORIDE 600; 310; 30; 20 MG/100ML; MG/100ML; MG/100ML; MG/100ML
9 INJECTION, SOLUTION INTRAVENOUS CONTINUOUS
Status: DISCONTINUED | OUTPATIENT
Start: 2025-05-07 | End: 2025-05-06 | Stop reason: HOSPADM

## 2025-05-06 RX ORDER — DEXAMETHASONE SODIUM PHOSPHATE 4 MG/ML
INJECTION, SOLUTION INTRA-ARTICULAR; INTRALESIONAL; INTRAMUSCULAR; INTRAVENOUS; SOFT TISSUE AS NEEDED
Status: DISCONTINUED | OUTPATIENT
Start: 2025-05-06 | End: 2025-05-06 | Stop reason: SURG

## 2025-05-06 RX ORDER — LIDOCAINE HYDROCHLORIDE 10 MG/ML
0.5 INJECTION, SOLUTION EPIDURAL; INFILTRATION; INTRACAUDAL; PERINEURAL ONCE AS NEEDED
Status: COMPLETED | OUTPATIENT
Start: 2025-05-06 | End: 2025-05-06

## 2025-05-06 RX ORDER — LIDOCAINE HYDROCHLORIDE 10 MG/ML
INJECTION, SOLUTION EPIDURAL; INFILTRATION; INTRACAUDAL; PERINEURAL AS NEEDED
Status: DISCONTINUED | OUTPATIENT
Start: 2025-05-06 | End: 2025-05-06 | Stop reason: SURG

## 2025-05-06 RX ORDER — FENTANYL CITRATE 50 UG/ML
50 INJECTION, SOLUTION INTRAMUSCULAR; INTRAVENOUS
Status: DISCONTINUED | OUTPATIENT
Start: 2025-05-06 | End: 2025-05-06 | Stop reason: HOSPADM

## 2025-05-06 RX ORDER — ONDANSETRON 2 MG/ML
4 INJECTION INTRAMUSCULAR; INTRAVENOUS ONCE AS NEEDED
Status: DISCONTINUED | OUTPATIENT
Start: 2025-05-06 | End: 2025-05-06 | Stop reason: HOSPADM

## 2025-05-06 RX ORDER — HYDROCODONE BITARTRATE AND ACETAMINOPHEN 7.5; 325 MG/1; MG/1
1 TABLET ORAL EVERY 6 HOURS PRN
Qty: 24 TABLET | Refills: 0 | Status: SHIPPED | OUTPATIENT
Start: 2025-05-06

## 2025-05-06 RX ORDER — SODIUM CHLORIDE 0.9 % (FLUSH) 0.9 %
10 SYRINGE (ML) INJECTION EVERY 12 HOURS SCHEDULED
Status: DISCONTINUED | OUTPATIENT
Start: 2025-05-06 | End: 2025-05-06 | Stop reason: HOSPADM

## 2025-05-06 RX ORDER — TRANEXAMIC ACID 10 MG/ML
1000 INJECTION, SOLUTION INTRAVENOUS ONCE
Status: COMPLETED | OUTPATIENT
Start: 2025-05-06 | End: 2025-05-06

## 2025-05-06 RX ORDER — ROPIVACAINE HYDROCHLORIDE 2 MG/ML
INJECTION, SOLUTION EPIDURAL; INFILTRATION; PERINEURAL CONTINUOUS
Status: DISCONTINUED | OUTPATIENT
Start: 2025-05-06 | End: 2025-05-06 | Stop reason: HOSPADM

## 2025-05-06 RX ORDER — MIDAZOLAM HYDROCHLORIDE 1 MG/ML
0.5 INJECTION, SOLUTION INTRAMUSCULAR; INTRAVENOUS
Status: DISCONTINUED | OUTPATIENT
Start: 2025-05-06 | End: 2025-05-06 | Stop reason: HOSPADM

## 2025-05-06 RX ORDER — DOCUSATE SODIUM 100 MG/1
100 CAPSULE, LIQUID FILLED ORAL 2 TIMES DAILY
Qty: 20 CAPSULE | Refills: 0 | Status: SHIPPED | OUTPATIENT
Start: 2025-05-06

## 2025-05-06 RX ORDER — FAMOTIDINE 20 MG/1
20 TABLET, FILM COATED ORAL ONCE
Status: COMPLETED | OUTPATIENT
Start: 2025-05-06 | End: 2025-05-06

## 2025-05-06 RX ORDER — ROCURONIUM BROMIDE 10 MG/ML
INJECTION, SOLUTION INTRAVENOUS AS NEEDED
Status: DISCONTINUED | OUTPATIENT
Start: 2025-05-06 | End: 2025-05-06 | Stop reason: SURG

## 2025-05-06 RX ORDER — ACETAMINOPHEN 500 MG
1000 TABLET ORAL ONCE
Status: COMPLETED | OUTPATIENT
Start: 2025-05-06 | End: 2025-05-06

## 2025-05-06 RX ADMIN — EPHEDRINE SULFATE 10 MG: 50 INJECTION INTRAVENOUS at 10:02

## 2025-05-06 RX ADMIN — HYDROMORPHONE HYDROCHLORIDE 0.5 MG: 1 INJECTION, SOLUTION INTRAMUSCULAR; INTRAVENOUS; SUBCUTANEOUS at 12:10

## 2025-05-06 RX ADMIN — DEXAMETHASONE SODIUM PHOSPHATE 4 MG: 4 INJECTION INTRA-ARTICULAR; INTRALESIONAL; INTRAMUSCULAR; INTRAVENOUS; SOFT TISSUE at 09:56

## 2025-05-06 RX ADMIN — FAMOTIDINE 20 MG: 20 TABLET, FILM COATED ORAL at 08:58

## 2025-05-06 RX ADMIN — ONDANSETRON 4 MG: 2 INJECTION INTRAMUSCULAR; INTRAVENOUS at 10:56

## 2025-05-06 RX ADMIN — EPHEDRINE SULFATE 10 MG: 50 INJECTION INTRAVENOUS at 10:58

## 2025-05-06 RX ADMIN — MELOXICAM 15 MG: 15 TABLET ORAL at 08:58

## 2025-05-06 RX ADMIN — BUPIVACAINE HYDROCHLORIDE 30 ML: 2.5 INJECTION, SOLUTION EPIDURAL; INFILTRATION; INTRACAUDAL; PERINEURAL at 09:30

## 2025-05-06 RX ADMIN — ACETAMINOPHEN 1000 MG: 500 TABLET ORAL at 08:57

## 2025-05-06 RX ADMIN — TRANEXAMIC ACID 1000 MG: 10 INJECTION, SOLUTION INTRAVENOUS at 10:56

## 2025-05-06 RX ADMIN — SODIUM CHLORIDE 2000 MG: 900 INJECTION INTRAVENOUS at 09:58

## 2025-05-06 RX ADMIN — BUPIVACAINE HYDROCHLORIDE 15 ML: 2.5 INJECTION, SOLUTION EPIDURAL; INFILTRATION; INTRACAUDAL; PERINEURAL at 09:22

## 2025-05-06 RX ADMIN — FENTANYL CITRATE 100 MCG: 50 INJECTION, SOLUTION INTRAMUSCULAR; INTRAVENOUS at 09:22

## 2025-05-06 RX ADMIN — LIDOCAINE HYDROCHLORIDE 0.5 ML: 10 INJECTION, SOLUTION EPIDURAL; INFILTRATION; INTRACAUDAL; PERINEURAL at 08:58

## 2025-05-06 RX ADMIN — SODIUM CHLORIDE, POTASSIUM CHLORIDE, SODIUM LACTATE AND CALCIUM CHLORIDE 9 ML/HR: 600; 310; 30; 20 INJECTION, SOLUTION INTRAVENOUS at 08:58

## 2025-05-06 RX ADMIN — ROCURONIUM 50 MG: 50 INJECTION, SOLUTION INTRAVENOUS at 09:53

## 2025-05-06 RX ADMIN — Medication 1000 MG: at 11:17

## 2025-05-06 RX ADMIN — PROPOFOL 120 MG: 10 INJECTION, EMULSION INTRAVENOUS at 09:52

## 2025-05-06 RX ADMIN — SUGAMMADEX 200 MG: 100 INJECTION, SOLUTION INTRAVENOUS at 11:03

## 2025-05-06 RX ADMIN — TRANEXAMIC ACID 1000 MG: 10 INJECTION, SOLUTION INTRAVENOUS at 09:59

## 2025-05-06 RX ADMIN — EPHEDRINE SULFATE 10 MG: 50 INJECTION INTRAVENOUS at 10:34

## 2025-05-06 RX ADMIN — LIDOCAINE HYDROCHLORIDE 50 MG: 10 INJECTION, SOLUTION EPIDURAL; INFILTRATION; INTRACAUDAL; PERINEURAL at 09:52

## 2025-05-06 NOTE — OP NOTE
DATE OF OPERATION: 05/06/25  PREOPERATIVE DIAGNOSIS: right shoulder DJD and AC DJD  POSTOPERATIVE DIAGNOSES:  1. 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 Biceps tenosynovitis.    3. R AC DJD  PROCEDURES PERFORMED:  1. right reverse total shoulder arthroplasty.    2. right biceps tenodesis.    3. Distal clavicle excision    Procedure/CPT® Codes:  DC ARTHROPLASTY GLENOHUMERAL JOINT TOTAL SHOULDER [29988]    SURGICAL APPROACH: Deltopectoral      SURGICAL TECHNIQUE: Tenotomy         Procedure(s):  REVERSE TOTAL SHOULDER ARTHROPLASTY WITH BICEPS TENODESIS  DISTAL CLAVICLE EXCISION    Staff:  Surgeon(s):  Jv Wei Jr., MD    Circulator: Magali Carter RN; Lavonne Pineda RN  Scrub Person: Keith Tinoco  Nursing Assistant: Heena Bal  Assistant: Shalom Ortez PA-C     Assistant: Shalom Ortez PA-C  was responsible for performing the following activities: Retraction, Suction, Irrigation, Suturing, Closing, and Placing Dressing and their skilled assistance was necessary for the success of this case.    Anesthesia: General with Block    Estimated Blood Loss: 100ml    Implants:    Implant Name Type Inv. Item Serial No.  Lot No. LRB No. Used Action   SUT NONABS BONE DYNACORD UHMWPE W/OS/6 NDL 2PK STRIP/DYLAN - [id number] Implant SUT NONABS BONE DYNACORD UHMWPE W/OS/6 NDL 2PK STRIP/DYLAN  DEPUY [id number] [id number] Right 1 Implanted   SCRW PERIPH ALTIVATE/REVERSE TORX 18MM - [id number] Implant SCRW PERIPH ALTIVATE/REVERSE TORX 18MM  [employer] [id number] Right 1 Implanted   SCRW PERIPH ALTIVATE/REVERSE TORX 14MM - [id number] Implant SCRW PERIPH ALTIVATE/REVERSE TORX 14MM  [employer] [id number] Right 1 Implanted   BASEPLT ROSIO REV RSP TI [id number] - [id number] Implant BASEPLT ROSIO REV RSP TI [id number]  [employer] [id number] Right 1 Implanted   GLENOSPHERE ALTIVATE/REVERSE W/RETAIN/SCRW 32MM MIN6MM - [id number] Implant GLENOSPHERE ALTIVATE/REVERSE W/RETAIN/SCRW 32MM MIN6MM  DJO [employer]  [id number] Right 1 Implanted   SCRW PERIPH ALTIVATE/REVERSE TORX 14MM - [id number] Implant SCRW PERIPH ALTIVATE/REVERSE TORX 14MM  D[employer] [id number] Right 1 Implanted   SCRW PERIPH ALTIVATE/REVERSE TORX 26MM - [id number] Implant SCRW PERIPH ALTIVATE/REVERSE TORX 26MM  [employer] [id number] Right 1 Implanted   STEM HUM SHLDR ALTIVATE REVERSE 6X48MM SM - [id number] Implant STEM HUM SHLDR ALTIVATE REVERSE 6X48MM SM  [employer] [id number] Right 1 Implanted   INSRT SOCKT HUM/SHLDR ALTIVATE/REVERSE SEMI/CONSTR SZ32 SM - [id number] Implant INSRT SOCKT HUM/SHLDR ALTIVATE/REVERSE SEMI/CONSTR SZ32 SM  DJO [employer] [id number] Right 1 Implanted       Specimen:                None    INDICATIONS: This is a 69-year-old female with right shoulder pain and limited function and motion secondary to rDJD with suspected superior cuff tear in addition to AC DJD. They have failed conservative treatment and after a discussion of risks, benefits, and alternatives, wished to proceed with shoulder arthroplasty with DCE.  DESCRIPTION OF PROCEDURE: On the day of surgery, the patient identified the right shoulder as the correct operative extremity. This was initialed by the surgeon with the patient's acknowledgment. The patient underwent placement of an interscalene block and was taken to the operating room and placed in the supine position. Upon induction of adequate anesthesia, the patient was brought up to the beach chair position and the shoulder and upper extremity were prepped and draped in the usual sterile fashion. Timeout confirmed the correct patient and operative extremity as well as that antibiotics were on board. A standard deltopectoral approach to the shoulder was carried out. It was carried sharply through the skin and subcutaneous tissue. Medial and lateral flaps were developed over the deltopectoral fascia. The cephalic vein was identified and mobilized laterally with the deltoid. The subdeltoid and subpectoral spaces  were mobilized and a blunt retractor was placed deep to this. The clavipectoral fascia was opened on the lateral edge of the conjoined tendon and the retractor was moved deep to this. The leading edge of the pectoralis was released exposing the long head of the biceps. This was tenosynovitic. It was tenodesed to the pectoralis and released proximal to this. The 3 sisters were identified and coagulated. A subscapularis tenotomy was performed 1 cm medial to its insertion on the lesser tuberosity and rotator interval was released to the glenoid exposing the humeral head. The inferior capsule was released directly off the humerus to allow greater than 90° of external rotation. The anatomic neck was exposed and the humeral head osteotomy was performed in approximately 30° of retroversion. The remainder of the osteophytes were removed. The humerus was subluxated posteriorly. The glenoid exposed. Circumferential labral excision and capsular release were performed. A 270° mobilization of the subscapularis was carried out as well.  A centering hole was drilled and a 6.5 mm tap was placed. The glenoid was gently reamed and then the tap was removed. A standard baseplate was then screwed into place, achieving excellent bite.  We then placed the drill guide for the locking screws, then drilled and placed 4 peripheral locking screws.  The glenosphere was then inserted and locked into place with a set screw.  The humerus was carefully subluxed back anteriorly. The canal was then entered, reamed, and broached. The final stem impacted in in approximately 30° of retroversion. A trial polyethylene was placed and trialing was carried out. The appropriate final size polyethylene component chosen and malleted into place.  The shoulder was then reduced. We then repaired the subscapularis with 3 - #2 nonabsorbable sutures passed in a figure of 8 fashion. This allowed nearly full passive range of motion with no instability. The joint was  copiously irrigated with normal saline irrigation mixed with Rocephin after the final implants were assembled and locked into place.  Passive range of motion will be 150 degrees but external rotation will be limited to 0° in the perioperative period. The deltopectoral interval was approximated with 0 Vicryl, the subcutaneous tissue with 2-0 Vicryl, and the skin with an exofin dressing.    We then turned our attention to the distal clavicle excision.  A 3 cm incision was made transversely to the AC joint.  Sharp resection was carried through the subcutaneous tissue and fascia in line with our skin incision.  The distal clavicle and AC joint were readily exposed.  We then exposed the anterior and posterior margins of the distal clavicle, taking care to avoid excess traction on the surrounding capsule.  We then excised 1 cm of distal clavicle using a sagittal saw.  The wound was irrigated thoroughly.  The capsule was closed with #1 Vicryl suture in a pursestring fashion.  The fascia was then closed with #1 Vicryl suture.  The subcutaneous tissue was closed with 2-0 Vicryl.  The skin was closed with an Exofin mesh dressing.   Anesthesia was reversed and the patient was taken to the recovery room in stable condition. All instrument, needle, and sponge counts were correct.      Jv Wei Jr., MD  05/06/25  11:14 EDT

## 2025-05-06 NOTE — ANESTHESIA PROCEDURE NOTES
Airway  Reason: elective    Date/Time: 5/6/2025 9:54 AM  Airway not difficult    General Information and Staff    Patient location during procedure: OR  CRNA/CAA: Vish Wright CRNA    Indications and Patient Condition  Indications for airway management: airway protection    Preoxygenated: yes  MILS not maintained throughout    Mask difficulty assessment: 1 - vent by mask    Final Airway Details    Final airway type: endotracheal airway      Successful airway: ETT  Cuffed: yes   Successful intubation technique: video laryngoscopy  Adjuncts used in placement: intubating stylet  Endotracheal tube insertion site: oral  Blade: Tyler  Blade size: 3  ETT size (mm): 7.0  Cormack-Lehane Classification: grade I - full view of glottis  Placement verified by: chest auscultation and capnometry   Cuff volume (mL): 10  Measured from: lips  ETT/EBT  to lips (cm): 21  Number of attempts at approach: 1  Assessment: lips, teeth, and gum same as pre-op and atraumatic intubation    Additional Comments  Negative epigastric sounds, Breath sound equal bilaterally with symmetric chest rise and fall

## 2025-05-06 NOTE — ADDENDUM NOTE
Addendum  created 05/06/25 1237 by Shon Goodman CRNA    Clinical Note Signed, Intraprocedure Blocks edited, SmartForm saved

## 2025-05-06 NOTE — ANESTHESIA PROCEDURE NOTES
Right ISC Cath      Patient reassessed immediately prior to procedure    Patient location during procedure: pre-op  Reason for block: at surgeon's request and post-op pain management  Performed by  CRNA/CAA: Shon Goodman CRNA  Assisted by: Josy Acuna RN  Preanesthetic Checklist  Completed: patient identified, IV checked, site marked, risks and benefits discussed, surgical consent, monitors and equipment checked, pre-op evaluation and timeout performed  Prep:  Pt Position: left lateral decubitus  Sterile barriers:cap, gloves, mask and washed/disinfected hands  Prep: ChloraPrep  Patient monitoring: blood pressure monitoring, continuous pulse oximetry and EKG  Procedure    Sedation: yes  Performed under: local infiltration  Guidance:ultrasound guided    ULTRASOUND INTERPRETATION.  Using ultrasound guidance a 20 G gauge needle was placed in close proximity to the brachial plexus nerve, at which point, under ultrasound guidance anesthetic was injected in the area of the nerve and spread of the anesthesia was seen on ultrasound in close proximity thereto.  There were no abnormalities seen on ultrasound; a digital image was taken; and the patient tolerated the procedure with no complications. Images:still images obtained, printed/placed on chart    Laterality:right  Block Type:interscalene  Injection Technique:catheter  Needle Type:Tuohy and echogenic  Needle Gauge:18 G  Resistance on Injection: none  Catheter Size:20 G (20g)  Cath Depth at skin: 10 cm    Medications Used: fentaNYL citrate (PF) (SUBLIMAZE) injection - Intravenous   100 mcg - 5/6/2025 9:22:00 AM  bupivacaine PF (MARCAINE) 0.25 % injection - Injection   15 mL - 5/6/2025 9:22:00 AM      Post Assessment  Injection Assessment: negative aspiration for heme, no paresthesia on injection and incremental injection  Patient Tolerance:comfortable throughout block  Complications:no  Additional Notes  CATHETER  A high-frequency linear transducer, with sterile  "cover, was placed in the supraclavicular fossa to identify the subclavian artery and trunks and divisions of the brachial plexus. The transducer was then moved in a cephalad orientation with a slight rotation to continue visualization of the brachial plexus from the trunks and divisions, on to the C5-C7 roots. The insertion site was prepped and draped in sterile fashion. Skin and cutaneous tissue was infiltrated with 2-5 ml of 1% Lidocaine. Using ultrasound-guidance, an 18-gauge Contiplex Ultra 360 Touhy needle was advanced in plane from lateral to medial. Preservative-free normal saline was utilized for hydro-dissection of tissue, advancement of Touhy, and to confirm final needle placement at the fascial plane between the middle scalene muscle and sheath of the brachial plexus (C5-C7). A 20-gauge Contiplex Echo catheter was placed through the needle and advance out the tip of the Touhy 3-5 cm with the \"Mcguire Flip\". The Touhy needle was then removed, and final catheter position verified lateral to the brachial plexus with local anesthetic (LA) and ultrasound visualization. The catheter was secured in the usual fashion with skin glue, benzoin, steri-strips, CHG tegaderm and label noting \"Nerve Block Catheter\". Jerk tape applied at yellow connector and catheter connection. All LA was injected in increments of 3-5 ml after catheter secured. Aspiration every 5 ml to prevent intravascular injection. Injection was completed with negative aspiration of blood and negative intravascular injection. Injection pressures were normal with minimal resistance.   Performed by: Vish Wright, CRNA            "

## 2025-05-06 NOTE — NURSING NOTE
Caregiver Telephone Number    Phone Number for Ride/Caregiver: CAMRONANNELIESE ALEMAN (SPOUSE) [phone]     Who will be staying with you post procedure for the next 24 hours? Name and Phone Number?: CAMRON ALEMAN (SPOUSE) [phone]

## 2025-05-06 NOTE — ANESTHESIA POSTPROCEDURE EVALUATION
Patient: Luh Horner    Procedure Summary       Date: 05/06/25 [facility] / [facility]:  [facility]    Anesthesia Start: 0948 Anesthesia Stop: 1118    Procedures:       REVERSE TOTAL SHOULDER ARTHROPLASTY WITH BICEPS TENODESIS (Right: Shoulder)      DISTAL CLAVICLE EXCISION (Right: Shoulder) Diagnosis:     Surgeons: Jv Wei Jr., MD Provider: Scout Pillai MD    Anesthesia Type: general with block ASA Status: 3            Anesthesia Type: general with block    Vitals  Vitals Value Taken Time   BP     Temp     Pulse 92 05/06/25 11:18   Resp     SpO2 98 % 05/06/25 11:18   Vitals shown include unfiled device data.        Post Anesthesia Care and Evaluation    Patient location during evaluation: PACU  Patient participation: complete - patient participated  Level of consciousness: awake and alert  Pain score: 0  Pain management: adequate    Airway patency: patent  Anesthetic complications: No anesthetic complications  PONV Status: none  Cardiovascular status: hemodynamically stable and acceptable  Respiratory status: nonlabored ventilation, acceptable and nasal cannula  Hydration status: acceptable

## 2025-05-06 NOTE — PLAN OF CARE
Goal Outcome Evaluation:  Plan of Care Reviewed With: patient, spouse           Outcome Evaluation: OT educated pt and family on shoulder precautions, ADL retraining to maintain, sling management and HEP. Pt tolerated R shoulder PROM  with excellent teachback from spouse, issued pulleys for home and educated on use. She ambulated 200 feet with supervision and passed mobility screen, no PT needs. Recommend DC home with spouse assist as needed.    Anticipated Discharge Disposition (OT): home with 24/7 care

## 2025-05-06 NOTE — ANESTHESIA PROCEDURE NOTES
Right Pecs 1&2      Patient reassessed immediately prior to procedure    Patient location during procedure: OR  Reason for block: at surgeon's request and post-op pain management  Performed by  CRNA/CAA: Shon Goodman CRNA  Assisted by: Josy Acuna RN  Preanesthetic Checklist  Completed: patient identified, IV checked, site marked, risks and benefits discussed, surgical consent, monitors and equipment checked, pre-op evaluation and timeout performed  Prep:  Pt Position: supine  Sterile barriers:cap, gloves, mask and washed/disinfected hands  Prep: ChloraPrep  Patient monitoring: blood pressure monitoring, continuous pulse oximetry and EKG  Procedure  Performed under: general  Guidance:ultrasound guided and landmark technique  Images:still images obtained, printed/placed on chart    Laterality:right  Block Type:PECS I and PECS II  Injection Technique:single-shot  Needle Type:short-bevel  Needle Gauge:20 G  Resistance on Injection: none    Medications Used: bupivacaine PF (MARCAINE) 0.25 % injection - Injection   30 mL - 5/6/2025 9:30:00 AM      Medications  Preservative Free Saline:10ml  Comment:Block Injection:  Total volume of LA divided between Right and Left sided blocks         Post Assessment  Injection Assessment: negative aspiration for heme, incremental injection and no paresthesia on injection  Patient Tolerance:comfortable throughout block  Complications:no  Additional Notes  Interpectoral-Pectoserratus Plane   A high-frequency linear transducer, with sterile cover, was placed medial to the coracoid process in the paramedian sagittal plane. The transducer was moved caudally to the 4th rib and rotated slightly to allow an in-plane needle trajectory from medial to lateral. Pectoralis Major Muscle (PMM), Pectoralis Minor Muscle (PmM), Thoracoacromial Artery, Ribs, and Pleura were identified under ultrasound. The insertion site was prepped in sterile fashion and then localized with 2-5 ml of 1%  "Lidocaine. Using ultrasound-guidance, a 20-gauge B-Anaya 4\" Ultraplex 360 non-stimulating echogenic needle was advanced in plane until the tip of the needle was in the fascial plane between the PMM and PmM, lateral to the Thoracoacromial Artery. 1-3ml of preservative free normal saline was used to hydro-dissect the fascial planes. After the fascial plane was verified, 10ml local anesthetic (LA) was injected for Interpectoral fascial plane block. The needle was continued along the same path to the level of the 4th rib below PmM.  Initially preservative free normal saline was used to confirm needle position and then 20 ml of LA was injected for Pectoserratus fascial plane block. Aspiration every 5 ml to prevent intravascular injection. Injection was completed with negative aspiration of blood and negative intravascular injection. Injection pressures were normal with minimal resistance.     Performed by: Vish Wright CRNA            "

## 2025-05-06 NOTE — DISCHARGE INSTRUCTIONS
InfuBLOCK - Patient Information    What is a pain pump?  The InfuBLOCK pump delivers post-operative, non-narcotic, numbing medication to the nerve near the surgical site for pain relief.     Where can I find information about my pain pump?           For more information about your pain pump, scan the QR code.  For additional patient resources, visit [email or web address].                                                                                               While your physician is your primary source for information about your treatment there may be times during your treatment that you need assistance with your infusion pump.     If you need assistance take the following steps:    The [facility] Hotline is Here for You 24/7.  Please call [phone] for the following concerns or complications:    Answers to questions about your infusion pump                 Tubing disconnect  Assistance with pump alarms                                                      Dislodged catheter  Excessive leakage noted from pump                                         Inadequate pain control    2.   [facility] Anesthesia Acute Pain Service: [phone] is available 24/7 for any further needs or concerns about medication or pain control.     -------------------------------------------------------------------------    Nerve Catheter Removal Instructions  When your device is empty:    Remove your catheter by pulling the dressing off slowly (like you would remove a regular bandage). The catheter should pull right out of the skin.  Check that the BLUE tip is intact.                                                                                     If the catheter is stuck, reposition your   extremity and pull slowly until removed.  *If catheter is HURTING and WON'T come out, stop and call [phone] for further assistance.    Remove medication bag from the black carrying case.  Cut the  tubing on right and left side of pump, and discard the medication bag and tubing into garbage.  Place the pump and black carrying case into the plastic bag and then place this into the return box.  Seal box with blue stickers and return to [place] postal service. THIS IS PRE-PAID POSTAGE.        -------------------------------------------------------------------------    [facility] COLD THERAPY - PATIENT INSTRUCTION SHEET    Cold Compression Therapy for your comfort and rehabilitation  Your caregivers want you to be productive in your rehab and comfortable during your stay. In keeping with those goals, you will be receiving an SMI Cold Therapy Wrap to help ease post-operative pain and swelling that might keep you from getting back on track! Your SMI Cold Therapy Wrap is effective and simple-to-use, and you will be encouraged to apply it throughout your hospital stay and at home through the duration of your recovery.    When you are ready to go home  Be sure to take your SMI Cold Therapy Wrap and both sets of Gel Bags with you for continued comfort and use throughout your rehabilitation. If you don't already have them, ask your nurse or aide to retrieve your SMI Gel Bags from the patient freezer.    Home use precautions  Always follow your medical professional's application instructions upon discharge. Your SMI Cold Therapy Wrap and Gel Bags are designed to last for months following your surgery. Never heat the Gel Bags unless specified by your healthcare provider. Supervision is advised when using this product on children or geriatric patients. To avoid danger of suffocation, please keep the outer plastic packaging away from children & pets.    Cold Therapy Instructions  Place Gel Bags in a freezer set ¾ of the way to max temperature for at least (4) hours. For best results, lay the Gel Bags flat and [id number] in the freezer. Once frozen, slide Gel Bags into the gel pouch and secure your wrap to the affected area with the  straps.  Gel wraps that have been stored in a freezer for an extended period of time may require a (10) minute period of softening up in a room temperature environment before application.  The gel pouch acts as a protective barrier. NEVER place frozen bags directly onto skin, as this may cause frostbite injury.  The [facility] Cold Therapy Wrap is designed to be able to be worm while ambulating. The compression straps can be secured well enough so that the Wrap won't fall off while moving.  Wrap Application Videos can be viewed at [employer].  An additional protective barrier such as clothing, a washcloth, hand-towel or pillowcase may be used during prolonged treatment applications.  The Gel-Pouch and Wrap are both Latex-Free and the Gel Bag ingredients are non toxic.    [facility] Wrap care instructions  The [facility] Cold Therapy Wrap may be hand washed and hung to dry when needed.    [facility] re-order information  Additional [facility] body specific wraps and/or Gel Bags can be re-ordered from [employer] or call [employer] ([phone])

## 2025-05-06 NOTE — H&P
Pre-Op H&P  Luh Horner  [id number]  1955      Chief complaint: Right shoulder pain      Subjective:  Patient is a 69 y.o.female presents for scheduled surgery by Dr. Wei. She anticipates a TOTAL SHOULDER ARTHROPLASTY VERSUS REVERSE TOTAL SHOULDER ARTHROPLASTY WITH BICEPS TENODESIS; RESECTION DISTAL CLAVICLE  today.  She reports right shoulder pain for over a year.  She has difficulty with range of motion.  She reports right hand numbness and difficulty with .      Review of Systems:  Constitutional-- No fever, chills or sweats. No fatigue.  CV-- No chest pain, palpitation or syncope. +HTN, HLD  Resp-- No SOB, cough, hemoptysis  Skin--No rashes or lesions      Allergies:   Allergies   Allergen Reactions    Shellfish Allergy Other (See Comments)     Head congestion    Atenolol Other (See Comments)     Fatigue    Buspirone Headache      Headache    Codeine Other (See Comments)     Depression    Pseudoephedrine Anxiety    Rosuvastatin Other (See Comments)     Fatigue    Westley-E.P.A. [Dha-Epa-Vitamin E] Anxiety    Simvastatin Other (See Comments)     Facial numbness    Trazodone Other (See Comments)     Nightmares         Home Meds:  Medications Prior to Admission   Medication Sig Dispense Refill Last Dose/Taking    aspirin (SB Low Dose ASA EC) 81 MG EC tablet Take 1 tablet by mouth Daily.   5/4/2025    atorvastatin (LIPITOR) 80 MG tablet Take 1 tablet by mouth Daily. 90 tablet 3 5/5/2025 Evening    benzoyl peroxide 5 % external liquid Apply topically to the appropriate area as directed three times prior to surgery 148 mL 0 5/5/2025    citalopram (CeleXA) 20 MG tablet Take 1 tablet by mouth Daily. 90 tablet 3 5/6/2025 Morning    Coenzyme Q10 (CO Q-10) 200 MG capsule Take 1 capsule by mouth.   5/5/2025    estradiol (ESTRACE VAGINAL) 0.1 MG/GM vaginal cream Insert 2 g into the vagina Daily for 7 days, THEN 1 g 3 (Three) Times a Week for 30 days. 42.5 g 0     estradiol (MINIVELLE, VIVELLE-DOT) 0.05  MG/24HR patch    5/6/2025 Morning    ezetimibe (ZETIA) 10 MG tablet Take 1 tablet by mouth Daily. 90 tablet 3 5/6/2025 at  6:30 AM    fluticasone (FLONASE) 50 MCG/ACT nasal spray 2 sprays by Each Nare route Daily. 48 g 3 5/6/2025 at  6:30 AM    HYDROcodone-acetaminophen (NORCO) 5-325 MG per tablet Take 1-2 tablets by mouth Every 12 (Twelve) Hours As Needed for Severe Pain (Pain). 60 tablet 0 5/5/2025 Evening    lisinopril (PRINIVIL,ZESTRIL) 40 MG tablet Take 1 tablet by mouth Every Morning. 90 tablet 3 5/5/2025 Morning    Magnesium Citrate 200 MG tablet Take 2 tablets by mouth Every Night.   5/5/2025 Evening    melatonin 5 MG tablet tablet Take 1 tablet by mouth Every Night.   5/5/2025 Evening    Omega-3 Fatty Acids (FISH OIL) 1000 MG capsule capsule Take 1 capsule by mouth Daily With Breakfast.   Past Week    omeprazole (priLOSEC) 20 MG capsule Take 1 capsule by mouth Daily. 90 capsule 3 5/5/2025 Evening    Semaglutide, 1 MG/DOSE, (OZEMPIC) 2 MG/1.5ML solution pen-injector Inject  under the skin into the appropriate area as directed 1 (One) Time Per Week.   Past Month    sucralfate (CARAFATE) 1 g tablet Take 1 tablet by mouth Every 12 (Twelve) Hours. 180 tablet 0 5/5/2025    valACYclovir (VALTREX) 500 MG tablet Take 1 tablet by mouth Daily.   5/5/2025    vitamin C (ASCORBIC ACID) 500 MG tablet Take 1 tablet by mouth Daily.   5/5/2025         PMH:   Past Medical History:   Diagnosis Date    Allergic 2000    Allergic rhinitis     Anesthesia complication     had to have O2 cannula during endoscopy    Anxiety 2004    Arthritis of back     Bone pain     right foot    Cancer 2005    Skin cancer squamous    Cataract 2010    Surgery recently    Cervical disc disorder 2022    Chronic pain disorder 2012    Coronary artery disease 2005    Mitral valve    Depression 1981 1981 - Hospitalized    Dislocation of finger     Fibrocystic breast disease 2000    Current benign breast biopsies    GERD (gastroesophageal reflux  disease) 2001    Glaucoma Surgery recently    Headache     None this year    Hiatal hernia     Hyperlipidemia     Medication    Hypertension 1997    Elevated today    Insomnia 2013    Irritable bowel syndrome (IBS) 2002    Recurrent abdominal cramps    Joint pain 2012    Knee swelling     Low back pain 2008    Low back strain     Lumbar scoliosis 2017    Lumbar spondylosis 2000    Chronic low back pain    Lumbosacral disc disease     Migraine     Mitral valve insufficiency     Mitral valve prolapse     Neck pain     Obesity 2000    Osteoarthritis     Osteopenia     Periarthritis of shoulder     right    Peripheral neuropathy     Post menopausal syndrome 2004    Refractory hot flashes    Rotator cuff syndrome     right    Sleep disorder 2016    Spinal stenosis     Thoracic disc disorder     Wears reading eyeglasses     Wrist sprain     left     PSH:    Past Surgical History:   Procedure Laterality Date    BACK SURGERY  2023    BREAST BIOPSY Right remote    benign disease    CATARACT EXTRACTION, BILATERAL Bilateral     CHOLECYSTECTOMY      COLONOSCOPY  ?    ENDOSCOPY      HYSTERECTOMY  1998    LAMINECTOMY  2023    LUMBAR LAMINECTOMY Bilateral 10/18/2023    Procedure: LAMINECTOMY BILATERAL DECOMPRESSION L4-5 RIGHT;  Surgeon: Jonel Browning MD;  Location: [facility];  Service: Neurosurgery;  Laterality: Bilateral;    REFRACTIVE SURGERY      RK    SKIN BIOPSY      SPINE SURGERY      TRIGGER POINT INJECTION      Knee    URETHRAL SUSPENSION      laparoscopic for stress incontinence       Immunization History:  Influenza: UTD  Pneumococcal: UTD  Tetanus: UTD    Social History:   Tobacco:   Social History     Tobacco Use   Smoking Status Former    Current packs/day: 0.00    Average packs/day: 2.0 packs/day for 10.0 years (20.0 ttl pk-yrs)    Types: Cigarettes    Start date: 1971    Quit date: 1981    Years since quittin.3    Passive exposure: Never  "  Smokeless Tobacco Never      Alcohol:     Social History     Substance and Sexual Activity   Alcohol Use Yes    Comment: occassional         Physical Exam:/72 (BP Location: Right arm, Patient Position: Lying)   Pulse 63   Temp 98.2 °F (36.8 °C) (Temporal)   Resp 16   Ht 152.4 cm (60\")   Wt 64.4 kg (142 lb)   SpO2 96%   BMI 27.73 kg/m²       General Appearance:    Alert, cooperative, no distress, appears stated age   Head:    Normocephalic, without obvious abnormality, atraumatic   Lungs:     Clear to auscultation bilaterally, respirations unlabored    Heart:   Regular rate and rhythm, S1 and S2 normal    Abdomen:    Soft without tenderness   Extremities:   Extremities normal, atraumatic, no cyanosis or edema   Skin:   Skin color, texture, turgor normal, no rashes or lesions   Neurologic:   Grossly intact     Results Review:     LABS:  Lab Results   Component Value Date    WBC 6.65 04/22/2025    HGB 15.8 04/22/2025    HCT 48.1 (H) 04/22/2025    .2 (H) 04/22/2025     04/22/2025    NEUTROABS 4.66 11/11/2024    GLUCOSE 84 04/22/2025    BUN 9 04/22/2025    CREATININE 0.79 04/22/2025    EGFRIFNONA 76 08/17/2021     04/22/2025    K 3.7 04/22/2025    CL 99 04/22/2025    CO2 26.6 04/22/2025    CALCIUM 9.4 04/22/2025    ALBUMIN 4.5 04/22/2025    AST 32 04/22/2025    ALT 37 (H) 04/22/2025    BILITOT 0.5 04/22/2025       RADIOLOGY:  Imaging Results (Last 72 Hours)       Procedure Component Value Units Date/Time    IMAGING SCANNED [[id number]] Resulted: 05/06/25     Updated: 05/05/25 1205            I reviewed the patient's new clinical results.    Cancer Staging (if applicable)  Cancer Patient: __ yes __no __unknown; If yes, clinical stage T:__ N:__M:__, stage group or __N/A      Impression: Right shoulder pain      Plan: TOTAL SHOULDER ARTHROPLASTY VERSUS REVERSE TOTAL SHOULDER ARTHROPLASTY WITH BICEPS TENODESIS; RESECTION DISTAL CLAVICLE       Erma Kelly, APRN   5/6/2025   08:50 EDT  "

## 2025-05-06 NOTE — ANESTHESIA PREPROCEDURE EVALUATION
Anesthesia Evaluation     Patient summary reviewed and Nursing notes reviewed   no history of anesthetic complications:   NPO Solid Status: > 8 hours  NPO Liquid Status: > 2 hours           Airway   Mallampati: III  TM distance: >3 FB  Neck ROM: full  Possible difficult intubation  Dental - normal exam     Comment: Prominent incisors    Pulmonary - normal exam    breath sounds clear to auscultation  (+) a smoker (Quit 1981) Former,  Cardiovascular - normal exam    ECG reviewed  Rhythm: regular  Rate: normal    (+) hypertension, valvular problems/murmurs (Mild MR on most recent ECHO, MVP listed in chart) MR and MVP, hyperlipidemia    ROS comment: EKG 4/22/25:  HR 69  Normal sinus rhythm  Low voltage QRS    ECHO 11/2023:  EF 55-60%, G1DD, mild MR, mild TR (normal RVSP)    Neuro/Psych  (+) headaches (Migraines), numbness (Peripheral neuropathy), psychiatric history Anxiety and Depression  GI/Hepatic/Renal/Endo    (+) hiatal hernia, GERD    Musculoskeletal     (+) back pain, chronic pain, neck pain  Abdominal    Substance History   (+) alcohol use     OB/GYN          Other   arthritis,   history of cancer (skin)    ROS/Med Hx Other: Ozempic - last dose about 3 weeks ago    Labs 4/22/25:  Hgb 15.8, Plt 307  Cr 0.79, K 3.7                Anesthesia Plan    ASA 3     general with block     intravenous induction     Anesthetic plan, risks, benefits, and alternatives have been provided, discussed and informed consent has been obtained with: patient.    Plan discussed with CRNA.    CODE STATUS:

## 2025-05-06 NOTE — ANESTHESIA POSTPROCEDURE EVALUATION
Patient: Luh Horner    Procedure Summary       Date: 05/06/25 [facility]    Anesthesia Start: 0948 Anesthesia Stop: 1118    Procedures:       REVERSE TOTAL SHOULDER ARTHROPLASTY WITH BICEPS TENODESIS (Right: Shoulder)      DISTAL CLAVICLE EXCISION (Right: Shoulder) Diagnosis:     Surgeons: Jv Wei Jr., MD Provider: Scout Pillai MD    Anesthesia Type: general with block ASA Status: 3            Anesthesia Type: general with block    Vitals  Vitals Value Taken Time   /43 05/06/25 11:30   Temp 98 °F (36.7 °C) 05/06/25 11:20   Pulse 82 05/06/25 11:36   Resp 16 05/06/25 11:30   SpO2 93 % 05/06/25 11:36   Vitals shown include unfiled device data.        Post Anesthesia Care and Evaluation    Patient location during evaluation: PACU  Patient participation: complete - patient participated  Level of consciousness: awake and alert  Pain management: adequate    Airway patency: patent  Anesthetic complications: No anesthetic complications  PONV Status: none  Cardiovascular status: hemodynamically stable and acceptable  Respiratory status: nonlabored ventilation, acceptable and nasal cannula  Hydration status: acceptable

## 2025-05-12 ENCOUNTER — TELEPHONE (OUTPATIENT)
Age: 70
End: 2025-05-12
Payer: MEDICARE

## 2025-05-12 NOTE — TELEPHONE ENCOUNTER
I called patient to give him his  6:15 AMam arrival time for his surgery in the morning with Dr. Arguello The patient did not answer at this time. I left a voicemail stating that I did need a call back confirming that the received my message.

## 2025-05-13 ENCOUNTER — OUTSIDE FACILITY SERVICE (OUTPATIENT)
Dept: ORTHOPEDIC SURGERY | Facility: CLINIC | Age: 70
End: 2025-05-13
Payer: MEDICARE

## 2025-05-13 ENCOUNTER — DOCUMENTATION (OUTPATIENT)
Age: 70
End: 2025-05-13
Payer: MEDICARE

## 2025-05-13 RX ORDER — NYSTATIN 100000 [USP'U]/G
POWDER TOPICAL 2 TIMES DAILY
Qty: 60 G | Refills: 1 | Status: SHIPPED | OUTPATIENT
Start: 2025-05-13

## 2025-05-13 NOTE — PROGRESS NOTES
DATE OF PROCEDURE: 05/13/2025    LOCATION: [facility] [place]     PROCEDURES PERFORMED:    Right middle finger extensor centralization w/ tendon transfer CPT 42819    SURGEON: Nicolas Arguello MD      ASSISTANTS:      None          * Surgery not found *      ANESTHESIA: General    PREOPERATIVE DIAGNOSES:  Right middle finger sagittal band rupture     POSTOPERATIVE DIAGNOSES:    Same     ESTIMATED BLOOD LOSS: 5 mL.    SPECIMENS: none    IMPLANTS: none    COMPLICATIONS: None     INDICATIONS:  Luh Horner is a 69 y.o. female who initially presented with sagittal band rupture and extensor tendon subluxation that the patient found symptomatic and causing popping with flexion of the right middle finger.  The risks, benefits, alternatives and potential complications of surgery were discussed with the patient including but not limited to bleeding scarring, infection, recurrence, stiffness, damage to surrounding structures or postoperative pain.  The patient understands the risks and agreed to proceed with surgery.  A surgical informed consent was signed prior to the procedure.     DESCRIPTION OF PROCEDURE:  The patient was greeted in the pre-operative holding area and the surgical site was marked and consent confirmed prior to bringing the patient to the operating room.  The patient was then taken to the operating room and a timeout was performed including the patient's name, procedure and antibiotic administration prior to patient receiving anesthesia.  The patient was positioned supine on the operative room table and a non-sterile tourniquet was applied to the right upper extremity and it was then prepped and draped in the usual sterile fashion.  Local anesthesia infiltrated with 1% lidocaine with epinephrine and 0.5% Marcaine     The upper extremity was exsanguinated and the tourniquet set to 250 mmHg.     Longitudinal incision was made over the right middle finger dorsal MCP joint.  Skin  flaps were elevated many dorsal veins and arteries were protected.  The extensor mechanism was identified at the MCP and there is significant attenuation of the sagittal band tissue without tissue available for local repair.  Because of this the decision was made to proceed with a tendon transfer.  The ulnar juncture tendon a tendon middle finger was then harvested proximally to allow distally based tendon transfer.  It was then passed through the EDC tendon from ulnar to radial and sutured to itself using 3-0 Supramid.  It was then woven in the the transverse metacarpal ligament back onto itself.  Attention was with a finger in extension and the tendon woven through itself and secured with 3-0 Supramid in figure-of-eight fashion.  Tenodesis was then confirmed.  The tourniquet was let down and hemostasis achieved with bipolar electrocautery.  Skin was then closed with 4-0 nylon in horizontal mattress fashion.  A volar blocking splint was then applied to the right hand.     At the end of the procedure the patient was awoken from anesthesia and transferred to the PACU in stable condition.  I participated in all parts of the case.      POSTOPERATIVE PLAN:  Therapy to begin in 2 weeks.  Maintain splint for 2 weeks.  Right upper extremity ice and elevation.  Appearing Tylenol ibuprofen and oxycodone for pain.

## 2025-05-14 ENCOUNTER — TELEPHONE (OUTPATIENT)
Dept: ORTHOPEDIC SURGERY | Facility: CLINIC | Age: 70
End: 2025-05-14

## 2025-05-14 NOTE — TELEPHONE ENCOUNTER
PATIENT IS CALLING IN AFTER HAVING SURGERY WITH DR. MATTHEW YESTERDAY ON HER RIGHT HAND. SHE STATED THAT THE SURGERY CENTER CALLED HER  TO FOLLOW UP. SHE NOTIFIED THEM THAT SHE WAS EXPERIENCING SOME NUMBNESS IN HER FINGERS AND THEY ADVISED HER TO CALL THE OFFICE. SHE STATES SHE GETS SOME RELIEF WHEN SHE MOVES HER HAND AROUND AND LETS IT REST IN A DIFFERENT POSITION. SHE DIDN'T KNOW IF THIS WAS NORMAL OR NOT.    IF DR. MATTHEW OR CLINICAL STAFF COULD PLEASE ADVISE.     CALL BACK # [phone]

## 2025-05-14 NOTE — TELEPHONE ENCOUNTER
Spoke with patient to let her know that numbness is normal day after surgery. Keep moving her fingers a little this will help and it should improve.

## 2025-05-19 NOTE — TELEPHONE ENCOUNTER
PATIENT IS CALLING BACK TO DOUBLE CHECK THAT THE NUMBNESS AND TINGLING IN HER FINGER AFTER SURGERY IS STILL NORMAL. SHE WAS TOLD THAT IT SHOULD ONLY LAST FOR 72 HOURS AFTER SURGERY AND IT'S BEEN LONGER THAN THAT. SHE SAID IT'S NOT CONSTANT BUT IS STILL HAPPENING.     PATIENT IS ALSO WONDERING IF SHE CAN GET HER BRACE OR BAND OFF AROUND HER FINGERS. SHE THINKS THIS MIGHT BE CONTRIBUTING TO THE NUMBNESS AND TINGLING. SHE IS SCHEDULED TO COME BACK ON 05/29/25.     IF DR. MATTHEW OR CLINICAL STAFF COULD  PLEASE ADVSE.     CALL BACK # [phone]

## 2025-05-29 ENCOUNTER — TREATMENT (OUTPATIENT)
Dept: PHYSICAL THERAPY | Facility: CLINIC | Age: 70
End: 2025-05-29
Payer: MEDICARE

## 2025-05-29 ENCOUNTER — OFFICE VISIT (OUTPATIENT)
Age: 70
End: 2025-05-29
Payer: MEDICARE

## 2025-05-29 DIAGNOSIS — S63.659A SAGITTAL BAND RUPTURE AT METACARPOPHALANGEAL JOINT, INITIAL ENCOUNTER: Primary | ICD-10-CM

## 2025-05-29 DIAGNOSIS — M18.12 ARTHRITIS OF CARPOMETACARPAL (CMC) JOINT OF LEFT THUMB: ICD-10-CM

## 2025-05-29 PROCEDURE — L3927 FO PIP DIP NO JT SPR PRE OTS: HCPCS | Performed by: PHYSICAL THERAPIST

## 2025-05-29 PROCEDURE — L3808 WHFO, RIGID W/O JOINTS: HCPCS | Performed by: PHYSICAL THERAPIST

## 2025-05-29 NOTE — PROGRESS NOTES
[facility]     Follow-up Office Visit       Date: 05/29/2025   Patient Name: Luh Horner  MRN: [id number]  YOB: 1955    Chief Complaint:   Chief Complaint   Patient presents with    Post-op     2 weeks s/p Right middle finger extensor centralization w/ tendon transfer        History of Present Illness:   Luh Horner is a 69 y.o. female 2 weeks status post right middle finger extensor centralization.  She been doing well since surgery.  Reports pain is not well-controlled.  She has been compliant in her splint.  She does report worsening of her left basilar thumb pain with overuse over the last 2 weeks.  She would like repeat left thumb CMC injection today.      Subjective   Review of Systems:   Review of Systems   Constitutional:  Negative for chills, fever, unexpected weight gain and unexpected weight loss.   HENT:  Negative for congestion, postnasal drip and rhinorrhea.    Eyes:  Negative for blurred vision.   Respiratory:  Negative for shortness of breath.    Cardiovascular:  Negative for leg swelling.   Gastrointestinal:  Negative for abdominal pain, nausea and vomiting.   Genitourinary:  Negative for difficulty urinating.   Musculoskeletal:  Positive for arthralgias. Negative for gait problem, joint swelling and myalgias.   Skin:  Negative for skin lesions and wound.   Neurological:  Negative for dizziness, weakness, light-headedness and numbness.   Hematological:  Does not bruise/bleed easily.   Psychiatric/Behavioral:  Negative for depressed mood.         Pertinent review of systems per HPI    I reviewed the patient's chief complaint, history of present illness, review of systems, past medical history, surgical history, family history, social history, medications and allergy list in the EMR on 05/29/2025 and agree with the findings above.    Objective    Vital Signs: There were no vitals filed for this  visit.  BMI: There is no height or weight on file to calculate BMI.     General Appearance: No acute distress. Alert and oriented.     Chest:  Non-labored breathing on room air      Ortho Exam:  Right hand incision clean dry intact and healing appropriately.  Mild swelling of the dorsal aspectof the right hand  Tender palpation of the left thumb CMC.  Positive CMC shuck test.  Negative grind test.  Negative Finkelstein's test.    Fingers warm and well-perfused distally  Sensation intact to light touch in the median, radial and ulnar nerve distributions    Imaging/Studies:   Imaging Results (Last 24 Hours)       ** No results found for the last 24 hours. **                Procedures:  Procedures    Quality Measures:   ACP:   ACP discussion was deferred.    Tobacco:   Luh Horner  reports that she quit smoking about 44 years ago. Her smoking use included cigarettes. She started smoking about 54 years ago. She has a 20 pack-year smoking history. She has never been exposed to tobacco smoke. She has never used smokeless tobacco.    Assessment / Plan    Assessment/Plan:      Diagnosis Plan   1. Sagittal band rupture at metacarpophalangeal joint, initial encounter  Ambulatory Referral to Physical Therapy for Evaluation & Treatment    Ambulatory Referral to Physical Therapy for Evaluation & Treatment      2. Arthritis of carpometacarpal (CMC) joint of left thumb            2 weeks status post right middle finger extensor centralization, doing well postoperatively.  Recommend referral to therapy for custom brace with right wrist cock up brace and right yoke splint.  Recommend referral to [facility] to begin hand therapy.  Recommend nonweightbearing right upper extremity for an additional 4 weeks.  Recommend patient follow-up in 6 weeks for repeat check.    Patient also presents with worsening of her left basilar thumb arthritis.  We discussed further treatment options including corticosteroid injection and CMC arthroplasty.   Recommend left thumb CMC corticosteroid injection today.    Procedure Note:    I discussed with the patient the potential benefits of performing therapeutic aspiration and injections as well as potential risks including but not limited to infection, swelling, pain, bleeding, bruising, nerve/vessel damage, skin color changes, transient elevation in blood glucose levels, and fat atrophy. After informed consent and after the areas were prepped with chlorhexadine soap, ethyl chloride was used to numb the skin. Via the dorsal approach, 0.5 mL of 1% lidocaine was injected followed by injection of 20mg of Kenalog into the left thumb CMC joint.  The patient tolerated the procedure well. There were no complications. A sterile dressing was placed over the injection sites.      Follow Up:   Return in about 6 weeks (around 7/10/2025) for F/U with Miladis.        Nicolas Arguello MD  [facility]remity Surgeon

## 2025-05-29 NOTE — PROGRESS NOTES
Luh Horner 1955   Diagnosis/ Surgery: s/p right middle finger sagittal band reconstruction              Date Of Injury: progressive    Date Of Surgery:5/13/25    Hand Dominance: right   History of Present Condition: progressive popping and snapping of extensor tendon.   Medical/Vocational History/ Medications: recent shoulder surgery     Pain:  mod   Edema: minimal   Sensibility: WNL   Wound Status:dorsal hand  ROM/ Strength: NT     Splinting:  Patient was measure and fit with a custom fabricated forearm based wrist immobilization splint with yoke splint posting long finger in relative extension.   Patient was instructed in wearing schedule, precautions and care of the splint during this visit.   Patient was instructed in proper donning/doffing of splint.   Assessment:  Patient was fitted and appropriate splint was fabricated this date.  Patient reported that splint was comfortable and had no complications with the fit of the splint.  Patient was instructed and patient verbalized understanding of precautions, wear and care of the splint.   Patient demonstrated independent donning/doffing of splint during treatment today.  Goals:  Patient was fitted properly with appropriate splint for diagnosis  Patient was educated on precautions, wear schedule and care of splint  Patient demonstrated independence with donning/doffing of the splint.  Splint was provided to Protect Healing Structures, Restrict Mobility, Improve joint alignment.  Plan:  No additional treatment is required for this patient at this time. The patient is therefore discharged from therapy.  Patient advised to contact therapist with any additional questions or concerns regarding the fit and function of the splint.  Patient will be seen for splint issues as needed   Wear Instructions: Off for hygiene           PT SIGNATURE: Stacey Mosher PT   DATE TREATMENT INITIATED: 5/29/2025    Medicare Initial Certification  Certification Period:  8/27/2025  I certify that the therapy services are furnished while this patient is under my care.  The services outlined above are required by this patient, and will be reviewed every 90 days.     PHYSICIAN: Nicolas Arguello MD      DATE:     Please sign and return via fax to [phone].. Thank you, [facility].

## 2025-06-02 ENCOUNTER — TELEPHONE (OUTPATIENT)
Dept: ORTHOPEDIC SURGERY | Facility: CLINIC | Age: 70
End: 2025-06-02

## 2025-06-02 NOTE — TELEPHONE ENCOUNTER
Caller: Luh Horner    Relationship: Self    Best call back number: [phone]    What orders are you requesting (i.e. lab or imaging): PT FOR RIGHT HAND    In what timeframe would the patient need to come in: ASAP    Where will you receive your lab/imaging services: [facility] [place]    Additional notes: WOULD LIKE CALL ONCE ORDER HAS BEEN SENT

## 2025-06-05 ENCOUNTER — TELEPHONE (OUTPATIENT)
Dept: ORTHOPEDIC SURGERY | Facility: CLINIC | Age: 70
End: 2025-06-05
Payer: MEDICARE

## 2025-06-05 NOTE — TELEPHONE ENCOUNTER
Elsa Bob, therapist from [facility], is calling about this patient. She starts PT today and they are wanting to know the specific range of motion that can be worked on since the patient is still wearing the cock up brace and yoke splint. Elsa also wants to know if they can remove the splint to work on specific exercises.    Patient is 3 weeks s/p Right middle finger extensor centralization w/ tendon transfer.    Elsa's call back# [phone]    Patient is being seen right now, so they will only do measurements/eval, but they would like this info before her next appointment next Tuesday.

## 2025-06-06 NOTE — TELEPHONE ENCOUNTER
Faxed protocol per Dr Arguello to [facility]. Called Elsa and let her know. She will call back with any further problems or questions.     Erma

## 2025-06-13 ENCOUNTER — OFFICE VISIT (OUTPATIENT)
Dept: FAMILY MEDICINE CLINIC | Facility: CLINIC | Age: 70
End: 2025-06-13
Payer: MEDICARE

## 2025-06-13 VITALS
WEIGHT: 142 LBS | HEART RATE: 68 BPM | HEIGHT: 60 IN | BODY MASS INDEX: 27.88 KG/M2 | DIASTOLIC BLOOD PRESSURE: 84 MMHG | OXYGEN SATURATION: 98 % | SYSTOLIC BLOOD PRESSURE: 144 MMHG

## 2025-06-13 DIAGNOSIS — Z51.81 THERAPEUTIC DRUG MONITORING: Primary | ICD-10-CM

## 2025-06-13 DIAGNOSIS — M48.062 NEUROGENIC CLAUDICATION DUE TO LUMBAR SPINAL STENOSIS: ICD-10-CM

## 2025-06-13 DIAGNOSIS — I10 PRIMARY HYPERTENSION: Chronic | ICD-10-CM

## 2025-06-13 DIAGNOSIS — Z51.81 THERAPEUTIC DRUG MONITORING: ICD-10-CM

## 2025-06-13 RX ORDER — HYDROCODONE BITARTRATE AND ACETAMINOPHEN 5; 325 MG/1; MG/1
1-2 TABLET ORAL EVERY 12 HOURS PRN
Qty: 60 TABLET | Refills: 0 | Status: SHIPPED | OUTPATIENT
Start: 2025-06-13

## 2025-06-13 NOTE — PROGRESS NOTES
Established Patient Office Visit      Patient Name: Luh Horner  : 1955   MRN: [id number]   Care Team: Patient Care Team:  Daniel Shafer DO as PCP - General (Family Medicine)  Felice Lopez MD as Consulting Physician (Gastroenterology)  Dandy Mcrae MD as Consulting Physician (Pain Medicine)  Damián Devi MD as Consulting Physician (Cardiology)  Jonel Browning MD as Surgeon (Neurosurgery)  Alexy Coy PA-C as Physician Assistant (Physician Assistant)  Daniel Shafer DO as Emergency Attending (Family Medicine)  Nicolas Arguello MD as Consulting Physician (Orthopedic Surgery)    Chief Complaint:    Chief Complaint   Patient presents with    Hypertension     Pt states that her bp has gone up a little bit since she has had those 2 surgery in May. 140/82 is what it has been ranging        History of Present Illness: Luh Horner is a 69 y.o. female who is here today for chief complaint.    Hypertension  Chronicity:  Recurrent  Onset:  More than 1 year ago  Progression since onset:  Improved  Condition status:  Controlled  Associated symptoms: no anxiety, no blurred vision, no chest pain, no headaches, no malaise/fatigue, no orthopnea, no palpitations, no peripheral edema and no shortness of breath    Agents associated with hypertension:  Estrogens and NSAIDs  Compliance problems:  Exercise  Additional Information:  The blood pressure is slowly creeping up          This patient is accompanied by their self who contributes to the history of their care.    The following portions of the patient's history were reviewed and updated as appropriate: allergies, current medications, past family history, past medical history, past social history, past surgical history and problem list.    Subjective      Review of Systems:   Review of Systems   Constitutional:  Negative for malaise/fatigue.   Eyes:  Negative for blurred vision.   Respiratory:  Negative for  shortness of breath.    Cardiovascular:  Negative for chest pain, palpitations and orthopnea.    - See HPI    Past Medical History:   Past Medical History:   Diagnosis Date    Allergic 2000    Allergic rhinitis     Anesthesia complication     had to have O2 cannula during endoscopy    Anxiety 2004    Arthritis of back     Bone pain     right foot    Cancer 2005    Skin cancer squamous    Cataract 2010    Surgery recently    Cervical disc disorder 2022    Chronic pain disorder 2012    Coronary artery disease 2005    Mitral valve    CTS (carpal tunnel syndrome)     Depression 1981 1981 - Hospitalized    Dislocation of finger     Fibrocystic breast disease 2000    Current benign breast biopsies    GERD (gastroesophageal reflux disease) 2001    Glaucoma Surgery recently    Headache 2021    None this year    Hiatal hernia     Hyperlipidemia     Medication    Hypertension 1997    Elevated today    Insomnia 2013    Irritable bowel syndrome (IBS) 2002    Recurrent abdominal cramps    Joint pain 2012    Knee swelling     Low back pain 2008    Low back strain     Lumbar scoliosis 2017    Lumbar spondylosis 2000    Chronic low back pain    Lumbosacral disc disease     Migraine 2021    Mitral valve insufficiency     Mitral valve prolapse 2000    Neck pain 2022    Obesity 2000    Osteoarthritis     Osteopenia     Periarthritis of shoulder     right    Peripheral neuropathy     Post menopausal syndrome 2004    Refractory hot flashes    Rotator cuff syndrome 2023    right    Sleep disorder 06/14/2016    Spinal stenosis 2022    Thoracic disc disorder     Wears reading eyeglasses     Wrist sprain     left       Past Surgical History:   Past Surgical History:   Procedure Laterality Date    BACK SURGERY  09/2023    BREAST BIOPSY Right remote    benign disease    CATARACT EXTRACTION, BILATERAL Bilateral 2021    CHOLECYSTECTOMY  1998    COLONOSCOPY  ?    ENDOSCOPY      FOOT SURGERY      FRACTURE SURGERY  5/13/25    Sagital bands  right hand    HAND TENDON SURGERY  2025    Right middle finger extensor centralization w/ tendon transfer -dr. willis    HYSTERECTOMY  1998    JOINT REPLACEMENT  25    LAMINECTOMY  2023    LUMBAR LAMINECTOMY Bilateral 10/18/2023    Procedure: LAMINECTOMY BILATERAL DECOMPRESSION L4-5 RIGHT;  Surgeon: Jonel Browning MD;  Location:  [facility];  Service: Neurosurgery;  Laterality: Bilateral;    REFRACTIVE SURGERY      RK    RESECTION DISTAL CLAVICLE Right 2025    Procedure: DISTAL CLAVICLE EXCISION RIGHT;  Surgeon: Jv Wei Jr., MD;  Location:  [facility];  Service: Orthopedics;  Laterality: Right;    SKIN BIOPSY      SPINE SURGERY      TOTAL SHOULDER ARTHROPLASTY W/ DISTAL CLAVICLE EXCISION Right 2025    Procedure: REVERSE TOTAL SHOULDER ARTHROPLASTY WITH BICEPS TENODESIS RIGHT;  Surgeon: Jv Wei Jr., MD;  Location:  [facility];  Service: Orthopedics;  Laterality: Right;    TRIGGER POINT INJECTION      Knee    URETHRAL SUSPENSION      laparoscopic for stress incontinence       Family History:   Family History   Problem Relation Age of Onset    Pulmonary fibrosis Mother          age 82    Hypertension Mother     Lumbar disc disease Mother     Prostate cancer Father          age 76    Cancer Father         Prostate    Hypertension Sister             Goiter Sister     Hypothyroidism Sister     Anxiety disorder Sister     Miscarriages / Stillbirths Sister     Thyroid disease Sister     Heart disease Brother          Age 48 heart attack was cigarette smoker    Hiatal hernia Brother     Early death Brother         Heart attack at age 48    Heart attack Brother     Breast cancer Other         Maternal and paternal aunts    Hypertension Brother     Obesity Maternal Uncle     Diabetes Maternal Uncle     Arthritis Paternal Aunt     Hypertension Brother     Hyperlipidemia Brother        Social History:   Social History     Socioeconomic History     Marital status:    Tobacco Use    Smoking status: Former     Current packs/day: 0.00     Average packs/day: 2.0 packs/day for 10.0 years (20.0 ttl pk-yrs)     Types: Cigarettes     Start date: 1971     Quit date: 1981     Years since quittin.4     Passive exposure: Never    Smokeless tobacco: Never   Vaping Use    Vaping status: Never Used   Substance and Sexual Activity    Alcohol use: Not Currently     Alcohol/week: 3.0 standard drinks of alcohol     Types: 3 Glasses of wine per week     Comment: occassional    Drug use: Never    Sexual activity: Yes     Partners: Male     Birth control/protection: Hysterectomy       Tobacco History:   Social History     Tobacco Use   Smoking Status Former    Current packs/day: 0.00    Average packs/day: 2.0 packs/day for 10.0 years (20.0 ttl pk-yrs)    Types: Cigarettes    Start date: 1971    Quit date: 1981    Years since quittin.4    Passive exposure: Never   Smokeless Tobacco Never       Medications:   Outpatient Medications Prior to Visit   Medication Sig Dispense Refill    aspirin (SB Low Dose ASA EC) 81 MG EC tablet Take 1 tablet by mouth Daily.      atorvastatin (LIPITOR) 80 MG tablet Take 1 tablet by mouth Daily. 90 tablet 3    citalopram (CeleXA) 20 MG tablet Take 1 tablet by mouth Daily. 90 tablet 3    Coenzyme Q10 (CO Q-10) 200 MG capsule Take 1 capsule by mouth.      estradiol (MINIVELLE, VIVELLE-DOT) 0.05 MG/24HR patch       ezetimibe (ZETIA) 10 MG tablet Take 1 tablet by mouth Daily. 90 tablet 3    fluticasone (FLONASE) 50 MCG/ACT nasal spray 2 sprays by Each Nare route Daily. 48 g 3    lisinopril (PRINIVIL,ZESTRIL) 40 MG tablet Take 1 tablet by mouth Every Morning. 90 tablet 3    Magnesium Citrate 200 MG tablet Take 2 tablets by mouth Every Night.      melatonin 5 MG tablet tablet Take 1 tablet by mouth Every Night.      nystatin (MYCOSTATIN) 360525 UNIT/GM powder Apply  topically to the appropriate area as directed 2 (Two) Times a  "Day. 60 g 1    Omega-3 Fatty Acids (FISH OIL) 1000 MG capsule capsule Take 1 capsule by mouth Daily With Breakfast.      omeprazole (priLOSEC) 20 MG capsule Take 1 capsule by mouth Daily. 90 capsule 3    sucralfate (CARAFATE) 1 g tablet Take 1 tablet by mouth Every 12 (Twelve) Hours. 180 tablet 0    valACYclovir (VALTREX) 500 MG tablet Take 1 tablet by mouth Daily.      vitamin C (ASCORBIC ACID) 500 MG tablet Take 1 tablet by mouth Daily.      docusate sodium (COLACE) 100 MG capsule Take 1 capsule by mouth 2 (Two) Times a Day. 20 capsule 0    HYDROcodone-acetaminophen (NORCO) 7.5-325 MG per tablet Take 1 tablet by mouth Every 6 (Six) Hours As Needed for Moderate Pain (Pain). 24 tablet 0    ondansetron (Zofran) 4 MG tablet Take 1 tablet by mouth Every 8 (Eight) Hours As Needed for Nausea or Vomiting. 12 tablet 0     No facility-administered medications prior to visit.        Allergies:   Allergies   Allergen Reactions    Shellfish Allergy Other (See Comments)     Head congestion    Atenolol Other (See Comments)     Fatigue    Buspirone Headache      Headache    Codeine Other (See Comments)     Depression    Pseudoephedrine Anxiety    Rosuvastatin Other (See Comments)     Fatigue    Westley-E.P.A. [Dha-Epa-Vitamin E] Anxiety    Simvastatin Other (See Comments)     Facial numbness    Trazodone Other (See Comments)     Nightmares       Objective   Objective     Physical Exam:  Vital Signs:   Vitals:    06/13/25 1252   BP: 144/84   Pulse: 68   SpO2: 98%   Weight: 64.4 kg (142 lb)   Height: 152.4 cm (60\")     Body mass index is 27.73 kg/m².     Physical Exam  Nursing note reviewed  Const: NAD, A&Ox4, Pleasant, Cooperative  Eyes: EOMI, no conjunctivitis  ENT: No nasal discharge present, neck supple  Cardiac: Regular rate and rhythm, no cyanosis  Resp: Respiratory rate within normal limits, no increased work of breathing, no audible wheezing or retractions noted  GI: No distention or ascites  MSK: Motor and sensation grossly " intact in bilateral upper extremities  Neurologic: CN II-XII grossly intact  Psych: Appropriate mood and behavior.  Skin: Warm, dry  Procedures/Radiology     Procedures  No radiology results for the last 7 days     Assessment & Plan   Assessment / Plan      Assessment/Plan:   Problems Addressed This Visit  Diagnoses and all orders for this visit:    1. Therapeutic drug monitoring (Primary)  -     Compliance Drug Analysis, Ur - Urine, Clean Catch; Future    2. Primary hypertension  Assessment & Plan:  She had come off HCTZ previously due to some low sodium, in April we stopped amlodipine but kept her on the lisinopril 40 mg.  She has 2 surgeries in May and has noted her blood pressure be higher since that time.  Recommend she continue lisinopril 40 mg daily, work on weight loss.      3. Neurogenic claudication due to lumbar spinal stenosis  -     HYDROcodone-acetaminophen (NORCO) 5-325 MG per tablet; Take 1-2 tablets by mouth Every 12 (Twelve) Hours As Needed for Severe Pain.  Dispense: 60 tablet; Refill: 0      Problem List Items Addressed This Visit          Cardiac and Vasculature    Hypertension (Chronic)    Current Assessment & Plan   She had come off HCTZ previously due to some low sodium, in April we stopped amlodipine but kept her on the lisinopril 40 mg.  She has 2 surgeries in May and has noted her blood pressure be higher since that time.  Recommend she continue lisinopril 40 mg daily, work on weight loss.            Musculoskeletal and Injuries    Neurogenic claudication due to lumbar spinal stenosis    Relevant Medications    HYDROcodone-acetaminophen (NORCO) 5-325 MG per tablet     Other Visit Diagnoses         Therapeutic drug monitoring    -  Primary    Relevant Orders    Compliance Drug Analysis, Ur - Urine, Clean Catch            New Medications Ordered This Visit   Medications    HYDROcodone-acetaminophen (NORCO) 5-325 MG per tablet     Sig: Take 1-2 tablets by mouth Every 12 (Twelve) Hours As  Needed for Severe Pain.     Dispense:  60 tablet     Refill:  0        Patient Instructions   18 clicks of Ozempic per week    Follow Up:   No follow-ups on file.      DO FARZANA Jiang RD  [facility]  [address]  [facility] [address]  Fax [phone]  Phone [phone]    Disclaimer to patients: The 21st Century Cares Act makes medical notes like these available to patients in the interest of transparency. However, please be advised that this is still a medical document. It is intended as ymuj-dj-eera communication. Many sections may include medical language or jargon, abbreviations, and additional verbiage that are unfamiliar or confusing. In some ways it may come across as blunt, direct, or may be summarized in order to clearly and concisely communicate the most crucial information to medical professionals. It may also include mentions of conditions that are unlikely but considered as part of the differential diagnosis, including serious disorders. These are not always discussed at length at the time of appointment because their likelihood is so low, but may be included in a medical note to make it clear what has been considered and/or ruled out as part of a work-up. Medical documents are intended to carry relevant information, facts as evident, and the personal clinical opinion of the physician. If you have any questions regarding this medical document, please bring them to the attention of the physician at your next scheduled appointment.

## 2025-06-13 NOTE — ASSESSMENT & PLAN NOTE
She had come off HCTZ previously due to some low sodium, in April we stopped amlodipine but kept her on the lisinopril 40 mg.  She has 2 surgeries in May and has noted her blood pressure be higher since that time.  Recommend she continue lisinopril 40 mg daily, work on weight loss.

## 2025-06-18 LAB — DRUGS UR: NORMAL

## 2025-06-26 ENCOUNTER — TELEPHONE (OUTPATIENT)
Dept: CARDIOLOGY | Facility: CLINIC | Age: 70
End: 2025-06-26
Payer: MEDICARE

## 2025-06-26 NOTE — TELEPHONE ENCOUNTER
Caller: Luh Horner    Relationship: Self    Best call back number: [phone]    Which medication are you concerned about: ATORVASTATIN    Who prescribed you this medication:     When did you start taking this medication: A FEW MONTHS.    What are your concerns: THE PT RECENTLY HAD HER MEDICATION DOUBLED. SINCE THEN SHE HAS NOTICED SHE IS MORE TIRED THAN USUAL AND HER GAIT SEEMS OFF WHEN WALKING. SHE IS CONCERNED ABOUT HOW IT IS AFFECTING HER LEG MUSCLES. SHE WOULD LIKE TO ADDRESS THIS WITH . PLEASE REACH OUT TO THE PT TO ADDRESS THIS.     How long have you had these concerns: SEVERAL WEEKS NOW

## 2025-06-26 NOTE — TELEPHONE ENCOUNTER
Called pt to address message. No answer. Pt was last seen in office 02/01/24. LVM recommending pt call back to schedule appt with Dr. Devi for assessment and to discuss concerns.

## 2025-07-01 ENCOUNTER — TELEPHONE (OUTPATIENT)
Dept: CARDIOLOGY | Facility: CLINIC | Age: 70
End: 2025-07-01
Payer: MEDICARE

## 2025-07-02 RX ORDER — CITALOPRAM HYDROBROMIDE 20 MG/1
20 TABLET ORAL DAILY
Qty: 90 TABLET | Refills: 0 | Status: SHIPPED | OUTPATIENT
Start: 2025-07-02

## 2025-07-02 NOTE — TELEPHONE ENCOUNTER
Rx Refill Note  Requested Prescriptions     Pending Prescriptions Disp Refills    citalopram (CeleXA) 20 MG tablet [Pharmacy Med Name: CITALOPRAM HYDROBROMIDE TABS 20MG] 90 tablet 3     Sig: TAKE 1 TABLET DAILY      Last office visit with prescribing clinician: 6/13/2025   Last telemedicine visit with prescribing clinician: Visit date not found   Next office visit with prescribing clinician: 10/2/2025                         Would you like a call back once the refill request has been completed: [] Yes [] No    If the office needs to give you a call back, can they leave a voicemail: [] Yes [] No    April WAQAS Feliciano  07/02/25, 10:58 EDT

## 2025-07-03 ENCOUNTER — TELEPHONE (OUTPATIENT)
Dept: CARDIOLOGY | Facility: CLINIC | Age: 70
End: 2025-07-03
Payer: MEDICARE

## 2025-07-03 NOTE — TELEPHONE ENCOUNTER
Spoke with pt. Reviewed need for f/u labs due 6/24. Also need for f/u appt. Transferred to office  to make appt. Pt voiced understanding to all of the above.

## 2025-07-09 DIAGNOSIS — R35.0 URINARY FREQUENCY: ICD-10-CM

## 2025-07-10 RX ORDER — ESTRADIOL 0.1 MG/G
CREAM VAGINAL
Qty: 42.5 G | Refills: 2 | Status: SHIPPED | OUTPATIENT
Start: 2025-07-10

## 2025-07-11 DIAGNOSIS — M48.062 NEUROGENIC CLAUDICATION DUE TO LUMBAR SPINAL STENOSIS: ICD-10-CM

## 2025-07-11 NOTE — TELEPHONE ENCOUNTER
Rx Refill Note  Requested Prescriptions     Pending Prescriptions Disp Refills    HYDROcodone-acetaminophen (NORCO) 5-325 MG per tablet 60 tablet 0     Sig: Take 1-2 tablets by mouth Every 12 (Twelve) Hours As Needed for Severe Pain.      Last office visit with prescribing clinician: 6/13/2025   Last telemedicine visit with prescribing clinician: Visit date not found   Next office visit with prescribing clinician: 10/2/2025                         Would you like a call back once the refill request has been completed: [] Yes [] No    If the office needs to give you a call back, can they leave a voicemail: [] Yes [] No    Estephania lLoyd MA  07/11/25, 13:44 EDT

## 2025-07-12 RX ORDER — HYDROCODONE BITARTRATE AND ACETAMINOPHEN 5; 325 MG/1; MG/1
1-2 TABLET ORAL EVERY 12 HOURS PRN
Qty: 60 TABLET | Refills: 0 | Status: SHIPPED | OUTPATIENT
Start: 2025-07-12

## 2025-07-16 ENCOUNTER — OFFICE VISIT (OUTPATIENT)
Dept: ORTHOPEDIC SURGERY | Facility: CLINIC | Age: 70
End: 2025-07-16
Payer: MEDICARE

## 2025-07-16 DIAGNOSIS — G56.03 CARPAL TUNNEL SYNDROME, BILATERAL: Primary | ICD-10-CM

## 2025-07-16 DIAGNOSIS — Z98.890 POST-OPERATIVE STATE: ICD-10-CM

## 2025-07-16 DIAGNOSIS — M18.12 ARTHRITIS OF CARPOMETACARPAL (CMC) JOINT OF LEFT THUMB: ICD-10-CM

## 2025-07-16 NOTE — PROGRESS NOTES
[facility]     Follow-up Office Visit       Date: 07/16/2025   Patient Name: Luh Horner  MRN: [id number]  YOB: 1955    Chief Complaint:   Chief Complaint   Patient presents with   • Post-op      7 wk f/u- s/p Right middle finger extensor centralization w/ tendon transfer (DOS 5/13/25)       History of Present Illness:   Luh Horner is a 69 y.o. female for follow-up right middle finger extensor centralization 2 months ago.  Imaging was at her last visit.  He has some pain at the middle finger MCP but otherwise no subluxation of the tendon.  She has completed therapy for her hand.  She does have new concern regarding bilateral hand numbness and tingling in her thumb index and middle fingers.  Reports symptoms are worse at night and during activity.  She also reports ongoing left basilar thumb pain.      Subjective   Review of Systems:   Review of Systems   Constitutional:  Negative for chills, fever, unexpected weight gain and unexpected weight loss.   HENT:  Negative for congestion, postnasal drip and rhinorrhea.    Eyes:  Negative for blurred vision.   Respiratory:  Negative for shortness of breath.    Cardiovascular:  Negative for leg swelling.   Gastrointestinal:  Negative for abdominal pain, nausea and vomiting.   Genitourinary:  Negative for difficulty urinating.   Musculoskeletal:  Positive for arthralgias. Negative for gait problem, joint swelling and myalgias.   Skin:  Negative for skin lesions and wound.   Neurological:  Negative for dizziness, weakness, light-headedness and numbness.   Hematological:  Does not bruise/bleed easily.   Psychiatric/Behavioral:  Negative for depressed mood.         Pertinent review of systems per HPI    I reviewed the patient's chief complaint, history of present illness, review of systems, past medical history, surgical history, family history, social history,  medications and allergy list in the EMR on 07/16/2025 and agree with the findings above.    Objective    Vital Signs: There were no vitals filed for this visit.  BMI: There is no height or weight on file to calculate BMI.     General Appearance: No acute distress. Alert and oriented.     Chest:  Non-labored breathing on room air      Ortho Exam:  Right middle finger incision well-healed.  Patient able to flex and extend the middle finger without extensor subluxation.  Positive Tinel bilateral carpal tunnels.  Positive bilateral carpal compression test.  No thenar wasting bilaterally.  Negative Tinel at the bilateral cubital tunnels.  Tender palpation of the left thumb CMC.  Positive CMC shuck test.  There is a palpable volar ganglion cyst just proximal to the CMC  Fingers warm and well-perfused distally  Sensation intact to light touch in the median, radial and ulnar nerve distributions    Imaging/Studies:   Imaging Results (Last 24 Hours)       ** No results found for the last 24 hours. **                Procedures:  Procedures    Quality Measures:   ACP:   ACP discussion was deferred.    Tobacco:   Luh Horner  reports that she quit smoking about 44 years ago. Her smoking use included cigarettes. She started smoking about 54 years ago. She has a 20 pack-year smoking history. She has never been exposed to tobacco smoke. She has never used smokeless tobacco.    Assessment / Plan    Assessment/Plan:      Diagnosis Plan   1. Carpal tunnel syndrome, bilateral        2. Arthritis of carpometacarpal (CMC) joint of left thumb        3. Post-operative state            Presents for follow-up 2-month status post right middle finger extensor centralization.  She is doing well from that standpoint progressing as expected.  She does have some pain at the middle finger MCP due to combined arthritis however reports it is mild.    She also has evidence of bilateral carpal tunnel syndrome on exam.  We discussed her diagnosis  as well as treatment options and bracing anti-inflammatories corticosteroid ejections surgery.  Recommend bilateral EMG nerve study.  We also again discussed treatment options for CMC arthritis including repeat injection versus CMC arthroplasty.  Patient like to continue with observation at this time.    Follow Up:   No follow-ups on file.        Nicolas Arguello MD  [facility]

## 2025-07-18 ENCOUNTER — LAB (OUTPATIENT)
Dept: LAB | Facility: HOSPITAL | Age: 70
End: 2025-07-18
Payer: MEDICARE

## 2025-07-18 DIAGNOSIS — E78.00 PURE HYPERCHOLESTEROLEMIA: Chronic | ICD-10-CM

## 2025-07-18 LAB
ALBUMIN SERPL-MCNC: 4.2 G/DL (ref 3.5–5.2)
ALBUMIN/GLOB SERPL: 1.4 G/DL
ALP SERPL-CCNC: 75 U/L (ref 39–117)
ALT SERPL W P-5'-P-CCNC: 34 U/L (ref 1–33)
ANION GAP SERPL CALCULATED.3IONS-SCNC: 10 MMOL/L (ref 5–15)
AST SERPL-CCNC: 30 U/L (ref 1–32)
BILIRUB SERPL-MCNC: 0.4 MG/DL (ref 0–1.2)
BUN SERPL-MCNC: 9 MG/DL (ref 8–23)
BUN/CREAT SERPL: 10.6 (ref 7–25)
CALCIUM SPEC-SCNC: 9.2 MG/DL (ref 8.6–10.5)
CHLORIDE SERPL-SCNC: 99 MMOL/L (ref 98–107)
CHOLEST SERPL-MCNC: 136 MG/DL (ref 0–200)
CO2 SERPL-SCNC: 27 MMOL/L (ref 22–29)
CREAT SERPL-MCNC: 0.85 MG/DL (ref 0.57–1)
CRP SERPL-MCNC: 0.06 MG/DL (ref 0.01–0.5)
EGFRCR SERPLBLD CKD-EPI 2021: 73.4 ML/MIN/1.73
GLOBULIN UR ELPH-MCNC: 3 GM/DL
GLUCOSE SERPL-MCNC: 84 MG/DL (ref 65–99)
HDLC SERPL-MCNC: 56 MG/DL (ref 40–60)
LDLC SERPL CALC-MCNC: 60 MG/DL (ref 0–100)
LDLC/HDLC SERPL: 1.03 {RATIO}
POTASSIUM SERPL-SCNC: 4.3 MMOL/L (ref 3.5–5.2)
PROT SERPL-MCNC: 7.2 G/DL (ref 6–8.5)
SODIUM SERPL-SCNC: 136 MMOL/L (ref 136–145)
TRIGL SERPL-MCNC: 113 MG/DL (ref 0–150)
VLDLC SERPL-MCNC: 20 MG/DL (ref 5–40)

## 2025-07-18 PROCEDURE — 80061 LIPID PANEL: CPT

## 2025-07-18 PROCEDURE — 86141 C-REACTIVE PROTEIN HS: CPT

## 2025-07-18 PROCEDURE — 36415 COLL VENOUS BLD VENIPUNCTURE: CPT

## 2025-07-18 PROCEDURE — 80053 COMPREHEN METABOLIC PANEL: CPT

## 2025-07-24 ENCOUNTER — OFFICE VISIT (OUTPATIENT)
Dept: CARDIOLOGY | Facility: CLINIC | Age: 70
End: 2025-07-24
Payer: MEDICARE

## 2025-07-24 VITALS
OXYGEN SATURATION: 98 % | HEIGHT: 60 IN | BODY MASS INDEX: 27.48 KG/M2 | SYSTOLIC BLOOD PRESSURE: 138 MMHG | DIASTOLIC BLOOD PRESSURE: 84 MMHG | WEIGHT: 140 LBS | HEART RATE: 72 BPM

## 2025-07-24 DIAGNOSIS — E78.00 PURE HYPERCHOLESTEROLEMIA: Primary | ICD-10-CM

## 2025-07-24 RX ORDER — ATORVASTATIN CALCIUM 40 MG/1
40 TABLET, FILM COATED ORAL DAILY
Qty: 90 TABLET | Refills: 3 | Status: SHIPPED | OUTPATIENT
Start: 2025-07-24

## 2025-07-24 NOTE — PROGRESS NOTES
"Chief Complaint  Hyperlipidemia (Overdue follow up)      Subjective   History of Present Illness    Problem List  -atherosclerosis of aorta  -MVP, recent echo normal for age  -HLD,  [id number]  -BMI 32, bow 27  -EKG non specific changes    Ms. Horner is a 67 year old lady with above hx.  Occasional dyspnea when doing stairs but has been way for years and non limiting.  Able to adls and her exercise class.  Limiting factor is back pain.  No chest pain or syncope.  No prominent murmur but hx of MVP.  Brother  in 50s of MI.  ROS negative except for the above.      Update 24  Went over testing.  No CV complaints    Update 25  No complaints beyond muscle aches.         Objective   Vital Signs:  Vitals:    25 1410   BP: 138/84   Pulse: 72   SpO2: 98%     Estimated body mass index is 27.34 kg/m² as calculated from the following:    Height as of this encounter: 152.4 cm (60\").    Weight as of this encounter: 63.5 kg (140 lb).       Physical Exam  HENT:      Head: Normocephalic.   Eyes:      Extraocular Movements: Extraocular movements intact.   Cardiovascular:      Rate and Rhythm: Normal rate and regular rhythm.      Heart sounds: No murmur heard.     No gallop.   Pulmonary:      Breath sounds: Normal breath sounds.   Abdominal:      Palpations: Abdomen is soft.   Musculoskeletal:      Right lower leg: No edema.      Left lower leg: No edema.   Skin:     General: Skin is warm and dry.   Neurological:      General: No focal deficit present.      Mental Status: She is alert.   Psychiatric:         Mood and Affect: Mood normal.               Assessment   -atherosclerosis of aorta  -MVP, recent echo normal for age  -HLD,  [id number]  -BMI 32, bow 27  -EKG non specific changes    Plan   -cont. Aspirin, zetia.  Will try lower dose of atorvastatin to see if helps with muscle aches.    -lost weight on wegovy.  Now off and maintaining  -6 month follow up    Return in about 6 months (around 2026).  Damián Michaud " MD Shandra

## 2025-08-08 DIAGNOSIS — M48.062 NEUROGENIC CLAUDICATION DUE TO LUMBAR SPINAL STENOSIS: ICD-10-CM

## 2025-08-08 RX ORDER — HYDROCODONE BITARTRATE AND ACETAMINOPHEN 5; 325 MG/1; MG/1
1-2 TABLET ORAL EVERY 12 HOURS PRN
Qty: 60 TABLET | Refills: 0 | Status: SHIPPED | OUTPATIENT
Start: 2025-08-08

## 2025-08-12 DIAGNOSIS — G56.03 CARPAL TUNNEL SYNDROME, BILATERAL: ICD-10-CM

## 2025-08-20 ENCOUNTER — OFFICE VISIT (OUTPATIENT)
Dept: ORTHOPEDIC SURGERY | Facility: CLINIC | Age: 70
End: 2025-08-20
Payer: MEDICARE

## 2025-08-20 VITALS
BODY MASS INDEX: 27.48 KG/M2 | WEIGHT: 139.99 LBS | SYSTOLIC BLOOD PRESSURE: 180 MMHG | HEIGHT: 60 IN | DIASTOLIC BLOOD PRESSURE: 62 MMHG

## 2025-08-20 DIAGNOSIS — G56.03 CARPAL TUNNEL SYNDROME, BILATERAL: Primary | ICD-10-CM

## 2025-08-20 RX ORDER — DEXAMETHASONE SODIUM PHOSPHATE 4 MG/ML
2 INJECTION, SOLUTION INTRA-ARTICULAR; INTRALESIONAL; INTRAMUSCULAR; INTRAVENOUS; SOFT TISSUE
Status: COMPLETED | OUTPATIENT
Start: 2025-08-20 | End: 2025-08-20

## 2025-08-20 RX ORDER — LIDOCAINE HYDROCHLORIDE 10 MG/ML
0.5 INJECTION, SOLUTION EPIDURAL; INFILTRATION; INTRACAUDAL; PERINEURAL
Status: COMPLETED | OUTPATIENT
Start: 2025-08-20 | End: 2025-08-20

## 2025-08-20 RX ADMIN — DEXAMETHASONE SODIUM PHOSPHATE 2 MG: 4 INJECTION, SOLUTION INTRA-ARTICULAR; INTRALESIONAL; INTRAMUSCULAR; INTRAVENOUS; SOFT TISSUE at 15:09

## 2025-08-20 RX ADMIN — LIDOCAINE HYDROCHLORIDE 0.5 ML: 10 INJECTION, SOLUTION EPIDURAL; INFILTRATION; INTRACAUDAL; PERINEURAL at 15:09

## 2025-08-27 RX ORDER — FLUTICASONE PROPIONATE 50 MCG
2 SPRAY, SUSPENSION (ML) NASAL DAILY
Qty: 48 G | Refills: 3 | Status: SHIPPED | OUTPATIENT
Start: 2025-08-27

## (undated) DEVICE — NEEDLE, QUINCKE 22GX3.5": Brand: MEDLINE INDUSTRIES, INC.

## (undated) DEVICE — CABL BIPOL MEGADYNE 12FT DISP

## (undated) DEVICE — GLV SURG PREMIERPRO MIC LTX PF SZ8 BRN

## (undated) DEVICE — SCRB SURG BACTOSHIELD CHG 4PCT 4OZ

## (undated) DEVICE — APPL CHLORAPREP TINTED 26ML TEAL

## (undated) DEVICE — SYR CONTRL PRESS/LO FIX/M/LL W/THMB/RNG 10ML

## (undated) DEVICE — DRP MICROSCOPE 4 BINOCULAR CV 54X150IN

## (undated) DEVICE — ANTIBACTERIAL UNDYED BRAIDED (POLYGLACTIN 910), SYNTHETIC ABSORBABLE SUTURE: Brand: COATED VICRYL

## (undated) DEVICE — PK NEURO DISC 10

## (undated) DEVICE — SPNG GZ WOVN 4X4IN 12PLY 10/BX STRL

## (undated) DEVICE — HDRST INTUB GENTLETOUCH SLOT 7IN RT

## (undated) DEVICE — SOL ISO/ALC RUB 70PCT 4OZ

## (undated) DEVICE — Device

## (undated) DEVICE — GLV SURG BIOGEL LTX PF 8

## (undated) DEVICE — PAD ARMBRD SURG CONVOL 7.5X20X2IN

## (undated) DEVICE — ADHS LIQ MASTISOL 2/3ML

## (undated) DEVICE — ELECTRD BLD EZ CLN MOD 6.5IN

## (undated) DEVICE — STRAP POSTN KN/BDY FM 5X72IN DISP

## (undated) DEVICE — PATIENT RETURN ELECTRODE, SINGLE-USE, CONTACT QUALITY MONITORING, ADULT, WITH 9FT CORD, FOR PATIENTS WEIGING OVER 33LBS. (15KG): Brand: MEGADYNE

## (undated) DEVICE — STRIP,CLOSURE,WOUND,MEDI-STRIP,1/2X4: Brand: MEDLINE

## (undated) DEVICE — PCH INST SURG INVISISHIELD 2PCKT

## (undated) DEVICE — TOOL MR8-T12MH25M MR8 12CM T M 2FL 2.5MM: Brand: MIDAS REX MR8

## (undated) DEVICE — C-ARM: Brand: UNBRANDED

## (undated) DEVICE — DRSNG WND BORDR/ADHS NONADHR/GZ LF 4X4IN STRL